# Patient Record
Sex: FEMALE | Race: WHITE | Employment: OTHER | URBAN - METROPOLITAN AREA
[De-identification: names, ages, dates, MRNs, and addresses within clinical notes are randomized per-mention and may not be internally consistent; named-entity substitution may affect disease eponyms.]

---

## 2017-02-17 ENCOUNTER — OFFICE VISIT (OUTPATIENT)
Dept: CARDIOLOGY CLINIC | Age: 82
End: 2017-02-17

## 2017-02-17 ENCOUNTER — CLINICAL SUPPORT (OUTPATIENT)
Dept: CARDIOLOGY CLINIC | Age: 82
End: 2017-02-17

## 2017-02-17 VITALS
DIASTOLIC BLOOD PRESSURE: 70 MMHG | WEIGHT: 208 LBS | BODY MASS INDEX: 33.43 KG/M2 | HEIGHT: 66 IN | HEART RATE: 99 BPM | SYSTOLIC BLOOD PRESSURE: 130 MMHG | OXYGEN SATURATION: 98 % | RESPIRATION RATE: 16 BRPM

## 2017-02-17 DIAGNOSIS — R00.0 TACHYCARDIA: Primary | ICD-10-CM

## 2017-02-17 DIAGNOSIS — I51.7 LAE (LEFT ATRIAL ENLARGEMENT): ICD-10-CM

## 2017-02-17 DIAGNOSIS — R06.02 SOB (SHORTNESS OF BREATH): ICD-10-CM

## 2017-02-17 DIAGNOSIS — I49.3 PVC (PREMATURE VENTRICULAR CONTRACTION): ICD-10-CM

## 2017-02-17 DIAGNOSIS — G47.33 OSA (OBSTRUCTIVE SLEEP APNEA): ICD-10-CM

## 2017-02-17 DIAGNOSIS — I10 ESSENTIAL HYPERTENSION: ICD-10-CM

## 2017-02-17 DIAGNOSIS — I35.0 NONRHEUMATIC AORTIC VALVE STENOSIS: Primary | ICD-10-CM

## 2017-02-17 DIAGNOSIS — I35.0 NONRHEUMATIC AORTIC VALVE STENOSIS: ICD-10-CM

## 2017-02-17 DIAGNOSIS — I65.29 STENOSIS OF CAROTID ARTERY, UNSPECIFIED LATERALITY: Primary | ICD-10-CM

## 2017-02-17 DIAGNOSIS — I77.3 FIBROMUSCULAR DYSPLASIA (HCC): ICD-10-CM

## 2017-02-17 NOTE — PROCEDURES
[facility]  *** FINAL REPORT ***    Name: Sukhdeep Toro  MRN: [id number]       Outpatient  : 1930  HIS Order #: [id number]  [id number] [address] Visit #: [id number]  Date: 2017    TYPE OF TEST: Cerebrovascular Duplex    REASON FOR TEST  Known carotid stenosis    Right Carotid:-             Proximal               Mid                 Distal  cm/s  Systolic  Diastolic  Systolic  Diastolic  Systolic  Diastolic  CCA:     51.3      13.0                            56.0      12.0  Bulb:  ECA:     80.0      11.0  ICA:     34.0      12.0       45.0      15.0       73.0      15.0  ICA/CCA:  0.6       1.0    ICA Stenosis: Unknown    Right Vertebral:-  Finding: Antegrade  Sys:       40.0  Janie:       12.0    Right Subclavian:    Left Carotid:-            Proximal                Mid                 Distal  cm/s  Systolic  Diastolic  Systolic  Diastolic  Systolic  Diastolic  CCA:     78.5      14.0                            51.0      12.0  Bulb:  ECA:     62.0      10.0  ICA:     35.0      12.0       33.0      12.0       63.0      19.0  ICA/CCA:  0.7       1.0    ICA Stenosis: Unknown    Left Vertebral:-  Finding: Antegrade  Sys:       24.0  Janie:        8.0    Left Subclavian:    INTERPRETATION/FINDINGS  PROCEDURE:  Evaluation of the extracranial cerebrovascular arteries  with ultrasound (B-mode imaging, pulsed Doppler, color Doppler). Includes the common carotid, internal carotid, external carotid, and  vertebral arteries. FINDINGS:  Right:  Minimal heterogeneous plaque is present at the origins of the  internal and external carotid arteries. There are no significant  color flow filling defects and peak velocities are normal.  Left:  Minimal heterogeneous plaque is visualized at the origins of  the internal and external carotid arteries. There are no significant  filling defects noted.   Peak velocities are normal.    IMPRESSION: Findings are consistent with 1-49% stenosis of the right  and left internal carotid arteries. Disease severity is at the low  end of range. Vertebral arteries are patent with antegrade flow. In comparison with a previous study dated 10/15/2012, the presence of  plaque at the right carotid bifurcation is newly noted. ADDITIONAL COMMENTS    I have personally reviewed the data relevant to the interpretation of  this  study. TECHNOLOGIST: Margarita Galindo RVT, RDMS, RDCS  Signed: 02/17/2017 01:55 PM    PHYSICIAN: Timothy Lagos.  Romelia Mcrae MD  Signed: 02/20/2017 03:29 PM

## 2017-02-17 NOTE — MR AVS SNAPSHOT
Visit Information Date & Time Provider Department Dept. Phone Encounter #  
 2/17/2017  9:40 AM Marylen Irving, MD [facility] Natalie Krause [phone] [id number] Your Appointments 3/23/2017 12:00 PM  
ROUTINE CARE with Ruth Nicole MD  
[facility]) Appt Note: 6 month f/u  
 [place] [address] [facility] [address]  
[address]  
  
    
 3/24/2017  9:00 AM  
Any with Yamilka Garrett MD  
[address] [place] [address] ([facility] Appt Note: 3 month cpap folllow up; -  
 [address] [facility] [address] [facility] [address]  
  
   
 [address] [facility] [address] [address] 8/15/2017 11:00 AM  
ECHO CARDIOGRAMS 2D with ECHO, [facility] ([facility]IR[facility] SCHEDULING) Appt Note: echo and 6 mfu per nazario [address] [facility] [address]  
[address] [phone]  
  
    
 8/15/2017 11:40 AM  
ESTABLISHED PATIENT with Marylen Irving, MD  
[facility] ([facility]) Appt Note: echo and 6 mfu per nazario [address] [place] [address]  
[address] [address] Upcoming Health Maintenance Date Due OSTEOPOROSIS SCREENING (DEXA) 7/3/1995 MEDICARE YEARLY EXAM 7/3/1995 DTaP/Tdap/Td series (1 - Tdap) 9/21/2011 HEMOGLOBIN A1C Q6M 3/17/2015 MICROALBUMIN Q1 3/24/2015 LIPID PANEL Q1 3/24/2015 EYE EXAM RETINAL OR DILATED Q1 2/17/2016 GLAUCOMA SCREENING Q2Y 3/1/2016 FOOT EXAM Q1 4/27/2016 INFLUENZA AGE 9 TO ADULT 8/1/2016 Allergies as of 2/17/2017  Review Complete On: 2/17/2017 By: Adriel Amaya RN Severity Noted Reaction Type Reactions Bees [Hymenoptera Allergenic Extract]  07/23/2012   Systemic Anaphylaxis Betadine [Povidone-iodine]  08/17/2012    Other (comments) Abdominal swelling, one incident in 1977. Has had since without a reaction. Darvon [Propoxyphene]  02/01/2011   Systemic Nausea and Vomiting Griseofulvin  02/01/2011    Nausea and Vomiting Lipitor [Atorvastatin]  06/13/2011   Systemic Myalgia Current Immunizations  Reviewed on 4/27/2015 Name Date Influenza Vaccine 8/28/2013 Pneumococcal Conjugate (PCV-13) 4/27/2015 Pneumococcal Vaccine (Unspecified Type) 10/1/2006 TD Vaccine 9/20/2011  6:32 PM  
 Zoster Vaccine, Live 6/30/2004 Not reviewed this visit You Were Diagnosed With   
  
 Codes Comments Tachycardia    -  Primary ICD-10-CM: R00.0 ICD-9-CM: 785.0 Essential hypertension     ICD-10-CM: I10 
ICD-9-CM: 401.9 PVC (premature ventricular contraction)     ICD-10-CM: I49.3 ICD-9-CM: 427.69 Nonrheumatic aortic valve stenosis     ICD-10-CM: I35.0 ICD-9-CM: 424.1   
 CINDA (obstructive sleep apnea)     ICD-10-CM: G47.33 
ICD-9-CM: 327.23 Fibromuscular dysplasia (HCC)     ICD-10-CM: I77.3 ICD-9-CM: 884. 8 Vitals BP Pulse Resp Height(growth percentile) Weight(growth percentile) SpO2  
 130/70 (BP 1 Location: Left arm, BP Patient Position: Sitting) 99 16 5' 6\" (1.676 m) 208 lb (94.3 kg) 98% BMI OB Status Smoking Status 33.57 kg/m2 Postmenopausal Former Smoker Vitals History BMI and BSA Data Body Mass Index Body Surface Area  
 33.57 kg/m 2 2.1 m 2 Preferred Pharmacy Pharmacy Name Phone [id number] [phone] Your Updated Medication List  
  
   
This list is accurate as of: 2/17/17 11:02 AM.  Always use your most recent med list.  
  
  
  
  
 aspirin 81 mg tablet Take 162 mg by mouth daily. 2 tabs prior to niacin  
  
 azelastine 137 mcg (0.1 %) nasal spray Commonly known as:  ASTELIN  
2 Sprays by Both Nostrils route two (2) times a day. Use in each nostril as directed COD LIVER OIL PO Take 1,000 mg by mouth daily. dilTIAZem  mg ER capsule Commonly known as:  CARDIZEM CD Take 1 Cap by mouth daily. ezetimibe 10 mg tablet Commonly known as:  Michalene Gills Take 1 Tab by mouth daily. furosemide 40 mg tablet Commonly known as:  LASIX Take 1 Tab by mouth daily. glimepiride 2 mg tablet Commonly known as:  AMARYL Take 1 Tab by mouth daily. glucose blood VI test strips strip Commonly known as:  ACCU-CHEK ELBERT PLUS TEST STRP Check bs daily. Dx 250.00  
  
 ketoconazole 2 % shampoo Commonly known as:  NIZORAL Apply to scalp and shampoo and massage. Rinse out after 5-10 minutes. losartan 100 mg tablet Commonly known as:  COZAAR Take 1 Tab by mouth daily. metFORMIN 1,000 mg tablet Commonly known as:  GLUCOPHAGE Take 1 Tab by mouth two (2) times a day. niacin 1,000 mg Tber Take 2,000 Units by mouth nightly. potassium chloride SA 10 mEq capsule Commonly known as:  Rufus  Take 1 Cap by mouth two (2) times a day. rosuvastatin 5 mg tablet Commonly known as:  CRESTOR Take 1 Tab by mouth daily. TYLENOL EXTRA STRENGTH 500 mg tablet Generic drug:  acetaminophen Take  by mouth every six (6) hours as needed for Pain. VITAMIN B-12 1,000 mcg tablet Generic drug:  cyanocobalamin Take 1,500 mcg by mouth daily. VITAMIN D3 1,000 unit tablet Generic drug:  cholecalciferol Take 2,000 Units by mouth daily. We Performed the Following DUPLEX CAROTID BILATERAL [id number] CPT(R)] To-Do List   
 02/17/2017 ECHO:  2D ECHO COMPLETE ADULT (TTE) W OR WO CONTR Introducing [facility]!    
 [facility] introduces [employer]! patient portal. Now you can access parts of your medical record, email your doctor's office, and request medication refills online. 1. In your internet browser, go to [email or web address] 2. Click on the First Time User? Click Here link in the Sign In box. You will see the New Member Sign Up page. 3. Enter your [employer] Access Code exactly as it appears below. You will not need to use this code after youve completed the sign-up process. If you do not sign up before the expiration date, you must request a new code. · [employer] Access Code: [email or web address] Expires: 3/19/2017  2:19 PM 
 
4. Enter the last four digits of your Social Security Number (xxxx) and Date of Birth (mm/dd/yyyy) as indicated and click Submit. You will be taken to the next sign-up page. 5. Create a [employer] ID. This will be your [employer] login ID and cannot be changed, so think of one that is secure and easy to remember. 6. Create a [employer] password. You can change your password at any time. 7. Enter your Password Reset Question and Answer. This can be used at a later time if you forget your password. 8. Enter your e-mail address. You will receive e-mail notification when new information is available in [address] 9. Click Sign Up. You can now view and download portions of your medical record. 10. Click the Download Summary menu link to download a portable copy of your medical information. If you have questions, please visit the Frequently Asked Questions section of the [employer] website. Remember, [employer] is NOT to be used for urgent needs. For medical emergencies, dial 911. Now available from your iPhone and Android! Please provide this summary of care documentation to your next provider. Your primary care clinician is listed as KEERTHI JENSEN. If you have any questions after today's visit, please call [phone].
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
## 2017-02-24 ENCOUNTER — TELEPHONE (OUTPATIENT)
Dept: CARDIOLOGY CLINIC | Age: 82
End: 2017-02-24

## 2017-02-24 NOTE — TELEPHONE ENCOUNTER
The patient requested a call back on [phone]. She stated that she's been awaiting the nurses call back regarding her Vascular. Thanks!

## 2017-02-24 NOTE — TELEPHONE ENCOUNTER
LVM for patient to return call at earliest convenience. Carotid: 2/17, IMPRESSION: Findings are consistent with 1-49% stenosis of the right  and left internal carotid arteries. Disease severity is at the low  end of range. Vertebral arteries are patent with antegrade flow.     In comparison with a previous study dated 10/15/2012, the presence of  plaque at the right carotid bifurcation is newly noted. Echo: 2/17, Discussed at office visit: EF 60%, LVH, mod AS    Above Carotid results discussed with patient. No further questions.    2 pt identifiers used

## 2017-03-03 ENCOUNTER — DOCUMENTATION ONLY (OUTPATIENT)
Dept: SLEEP MEDICINE | Age: 82
End: 2017-03-03

## 2017-03-23 ENCOUNTER — OFFICE VISIT (OUTPATIENT)
Dept: INTERNAL MEDICINE CLINIC | Age: 82
End: 2017-03-23

## 2017-03-23 VITALS
WEIGHT: 201.6 LBS | OXYGEN SATURATION: 96 % | SYSTOLIC BLOOD PRESSURE: 126 MMHG | HEART RATE: 94 BPM | DIASTOLIC BLOOD PRESSURE: 75 MMHG | RESPIRATION RATE: 17 BRPM | TEMPERATURE: 98 F | BODY MASS INDEX: 32.4 KG/M2 | HEIGHT: 66 IN

## 2017-03-23 DIAGNOSIS — Z13.39 SCREENING FOR ALCOHOLISM: ICD-10-CM

## 2017-03-23 DIAGNOSIS — Z78.9 ADVANCE DIRECTIVE ON FILE: ICD-10-CM

## 2017-03-23 DIAGNOSIS — Z00.00 ROUTINE GENERAL MEDICAL EXAMINATION AT A HEALTH CARE FACILITY: ICD-10-CM

## 2017-03-23 DIAGNOSIS — E11.9 CONTROLLED TYPE 2 DIABETES MELLITUS WITHOUT COMPLICATION, WITHOUT LONG-TERM CURRENT USE OF INSULIN (HCC): Primary | ICD-10-CM

## 2017-03-23 DIAGNOSIS — I35.0 NONRHEUMATIC AORTIC VALVE STENOSIS: ICD-10-CM

## 2017-03-23 DIAGNOSIS — J44.9 CHRONIC OBSTRUCTIVE PULMONARY DISEASE, UNSPECIFIED COPD TYPE (HCC): ICD-10-CM

## 2017-03-23 DIAGNOSIS — Z78.0 POSTMENOPAUSE: ICD-10-CM

## 2017-03-23 DIAGNOSIS — I10 ESSENTIAL HYPERTENSION: ICD-10-CM

## 2017-03-23 RX ORDER — MOMETASONE FUROATE 1 MG/G
CREAM TOPICAL DAILY
Qty: 15 G | Refills: 0 | Status: SHIPPED | OUTPATIENT
Start: 2017-03-23 | End: 2017-09-19 | Stop reason: ALTCHOICE

## 2017-03-23 NOTE — PROGRESS NOTES
HISTORY OF PRESENT ILLNESS  Milka Rodriguez is a 80 y.o. female. HPI  On ketogenic diet - 60% fat, 30% protein and 10% carbs with no sugars - for 6 weeks. Has lost 12 lbs and feels better. Legs are not swelling. BS have been good. Skin and nails are splitting on fingers. Blisters on fingers - on thumbs inially, also had been on palm and index fingers. Itches and scratches in sleep. Right foot with onychomycosis. Does not want a pill. Has been using VICKS. Diabetes - continues to see Dr. Evelin Rodriguez. A1C was 6.1. Last eye exam was 1/2017. Neuropathy is stable. Intermittent. Can somedays walk without cane. Stretching for back helps. Rarely has bad day and needs to use her walker. UTD with Dr. Abdalla Betters for AS and bradycardia. Recent ECHO was unchanged. SOB is stable. Worsening fecal incontinence in the last several years. Slightly improved after Ketogenic diet. Current Outpatient Prescriptions:     ketoconazole (NIZORAL) 2 % shampoo, Apply to scalp and shampoo and massage. Rinse out after 5-10 minutes. , Disp: 120 mL, Rfl: 4    COD LIVER OIL PO, Take 1,000 mg by mouth daily. , Disp: , Rfl:     acetaminophen (TYLENOL EXTRA STRENGTH) 500 mg tablet, Take  by mouth every six (6) hours as needed for Pain., Disp: , Rfl:     potassium chloride SA (MICRO-K) 10 mEq capsule, Take 1 Cap by mouth two (2) times a day., Disp: 180 Cap, Rfl: 3    ezetimibe (ZETIA) 10 mg tablet, Take 1 Tab by mouth daily. , Disp: 90 Tab, Rfl: 3    glimepiride (AMARYL) 2 mg tablet, Take 1 Tab by mouth daily. , Disp: 90 Tab, Rfl: 3    rosuvastatin (CRESTOR) 5 mg tablet, Take 1 Tab by mouth daily. , Disp: 90 Tab, Rfl: 3    metFORMIN (GLUCOPHAGE) 1,000 mg tablet, Take 1 Tab by mouth two (2) times a day., Disp: 180 Tab, Rfl: 3    losartan (COZAAR) 100 mg tablet, Take 1 Tab by mouth daily. , Disp: 90 Tab, Rfl: 3    diltiazem CD (CARDIZEM CD) 180 mg ER capsule, Take 1 Cap by mouth daily. , Disp: 90 Cap, Rfl: 3   furosemide (LASIX) 40 mg tablet, Take 1 Tab by mouth daily. , Disp: 90 Tab, Rfl: 3    azelastine (ASTELIN) 137 mcg (0.1 %) nasal spray, 2 Sprays by Both Nostrils route two (2) times a day. Use in each nostril as directed (Patient taking differently: 2 Sprays by Both Nostrils route as needed. Use in each nostril as directed), Disp: 3 Bottle, Rfl: 3    niacin 1,000 mg TbER, Take 2,000 Units by mouth nightly., Disp: 180 Tab, Rfl: 3    glucose blood VI test strips (ACCU-CHEK ELBERT PLUS) strip, Check bs daily. Dx 250.00, Disp: 1 Package, Rfl: 11    cyanocobalamin (VITAMIN B-12) 1,000 mcg tablet, Take 1,500 mcg by mouth daily. , Disp: , Rfl:     cholecalciferol, vitamin d3, (VITAMIN D) 1,000 unit tablet, Take 2,000 Units by mouth daily. , Disp: , Rfl:     aspirin 81 mg tablet, Take 162 mg by mouth daily. 2 tabs prior to niacin, Disp: , Rfl:     Visit Vitals    /75 (BP 1 Location: Left arm, BP Patient Position: Sitting)    Pulse 94    Temp 98 °F (36.7 °C) (Oral)    Resp 17    Ht 5' 6\" (1.676 m)    Wt 201 lb 9.6 oz (91.4 kg)    SpO2 96%    BMI 32.54 kg/m2     ROS  See above  Physical Exam  NECK: supple, no lad, no bruit, no tm  LUNGS: cta bilat  CV rrr, no g/r, 3/6 fernando  ABD: soft, nt, nd, nabs  EXT: no c/c/e  Feet: 2+ pt and dp pulses. Cool to touch but good cap refill. Decreased sensation to light touch and filament testing to mid shin bilat. No lesions, calluses or ulcerations. 1st, 3rd and 5th toenails on right foot thickened and yellowed consistent with onychomycosis. Hands - mild erythema lateral aspect bilat index fingers with evidence of excoriations. ASSESSMENT and PLAN  DM - well controlled, f/u Dr. Jane Zapata. Foot exam completed today  AS - controlled, valve is stable per recent ECHO. continu f/u with Dr. Khushboo Vela.    htn - controlled, cont same  COPD - controlled, cont same  Fecal incontinence - better with recent dietary changes. Discussed PT if worsens again.     Onychomycosis - VICKS to toenails overnight x 9 months. Eczema hands - mometasone cream  Orders Placed This Encounter    DEXA BONE DENSITY STUDY AXIAL    mometasone (ELOCON) 0.1 % topical cream     Follow-up Disposition:  Return in about 6 months (around 9/23/2017). This is an Initial Medicare Annual Wellness Exam (AWV) (Performed 12 months after IPPE or effective date of Medicare Part B enrollment, Once in a lifetime)    I have reviewed the patient's medical history in detail and updated the computerized patient record.      History     Past Medical History:   Diagnosis Date    Aortic stenosis     CAD (coronary artery disease)     pt states she does not have this    Chronic pain     back - spinal stenosis    COPD     Diabetes ([address]     Diabetic neuropathy ([address]     Diverticulosis of colon     GERD (gastroesophageal reflux disease)     Hiatal Hernia - pt states she does not have this    Hypercholesterolemia     CINDA (obstructive sleep apnea) 08-    AHI: 18.6 per hour    Other ill-defined conditions(799.89)     heart murmur    Other ill-defined conditions(799.89)     colon infection    Peripheral neuropathy ([address]     Renal artery stenosis ([address]     Right    Unspecified adverse effect of anesthesia     delayed awakening - \"does not need as much\"      Past Surgical History:   Procedure Laterality Date    ABDOMEN SURGERY PROC UNLISTED  1977    R Renal Artery Stenosis repair    ABDOMEN SURGERY [address] or 1983    [employer] Knee    BREAST SURGERY PROCEDURE UNLISTED  1968    Left - benign cyst    ENDOSCOPY, COLON, DIAGNOSTIC  1999    hx of polys in the past    HX GYN  1966    D&C    HX HEENT      Bilat Cataracts    HX HEENT      R Retinal Hem    HX HEENT      Eyelid Cysts x 4    HX HEENT      cyst removed above eyebrow    HX HEENT      oral surgeries - gum surgery/floating root removed x 2    HX ORTHOPAEDIC  11/06    L1-S1 2007    HX ORTHOPAEDIC  1985    L 4th Trigger finger    HX OTHER SURGICAL      renal artery bypass    HX OTHER SURGICAL      tonsillectomy  adenoidectomy    HX OTHER SURGICAL      retinal hemorrhage    HX OTHER SURGICAL      cataract bilateral    HX OTHER SURGICAL      back surgery    HX TONSILLECTOMY      T & A     Current Outpatient Prescriptions   Medication Sig Dispense Refill    ketoconazole (NIZORAL) 2 % shampoo Apply to scalp and shampoo and massage. Rinse out after 5-10 minutes. 120 mL 4    COD LIVER OIL PO Take 1,000 mg by mouth daily.  acetaminophen (TYLENOL EXTRA STRENGTH) 500 mg tablet Take  by mouth every six (6) hours as needed for Pain.  potassium chloride SA (MICRO-K) 10 mEq capsule Take 1 Cap by mouth two (2) times a day. 180 Cap 3    ezetimibe (ZETIA) 10 mg tablet Take 1 Tab by mouth daily. 90 Tab 3    glimepiride (AMARYL) 2 mg tablet Take 1 Tab by mouth daily. 90 Tab 3    rosuvastatin (CRESTOR) 5 mg tablet Take 1 Tab by mouth daily. 90 Tab 3    metFORMIN (GLUCOPHAGE) 1,000 mg tablet Take 1 Tab by mouth two (2) times a day. 180 Tab 3    losartan (COZAAR) 100 mg tablet Take 1 Tab by mouth daily. 90 Tab 3    diltiazem CD (CARDIZEM CD) 180 mg ER capsule Take 1 Cap by mouth daily. 90 Cap 3    furosemide (LASIX) 40 mg tablet Take 1 Tab by mouth daily. 90 Tab 3    azelastine (ASTELIN) 137 mcg (0.1 %) nasal spray 2 Sprays by Both Nostrils route two (2) times a day. Use in each nostril as directed (Patient taking differently: 2 Sprays by Both Nostrils route as needed. Use in each nostril as directed) 3 Bottle 3    niacin 1,000 mg TbER Take 2,000 Units by mouth nightly. 180 Tab 3    glucose blood VI test strips (ACCU-CHEK ELBERT PLUS) strip Check bs daily. Dx 250.00 1 Package 11    cyanocobalamin (VITAMIN B-12) 1,000 mcg tablet Take 1,500 mcg by mouth daily.       cholecalciferol, vitamin d3, (VITAMIN D) 1,000 unit tablet Take 2,000 Units by mouth daily.  aspirin 81 mg tablet Take 162 mg by mouth daily. 2 tabs prior to niacin       Allergies   Allergen Reactions    Bees [Hymenoptera Allergenic Extract] Anaphylaxis    Betadine [Povidone-Iodine] Other (comments)     Abdominal swelling, one incident in 1977. Has had since without a reaction.  Darvon [Propoxyphene] Nausea and Vomiting    Griseofulvin Nausea and Vomiting    Lipitor [Atorvastatin] Myalgia     Family History   Problem Relation Age of Onset   [facility] Stroke Mother     Cancer Mother      ovarian    Heart Attack Mother     Cancer Father      lung/throat    Alcohol abuse Father     Stroke Maternal Grandfather      Social History   Substance Use Topics    Smoking status: Former Smoker     Packs/day: 2.00     Years: 13.00     Types: Cigarettes     Quit date: 6/6/1977    Smokeless tobacco: Never Used      Comment: former cigarette smoker    Alcohol use 0.0 - 0.6 oz/week     0 - 1 Standard drinks or equivalent per week      Comment: 1 drink every several months     Patient Active Problem List   Diagnosis Code    Tachycardia R00.0    Hypertension I10    PVC (premature ventricular contraction) I49.3    Aortic stenosis I35.0    Edema R60.9    Foreign body in pharynx T17.208A    Type II or unspecified type diabetes mellitus without mention of complication, uncontrolled E11.65    Other and unspecified hyperlipidemia E78.5    CINDA (obstructive sleep apnea) G47.33    Other dyspnea and respiratory abnormality R06.09, R09.89    Mitral stenosis I05.0    COPD (chronic obstructive pulmonary disease) ([facility]) J44.9    Fibromuscular dysplasia ([facility]) I77.3         Depression Risk Factor Screening:     PHQ 2 / 9, over the last two weeks 4/24/2014   Little interest or pleasure in doing things Not at all   Feeling down, depressed or hopeless Not at all   Total Score PHQ 2 0     Alcohol Risk Factor Screening:    On any occasion during the past 3 months, have you had more than 3 drinks containing alcohol? No    Do you average more than 7 drinks per week? No    Functional Ability and Level of Safety:     Hearing Loss   normal-to-mild    Activities of Daily Living   Self-care. Requires assistance with: no ADLs    Fall Risk     Fall Risk Assessment, last 12 mths 4/27/2015   Able to walk? Yes   Fall in past 12 months? No     Abuse Screen   Patient is not abused    Review of Systems   Pertinent items are noted in HPI. Physical Examination     No exam data present    Evaluation of Cognitive Function:  Mood/affect:  happy  Appearance: age appropriate  Family member/caregiver input: none    See above    Patient Care Team:  Karyn Navarrete MD as PCP - General (Internal Medicine)  Gregorio Cummins MD (Endocrinology)  Ramone Laguerre MD as Physician (Ophthalmology)  Thomas Silva MD as Physician (Dermatology)  Emanuel Oh MD as Physician (Dermatology)  Rosie Iqbal MD as Physician (Cardiology)  Sadaf Dee MD (Cardiology)  Robinson Pappas MD as Physician (Sleep Medicine)    Advice/Referrals/Counseling   Education and counseling provided:  advanced care plan on file      Assessment/Plan    HM -   Declines TDAP  DXA ordered  Will obtain lab results from Dr. Marilyn Peck and eye exam notes from Dr. Valente Garcia This Encounter    DEXA BONE DENSITY STUDY AXIAL    mometasone (ELOCON) 0.1 % topical cream     Follow-up Disposition:  Return in about 6 months (around 9/23/2017). Braulio Houston

## 2017-03-23 NOTE — PATIENT INSTRUCTIONS
Medicare Wellness Visit, Female    The best way to live healthy is to have a healthy lifestyle by eating a well-balanced diet, exercising regularly, limiting alcohol and stopping smoking. Regular physical exams and screening tests are another way to keep healthy. Preventive exams provided by your health care provider can find health problems before they become diseases or illnesses. Preventive services including immunizations, screening tests, monitoring and exams can help you take care of your own health. All people over age 72 should have a pneumovax  and and a prevnar shot to prevent pneumonia. These are once in a lifetime unless you and your provider decide differently. All people over 65 should have a yearly flu shot and a tetanus vaccine every 10 years. A bone mass density to screen for osteoporosis or thinning of the bones should be done every 2 years after 65. Screening for diabetes mellitus with a blood sugar test should be done every year. Glaucoma is a disease of the eye due to increased ocular pressure that can lead to blindness and it should be done every year by an eye professional.    Cardiovascular screening tests that check for elevated lipids (fatty part of blood) which can lead to heart disease and strokes should be done every 5 years. Colorectal screening that evaluates for blood or polyps in your colon should be done yearly as a stool test or every five years as a flexible sigmoidoscope or every 10 years as a colonoscopy up to age 76. Breast cancer screening with a mammogram is recommended biennially  for women age 54-69. Screening for cervical cancer with a pap smear and pelvic exam is recommended for women after age 72 years every 2 years up to age 79 or when the provider and patient decide to stop. If there is a history of cervical abnormalities or other increased risk for cancer then the test is recommended yearly.     Hepatitis C screening is also recommended for anyone born between [birth year] through [address]. A shingles vaccine is also recommended once in a lifetime after age 61. Your Medicare Wellness Exam is recommended annually. Here is a list of your current Health Maintenance items with a due date:  Health Maintenance Due   Topic Date Due    Bone Density Screening  Ordered today    Annual Well Visit  03/23/2018    DTaP/Tdap/Td  (1 - Tdap) Declined    Hemoglobin A1C    Up to date through Dr. Jerome Canavan    Albumin Urine Test  Up to date through Dr. Dawood Hollis Cholesterol Test   Up to date through Dr. Shepard Boxer to date    Glaucoma Screening   Up to date    Diabetic Foot Care  Completed today     Layo Martin nightly to affected toenails for 9 months.

## 2017-03-23 NOTE — MR AVS SNAPSHOT
Visit Information Date & Time Provider Department Dept. Phone Encounter #  
 3/23/2017 12:00 PM Dana Armstrong MD [facility] [phone] [id number] Follow-up Instructions Return in about 6 months (around 9/23/2017). Your Appointments 3/24/2017  9:00 AM  
Any with Dana Silva MD  
[address] [place] ([facility]) Appt Note: 3 month cpap folllow up; -  
 [address] [facility] [address]  
  
   
 [address] [address] 8/15/2017 11:00 AM  
ECHO CARDIOGRAMS 2D with ECHO, [facility] ([facility] SCHEDULING) Appt Note: echo and 6 mfu per [address]  [address] [facility] [address]  
[address] [address]  
  
    
 8/15/2017 11:40 AM  
ESTABLISHED PATIENT with David Brennan MD  
[facility] ([facility]) Appt Note: echo and 6 mfu per nazario [address] [address]  
[address] [address]  
  
    
 9/25/2017 11:20 AM  
ROUTINE CARE with Dana Armstrong MD  
[facility]) Appt Note: 6 month follow up visit [facility] [address] [facility] [address]  
[facility] [address] [facility] [address] [facility] [address] Upcoming Health Maintenance Date Due OSTEOPOROSIS SCREENING (DEXA) 7/3/1995 MEDICARE YEARLY EXAM 7/3/1995 DTaP/Tdap/Td series (1 - Tdap) 9/21/2011 HEMOGLOBIN A1C Q6M 3/17/2015 MICROALBUMIN Q1 3/24/2015 LIPID PANEL Q1 3/24/2015 EYE EXAM RETINAL OR DILATED Q1 2/17/2016 GLAUCOMA SCREENING Q2Y 3/1/2016 FOOT EXAM Q1 4/27/2016 Allergies as of 3/23/2017  Review Complete On: 3/23/2017 By: Dana Armstrong MD  
  
 Severity Noted Reaction Type Reactions Bees [Hymenoptera Allergenic Extract]  07/23/2012   Systemic Anaphylaxis Betadine [Povidone-iodine]  08/17/2012    Other (comments) Abdominal swelling, one incident in 1977. Has had since without a reaction. Darvon [Propoxyphene]  02/01/2011   Systemic Nausea and Vomiting Griseofulvin  02/01/2011    Nausea and Vomiting Lipitor [Atorvastatin]  06/13/2011   Systemic Myalgia Current Immunizations  Reviewed on 4/27/2015 Name Date Influenza Vaccine 8/28/2013 Pneumococcal Conjugate (PCV-13) 4/27/2015 Pneumococcal Vaccine (Unspecified Type) 10/1/2006 TD Vaccine 9/20/2011  6:32 PM  
 Zoster Vaccine, Live 6/30/2004 Not reviewed this visit You Were Diagnosed With   
  
 Codes Comments Postmenopause    -  Primary ICD-10-CM: Z78.0 ICD-9-CM: V49.81 Routine general medical examination at a health care facility     ICD-10-CM: Z00.00 ICD-9-CM: V70.0 Screening for alcoholism     ICD-10-CM: Z13.89 ICD-9-CM: V79.1 Controlled type 2 diabetes mellitus without complication, without long-term current use of insulin ([facility].)     ICD-10-CM: E11.9 ICD-9-CM: 250.00 Vitals BP Pulse Temp Resp Height(growth percentile) Weight(growth percentile) 126/75 (BP 1 Location: Left arm, BP Patient Position: Sitting) 94 98 °F (36.7 °C) (Oral) 17 5' 6\" (1.676 m) 201 lb 9.6 oz (91.4 kg) SpO2 BMI OB Status Smoking Status 96% 32.54 kg/m2 Postmenopausal Former Smoker BMI and BSA Data Body Mass Index Body Surface Area 32.54 kg/m 2 2.06 m 2 Preferred Pharmacy Pharmacy Name Phone [id number] 60 [phone] Your Updated Medication List  
  
   
This list is accurate as of: 3/23/17 12:47 PM.  Always use your most recent med list.  
  
  
  
  
 aspirin 81 mg tablet Take 162 mg by mouth daily. 2 tabs prior to niacin azelastine 137 mcg (0.1 %) nasal spray Commonly known as:  ASTELIN  
2 Sprays by Both Nostrils route two (2) times a day. Use in each nostril as directed COD LIVER OIL PO Take 1,000 mg by mouth daily. dilTIAZem  mg ER capsule Commonly known as:  CARDIZEM CD Take 1 Cap by mouth daily. ezetimibe 10 mg tablet Commonly known as:  Teresa Ahumada Take 1 Tab by mouth daily. furosemide 40 mg tablet Commonly known as:  LASIX Take 1 Tab by mouth daily. glimepiride 2 mg tablet Commonly known as:  AMARYL Take 1 Tab by mouth daily. glucose blood VI test strips strip Commonly known as:  ACCU-CHEK ELBERT PLUS TEST STRP Check bs daily. Dx 250.00  
  
 ketoconazole 2 % shampoo Commonly known as:  NIZORAL Apply to scalp and shampoo and massage. Rinse out after 5-10 minutes. losartan 100 mg tablet Commonly known as:  COZAAR Take 1 Tab by mouth daily. metFORMIN 1,000 mg tablet Commonly known as:  GLUCOPHAGE Take 1 Tab by mouth two (2) times a day. mometasone 0.1 % topical cream  
Commonly known as:  Nikki Bill Apply  to affected area daily. niacin 1,000 mg Tber Take 2,000 Units by mouth nightly. potassium chloride SA 10 mEq capsule Commonly known as:  Pankaj Irish Take 1 Cap by mouth two (2) times a day. rosuvastatin 5 mg tablet Commonly known as:  CRESTOR Take 1 Tab by mouth daily. TYLENOL EXTRA STRENGTH 500 mg tablet Generic drug:  acetaminophen Take  by mouth every six (6) hours as needed for Pain. VITAMIN B-12 1,000 mcg tablet Generic drug:  cyanocobalamin Take 1,500 mcg by mouth daily. VITAMIN D3 1,000 unit tablet Generic drug:  cholecalciferol Take 2,000 Units by mouth daily. Prescriptions Sent to Pharmacy Refills  
 mometasone (ELOCON) 0.1 % topical cream 0 Sig: Apply  to affected area daily.   
 Class: Normal  
 Pharmacy: [id number] [place], [address] #: [phone] Route: Topical  
  
We Performed the Following  DIABETES FOOT EXAM [7 Custom] Follow-up Instructions Return in about 6 months (around 9/23/2017). To-Do List   
 03/30/2017 Imaging:  DEXA BONE DENSITY STUDY AXIAL Patient Instructions Medicare Wellness Visit, Female The best way to live healthy is to have a healthy lifestyle by eating a well-balanced diet, exercising regularly, limiting alcohol and stopping smoking. Regular physical exams and screening tests are another way to keep healthy. Preventive exams provided by your health care provider can find health problems before they become diseases or illnesses. Preventive services including immunizations, screening tests, monitoring and exams can help you take care of your own health. All people over age 72 should have a pneumovax  and and a prevnar shot to prevent pneumonia. These are once in a lifetime unless you and your provider decide differently. All people over 65 should have a yearly flu shot and a tetanus vaccine every 10 years. A bone mass density to screen for osteoporosis or thinning of the bones should be done every 2 years after 65. Screening for diabetes mellitus with a blood sugar test should be done every year. Glaucoma is a disease of the eye due to increased ocular pressure that can lead to blindness and it should be done every year by an eye professional. 
 
Cardiovascular screening tests that check for elevated lipids (fatty part of blood) which can lead to heart disease and strokes should be done every 5 years. Colorectal screening that evaluates for blood or polyps in your colon should be done yearly as a stool test or every five years as a flexible sigmoidoscope or every 10 years as a colonoscopy up to age 76.  
 
Breast cancer screening with a mammogram is recommended biennially  for women age 54-69. Screening for cervical cancer with a pap smear and pelvic exam is recommended for women after age 72 years every 2 years up to age 79 or when the provider and patient decide to stop. If there is a history of cervical abnormalities or other increased risk for cancer then the test is recommended yearly. Hepatitis C screening is also recommended for anyone born between 80 through [address]. A shingles vaccine is also recommended once in a lifetime after age 61. Your Medicare Wellness Exam is recommended annually. Here is a list of your current Health Maintenance items with a due date: 
Health Maintenance Due Topic Date Due  Bone Density Screening  Ordered today Denisha Jade Annual Well Visit  03/23/2018  DTaP/Tdap/Td  (1 - Tdap) Declined  Hemoglobin A1C    Up to date through Dr. Aria Rose  Albumin Urine Test  Up to date through Dr. Aria Rose  Cholesterol Test   Up to date through Dr. Aria Rsoe  Eye Exam  Up to date  Glaucoma Screening   Up to date  Diabetic Foot Care  Completed today Clifton Valero nightly to affected toenails for 9 months. Introducing [facility] [place] [facility]! Gustavo Ortega introduces Touch[employer] patient portal. Now you can access parts of your medical record, email your doctor's office, and request medication refills online. 1. In your internet browser, go to [email or web address] 2. Click on the First Time User? Click Here link in the Sign In box. You will see the New Member Sign Up page. 3. Enter your Touch[employer] Access Code exactly as it appears below. You will not need to use this code after youve completed the sign-up process. If you do not sign up before the expiration date, you must request a new code. · Touch[employer] Access Code: [email or web address] Expires: 6/21/2017 12:47 PM 
 
4. Enter the last four digits of your Social Security Number (xxxx) and Date of Birth (mm/dd/yyyy) as indicated and click Submit.  You will be taken to the next sign-up page. 5. Create a [employer] ID. This will be your [employer] login ID and cannot be changed, so think of one that is secure and easy to remember. 6. Create a [employer] password. You can change your password at any time. 7. Enter your Password Reset Question and Answer. This can be used at a later time if you forget your password. 8. Enter your e-mail address. You will receive e-mail notification when new information is available in [address] 9. Click Sign Up. You can now view and download portions of your medical record. 10. Click the Download Summary menu link to download a portable copy of your medical information. If you have questions, please visit the Frequently Asked Questions section of the [employer] website. Remember, [employer] is NOT to be used for urgent needs. For medical emergencies, dial 911. Now available from your iPhone and Android! Please provide this summary of care documentation to your next provider. Your primary care clinician is listed as KEERTHI JENSEN. If you have any questions after today's visit, please call [phone].

## 2017-03-24 ENCOUNTER — OFFICE VISIT (OUTPATIENT)
Dept: SLEEP MEDICINE | Age: 82
End: 2017-03-24

## 2017-03-24 VITALS
HEIGHT: 66 IN | HEART RATE: 102 BPM | OXYGEN SATURATION: 97 % | WEIGHT: 204 LBS | BODY MASS INDEX: 32.78 KG/M2 | SYSTOLIC BLOOD PRESSURE: 132 MMHG | DIASTOLIC BLOOD PRESSURE: 75 MMHG

## 2017-03-24 DIAGNOSIS — G47.33 OSA (OBSTRUCTIVE SLEEP APNEA): Primary | ICD-10-CM

## 2017-03-24 DIAGNOSIS — J44.9 CHRONIC OBSTRUCTIVE PULMONARY DISEASE, UNSPECIFIED COPD TYPE (HCC): ICD-10-CM

## 2017-03-24 NOTE — Clinical Note
* Device pressure change to Auto-Bi - Level  EPAP 6 cmH2O and PS 6 for AHI 14.0.  * PAP card download in 4 weeks.

## 2017-03-24 NOTE — PROGRESS NOTES
[address] [facility]. [place], [address]  Tel.  [phone]  Fax. 100 [facility] 60  [place], [address] [facility]  Tel.  [phone]  Fax. [phone] [address] [facility] [address]   Tel.  [phone]  Fax. [phone]     Ivett Barreto is a 80 y.o. female seen for a positive airway pressure follow-up. She reports no problems using the device. She is 100% compliant over the past 30 days. The following problems are identified:    Drowsiness no Problems exhaling no   Snoring no Forget to put on no   Mask Comfortable yes Can't fall asleep no   Dry Mouth no Mask falls off no   Air Leaking no Frequent awakenings no         She admits that her sleep has improved. Allergies   Allergen Reactions    Bees [Hymenoptera Allergenic Extract] Anaphylaxis    Betadine [Povidone-Iodine] Other (comments)     Abdominal swelling, one incident in 1977. Has had since without a reaction.  Darvon [Propoxyphene] Nausea and Vomiting    Griseofulvin Nausea and Vomiting    Lipitor [Atorvastatin] Myalgia       She has a current medication list which includes the following prescription(s): mometasone, ketoconazole, cod liver oil, acetaminophen, potassium chloride sa, ezetimibe, glimepiride, rosuvastatin, metformin, losartan, diltiazem cd, furosemide, azelastine, niacin, glucose blood vi test strips, cyanocobalamin, cholecalciferol, and aspirin. .      She  has a past medical history of Aortic stenosis; CAD (coronary artery disease); Chronic pain; COPD; Diabetes ([address] Diabetic neuropathy ([address] Diverticulosis of colon; GERD (gastroesophageal reflux disease); Hypercholesterolemia; CINDA (obstructive sleep apnea) (08-); Other ill-defined conditions(799.89); Other ill-defined conditions(799.89); Peripheral neuropathy ([address] Renal artery stenosis ([address] and Unspecified adverse effect of anesthesia.     [place] Sleepiness Score: 6   and Modified F.O.S.Q. Score Total / 2: 17.5 which reflect improved sleep quality over therapy time. O>    Visit Vitals    /75    Pulse (!) 102    Ht 5' 6\" (1.676 m)    Wt 204 lb (92.5 kg)    SpO2 97%    BMI 32.93 kg/m2         General:   Not in acute distress   Eyes:  Anicteric sclerae, no obvious strabismus   Nose:  No obvious nasal septum deviation    Oropharynx:   Class 4 oropharyngeal outlet, thick tongue base, uvula not seen due to low-lying soft palate, narrow tonsilo-pharyngeal pilars   Tonsils:   tonsils are not visualized due to low-lying soft palate   Neck:   midline trachea   Chest/Lungs:  Equal lung expansion, clear on auscultation    CVS:  Normal rate, regular rhythm; no JVD   Skin:  Warm to touch; no obvious rashes   Neuro:  No focal deficits ; no obvious tremor    Psych:  Normal affect,  normal countenance;           A>    ICD-10-CM ICD-9-CM    1. CINDA (obstructive sleep apnea) G47.33 327.23    2. Chronic obstructive pulmonary disease, unspecified COPD type (HCC) J44.9 496      AHI = 18.6. On Bi - Level : 12/06 cmH2O. Compliant:      yes    Therapeutic Response:  Positive    P>    * Device pressure change to Auto-Bi - Level  EPAP 6 cmH2O and PS 6 for AHI 14.0.    * PAP card download in 4 weeks. * We have recommended a dedicated weight loss through appropriate diet and an exercise regiment as significant weight reduction has been shown to reduce severity of obstructive sleep apnea. * Follow-up Disposition:  Return in about 6 months (around 9/24/2017), or if symptoms worsen or fail to improve. * She was asked to contact our office for any problems regarding PAP therapy. * Counseling was provided regarding the importance of regular PAP use and on proper sleep hygiene and safe driving. * Re-enforced proper and regular cleaning for the device. Thank you for allowing us to participate in your patient's medical care.       Aarti Houston MD, FAASM  Diplomate American Board of Sleep Medicine  Diplomate in Sleep Medicine - ABP  Electronically signed.

## 2017-03-24 NOTE — PATIENT INSTRUCTIONS
[address] [facility] [place]. [place], [address]  Tel.  [phone]  Fax. 100 [facility] 60  [address] [facility], [address]  Tel.  [phone]  Fax. [phone] [address]  Tel.  [phone]  Fax. [phone]     Learning About CPAP for Sleep Apnea  What is CPAP? CPAP is a small machine that you use at home every night while you sleep. It increases air pressure in your throat to keep your airway open. When you have sleep apnea, this can help you sleep better so you feel much better. CPAP stands for \"continuous positive airway pressure. \"  The CPAP machine will have one of the following:  · A mask that covers your nose and mouth  · Prongs that fit into your nose  · A mask that covers your nose only, the most common type. This type is called NCPAP. The N stands for \"nasal.\"  Why is it done? CPAP is usually the best treatment for obstructive sleep apnea. It is the first treatment choice and the most widely used. Your doctor may suggest CPAP if you have:  · Moderate to severe sleep apnea. · Sleep apnea and coronary artery disease (CAD) or heart failure. How does it help? · CPAP can help you have more normal sleep, so you feel less sleepy and more alert during the daytime. · CPAP may help keep heart failure or other heart problems from getting worse. · NCPAP may help lower your blood pressure. · If you use CPAP, your bed partner may also sleep better because you are not snoring or restless. What are the side effects? Some people who use CPAP have:  · A dry or stuffy nose and a sore throat. · Irritated skin on the face. · Sore eyes. · Bloating. If you have any of these problems, work with your doctor to fix them. Here are some things you can try:  · Be sure the mask or nasal prongs fit well. · See if your doctor can adjust the pressure of your CPAP. · If your nose is dry, try a humidifier.   · If your nose is runny or stuffy, try decongestant medicine or a steroid nasal spray. If these things do not help, you might try a different type of machine. Some machines have air pressure that adjusts on its own. Others have air pressures that are different when you breathe in than when you breathe out. This may reduce discomfort caused by too much pressure in your nose. Where can you learn more? Go to CAD Crowd.be  Enter Jannette Baltazar in the search box to learn more about \"Learning About CPAP for Sleep Apnea. \"   © 9823-4217 Healthwise, [place]. Care instructions adapted under license by J [employer] Don (which disclaims liability or warranty for this information). This care instruction is for use with your licensed healthcare professional. If you have questions about a medical condition or this instruction, always ask your healthcare professional. [address] any warranty or liability for your use of this information. Content Version: 4.2.75745; Last Revised: January 11, 2010  PROPER SLEEP HYGIENE    What to avoid  · Do not have drinks with caffeine, such as coffee or black tea, for 8 hours before bed. · Do not smoke or use other types of tobacco near bedtime. Nicotine is a stimulant and can keep you awake. · Avoid drinking alcohol late in the evening, because it can cause you to wake in the middle of the night. · Do not eat a big meal close to bedtime. If you are hungry, eat a light snack. · Do not drink a lot of water close to bedtime, because the need to urinate may wake you up during the night. · Do not read or watch TV in bed. Use the bed only for sleeping and sexual activity. What to try  · Go to bed at the same time every night, and wake up at the same time every morning. Do not take naps during the day. · Keep your bedroom quiet, dark, and cool. · Get regular exercise, but not within 3 to 4 hours of your bedtime. .  · Sleep on a comfortable pillow and mattress.   · If watching the clock makes you anxious, turn it facing away from you so you cannot see the time. · If you worry when you lie down, start a worry book. Well before bedtime, write down your worries, and then set the book and your concerns aside. · Try meditation or other relaxation techniques before you go to bed. · If you cannot fall asleep, get up and go to another room until you feel sleepy. Do something relaxing. Repeat your bedtime routine before you go to bed again. · Make your house quiet and calm about an hour before bedtime. Turn down the lights, turn off the TV, log off the computer, and turn down the volume on music. This can help you relax after a busy day. Drowsy Driving: The [employer] cites drowsiness as a causing factor in more than 072,913 police reported crashes annually, resulting in 76,000 injuries and 1,500 deaths. Other surveys suggest 55% of people polled have driven while drowsy in the past year, 23% had fallen asleep but not crashed, 3% crashed, and 2% had and accident due to drowsy driving. Who is at risk? Young Drivers: One study of drowsy driving accidents states that 55% of the drivers were under 25 years. Of those, 75% were male. Shift Workers and Travelers: People who work overnight or travel across time zones frequently are at higher risk of experiencing Circadian Rhythm Disorders. They are trying to work and function when their body is programed to sleep. Sleep Deprived: Lack of sleep has a serious impact on your ability to pay attention or focus on a task. Consistently getting less than the average of 8 hours your body needs creates partial or cumulative sleep deprivation. Untreated Sleep Disorders: Sleep Apnea, Narcolepsy, R.L.S., and other sleep disorders (untreated) prevent a person from getting enough restful sleep. This leads to excessive daytime sleepiness and increases the risk for drowsy driving accidents by up to 7 times.   Medications / Alcohol: Even over the counter medications can cause drowsiness. Medications that impair a drivers attention should have a warning label. Alcohol naturally makes you sleepy and on its own can cause accidents. Combined with excessive drowsiness its effects are amplified. Signs of Drowsy Driving:   * You don't remember driving the last few miles   * You may drift out of your jeny   * You are unable to focus and your thoughts wander   * You may yawn more often than normal   * You have difficulty keeping your eyes open / nodding off   * Missing traffic signs, speeding, or tailgating  Prevention-   Good sleep hygiene, lifestyle and behavioral choices have the most impact on drowsy driving. There is no substitute for sleep and the average person requires 8 hours nightly. If you find yourself driving drowsy, stop and sleep. Consider the sleep hygiene tips provided during your visit as well. Medication Refill Policy: Refills for all medications require 1 week advance notice. Please have your pharmacy fax a refill request. We are unable to fax, or call in \"controled substance\" medications and you will need to pick these prescriptions up from our office. [employer] Activation    Thank you for requesting access to [employer]. Please follow the instructions below to securely access and download your online medical record. [employer] allows you to send messages to your doctor, view your test results, renew your prescriptions, schedule appointments, and more. How Do I Sign Up? 1. In your internet browser, go to [email or web address]. 2. Click on the First Time User? Click Here link in the Sign In box. You will see the New Member Sign Up page. 3. Enter your [employer] Access Code exactly as it appears below. You will not need to use this code after youve completed the sign-up process. If you do not sign up before the expiration date, you must request a new code.     [employer] Access Code: [email or web address]  Expires: 2017 12:47 PM (This is the date your [employer] access code will )    4. Enter the last four digits of your Social Security Number (xxxx) and Date of Birth (mm/dd/yyyy) as indicated and click Submit. You will be taken to the next sign-up page. 5. Create a [employer] ID. This will be your [employer] login ID and cannot be changed, so think of one that is secure and easy to remember. 6. Create a [employer] password. You can change your password at any time. 7. Enter your Password Reset Question and Answer. This can be used at a later time if you forget your password. 8. Enter your e-mail address. You will receive e-mail notification when new information is available in [address] 9. Click Sign Up. You can now view and download portions of your medical record. 10. Click the Download Summary menu link to download a portable copy of your medical information. Additional Information    If you have questions, please call [phone]. Remember, [employer] is NOT to be used for urgent needs. For medical emergencies, dial 911.

## 2017-04-07 ENCOUNTER — HOSPITAL ENCOUNTER (OUTPATIENT)
Dept: MAMMOGRAPHY | Age: 82
Discharge: HOME | End: 2017-04-07
Attending: INTERNAL MEDICINE
Payer: MEDICARE

## 2017-04-07 DIAGNOSIS — Z78.0 POSTMENOPAUSE: ICD-10-CM

## 2017-04-07 PROCEDURE — 77080 DXA BONE DENSITY AXIAL: CPT

## 2017-06-28 DIAGNOSIS — R00.0 TACHYCARDIA: ICD-10-CM

## 2017-06-28 DIAGNOSIS — I49.3 PVC (PREMATURE VENTRICULAR CONTRACTION): ICD-10-CM

## 2017-06-28 RX ORDER — DILTIAZEM HYDROCHLORIDE 180 MG/1
180 CAPSULE, COATED, EXTENDED RELEASE ORAL DAILY
Qty: 90 CAP | Refills: 3 | Status: SHIPPED | OUTPATIENT
Start: 2017-06-28 | End: 2018-04-15

## 2017-06-28 RX ORDER — METFORMIN HYDROCHLORIDE 1000 MG/1
1000 TABLET ORAL 2 TIMES DAILY
Qty: 180 TAB | Refills: 3 | Status: SHIPPED | OUTPATIENT
Start: 2017-06-28 | End: 2018-05-18 | Stop reason: SDUPTHER

## 2017-06-28 RX ORDER — EZETIMIBE 10 MG/1
10 TABLET ORAL DAILY
Qty: 90 TAB | Refills: 3 | Status: SHIPPED | OUTPATIENT
Start: 2017-06-28 | End: 2018-06-08 | Stop reason: SDUPTHER

## 2017-06-28 RX ORDER — FUROSEMIDE 40 MG/1
40 TABLET ORAL DAILY
Qty: 90 TAB | Refills: 3 | Status: SHIPPED | OUTPATIENT
Start: 2017-06-28 | End: 2017-08-15 | Stop reason: SDUPTHER

## 2017-06-28 RX ORDER — POTASSIUM CHLORIDE 750 MG/1
10 CAPSULE, EXTENDED RELEASE ORAL 2 TIMES DAILY
Qty: 180 CAP | Refills: 3 | Status: SHIPPED | OUTPATIENT
Start: 2017-06-28 | End: 2018-05-18 | Stop reason: SDUPTHER

## 2017-06-28 RX ORDER — ROSUVASTATIN CALCIUM 5 MG/1
5 TABLET, COATED ORAL DAILY
Qty: 90 TAB | Refills: 3 | Status: SHIPPED | OUTPATIENT
Start: 2017-06-28 | End: 2018-06-08 | Stop reason: SDUPTHER

## 2017-06-28 RX ORDER — GLIMEPIRIDE 2 MG/1
2 TABLET ORAL DAILY
Qty: 90 TAB | Refills: 3 | Status: SHIPPED | OUTPATIENT
Start: 2017-06-28 | End: 2018-01-04 | Stop reason: ALTCHOICE

## 2017-06-28 NOTE — TELEPHONE ENCOUNTER
Last seen 03/23/2017    Requested Prescriptions     Pending Prescriptions Disp Refills    glimepiride (AMARYL) 2 mg tablet 90 Tab 3     Sig: Take 1 Tab by mouth daily.  metFORMIN (GLUCOPHAGE) 1,000 mg tablet 180 Tab 3     Sig: Take 1 Tab by mouth two (2) times a day.  dilTIAZem CD (CARDIZEM CD) 180 mg ER capsule 90 Cap 3     Sig: Take 1 Cap by mouth daily.  furosemide (LASIX) 40 mg tablet 90 Tab 3     Sig: Take 1 Tab by mouth daily.  ezetimibe (ZETIA) 10 mg tablet 90 Tab 3     Sig: Take 1 Tab by mouth daily.  potassium chloride SA (MICRO-K) 10 mEq capsule 180 Cap 3     Sig: Take 1 Cap by mouth two (2) times a day.  rosuvastatin (CRESTOR) 5 mg tablet 90 Tab 3     Sig: Take 1 Tab by mouth daily.

## 2017-08-15 ENCOUNTER — CLINICAL SUPPORT (OUTPATIENT)
Dept: CARDIOLOGY CLINIC | Age: 82
End: 2017-08-15

## 2017-08-15 ENCOUNTER — OFFICE VISIT (OUTPATIENT)
Dept: CARDIOLOGY CLINIC | Age: 82
End: 2017-08-15

## 2017-08-15 VITALS
HEART RATE: 88 BPM | HEIGHT: 66 IN | WEIGHT: 205 LBS | DIASTOLIC BLOOD PRESSURE: 70 MMHG | RESPIRATION RATE: 16 BRPM | SYSTOLIC BLOOD PRESSURE: 120 MMHG | BODY MASS INDEX: 32.95 KG/M2

## 2017-08-15 DIAGNOSIS — R00.0 TACHYCARDIA: ICD-10-CM

## 2017-08-15 DIAGNOSIS — I35.0 NONRHEUMATIC AORTIC VALVE STENOSIS: Primary | ICD-10-CM

## 2017-08-15 DIAGNOSIS — J44.9 CHRONIC OBSTRUCTIVE PULMONARY DISEASE, UNSPECIFIED COPD TYPE (HCC): ICD-10-CM

## 2017-08-15 DIAGNOSIS — I35.0 NONRHEUMATIC AORTIC VALVE STENOSIS: ICD-10-CM

## 2017-08-15 DIAGNOSIS — G47.33 OSA (OBSTRUCTIVE SLEEP APNEA): ICD-10-CM

## 2017-08-15 DIAGNOSIS — I05.0 MITRAL VALVE STENOSIS, UNSPECIFIED ETIOLOGY: ICD-10-CM

## 2017-08-15 DIAGNOSIS — I49.3 PVC (PREMATURE VENTRICULAR CONTRACTION): ICD-10-CM

## 2017-08-15 DIAGNOSIS — E78.5 OTHER AND UNSPECIFIED HYPERLIPIDEMIA: ICD-10-CM

## 2017-08-15 DIAGNOSIS — I10 ESSENTIAL HYPERTENSION: ICD-10-CM

## 2017-08-15 DIAGNOSIS — E11.9 CONTROLLED TYPE 2 DIABETES MELLITUS WITHOUT COMPLICATION, WITHOUT LONG-TERM CURRENT USE OF INSULIN (HCC): ICD-10-CM

## 2017-08-15 DIAGNOSIS — I77.3 FIBROMUSCULAR DYSPLASIA (HCC): ICD-10-CM

## 2017-08-15 DIAGNOSIS — R06.09 OTHER DYSPNEA AND RESPIRATORY ABNORMALITY: ICD-10-CM

## 2017-08-15 DIAGNOSIS — R09.89 OTHER DYSPNEA AND RESPIRATORY ABNORMALITY: ICD-10-CM

## 2017-08-15 RX ORDER — FUROSEMIDE 40 MG/1
40 TABLET ORAL DAILY
Qty: 180 TAB | Refills: 3 | Status: SHIPPED | OUTPATIENT
Start: 2017-08-15 | End: 2018-04-10
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
## 2017-08-15 NOTE — MR AVS SNAPSHOT
Visit Information Date & Time Provider Department Dept. Phone Encounter #  
 8/15/2017 11:40 AM Shahnaz Arguelles MD [facility] Jc Barrier [phone] [id number] Your Appointments 9/25/2017 11:20 AM  
ROUTINE CARE with Garfield White MD  
[facility]) Appt Note: 6 month follow up visit [place] [address] [facility] [address]  
[address]  
  
    
 9/26/2017  9:00 AM  
Any with Stormy Henning MD  
[id number] [place] [address] ([facility]) Appt Note: 6 month cpap follow up [address] [facility] [address] [id number] Upcoming Health Maintenance Date Due DTaP/Tdap/Td series (1 - Tdap) 9/21/2011 HEMOGLOBIN A1C Q6M 3/17/2015 MICROALBUMIN Q1 3/24/2015 LIPID PANEL Q1 3/24/2015 INFLUENZA AGE 9 TO ADULT 8/1/2017 EYE EXAM RETINAL OR DILATED Q1 1/3/2018 FOOT EXAM Q1 3/23/2018 MEDICARE YEARLY EXAM 3/24/2018 GLAUCOMA SCREENING Q2Y 1/3/2019 Allergies as of 8/15/2017  Review Complete On: 8/15/2017 By: Maye Temple Severity Noted Reaction Type Reactions Bees [Hymenoptera Allergenic Extract]  07/23/2012   Systemic Anaphylaxis Betadine [Povidone-iodine]  08/17/2012    Other (comments) Abdominal swelling, one incident in 1977. Has had since without a reaction. Darvon [Propoxyphene]  02/01/2011   Systemic Nausea and Vomiting Griseofulvin  02/01/2011    Nausea and Vomiting Lipitor [Atorvastatin]  06/13/2011   Systemic Myalgia Current Immunizations  Reviewed on 4/27/2015 Name Date Influenza Vaccine 8/28/2013 Pneumococcal Conjugate (PCV-13) 4/27/2015 TD Vaccine 9/20/2011  6:32 PM  
 ZZZ-RETIRED (DO NOT USE) Pneumococcal Vaccine (Unspecified Type) 10/1/2006 Zoster Vaccine, Live 6/30/2004 Not reviewed this visit Vitals BP Pulse Resp Height(growth percentile) Weight(growth percentile) BMI  
 120/70 (BP 1 Location: Left arm, BP Patient Position: Sitting) 88 16 5' 6\" (1.676 m) 205 lb (93 kg) 33.09 kg/m2 OB Status Smoking Status Postmenopausal Former Smoker Vitals History BMI and BSA Data Body Mass Index Body Surface Area 33.09 kg/m 2 2.08 m 2 Preferred Pharmacy Pharmacy Name Phone 305 [facility], [address] [place] [address] [address] Your Updated Medication List  
  
   
This list is accurate as of: 8/15/17 12:22 PM.  Always use your most recent med list.  
  
  
  
  
 aspirin 81 mg tablet Take 162 mg by mouth daily. 2 tabs prior to niacin  
  
 azelastine 137 mcg (0.1 %) nasal spray Commonly known as:  ASTELIN  
2 Sprays by Both Nostrils route two (2) times a day. Use in each nostril as directed COD LIVER OIL PO Take 1,000 mg by mouth daily. dilTIAZem  mg ER capsule Commonly known as:  CARDIZEM CD Take 1 Cap by mouth daily. ezetimibe 10 mg tablet Commonly known as:  Kita Starr Take 1 Tab by mouth daily. furosemide 40 mg tablet Commonly known as:  LASIX Take 1 Tab by mouth daily. Take 40/80mg on alternating days. glimepiride 2 mg tablet Commonly known as:  AMARYL Take 1 Tab by mouth daily. glucose blood VI test strips strip Commonly known as:  ACCU-CHEK ELBERT PLUS TEST STRP Check bs daily. Dx 250.00  
  
 ketoconazole 2 % shampoo Commonly known as:  NIZORAL Apply to scalp and shampoo and massage. Rinse out after 5-10 minutes. losartan 100 mg tablet Commonly known as:  COZAAR Take 1 Tab by mouth daily. metFORMIN 1,000 mg tablet Commonly known as:  GLUCOPHAGE Take 1 Tab by mouth two (2) times a day.   
  
 mometasone 0.1 % topical cream  
Commonly known as:  Trish Fiddler  
 Apply  to affected area daily. niacin 1,000 mg Tber Take 2,000 Units by mouth nightly. potassium chloride SA 10 mEq capsule Commonly known as:  [place] Take 1 Cap by mouth two (2) times a day. rosuvastatin 5 mg tablet Commonly known as:  CRESTOR Take 1 Tab by mouth daily. TYLENOL EXTRA STRENGTH 500 mg tablet Generic drug:  acetaminophen Take  by mouth every six (6) hours as needed for Pain. VITAMIN B-12 1,000 mcg tablet Generic drug:  cyanocobalamin Take 1,500 mcg by mouth daily. VITAMIN D3 1,000 unit tablet Generic drug:  cholecalciferol Take 2,000 Units by mouth daily. Prescriptions Sent to Pharmacy Refills  
 furosemide (LASIX) 40 mg tablet 3 Sig: Take 1 Tab by mouth daily. Take 40/80mg on alternating days. Class: Normal  
 Pharmacy: [facility] Ph #: [phone] Route: Oral  
  
Introducing [facility]! [place] Amin introduces [employer] patient portal. Now you can access parts of your medical record, email your doctor's office, and request medication refills online. 1. In your internet browser, go to [email or web address] [email or web address] plchart 2. Click on the First Time User? Click Here link in the Sign In box. You will see the New Member Sign Up page. 3. Enter your [employer] Access Code exactly as it appears below. You will not need to use this code after youve completed the sign-up process. If you do not sign up before the expiration date, you must request a new code. · [employer] Access Code: [email or web address] Expires: 11/13/2017  7:52 AM 
 
4. Enter the last four digits of your Social Security Number (xxxx) and Date of Birth (mm/dd/yyyy) as indicated and click Submit. You will be taken to the next sign-up page. 5. Create a Fig[employer] ID. This will be your [employer] login ID and cannot be changed, so think of one that is secure and easy to remember. 6. Create a Pal[employer] password. You can change your password at any time. 7. Enter your Password Reset Question and Answer. This can be used at a later time if you forget your password. 8. Enter your e-mail address. You will receive e-mail notification when new information is available in [address] 9. Click Sign Up. You can now view and download portions of your medical record. 10. Click the Download Summary menu link to download a portable copy of your medical information. If you have questions, please visit the Frequently Asked Questions section of the [employer] website. Remember, [employer] is NOT to be used for urgent needs. For medical emergencies, dial 911. Now available from your iPhone and Android! Please provide this summary of care documentation to your next provider. Your primary care clinician is listed as KEERTHI JENSEN. If you have any questions after today's visit, please call [phone].

## 2017-09-19 ENCOUNTER — TELEPHONE (OUTPATIENT)
Dept: INTERNAL MEDICINE CLINIC | Age: 82
End: 2017-09-19

## 2017-09-19 ENCOUNTER — HOSPITAL ENCOUNTER (OUTPATIENT)
Dept: GENERAL RADIOLOGY | Age: 82
Discharge: HOME | End: 2017-09-19
Payer: MEDICARE

## 2017-09-19 ENCOUNTER — OFFICE VISIT (OUTPATIENT)
Dept: INTERNAL MEDICINE CLINIC | Age: 82
End: 2017-09-19

## 2017-09-19 VITALS
HEART RATE: 86 BPM | RESPIRATION RATE: 16 BRPM | WEIGHT: 207.8 LBS | BODY MASS INDEX: 33.4 KG/M2 | HEIGHT: 66 IN | DIASTOLIC BLOOD PRESSURE: 72 MMHG | OXYGEN SATURATION: 98 % | TEMPERATURE: 98.2 F | SYSTOLIC BLOOD PRESSURE: 105 MMHG

## 2017-09-19 DIAGNOSIS — R05.9 COUGH: ICD-10-CM

## 2017-09-19 DIAGNOSIS — J20.9 ACUTE BRONCHITIS, UNSPECIFIED ORGANISM: ICD-10-CM

## 2017-09-19 DIAGNOSIS — R06.2 WHEEZING: ICD-10-CM

## 2017-09-19 DIAGNOSIS — J20.9 ACUTE BRONCHITIS, UNSPECIFIED ORGANISM: Primary | ICD-10-CM

## 2017-09-19 PROCEDURE — 71020 XR CHEST PA LAT: CPT

## 2017-09-19 RX ORDER — AZITHROMYCIN 250 MG/1
TABLET, FILM COATED ORAL
Qty: 6 TAB | Refills: 0 | Status: SHIPPED | OUTPATIENT
Start: 2017-09-19 | End: 2017-09-24

## 2017-09-19 RX ORDER — ALBUTEROL SULFATE 90 UG/1
2 AEROSOL, METERED RESPIRATORY (INHALATION)
Qty: 1 INHALER | Refills: 0 | Status: SHIPPED | OUTPATIENT
Start: 2017-09-19 | End: 2018-01-04

## 2017-09-19 NOTE — PROGRESS NOTES
Fer Cook is a 80 y.o. female who presents today with cough. patient states she has had cough since last Friday. She says she has pneumonia before and this feels very similar to how she felt when she had pneumonia. She is not actively coughing up mucus, instead she is swallowing it but says it is slimy.        Chief Complaint   Patient presents with    Cough     since last friday

## 2017-09-19 NOTE — PROGRESS NOTES
Subjective: Carter Raygoza is a 80 y.o. female who complains of congestion, sneezing, nasal blockage, post nasal drip, cough described as productive of clear sputum, bilateral ear pain, suspected fevers but not measured at home and clear nasal discharge for 5 days, gradually worsening since that time though cough is better today. + sob and wheezing over baseline  She denies a history of vomiting. BS have continued to be controlled running . Evaluation to date: none. Treatment to date: none. Patient does not smoke cigarettes. Relevant PMH: aortic and mitral valve d/o    Current Outpatient Prescriptions   Medication Sig Dispense Refill    PSYLLIUM SEED, WITH DEXTROSE, (FIBER PO) Take  by mouth. 6 tabs daily      furosemide (LASIX) 40 mg tablet Take 1 Tab by mouth daily. Take 40/80mg on alternating days. (Patient taking differently: Take 40 mg by mouth daily. 60 mg daily (1.5)) 180 Tab 3    glimepiride (AMARYL) 2 mg tablet Take 1 Tab by mouth daily. 90 Tab 3    metFORMIN (GLUCOPHAGE) 1,000 mg tablet Take 1 Tab by mouth two (2) times a day. 180 Tab 3    dilTIAZem CD (CARDIZEM CD) 180 mg ER capsule Take 1 Cap by mouth daily. 90 Cap 3    ezetimibe (ZETIA) 10 mg tablet Take 1 Tab by mouth daily. 90 Tab 3    potassium chloride SA (MICRO-K) 10 mEq capsule Take 1 Cap by mouth two (2) times a day. 180 Cap 3    rosuvastatin (CRESTOR) 5 mg tablet Take 1 Tab by mouth daily. 90 Tab 3    COD LIVER OIL PO Take 1,000 mg by mouth daily.  acetaminophen (TYLENOL EXTRA STRENGTH) 500 mg tablet Take  by mouth every six (6) hours as needed for Pain.  losartan (COZAAR) 100 mg tablet Take 1 Tab by mouth daily. 90 Tab 3    azelastine (ASTELIN) 137 mcg (0.1 %) nasal spray 2 Sprays by Both Nostrils route two (2) times a day. Use in each nostril as directed (Patient taking differently: 2 Sprays by Both Nostrils route as needed.  Use in each nostril as directed) 3 Bottle 3    niacin 1,000 mg TbER Take 2,000 Units by mouth nightly. 180 Tab 3    glucose blood VI test strips (ACCU-CHEK ELBERT PLUS) strip Check bs daily. Dx 250.00 1 Package 11    cyanocobalamin (VITAMIN B-12) 1,000 mcg tablet Take 1,500 mcg by mouth daily.  cholecalciferol, vitamin d3, (VITAMIN D) 1,000 unit tablet Take 2,000 Units by mouth daily.  aspirin 81 mg tablet Take 162 mg by mouth daily. 2 tabs prior to niacin          Review of Systems  Pertinent items are noted in HPI. Objective:     Visit Vitals    /72 (BP 1 Location: Left arm, BP Patient Position: Sitting)    Pulse 86    Temp 98.2 °F (36.8 °C) (Oral)    Resp 16    Ht 5' 6\" (1.676 m)    Wt 207 lb 12.8 oz (94.3 kg)    SpO2 98%    BMI 33.54 kg/m2     General:  alert, cooperative, no distress   Eyes: conjunctivae/corneas clear. Ears: bilat full TM without erythema, ext canals wnl   Sinuses: Normal paranasal sinuses without tenderness   Mouth:  abnormal findings: post nasal drainage   Neck: supple, symmetrical, trachea midline and no adenopathy. Lungs: wheezes throughout end expiratory   Nares   Edematous with clear discharge. Assessment/Plan:   Bronchitis vs pneumonia  Suggested symptomatic OTC remedies. Antibiotics per orders. RTC prn.   mucinex 1200mg bid  cxr  Albuterol hfa prn  Orders Placed This Encounter    XR CHEST PA LAT    PSYLLIUM SEED, WITH DEXTROSE, (FIBER PO)    azithromycin (ZITHROMAX) 250 mg tablet    albuterol (PROVENTIL HFA, VENTOLIN HFA, PROAIR HFA) 90 mcg/actuation inhaler     Follow-up Disposition:  Return if symptoms worsen or fail to improve. Annamary Ripa

## 2017-09-20 ENCOUNTER — TELEPHONE (OUTPATIENT)
Dept: CARDIOLOGY CLINIC | Age: 82
End: 2017-09-20

## 2017-09-20 NOTE — TELEPHONE ENCOUNTER
CXR ok        Called patient, verified identity. Informed patient of the above results per [facility]. Patient also requested for me to change her upcoming appointment with Dr. Ally Savage to 11/17 at 9am so that her son could attend the appointment with her.

## 2017-09-26 ENCOUNTER — OFFICE VISIT (OUTPATIENT)
Dept: SLEEP MEDICINE | Age: 82
End: 2017-09-26

## 2017-09-26 VITALS
HEART RATE: 90 BPM | HEIGHT: 66 IN | WEIGHT: 208 LBS | OXYGEN SATURATION: 93 % | DIASTOLIC BLOOD PRESSURE: 73 MMHG | SYSTOLIC BLOOD PRESSURE: 113 MMHG | BODY MASS INDEX: 33.43 KG/M2

## 2017-09-26 DIAGNOSIS — I10 ESSENTIAL HYPERTENSION: ICD-10-CM

## 2017-09-26 DIAGNOSIS — G47.33 OSA (OBSTRUCTIVE SLEEP APNEA): Primary | ICD-10-CM

## 2017-09-26 DIAGNOSIS — J44.9 CHRONIC OBSTRUCTIVE PULMONARY DISEASE, UNSPECIFIED COPD TYPE (HCC): ICD-10-CM

## 2017-09-26 NOTE — PROGRESS NOTES
[address] [facility]., [place]. [place], [address]  Tel.  [phone]  Fax. 100 [facility] 60  [place], [address] [facility]  Tel.  [phone]  Fax. [phone] [address]   Tel.  [phone]  Fax. [phone]     Meena Almanza is a 80 y.o. female seen for a positive airway pressure follow-up. She reports no problems using the device. She is 100% compliant over the past 30 days. The following problems are identified:    Drowsiness no Problems exhaling no   Snoring no Forget to put on no   Mask Comfortable yes Can't fall asleep no   Dry Mouth no Mask falls off no   Air Leaking no Frequent awakenings no         She admits that her sleep has improved. Allergies   Allergen Reactions    Bees [Hymenoptera Allergenic Extract] Anaphylaxis    Betadine [Povidone-Iodine] Other (comments)     Abdominal swelling, one incident in 1977. Has had since without a reaction.  Darvon [Propoxyphene] Nausea and Vomiting    Griseofulvin Nausea and Vomiting    Lipitor [Atorvastatin] Myalgia       She has a current medication list which includes the following prescription(s): psyllium seed (with dextrose), albuterol, furosemide, glimepiride, metformin, diltiazem cd, ezetimibe, potassium chloride sa, rosuvastatin, cod liver oil, acetaminophen, losartan, azelastine, niacin, glucose blood vi test strips, cyanocobalamin, cholecalciferol, and aspirin. .      She  has a past medical history of Aortic stenosis; CAD (coronary artery disease); Chronic pain; COPD; Diabetes ([address] Diabetic neuropathy ([address] Diverticulosis of colon; GERD (gastroesophageal reflux disease); Hypercholesterolemia; CINDA (obstructive sleep apnea) (08-); Other ill-defined conditions; Other ill-defined conditions; Peripheral neuropathy ([address] Renal artery stenosis ([address] and Unspecified adverse effect of anesthesia.     Kiron Sleepiness Score: 12   and     which reflect improved sleep quality over therapy time. O>    Visit Vitals    /73    Pulse 90    Ht 5' 6\" (1.676 m)    Wt 208 lb (94.3 kg)    SpO2 93%    BMI 33.57 kg/m2         General:   Not in acute distress   Eyes:  Anicteric sclerae, no obvious strabismus   Nose:  No obvious nasal septum deviation    Oropharynx:   Class 4 oropharyngeal outlet, thick tongue base, uvula not seen due to low-lying soft palate, narrow tonsilo-pharyngeal pilars   Tonsils:   tonsils are not visualized due to low-lying soft palate   Neck:   midline trachea   Chest/Lungs:  Equal lung expansion, clear on auscultation    CVS:  Normal rate, regular rhythm; no JVD   Skin:  Warm to touch; no obvious rashes   Neuro:  No focal deficits ; no obvious tremor    Psych:  Normal affect,  normal countenance;           A>    ICD-10-CM ICD-9-CM    1. CINDA (obstructive sleep apnea) G47.33 327.23    2. Chronic obstructive pulmonary disease, unspecified COPD type ([facility] J44.9 496    3. Essential hypertension I10 401.9      AHI = 18.6. On Bi - Level : 12/06 cmH2O. Compliant:      yes    Therapeutic Response:  Positive    P>    * Device pressure change to Bi - Level  14/07 cmH2O.    * PAP card download in 4 weeks. * We have recommended a dedicated weight loss through appropriate diet and an exercise regiment as significant weight reduction has been shown to reduce severity of obstructive sleep apnea. * Follow-up Disposition:  Return in about 6 months (around 3/26/2018), or if symptoms worsen or fail to improve. * She was asked to contact our office for any problems regarding PAP therapy. * Counseling was provided regarding the importance of regular PAP use and on proper sleep hygiene and safe driving. * Re-enforced proper and regular cleaning for the device. Thank you for allowing us to participate in your patient's medical care. Shanel Patiño MD, FAASM  Electronically signed.  09/26/17

## 2017-09-26 NOTE — PATIENT INSTRUCTIONS
217 [facility] [place]. [place], [address]  Tel.  [phone]  Fax. 100 [facility] 60  [place], [address] [facility]  Tel.  [phone]  Fax. [phone] [address]  Tel.  [phone]  Fax. [phone]     Learning About CPAP for Sleep Apnea  What is CPAP? CPAP is a small machine that you use at home every night while you sleep. It increases air pressure in your throat to keep your airway open. When you have sleep apnea, this can help you sleep better so you feel much better. CPAP stands for \"continuous positive airway pressure. \"  The CPAP machine will have one of the following:  · A mask that covers your nose and mouth  · Prongs that fit into your nose  · A mask that covers your nose only, the most common type. This type is called NCPAP. The N stands for \"nasal.\"  Why is it done? CPAP is usually the best treatment for obstructive sleep apnea. It is the first treatment choice and the most widely used. Your doctor may suggest CPAP if you have:  · Moderate to severe sleep apnea. · Sleep apnea and coronary artery disease (CAD) or heart failure. How does it help? · CPAP can help you have more normal sleep, so you feel less sleepy and more alert during the daytime. · CPAP may help keep heart failure or other heart problems from getting worse. · NCPAP may help lower your blood pressure. · If you use CPAP, your bed partner may also sleep better because you are not snoring or restless. What are the side effects? Some people who use CPAP have:  · A dry or stuffy nose and a sore throat. · Irritated skin on the face. · Sore eyes. · Bloating. If you have any of these problems, work with your doctor to fix them. Here are some things you can try:  · Be sure the mask or nasal prongs fit well. · See if your doctor can adjust the pressure of your CPAP. · If your nose is dry, try a humidifier.   · If your nose is runny or stuffy, try decongestant medicine or a steroid nasal spray. If these things do not help, you might try a different type of machine. Some machines have air pressure that adjusts on its own. Others have air pressures that are different when you breathe in than when you breathe out. This may reduce discomfort caused by too much pressure in your nose. Where can you learn more? Go to [employer].be  Enter Ascade in the search box to learn more about \"Learning About CPAP for Sleep Apnea. \"   © 1367-9258 Healthwise, [employer]. Care instructions adapted under license by Shanell Shirley (which disclaims liability or warranty for this information). This care instruction is for use with your licensed healthcare professional. If you have questions about a medical condition or this instruction, always ask your healthcare professional. [address] any warranty or liability for your use of this information. Content Version: 7.3.49169; Last Revised: January 11, 2010  PROPER SLEEP HYGIENE    What to avoid  · Do not have drinks with caffeine, such as coffee or black tea, for 8 hours before bed. · Do not smoke or use other types of tobacco near bedtime. Nicotine is a stimulant and can keep you awake. · Avoid drinking alcohol late in the evening, because it can cause you to wake in the middle of the night. · Do not eat a big meal close to bedtime. If you are hungry, eat a light snack. · Do not drink a lot of water close to bedtime, because the need to urinate may wake you up during the night. · Do not read or watch TV in bed. Use the bed only for sleeping and sexual activity. What to try  · Go to bed at the same time every night, and wake up at the same time every morning. Do not take naps during the day. · Keep your bedroom quiet, dark, and cool. · Get regular exercise, but not within 3 to 4 hours of your bedtime. .  · Sleep on a comfortable pillow and mattress.   · If watching the clock makes you anxious, turn it facing away from you so you cannot see the time. · If you worry when you lie down, start a worry book. Well before bedtime, write down your worries, and then set the book and your concerns aside. · Try meditation or other relaxation techniques before you go to bed. · If you cannot fall asleep, get up and go to another room until you feel sleepy. Do something relaxing. Repeat your bedtime routine before you go to bed again. · Make your house quiet and calm about an hour before bedtime. Turn down the lights, turn off the TV, log off the computer, and turn down the volume on music. This can help you relax after a busy day. Drowsy Driving: The [employer] cites drowsiness as a causing factor in more than 351,296 police reported crashes annually, resulting in 76,000 injuries and 1,500 deaths. Other surveys suggest 55% of people polled have driven while drowsy in the past year, 23% had fallen asleep but not crashed, 3% crashed, and 2% had and accident due to drowsy driving. Who is at risk? Young Drivers: One study of drowsy driving accidents states that 55% of the drivers were under 25 years. Of those, 75% were male. Shift Workers and Travelers: People who work overnight or travel across time zones frequently are at higher risk of experiencing Circadian Rhythm Disorders. They are trying to work and function when their body is programed to sleep. Sleep Deprived: Lack of sleep has a serious impact on your ability to pay attention or focus on a task. Consistently getting less than the average of 8 hours your body needs creates partial or cumulative sleep deprivation. Untreated Sleep Disorders: Sleep Apnea, Narcolepsy, R.L.S., and other sleep disorders (untreated) prevent a person from getting enough restful sleep. This leads to excessive daytime sleepiness and increases the risk for drowsy driving accidents by up to 7 times.   Medications / Alcohol: Even over the counter medications can cause drowsiness. Medications that impair a drivers attention should have a warning label. Alcohol naturally makes you sleepy and on its own can cause accidents. Combined with excessive drowsiness its effects are amplified. Signs of Drowsy Driving:   * You don't remember driving the last few miles   * You may drift out of your jeny   * You are unable to focus and your thoughts wander   * You may yawn more often than normal   * You have difficulty keeping your eyes open / nodding off   * Missing traffic signs, speeding, or tailgating  Prevention-   Good sleep hygiene, lifestyle and behavioral choices have the most impact on drowsy driving. There is no substitute for sleep and the average person requires 8 hours nightly. If you find yourself driving drowsy, stop and sleep. Consider the sleep hygiene tips provided during your visit as well. Medication Refill Policy: Refills for all medications require 1 week advance notice. Please have your pharmacy fax a refill request. We are unable to fax, or call in \"controled substance\" medications and you will need to pick these prescriptions up from our office. [employer] Activation    Thank you for requesting access to [employer]. Please follow the instructions below to securely access and download your online medical record. [employer] allows you to send messages to your doctor, view your test results, renew your prescriptions, schedule appointments, and more. How Do I Sign Up? 1. In your internet browser, go to [email or web address]. 2. Click on the First Time User? Click Here link in the Sign In box. You will see the New Member Sign Up page. 3. Enter your [employer] Access Code exactly as it appears below. You will not need to use this code after youve completed the sign-up process. If you do not sign up before the expiration date, you must request a new code.     [employer] Access Code: [email or web address]  Expires: 2017  7:52 AM (This is the date your Prognomix access code will )    4. Enter the last four digits of your Social Security Number (xxxx) and Date of Birth (mm/dd/yyyy) as indicated and click Submit. You will be taken to the next sign-up page. 5. Create a Prognomix ID. This will be your Pro[employer]omix login ID and cannot be changed, so think of one that is secure and easy to remember. 6. Create a Prognomix password. You can change your password at any time. 7. Enter your Password Reset Question and Answer. This can be used at a later time if you forget your password. 8. Enter your e-mail address. You will receive e-mail notification when new information is available in [address] 9. Click Sign Up. You can now view and download portions of your medical record. 10. Click the Download Summary menu link to download a portable copy of your medical information. Additional Information    If you have questions, please call [phone]. Remember, Pro[employer] is NOT to be used for urgent needs. For medical emergencies, dial 911.

## 2017-11-17 ENCOUNTER — OFFICE VISIT (OUTPATIENT)
Dept: CARDIOLOGY CLINIC | Age: 82
End: 2017-11-17

## 2017-11-17 ENCOUNTER — HOSPITAL ENCOUNTER (OUTPATIENT)
Dept: LAB | Age: 82
Discharge: HOME | End: 2017-11-17
Payer: MEDICARE

## 2017-11-17 VITALS
WEIGHT: 207.2 LBS | HEIGHT: 66 IN | SYSTOLIC BLOOD PRESSURE: 130 MMHG | HEART RATE: 68 BPM | DIASTOLIC BLOOD PRESSURE: 80 MMHG | RESPIRATION RATE: 16 BRPM | BODY MASS INDEX: 33.3 KG/M2

## 2017-11-17 DIAGNOSIS — R06.00 DYSPNEA AND RESPIRATORY ABNORMALITIES: ICD-10-CM

## 2017-11-17 DIAGNOSIS — R06.89 DYSPNEA AND RESPIRATORY ABNORMALITIES: ICD-10-CM

## 2017-11-17 DIAGNOSIS — I05.0 MITRAL VALVE STENOSIS, UNSPECIFIED ETIOLOGY: ICD-10-CM

## 2017-11-17 DIAGNOSIS — R07.89 CHEST TIGHTNESS: ICD-10-CM

## 2017-11-17 DIAGNOSIS — I35.0 NONRHEUMATIC AORTIC VALVE STENOSIS: Primary | ICD-10-CM

## 2017-11-17 DIAGNOSIS — I10 ESSENTIAL HYPERTENSION: ICD-10-CM

## 2017-11-17 DIAGNOSIS — R42 DIZZINESS: ICD-10-CM

## 2017-11-17 DIAGNOSIS — E11.9 CONTROLLED TYPE 2 DIABETES MELLITUS WITHOUT COMPLICATION, WITHOUT LONG-TERM CURRENT USE OF INSULIN (HCC): ICD-10-CM

## 2017-11-17 DIAGNOSIS — I77.3 FIBROMUSCULAR DYSPLASIA (HCC): ICD-10-CM

## 2017-11-17 DIAGNOSIS — J44.9 CHRONIC OBSTRUCTIVE PULMONARY DISEASE, UNSPECIFIED COPD TYPE (HCC): ICD-10-CM

## 2017-11-17 DIAGNOSIS — I49.3 PVC (PREMATURE VENTRICULAR CONTRACTION): ICD-10-CM

## 2017-11-17 DIAGNOSIS — G47.33 OSA (OBSTRUCTIVE SLEEP APNEA): ICD-10-CM

## 2017-11-17 PROCEDURE — 36415 COLL VENOUS BLD VENIPUNCTURE: CPT

## 2017-11-17 PROCEDURE — 85027 COMPLETE CBC AUTOMATED: CPT

## 2017-11-17 PROCEDURE — 80053 COMPREHEN METABOLIC PANEL: CPT

## 2017-11-17 NOTE — MR AVS SNAPSHOT
Visit Information Date & Time Provider Department Dept. Phone Encounter #  
 11/17/2017  9:00 AM Warren Back MD [facility] Jasmine Ott [phone] [id number] Your Appointments 3/22/2018 10:00 AM  
ROUTINE CARE with Roland Freire MD  
Formerly [facility] [address]) Appt Note: 6 month follow up visit; [place] F/U  
 [place] [address]  
[phone]  
  
   
 [address] [facility] [address]  
  
    
 3/27/2018  9:40 AM  
Any with Marian Claros MD  
[address] [facility] [[email or web address]] ([address]) Appt Note: 6 month F/U, modem pt  
 [address] [facility] [address] [facility] [address]  
  
   
 [address] [id number] [facility] [address] Upcoming Health Maintenance Date Due DTaP/Tdap/Td series (1 - Tdap) 9/21/2011 HEMOGLOBIN A1C Q6M 3/17/2015 MICROALBUMIN Q1 3/24/2015 LIPID PANEL Q1 3/24/2015 Influenza Age 5 to Adult 8/1/2017 EYE EXAM RETINAL OR DILATED Q1 1/3/2018 FOOT EXAM Q1 3/23/2018 MEDICARE YEARLY EXAM 3/24/2018 GLAUCOMA SCREENING Q2Y 1/3/2019 Allergies as of 11/17/2017  Review Complete On: 11/17/2017 By: Arcenio Berger Severity Noted Reaction Type Reactions Bees [Hymenoptera Allergenic Extract]  07/23/2012   Systemic Anaphylaxis Betadine [Povidone-iodine]  08/17/2012    Other (comments) Abdominal swelling, one incident in 1977. Has had since without a reaction. Darvon [Propoxyphene]  02/01/2011   Systemic Nausea and Vomiting Griseofulvin  02/01/2011    Nausea and Vomiting Lipitor [Atorvastatin]  06/13/2011   Systemic Myalgia Current Immunizations  Reviewed on 4/27/2015 Name Date Influenza Vaccine 8/28/2013 Pneumococcal Conjugate (PCV-13) 4/27/2015 TD Vaccine 9/20/2011  6:32 PM  
 ZZZ-RETIRED (DO NOT USE) Pneumococcal Vaccine (Unspecified Type) 10/1/2006 Zoster Vaccine, Live 6/30/2004 Not reviewed this visit You Were Diagnosed With   
  
 Codes Comments Mitral valve stenosis, unspecified etiology    -  Primary ICD-10-CM: I05.0 ICD-9-CM: 394.0 Nonrheumatic aortic valve stenosis     ICD-10-CM: I35.0 ICD-9-CM: 424.1 Vitals BP Pulse Resp Height(growth percentile) Weight(growth percentile) BMI  
 130/80 (BP 1 Location: Right arm, BP Patient Position: Sitting) 68 16 5' 6\" (1.676 m) 207 lb 3.2 oz (94 kg) 33.44 kg/m2 OB Status Smoking Status Postmenopausal Former Smoker Vitals History BMI and BSA Data Body Mass Index Body Surface Area  
 33.44 kg/m 2 2.09 m 2 Preferred Pharmacy Pharmacy Name Phone [id number], [address] [phone] Your Updated Medication List  
  
   
This list is accurate as of: 11/17/17  9:36 AM.  Always use your most recent med list.  
  
  
  
  
 albuterol 90 mcg/actuation inhaler Commonly known as:  PROVENTIL HFA, VENTOLIN HFA, PROAIR HFA Take 2 Puffs by inhalation every four (4) hours as needed for Wheezing. aspirin 81 mg tablet Take  by mouth. 2 tabs prior to niacin  
  
 azelastine 137 mcg (0.1 %) nasal spray Commonly known as:  ASTELIN  
2 Sprays by Both Nostrils route two (2) times a day. Use in each nostril as directed COD LIVER OIL PO Take 1,000 mg by mouth daily. dilTIAZem  mg ER capsule Commonly known as:  CARDIZEM CD Take 1 Cap by mouth daily. ezetimibe 10 mg tablet Commonly known as:  Jarome Dys Take 1 Tab by mouth daily. FIBER PO Take  by mouth. 6 tabs daily  
  
 furosemide 40 mg tablet Commonly known as:  LASIX Take 1 Tab by mouth daily. Take 40/80mg on alternating days. glimepiride 2 mg tablet Commonly known as:  AMARYL Take 1 Tab by mouth daily. glucose blood VI test strips strip Commonly known as:  ACCU-CHEK ELBERT PLUS TEST STRP  
 Check bs daily. Dx 250.00  
  
 losartan 100 mg tablet Commonly known as:  COZAAR Take 1 Tab by mouth daily. metFORMIN 1,000 mg tablet Commonly known as:  GLUCOPHAGE Take 1 Tab by mouth two (2) times a day. niacin 1,000 mg Tber Take 2,000 Units by mouth nightly. potassium chloride SA 10 mEq capsule Commonly known as:  Georganna Males Take 1 Cap by mouth two (2) times a day. rosuvastatin 5 mg tablet Commonly known as:  CRESTOR Take 1 Tab by mouth daily. TYLENOL EXTRA STRENGTH 500 mg tablet Generic drug:  acetaminophen Take  by mouth every six (6) hours as needed for Pain. VITAMIN B-12 1,000 mcg tablet Generic drug:  cyanocobalamin Take 1,500 mcg by mouth daily. VITAMIN D3 1,000 unit tablet Generic drug:  cholecalciferol Take 2,000 Units by mouth daily. We Performed the Following CBC W/O DIFF [75025 CPT(R)] METABOLIC PANEL, COMPREHENSIVE [29418 CPT(R)] Introducing [facility]! [facility] introduces [employer] patient portal. Now you can access parts of your medical record, email your doctor's office, and request medication refills online. 1. In your internet browser, go to [email or web address] 2. Click on the First Time User? Click Here link in the Sign In box. You will see the New Member Sign Up page. 3. Enter your [employer] Access Code exactly as it appears below. You will not need to use this code after youve completed the sign-up process. If you do not sign up before the expiration date, you must request a new code. · [employer] Access Code: [email or web address] Expires: 2/15/2018  8:47 AM 
 
4. Enter the last four digits of your Social Security Number (xxxx) and Date of Birth (mm/dd/yyyy) as indicated and click Submit. You will be taken to the next sign-up page. 5. Create a [employer] ID. This will be your [employer] login ID and cannot be changed, so think of one that is secure and easy to remember. 6. Create a BrainRush password. You can change your password at any time. 7. Enter your Password Reset Question and Answer. This can be used at a later time if you forget your password. 8. Enter your e-mail address. You will receive e-mail notification when new information is available in [address] 9. Click Sign Up. You can now view and download portions of your medical record. 10. Click the Download Summary menu link to download a portable copy of your medical information. If you have questions, please visit the Frequently Asked Questions section of the BrainRush website. Remember, BrainRush is NOT to be used for urgent needs. For medical emergencies, dial 911. Now available from your iPhone and Android! Please provide this summary of care documentation to your next provider. Your primary care clinician is listed as KEERTHI JENSEN. If you have any questions after today's visit, please call [phone].

## 2017-11-17 NOTE — LETTER
11/27/2017 9:54 AM 
 
Ms. Greg Shirley [address] [id number] Dear Greg Shirley: 
 
Please find your most recent results below. Resulted Orders CBC W/O DIFF Result Value Ref Range WBC 8.7 3.4 - 10.8 x10E3/uL  
 RBC 4.73 3.77 - 5.28 x10E6/uL HGB 12.8 11.1 - 15.9 g/dL HCT 40.2 34.0 - 46.6 % MCV 85 79 - 97 fL  
 MCH 27.1 26.6 - 33.0 pg  
 MCHC 31.8 31.5 - 35.7 g/dL  
 RDW 15.8 (H) 12.3 - 15.4 % PLATELET 724 838 - 600 x10E3/uL Narrative Performed at:  [address]  [id number] : Pricila Richardson MD, Phone:  [phone] METABOLIC PANEL, COMPREHENSIVE Result Value Ref Range Glucose 147 (H) 65 - 99 mg/dL BUN 16 8 - 27 mg/dL Creatinine 0.75 0.57 - 1.00 mg/dL GFR est non-AA 72 >59 mL/min/1.73 GFR est AA 83 >59 mL/min/1.73  
 BUN/Creatinine ratio 21 12 - 28 Sodium 143 134 - 144 mmol/L Potassium 4.1 3.5 - 5.2 mmol/L Chloride 100 96 - 106 mmol/L  
 CO2 24 18 - 29 mmol/L Calcium 9.1 8.7 - 10.3 mg/dL Protein, total 6.7 6.0 - 8.5 g/dL Albumin 4.1 3.5 - 4.7 g/dL GLOBULIN, TOTAL 2.6 1.5 - 4.5 g/dL A-G Ratio 1.6 1.2 - 2.2 Bilirubin, total 1.0 0.0 - 1.2 mg/dL Alk. phosphatase 86 39 - 117 IU/L  
 AST (SGOT) 20 0 - 40 IU/L  
 ALT (SGPT) 13 0 - 32 IU/L Narrative Performed at:  [address]  [id number] : Pricila Richardson MD, Phone:  [phone] RECOMMENDATIONS: 
Labs look good! Please call me if you have any questions: [phone] Sincerely, Lalit Jean MD
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
## 2017-11-18 LAB
ALBUMIN SERPL-MCNC: 4.1 G/DL (ref 3.5–4.7)
ALBUMIN/GLOB SERPL: 1.6 {RATIO} (ref 1.2–2.2)
ALP SERPL-CCNC: 86 IU/L (ref 39–117)
ALT SERPL-CCNC: 13 IU/L (ref 0–32)
AST SERPL-CCNC: 20 IU/L (ref 0–40)
BILIRUB SERPL-MCNC: 1 MG/DL (ref 0–1.2)
BUN SERPL-MCNC: 16 MG/DL (ref 8–27)
BUN/CREAT SERPL: 21 (ref 12–28)
CALCIUM SERPL-MCNC: 9.1 MG/DL (ref 8.7–10.3)
CHLORIDE SERPL-SCNC: 100 MMOL/L (ref 96–106)
CO2 SERPL-SCNC: 24 MMOL/L (ref 18–29)
CREAT SERPL-MCNC: 0.75 MG/DL (ref 0.57–1)
ERYTHROCYTE [DISTWIDTH] IN BLOOD BY AUTOMATED COUNT: 15.8 % (ref 12.3–15.4)
GFR SERPLBLD CREATININE-BSD FMLA CKD-EPI: 72 ML/MIN/1.73
GFR SERPLBLD CREATININE-BSD FMLA CKD-EPI: 83 ML/MIN/1.73
GLOBULIN SER CALC-MCNC: 2.6 G/DL (ref 1.5–4.5)
GLUCOSE SERPL-MCNC: 147 MG/DL (ref 65–99)
HCT VFR BLD AUTO: 40.2 % (ref 34–46.6)
HGB BLD-MCNC: 12.8 G/DL (ref 11.1–15.9)
MCH RBC QN AUTO: 27.1 PG (ref 26.6–33)
MCHC RBC AUTO-ENTMCNC: 31.8 G/DL (ref 31.5–35.7)
MCV RBC AUTO: 85 FL (ref 79–97)
PLATELET # BLD AUTO: 238 X10E3/UL (ref 150–379)
POTASSIUM SERPL-SCNC: 4.1 MMOL/L (ref 3.5–5.2)
PROT SERPL-MCNC: 6.7 G/DL (ref 6–8.5)
RBC # BLD AUTO: 4.73 X10E6/UL (ref 3.77–5.28)
SODIUM SERPL-SCNC: 143 MMOL/L (ref 134–144)
WBC # BLD AUTO: 8.7 X10E3/UL (ref 3.4–10.8)

## 2017-11-30 ENCOUNTER — HOSPITAL ENCOUNTER (OUTPATIENT)
Dept: CARDIAC CATH/INVASIVE PROCEDURES | Age: 82
Discharge: HOME | End: 2017-11-30
Attending: SPECIALIST | Admitting: SPECIALIST
Payer: MEDICARE

## 2017-11-30 VITALS
SYSTOLIC BLOOD PRESSURE: 134 MMHG | HEART RATE: 82 BPM | WEIGHT: 198 LBS | OXYGEN SATURATION: 92 % | BODY MASS INDEX: 31.82 KG/M2 | HEIGHT: 66 IN | DIASTOLIC BLOOD PRESSURE: 96 MMHG | TEMPERATURE: 99.3 F | RESPIRATION RATE: 18 BRPM

## 2017-11-30 LAB
GLUCOSE BLD STRIP.AUTO-MCNC: 141 MG/DL (ref 65–100)
SERVICE CMNT-IMP: ABNORMAL

## 2017-11-30 PROCEDURE — C1769 GUIDE WIRE: HCPCS

## 2017-11-30 PROCEDURE — C1751 CATH, INF, PER/CENT/MIDLINE: HCPCS

## 2017-11-30 PROCEDURE — 74011250636 HC RX REV CODE- 250/636: Performed by: SPECIALIST

## 2017-11-30 PROCEDURE — 93460 R&L HRT ART/VENTRICLE ANGIO: CPT

## 2017-11-30 PROCEDURE — 77030004532 HC CATH ANGI DX IMP BSC -A

## 2017-11-30 PROCEDURE — 93005 ELECTROCARDIOGRAM TRACING: CPT

## 2017-11-30 PROCEDURE — C1887 CATHETER, GUIDING: HCPCS

## 2017-11-30 PROCEDURE — 77030013406 HC CATH CTRL EDWD -B

## 2017-11-30 PROCEDURE — 74011636320 HC RX REV CODE- 636/320: Performed by: SPECIALIST

## 2017-11-30 PROCEDURE — 77030004533 HC CATH ANGI DX IMP BSC -B

## 2017-11-30 PROCEDURE — 82962 GLUCOSE BLOOD TEST: CPT

## 2017-11-30 PROCEDURE — 77030013744

## 2017-11-30 PROCEDURE — 74011000250 HC RX REV CODE- 250: Performed by: SPECIALIST

## 2017-11-30 PROCEDURE — C1894 INTRO/SHEATH, NON-LASER: HCPCS

## 2017-11-30 PROCEDURE — 74011250637 HC RX REV CODE- 250/637: Performed by: SPECIALIST

## 2017-11-30 RX ORDER — ATROPINE SULFATE 0.1 MG/ML
1 INJECTION INTRAVENOUS AS NEEDED
Status: DISCONTINUED | OUTPATIENT
Start: 2017-11-30 | End: 2017-11-30 | Stop reason: HOSPADM

## 2017-11-30 RX ORDER — HEPARIN SODIUM 200 [USP'U]/100ML
1000 INJECTION, SOLUTION INTRAVENOUS
Status: DISCONTINUED | OUTPATIENT
Start: 2017-11-30 | End: 2017-11-30 | Stop reason: HOSPADM

## 2017-11-30 RX ORDER — ACETAMINOPHEN 325 MG/1
650 TABLET ORAL
Status: DISCONTINUED | OUTPATIENT
Start: 2017-11-30 | End: 2017-11-30 | Stop reason: HOSPADM

## 2017-11-30 RX ORDER — SODIUM CHLORIDE 9 MG/ML
75 INJECTION, SOLUTION INTRAVENOUS CONTINUOUS
Status: DISCONTINUED | OUTPATIENT
Start: 2017-11-30 | End: 2017-11-30 | Stop reason: HOSPADM

## 2017-11-30 RX ORDER — CLOPIDOGREL 300 MG/1
600 TABLET, FILM COATED ORAL
Status: DISCONTINUED | OUTPATIENT
Start: 2017-11-30 | End: 2017-11-30 | Stop reason: HOSPADM

## 2017-11-30 RX ORDER — LIDOCAINE HYDROCHLORIDE 10 MG/ML
4-30 INJECTION INFILTRATION; PERINEURAL
Status: DISCONTINUED | OUTPATIENT
Start: 2017-11-30 | End: 2017-11-30 | Stop reason: HOSPADM

## 2017-11-30 RX ORDER — FENTANYL CITRATE 50 UG/ML
25-200 INJECTION, SOLUTION INTRAMUSCULAR; INTRAVENOUS
Status: DISCONTINUED | OUTPATIENT
Start: 2017-11-30 | End: 2017-11-30 | Stop reason: HOSPADM

## 2017-11-30 RX ORDER — SODIUM CHLORIDE 0.9 % (FLUSH) 0.9 %
5-10 SYRINGE (ML) INJECTION EVERY 8 HOURS
Status: DISCONTINUED | OUTPATIENT
Start: 2017-11-30 | End: 2017-11-30 | Stop reason: HOSPADM

## 2017-11-30 RX ORDER — MIDAZOLAM HYDROCHLORIDE 1 MG/ML
1-10 INJECTION, SOLUTION INTRAMUSCULAR; INTRAVENOUS
Status: DISCONTINUED | OUTPATIENT
Start: 2017-11-30 | End: 2017-11-30 | Stop reason: HOSPADM

## 2017-11-30 RX ORDER — SODIUM CHLORIDE 0.9 % (FLUSH) 0.9 %
10 SYRINGE (ML) INJECTION AS NEEDED
Status: DISCONTINUED | OUTPATIENT
Start: 2017-11-30 | End: 2017-11-30 | Stop reason: HOSPADM

## 2017-11-30 RX ORDER — HEPARIN SODIUM 1000 [USP'U]/ML
5000 INJECTION, SOLUTION INTRAVENOUS; SUBCUTANEOUS
Status: DISCONTINUED | OUTPATIENT
Start: 2017-11-30 | End: 2017-11-30 | Stop reason: HOSPADM

## 2017-11-30 RX ORDER — SODIUM CHLORIDE 0.9 % (FLUSH) 0.9 %
5-10 SYRINGE (ML) INJECTION AS NEEDED
Status: DISCONTINUED | OUTPATIENT
Start: 2017-11-30 | End: 2017-11-30 | Stop reason: HOSPADM

## 2017-11-30 RX ADMIN — SODIUM CHLORIDE 75 ML/HR: 900 INJECTION, SOLUTION INTRAVENOUS at 10:18

## 2017-11-30 RX ADMIN — IOPAMIDOL 52 ML: 755 INJECTION, SOLUTION INTRAVENOUS at 12:34

## 2017-11-30 RX ADMIN — FENTANYL CITRATE 25 MCG: 50 INJECTION, SOLUTION INTRAMUSCULAR; INTRAVENOUS at 12:29

## 2017-11-30 RX ADMIN — FENTANYL CITRATE 50 MCG: 50 INJECTION, SOLUTION INTRAMUSCULAR; INTRAVENOUS at 11:55

## 2017-11-30 RX ADMIN — LIDOCAINE HYDROCHLORIDE 10 ML: 10 INJECTION, SOLUTION INFILTRATION; PERINEURAL at 11:57

## 2017-11-30 RX ADMIN — ACETAMINOPHEN 650 MG: 325 TABLET, FILM COATED ORAL at 13:46

## 2017-11-30 RX ADMIN — MIDAZOLAM HYDROCHLORIDE 2 MG: 1 INJECTION, SOLUTION INTRAMUSCULAR; INTRAVENOUS at 11:55

## 2017-11-30 RX ADMIN — FENTANYL CITRATE 25 MCG: 50 INJECTION, SOLUTION INTRAMUSCULAR; INTRAVENOUS at 11:35

## 2017-11-30 RX ADMIN — MIDAZOLAM HYDROCHLORIDE 1 MG: 1 INJECTION, SOLUTION INTRAMUSCULAR; INTRAVENOUS at 12:29

## 2017-11-30 RX ADMIN — HEPARIN SODIUM 2000 UNITS: 200 INJECTION, SOLUTION INTRAVENOUS at 11:34

## 2017-11-30 RX ADMIN — MIDAZOLAM HYDROCHLORIDE 2 MG: 1 INJECTION, SOLUTION INTRAMUSCULAR; INTRAVENOUS at 11:35

## 2017-11-30 NOTE — ROUTINE PROCESS
1245:  TRANSFER - IN REPORT:    Verbal report received from [facility] on [address] , from the Cardiac Cath lab, for routine progression of care. Report consisted of patients Situation, Background, Assessment and Recommendations(SBAR). Information from the following report(s) Procedure Summary, Intake/Output, MAR and Recent Results was reviewed with the receiving clinician. Opportunity for questions and clarification was provided. Assessment completed upon patients arrival to [address] [place] [address] and care assumed. Cardiac Cath Lab Recovery Arrival Note:     [address] arrived to [facility] recovery area. Patient procedure= C. Patient on cardiac monitor, non-invasive blood pressure, Patient status doing well without problems. Patient is A&Ox 4. Patient reports no complaints. Procedure site without any bleeding and no hematoma.

## 2017-11-30 NOTE — PROGRESS NOTES
1306:  SHEATH PULL NOTE:    Patient informed of procedure with questions answered with review. Sheath site prepped with Chloraprep swab. 6 fr sheath in right femoral artery and vein pulled by KWAME Souza. Hand hold and clotting pad, with manual compression to site. No bleeding, no hematoma, no pain at site. Hemostasis obtained with hand hold/manual compression at site. Patient tolerated well. No change in status. Handhold for 15 minutes. No change at site. Sterile gauze and transparent dressing applied to site. No bleeding, no hematoma, no pain/discomfort at site. Groin instructions provided with review. Continue to monitor procedure site and patient status. *Advised patient to keep head flat and extremity flat to decrease risk of bleeding. *Recommended that patient not drink for ONE HOUR post sheath pull completion. *Recommended that patient not eat for TWO HOURS post sheath pull completion. *Instructed patient on rationale for delay of PO products to decrease risk for aspiration and if additional treatment to procedure site is required. Patient verbalized understanding of instructions with review.

## 2017-11-30 NOTE — DISCHARGE INSTRUCTIONS
Patient Discharge Instructions    Alejandra Petersen / [id number] : 7/3/1930    Admitted 2017 Discharged: 2017         CARDIAC CATHETERIZATION/ CATH STENT PLACEMENT   DISCHARGE INSTRUCTIONS    If possible, have someone stay with you for the first night. It is normal to feel tired for the first couple of days. Take it easy and follow your physicians instructions on activity. CHECK THE CATHETER INSERTION SITE DAILY:    If bleeding at the cath site occurs, take a clean washcloth and apply direct pressure just above the puncture site for at least 15 minutes. Call 911 immediately if the bleeding is not controlled. Continue to apply direct pressure until an ambulance gets to your location. Do not try to drive yourself or have someone else drive you to the hospital.  Have the ambulance bring you to the emergency room. You may shower 24 hours after your procedure. Gently remove the bandage during showering. Wash with soap and water and pat dry. To prevent infection, keep the groin area/insertion site clean and dry. Do not apply powders, creams, lotions, or ointments to the site for 5 days. You may cover the site with a fresh Band-Aid each day until well healed. You may notice a small lump at the site. This is normal and may last up to 6 weeks. CALL THE PHYSICIAN:  ? If the site becomes red, swollen, or feels warm to the touch, or is healing poorly    ? If you note any large/extending bruise, fever >101.0 or chills  ? If your extremity has numbness, tingling, discoloration, abnormal swelling, tightness or pain   ? If you have difficulty with urination or develop nausea, vomiting, or severe abdominal pain    ACTIVITY for the first 24-48 hours, or as instructed by your physician:  ? No lifting, pushing or pulling over 10 pounds and no straining the insertion site. Do not lift grocery bags or the garbage can; do not run the vacuum  or  for 7 days.   ? You may start walking short distances the day of your procedure. Gradually increase as tolerated each day. Current activity recommendations are 30 minutes of exercise at least 5 days a week. Work up to this as you recover. ? Avoid walking outside in extremes of heat or cold. Walk inside (at home, at the mall, or at a large store) when it is cold and windy or hot and humid. THINGS TO KEEP IN MIND:   ? Do not drive, operate any machinery, or sign any legal documents for 24 hours after your procedure, or as directed by your physician. You must have someone drive you home after your procedure. ? Drink plenty of fluids for 24-48 hours after your procedure to flush the contrast dye from your kidneys. ? Limit the number of times you go up and down the stairs  ? Take rests and pace yourself with activity  ? Be careful and do not strain with bowel movements    MEDICATIONS:  ? Take all medications as prescribed  ? Call your physician if you have any questions  ? Keep an updated list of your medications with you at all times and give a list to your primary physician and pharmacist  ? You may use Tylenol 325mg 1-2 tablets every 6 hours as needed for pain or discomfort, unless otherwise instructed. If you have significant discomfort more than 48 hours after your procedure, please call your physicians office. SIGNS AND SYMPTOMS:  ? Notify your physician for new or recurrent symptoms of chest discomfort, unusual shortness of breath or fatigue. These could be signs of a problem and should be discussed with your physician. ? For significant chest pain or symptoms of angina not relieved with rest:  if you have been prescribed Nitroglycerin, take as directed (taken under the tongue, one at a time 5 minutes apart for a total of 3 doses, sitting down). If the discomfort is not relieved after the 3rd Nitroglycerin, call 911. If you have not been prescribed Nitroglycerin and your chest discomfort is significant, call 911.  Take the ambulance, do not try to drive yourself or have someone else drive you to the hospital.     AFTER CARE:  ? Follow up with your physician as instructed  ? Follow a heart healthy diet with proper portion control, daily stress management, daily exercise, blood pressure and cholesterol control, and smoking cessation. The success of your stent, if you had one placed, and the prevention of future catheterizations heavily depends on your lifestyle changes you make now! ? You may start walking short distances the day of your procedure. Gradually increase as tolerated each day. Current activity recommendations are 30 minutes of exercise at least 5 days a week. Work up to this as you recover. Walk, ride a bike, or choose any other activity you enjoy to reach this activity goal.   ? Avoid walking outside in extremes of heat or cold. Walk inside (at home, at the mall, or at a large store) when it is cold and windy or hot and humid. ? If you had a stent placed, consider [facility] as a resource to help you make the needed lifestyle changes to live a heart healthy lifestyle. Discuss your candidacy with your physician. If you have questions, call your physicians office or the Cardiac Cath Lab at [phone]. The Cath Lab is operational from 6:30 a.m. to 5:00 p.m., Monday through Friday. After hours, notify your physician. [address] [place] [address] can be reached at [phone]. [facility] is operational Monday-Thursday 8:30 a.m. to 5:00 p.m. and Friday 8:30 a.m. to 12:00 p.m.       Remember:  IN CASE OF BLEEDING: KEEP FIRM PRESSURE ON THE PROCEDURE SITE AND CALL 911 IF NOT CONTROLLED

## 2017-11-30 NOTE — PROCEDURES
Cardiac Catheterization Procedure Note   Patient: Santo Habermann  MRN: [id number]  SSN: [id number]   YOB: 1930 Age: 80 y.o. Sex: female    Date of Procedure: 11/30/2017   Pre-procedure Diagnosis: Aortic Stenosis  Post-procedure Diagnosis: Aortic Stenosis  Procedure: Left and Right Heart Cath  :  Dr. Chloe Pedroza MD    Assistant(s):  None  Anesthesia: Moderate Sedation   Estimated Blood Loss: Less than 10 mL   Specimens Removed: None  Findings: RACHEL 0.84 patient appeared to be in AF during case, but most likely was frequent atrial ectopy. No mitral stenosis. LV gram not done. Normal coronaries. Thermo output was 6.9, assumed Emilie was 4.65; the 6.9 was used by computer in RACHEL calculation, so RACHEL may be overestimated. LV known to be normal by echo. Peak to peak gradient 63.   Complications: None   Implants:  None  Signed by:  Chloe Pedroza MD  11/30/2017  12:44 PM

## 2017-11-30 NOTE — IP AVS SNAPSHOT
[address] [facility] 
[phone] Patient: Costa Leal MRN: [id number] SJT:[birth year] My Medications TAKE these medications as instructed Instructions Each Dose to Equal  
 Morning Noon Evening Bedtime  
 albuterol 90 mcg/actuation inhaler Commonly known as:  PROVENTIL HFA, VENTOLIN HFA, PROAIR HFA Your last dose was: Your next dose is: Take 2 Puffs by inhalation every four (4) hours as needed for Wheezing. 2 Puff  
    
   
   
   
  
 aspirin 81 mg tablet Your last dose was: Your next dose is: Take  by mouth. 2 tabs prior to niacin  
     
   
   
   
  
 azelastine 137 mcg (0.1 %) nasal spray Commonly known as:  ASTELIN Your last dose was: Your next dose is: 2 Sprays by Both Nostrils route two (2) times a day. Use in each nostril as directed 2 [place] COD LIVER OIL PO Your last dose was: Your next dose is: Take 1,000 mg by mouth daily. 1000 mg  
    
   
   
   
  
 dilTIAZem  mg ER capsule Commonly known as:  CARDIZEM CD Your last dose was: Your next dose is: Take 1 Cap by mouth daily. 180 mg  
    
   
   
   
  
 ezetimibe 10 mg tablet Commonly known as:  Ric Ngo Your last dose was: Your next dose is: Take 1 Tab by mouth daily. 10 mg  
    
   
   
   
  
 FIBER PO Your last dose was: Your next dose is: Take  by mouth. 6 tabs daily  
     
   
   
   
  
 furosemide 40 mg tablet Commonly known as:  LASIX Your last dose was: Your next dose is: Take 1 Tab by mouth daily. Take 40/80mg on alternating days. 40 mg  
    
   
   
   
  
 glimepiride 2 mg tablet Commonly known as:  AMARYL Your last dose was: Your next dose is: Take 1 Tab by mouth daily. 2 mg  
    
   
   
   
  
 glucose blood VI test strips strip Commonly known as:  ACCU-CHEK ELBERT PLUS TEST STRP Your last dose was: Your next dose is:    
   
   
 Check bs daily. Dx 250.00  
     
   
   
   
  
 losartan 100 mg tablet Commonly known as:  COZAAR Your last dose was: Your next dose is: Take 1 Tab by mouth daily. 100 mg  
    
   
   
   
  
 metFORMIN 1,000 mg tablet Commonly known as:  GLUCOPHAGE Your last dose was: Your next dose is: Take 1 Tab by mouth two (2) times a day. 1000 mg  
    
   
   
   
  
 niacin 1,000 mg Tber Your last dose was: Your next dose is: Take 2,000 Units by mouth nightly. 2000 Units  
    
   
   
   
  
 potassium chloride SA 10 mEq capsule Commonly known as:  Dewayne Givens Your last dose was: Your next dose is: Take 1 Cap by mouth two (2) times a day. 10 mEq  
    
   
   
   
  
 rosuvastatin 5 mg tablet Commonly known as:  CRESTOR Your last dose was: Your next dose is: Take 1 Tab by mouth daily. 5 mg TYLENOL EXTRA STRENGTH 500 mg tablet Generic drug:  acetaminophen Your last dose was: Your next dose is: Take 1,000 mg by mouth every six (6) hours as needed for Pain. 1000 mg  
    
   
   
   
  
 VITAMIN B-12 1,000 mcg tablet Generic drug:  cyanocobalamin Your last dose was: Your next dose is: Take 1,500 mcg by mouth daily. 1500 mcg VITAMIN D3 1,000 unit tablet Generic drug:  cholecalciferol Your last dose was: Your next dose is: Take 2,000 Units by mouth daily. 2000 Units

## 2017-11-30 NOTE — IP AVS SNAPSHOT
[address] [facility] Colin Owens 13 
[phone] Patient: Chely Devi MRN: [id number] [facility]:[birth year] About your hospitalization You were admitted on:  November 30, 2017 You last received care in the:  [address], [facility]/s Dr JUAN LUIS BUSBY You were discharged on:  November 30, 2017 Why you were hospitalized Your primary diagnosis was:  Not on File Discharge Orders None A check latanya indicates which time of day the medication should be taken. My Medications TAKE these medications as instructed Instructions Each Dose to Equal  
 Morning Noon Evening Bedtime  
 albuterol 90 mcg/actuation inhaler Commonly known as:  PROVENTIL HFA, VENTOLIN HFA, PROAIR HFA Your last dose was: Your next dose is: Take 2 Puffs by inhalation every four (4) hours as needed for Wheezing. 2 Puff  
    
   
   
   
  
 aspirin 81 mg tablet Your last dose was: Your next dose is: Take  by mouth. 2 tabs prior to niacin  
     
   
   
   
  
 azelastine 137 mcg (0.1 %) nasal spray Commonly known as:  ASTELIN Your last dose was: Your next dose is: 2 Sprays by Both Nostrils route two (2) times a day. Use in each nostril as directed 2 [place] COD LIVER OIL PO Your last dose was: Your next dose is: Take 1,000 mg by mouth daily. 1000 mg  
    
   
   
   
  
 dilTIAZem  mg ER capsule Commonly known as:  CARDIZEM CD Your last dose was: Your next dose is: Take 1 Cap by mouth daily. 180 mg  
    
   
   
   
  
 ezetimibe 10 mg tablet Commonly known as:  Josephine Paget Your last dose was: Your next dose is: Take 1 Tab by mouth daily. 10 mg  
    
   
   
   
  
 FIBER PO Your last dose was: Your next dose is: Take  by mouth. 6 tabs daily furosemide 40 mg tablet Commonly known as:  LASIX Your last dose was: Your next dose is: Take 1 Tab by mouth daily. Take 40/80mg on alternating days. 40 mg  
    
   
   
   
  
 glimepiride 2 mg tablet Commonly known as:  AMARYL Your last dose was: Your next dose is: Take 1 Tab by mouth daily. 2 mg  
    
   
   
   
  
 glucose blood VI test strips strip Commonly known as:  ACCU-CHEK ELBERT PLUS TEST STRP Your last dose was: Your next dose is:    
   
   
 Check bs daily. Dx 250.00  
     
   
   
   
  
 losartan 100 mg tablet Commonly known as:  COZAAR Your last dose was: Your next dose is: Take 1 Tab by mouth daily. 100 mg  
    
   
   
   
  
 metFORMIN 1,000 mg tablet Commonly known as:  GLUCOPHAGE Your last dose was: Your next dose is: Take 1 Tab by mouth two (2) times a day. 1000 mg  
    
   
   
   
  
 niacin 1,000 mg Tber Your last dose was: Your next dose is: Take 2,000 Units by mouth nightly. 2000 Units  
    
   
   
   
  
 potassium chloride SA 10 mEq capsule Commonly known as:  Oc Waltersers Your last dose was: Your next dose is: Take 1 Cap by mouth two (2) times a day. 10 mEq  
    
   
   
   
  
 rosuvastatin 5 mg tablet Commonly known as:  CRESTOR Your last dose was: Your next dose is: Take 1 Tab by mouth daily. 5 mg TYLENOL EXTRA STRENGTH 500 mg tablet Generic drug:  acetaminophen Your last dose was: Your next dose is: Take 1,000 mg by mouth every six (6) hours as needed for Pain. 1000 mg  
    
   
   
   
  
 VITAMIN B-12 1,000 mcg tablet Generic drug:  cyanocobalamin Your last dose was: Your next dose is: Take 1,500 mcg by mouth daily. 1500 mcg VITAMIN D3 1,000 unit tablet Generic drug:  cholecalciferol Your last dose was: Your next dose is: Take 2,000 Units by mouth daily. 2000 Units Discharge Instructions Patient Discharge Instructions Maximus Balderrama / [id number] : 7/3/1930 Admitted 2017 Discharged: 2017 CARDIAC CATHETERIZATION/ CATH STENT PLACEMENT  
DISCHARGE INSTRUCTIONS If possible, have someone stay with you for the first night. It is normal to feel tired for the first couple of days. Take it easy and follow your physicians instructions on activity. CHECK THE CATHETER INSERTION SITE DAILY: If bleeding at the cath site occurs, take a clean washcloth and apply direct pressure just above the puncture site for at least 15 minutes. Call 911 immediately if the bleeding is not controlled. Continue to apply direct pressure until an ambulance gets to your location. Do not try to drive yourself or have someone else drive you to the hospital.  Have the ambulance bring you to the emergency room. You may shower 24 hours after your procedure. Gently remove the bandage during showering. Wash with soap and water and pat dry. To prevent infection, keep the groin area/insertion site clean and dry. Do not apply powders, creams, lotions, or ointments to the site for 5 days. You may cover the site with a fresh Band-Aid each day until well healed. You may notice a small lump at the site. This is normal and may last up to 6 weeks. CALL THE PHYSICIAN: 
? If the site becomes red, swollen, or feels warm to the touch, or is healing poorly ? If you note any large/extending bruise, fever >101.0 or chills ? If your extremity has numbness, tingling, discoloration, abnormal swelling, tightness or pain ? If you have difficulty with urination or develop nausea, vomiting, or severe abdominal pain ACTIVITY for the first 24-48 hours, or as instructed by your physician: 
? No lifting, pushing or pulling over 10 pounds and no straining the insertion site. Do not lift grocery bags or the garbage can; do not run the vacuum  or  for 7 days. ? You may start walking short distances the day of your procedure. Gradually increase as tolerated each day. Current activity recommendations are 30 minutes of exercise at least 5 days a week. Work up to this as you recover. ? Avoid walking outside in extremes of heat or cold. Walk inside (at home, at the mall, or at a large store) when it is cold and windy or hot and humid. THINGS TO KEEP IN MIND:  
? Do not drive, operate any machinery, or sign any legal documents for 24 hours after your procedure, or as directed by your physician. You must have someone drive you home after your procedure. ? Drink plenty of fluids for 24-48 hours after your procedure to flush the contrast dye from your kidneys. ? Limit the number of times you go up and down the stairs ? Take rests and pace yourself with activity ? Be careful and do not strain with bowel movements MEDICATIONS: 
? Take all medications as prescribed ? Call your physician if you have any questions ? Keep an updated list of your medications with you at all times and give a list to your primary physician and pharmacist 
? You may use Tylenol 325mg 1-2 tablets every 6 hours as needed for pain or discomfort, unless otherwise instructed. If you have significant discomfort more than 48 hours after your procedure, please call your physicians office. SIGNS AND SYMPTOMS: 
? Notify your physician for new or recurrent symptoms of chest discomfort, unusual shortness of breath or fatigue. These could be signs of a problem and should be discussed with your physician. ?  For significant chest pain or symptoms of angina not relieved with rest: if you have been prescribed Nitroglycerin, take as directed (taken under the tongue, one at a time 5 minutes apart for a total of 3 doses, sitting down). If the discomfort is not relieved after the 3rd Nitroglycerin, call 911. If you have not been prescribed Nitroglycerin and your chest discomfort is significant, call 911. Take the ambulance, do not try to drive yourself or have someone else drive you to the hospital.  
 
AFTER CARE: 
? Follow up with your physician as instructed ? Follow a heart healthy diet with proper portion control, daily stress management, daily exercise, blood pressure and cholesterol control, and smoking cessation. The success of your stent, if you had one placed, and the prevention of future catheterizations heavily depends on your lifestyle changes you make now! ? You may start walking short distances the day of your procedure. Gradually increase as tolerated each day. Current activity recommendations are 30 minutes of exercise at least 5 days a week. Work up to this as you recover. Walk, ride a bike, or choose any other activity you enjoy to reach this activity goal.  
? Avoid walking outside in extremes of heat or cold. Walk inside (at home, at the mall, or at a large store) when it is cold and windy or hot and humid. ? If you had a stent placed, consider Cardiac Wellness as a resource to help you make the needed lifestyle changes to live a heart healthy lifestyle. Discuss your candidacy with your physician. If you have questions, call your physicians office or the Cardiac Cath Lab at [phone]. The Cath Lab is operational from 6:30 a.m. to 5:00 p.m., Monday through Friday. After hours, notify your physician. [address] [place] [address] can be reached at [phone]. [facility]ness is operational Monday-Thursday 8:30 a.m. to 5:00 p.m. and Friday 8:30 a.m. to 12:00 p.m.  
 
 
Remember:  IN CASE OF BLEEDING: KEEP FIRM PRESSURE ON THE PROCEDURE SITE AND CALL 911 IF NOT CONTROLLED Introducing [facility]! [facility] introduces [employer] patient portal. Now you can access parts of your medical record, email your doctor's office, and request medication refills online. 1. In your internet browser, go to [email or web address] 2. Click on the First Time User? Click Here link in the Sign In box. You will see the New Member Sign Up page. 3. Enter your Solavei Access Code exactly as it appears below. You will not need to use this code after youve completed the sign-up process. If you do not sign up before the expiration date, you must request a new code. · [employer] Access Code: [email or web address] Expires: 2/15/2018  8:47 AM 
 
4. Enter the last four digits of your Social Security Number (xxxx) and Date of Birth (mm/dd/yyyy) as indicated and click Submit. You will be taken to the next sign-up page. 5. Create a Solavei ID. This will be your [employer]avei login ID and cannot be changed, so think of one that is secure and easy to remember. 6. Create a [employer]ave[employer] password. You can change your password at any time. 7. Enter your Password Reset Question and Answer. This can be used at a later time if you forget your password. 8. Enter your e-mail address. You will receive e-mail notification when new information is available in [address] 9. Click Sign Up. You can now view and download portions of your medical record. 10. Click the Download Summary menu link to download a portable copy of your medical information. If you have questions, please visit the Frequently Asked Questions section of the [employer] website. Remember, [employer]avei is NOT to be used for urgent needs. For medical emergencies, dial 911. Now available from your iPhone and Android! Providers Seen During Your Hospitalization Provider Specialty Primary office phone Caleb Oconnor MD Cardiology [phone] Your Primary Care Physician (PCP) Primary Care Physician Office Phone Office Fax [address] [facility] [facility], [place] [address] [phone] You are allergic to the following Allergen Reactions Bees (Hymenoptera Allergenic Extract) Anaphylaxis Betadine (Povidone-Iodine) Other (comments) Abdominal swelling, one incident in 1977. Has had since without a reaction. Darvon (Propoxyphene) Nausea and Vomiting Griseofulvin Nausea and Vomiting Lipitor (Atorvastatin) Myalgia Recent Documentation Height Weight Breastfeeding? BMI OB Status Smoking Status 1.676 m 89.8 kg No 31.96 kg/m2 Postmenopausal Former Smoker Emergency Contacts Name Discharge Info Relation Home Work Mobile Regina Mason DECLINED CAREGIVER [4] Son [22] [phone] Patient Belongings The following personal items are in your possession at time of discharge: 
     Visual Aid: Glasses Please provide this summary of care documentation to your next provider. Signatures-by signing, you are acknowledging that this After Visit Summary has been reviewed with you and you have received a copy. Patient Signature:  ____________________________________________________________ Date:  ____________________________________________________________  
  
[facility] Provider Signature:  ____________________________________________________________ Date:  ____________________________________________________________

## 2017-11-30 NOTE — PROGRESS NOTES
Cardiac Cath Lab Procedure Area Arrival Note:    Roosevelt Simmons arrived to Cardiac Cath Lab, Procedure Area. Patient identifiers verified with NAME and DATE OF BIRTH. Procedure verified with patient. Consent forms verified. Allergies verified. Patient informed of procedure and plan of care. Questions answered with review. Patient voiced understanding of procedure and plan of care. Patient on cardiac monitor, non-invasive blood pressure, SPO2 monitor. On   or O2 @ 2 lpm via NC.  IV of NS on pump at 75 ml/hr. Patient status doing well without problems. Patient is A&Ox 4. Patient reports no pain. Patient medicated during procedure with orders obtained and verified by Dr. Bryant Ferguson. Refer to patients Cardiac Cath Lab PROCEDURE REPORT for vital signs, assessment, status, and response during procedure, printed at end of case. Printed report on chart or scanned into chart.

## 2017-11-30 NOTE — PROGRESS NOTES
1505:  Head of bed elevated 30degrees right groin site without bleeding and no hematoma. 32 61 16: Jose J Golden ambulated @ 32 61 16 (time) approximately 100 feet. Patient tolerated ambulation without adverse advents. right leg (right/left, groin/arm)  without bleeding or hematoma noted. 1605: I have reviewed discharge instructions with the patient and patient's son Reji Gee. The patient and patient's son Reji Gee verbalized understanding.

## 2017-12-01 ENCOUNTER — TELEPHONE (OUTPATIENT)
Dept: CARDIAC CATH/INVASIVE PROCEDURES | Age: 82
End: 2017-12-01

## 2017-12-01 LAB
ATRIAL RATE: 80 BPM
CALCULATED P AXIS, ECG09: 93 DEGREES
CALCULATED R AXIS, ECG10: 25 DEGREES
CALCULATED T AXIS, ECG11: 63 DEGREES
DIAGNOSIS, 93000: NORMAL
P-R INTERVAL, ECG05: 196 MS
Q-T INTERVAL, ECG07: 404 MS
QRS DURATION, ECG06: 88 MS
QTC CALCULATION (BEZET), ECG08: 465 MS
VENTRICULAR RATE, ECG03: 80 BPM

## 2017-12-05 ENCOUNTER — TELEPHONE (OUTPATIENT)
Dept: CARDIOLOGY CLINIC | Age: 82
End: 2017-12-05

## 2017-12-05 NOTE — TELEPHONE ENCOUNTER
Returned patient's call, 2 pt identifiers used  Advised patient the valve clinic will be calling her to schedule an appointment with her. Patient verbalized understanding.

## 2017-12-05 NOTE — TELEPHONE ENCOUNTER
Patient states that she recently had a cath and would like to discuss what the next step is. Requests a call back at [phone]. Thanks!

## 2018-01-04 ENCOUNTER — OFFICE VISIT (OUTPATIENT)
Dept: CARDIOTHORACIC SURGERY | Age: 83
End: 2018-01-04

## 2018-01-04 VITALS
OXYGEN SATURATION: 96 % | HEIGHT: 66 IN | WEIGHT: 200 LBS | RESPIRATION RATE: 16 BRPM | SYSTOLIC BLOOD PRESSURE: 102 MMHG | HEART RATE: 93 BPM | DIASTOLIC BLOOD PRESSURE: 58 MMHG | TEMPERATURE: 98.7 F | BODY MASS INDEX: 32.14 KG/M2

## 2018-01-04 DIAGNOSIS — I10 ESSENTIAL HYPERTENSION: ICD-10-CM

## 2018-01-04 DIAGNOSIS — I05.0 MITRAL VALVE STENOSIS, UNSPECIFIED ETIOLOGY: ICD-10-CM

## 2018-01-04 DIAGNOSIS — J44.9 CHRONIC OBSTRUCTIVE PULMONARY DISEASE, UNSPECIFIED COPD TYPE (HCC): ICD-10-CM

## 2018-01-04 DIAGNOSIS — I77.3 FIBROMUSCULAR DYSPLASIA (HCC): ICD-10-CM

## 2018-01-04 DIAGNOSIS — E11.9 CONTROLLED TYPE 2 DIABETES MELLITUS WITHOUT COMPLICATION, WITHOUT LONG-TERM CURRENT USE OF INSULIN (HCC): ICD-10-CM

## 2018-01-04 DIAGNOSIS — G47.33 OSA (OBSTRUCTIVE SLEEP APNEA): ICD-10-CM

## 2018-01-04 DIAGNOSIS — I35.0 NONRHEUMATIC AORTIC VALVE STENOSIS: Primary | ICD-10-CM

## 2018-01-04 NOTE — PROGRESS NOTES
I had the pleasure of seeing Ms. Jesus French in the valve clinic earlier today with Ms. Maren Camarena NP - please see her note for details. She has severe AS and mild symptoms probably limited more by her back pain, but wants to proceed with TAVR on her aortic valve. She is intermediate risk at least due to age and mobility issues. Will get a CT scan and then discuss options.

## 2018-01-04 NOTE — PROGRESS NOTES
Patient: Alise Oh   Age: 80 y.o. Patient Care Team:  Dianna Espinosa MD as PCP - General (Internal Medicine)  Ayo Merchant MD (Endocrinology)  Cyrus Acosta MD as Physician (Ophthalmology)  Tomy Higgins MD as Physician (Dermatology)  Brenden Amos MD as Physician (Dermatology)  Moses Saravia MD as Physician (Cardiology)  Maldonado Campos MD as Physician (Sleep Medicine)  Meenu Núñez MD (Cardiothoracic Surgery)    PCP: Dianna Espinosa MD    Cardiologist: Stewart Atwood    Diagnosis/Reason for Consultation: The primary encounter diagnosis was Nonrheumatic aortic valve stenosis. Diagnoses of Mitral valve stenosis, unspecified etiology, Essential hypertension, Controlled type 2 diabetes mellitus without complication, without long-term current use of insulin ([address] Chronic obstructive pulmonary disease, unspecified COPD type ([address] CINDA (obstructive sleep apnea), and Fibromuscular dysplasia ([address] were also pertinent to this visit. Problem List:   Patient Active Problem List   Diagnosis Code    Tachycardia R00.0    Hypertension I10    PVC (premature ventricular contraction) I49.3    Aortic stenosis I35.0    Edema R60.9    Foreign body in pharynx T17.208A    Type II or unspecified type diabetes mellitus without mention of complication, uncontrolled E11.65    Other and unspecified hyperlipidemia E78.5    CINDA (obstructive sleep apnea) G47.33    Other dyspnea and respiratory abnormality R06.09, R09.89    Mitral stenosis I05.0    COPD (chronic obstructive pulmonary disease) ([facility]) J44.9    Fibromuscular dysplasia ([facility]) I77.3    Controlled type 2 diabetes mellitus without complication, without long-term current use of insulin ([facility]) E11.9    Advance directive on file Z78.9    Chest tightness R07.89    Dyspnea and respiratory abnormalities R06.00, R06.89         HPI: 80 y.o.   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 Romina Mccray for interventional evaluation of AS.     Ms. Lee Engel has been followed by Dr. Romina Mccray for several years and they have been serially tracking her AS by echo and by symptoms. It was suggested she come discuss intervention in the past but has developed convincing sx's and has agreed to come at this time. Today, Ms. Lee Engel admits to some progressive fatigue and mild dizziness if she moves too quickly. She also states she has some mild chest tightness associated with ROBB that is more common in the recent months that can occur even when walking on a flat surface. She has chronic back pain with peripheral neuropathy and feels that is a large contributor to her energy level which 'has good days and bad days.'  She denies any palpitations, orthopnea, PND, syncope or falls. She does have some long standing LE edema and has recently increased her furosemide for management. Ms. Lee Engel is deathly afraid of a debilitating stroke and has reluctantly come with one of her sons today. She is desperate to avoid general anesthesia. She denies any cardiac hospitalizations in the past 12 months. Ms. Lee Engel is  and lives alone. She has a son and daughter in law that live nearby. She has a life alert necklace on. She has a chair lift in her home to help her with stairs. She is independent in her ADLs, medical decision making and medication management. She drives and is an avid bridge player and participates in tournaments weekly. NYHA Classification: II   Class II (Mild): Slight limitation of physical activity. Comfortable at rest, but ordinary physical activity results in fatigue, palpitation, or dyspnea.      Angina Classification: 0   Class 0: No symptoms    Past Medical History:   Diagnosis Date    Aortic stenosis  CAD (coronary artery disease)     pt states she does not have this    Chronic pain     back - spinal stenosis    COPD     Diabetes ([address]     Diabetic neuropathy ([address]     Diverticulosis of colon     GERD (gastroesophageal reflux disease)     Hiatal Hernia - pt states she does not have this    Hypercholesterolemia     CINDA (obstructive sleep apnea) 08-    AHI: 18.6 per hour    Other ill-defined conditions(799.89)     heart murmur    Other ill-defined conditions(799.89)     colon infection    Peripheral neuropathy     Renal artery stenosis (HCC)     Right    Unspecified adverse effect of anesthesia     delayed awakening - \"does not need as much\"     Endocarditis: no  Pulmonary HTN: no Greater than 2/3 systemic: N/A  Immunocompromised/Steroids: no  Heart Failure Admission w/in past year:  no   Heart Failure Admission w/in past 2 weeks: no  Liver Dz/Cirrhosis: no  If yes, MELD score:  N/A    Past Surgical History:   Procedure Laterality Date    ABDOMEN SURGERY PROC UNLISTED  1977    R Renal Artery Stenosis repair    ABDOMEN SURGERY [address] or 1983    Herniorrhaphy   [address] [facility] [facility] [address]   [address] [place] [address]    Left - benign cyst    ENDOSCOPY, COLON, DIAGNOSTIC  1999    hx of polys in the past    HX GYN  1966    D&C    HX HEENT      Bilat Cataracts    HX HEENT      R Retinal Hem    HX HEENT      Eyelid Cysts x 4    HX HEENT      cyst removed above eyebrow    HX HEENT      oral surgeries - gum surgery/floating root  removed x 2    HX ORTHOPAEDIC  11/06    L1-S1 2007    HX ORTHOPAEDIC  1985    L 4th Trigger finger    HX OTHER SURGICAL      renal artery bypass    HX OTHER SURGICAL      tonsillectomy  adenoidectomy    HX OTHER SURGICAL      retinal hemorrhage    HX OTHER SURGICAL      cataract bilateral    HX OTHER SURGICAL      back surgery    HX TONSILLECTOMY      T & A      Social History   Substance Use Topics    Smoking status: Former Smoker     Packs/day: 2.00     Years: 13.00     Types: Cigarettes     Quit date: 6/6/1977    Smokeless tobacco: Never Used      Comment: former cigarette smoker    Alcohol use No      Family History   Problem Relation Age of Onset    Stroke Mother     Cancer Mother      ovarian    Heart Attack Mother     Cancer Father      lung/throat    Alcohol abuse Father     Stroke Maternal Grandfather      Prior to Admission medications    Medication Sig Start Date End Date Taking? Authorizing Provider   PSYLLIUM SEED, WITH DEXTROSE, (FIBER PO) Take  by mouth. 6 tabs daily   Yes Historical Provider   furosemide (LASIX) 40 mg tablet Take 1 Tab by mouth daily. Take 40/80mg on alternating days. Patient taking differently: Take 40 mg by mouth daily. 60 mg daily (1.5) 8/15/17  Yes Galdino Rios MD   metFORMIN (GLUCOPHAGE) 1,000 mg tablet Take 1 Tab by mouth two (2) times a day. 6/28/17  Yes Sally Cyr MD   dilTIAZem CD (CARDIZEM CD) 180 mg ER capsule Take 1 Cap by mouth daily. 6/28/17  Yes Rebeka Tsang MD   ezetimibe (ZETIA) 10 mg tablet Take 1 Tab by mouth daily. 6/28/17  Yes Sally Cyr MD   potassium chloride SA (MICRO-K) 10 mEq capsule Take 1 Cap by mouth two (2) times a day. 6/28/17  Yes Rebeka Tsang MD   rosuvastatin (CRESTOR) 5 mg tablet Take 1 Tab by mouth daily. 6/28/17  Yes Rebeka Tsang MD   COD LIVER OIL PO Take 1,000 mg by mouth daily. Yes Historical Provider   acetaminophen (TYLENOL EXTRA STRENGTH) 500 mg tablet Take 1,000 mg by mouth every six (6) hours as needed for Pain. Yes Historical Provider   losartan (COZAAR) 100 mg tablet Take 1 Tab by mouth daily. 6/23/16  Yes Rebeka Tsang MD   azelastine (ASTELIN) 137 mcg (0.1 %) nasal spray 2 Sprays by Both Nostrils route two (2) times a day. Use in each nostril as directed  Patient taking differently: 2 Sprays by Both Nostrils route as needed.  Use in each nostril as directed 6/23/16  Yes Rebeka Balbuena MD   niacin 1,000 mg TbER Take 2,000 Units by mouth nightly. 12/7/15  Yes Naomie Rodriguez MD   glucose blood VI test strips (ACCU-CHEK ELBERT PLUS) strip Check bs daily. Dx 250.00 8/1/13  Yes Rebeka Balbuena MD   cyanocobalamin (VITAMIN B-12) 1,000 mcg tablet Take 1,500 mcg by mouth daily. Yes Historical Provider   cholecalciferol, vitamin d3, (VITAMIN D) 1,000 unit tablet Take 2,000 Units by mouth daily. Yes Historical Provider   aspirin 81 mg tablet Take  by mouth. 2 tabs prior to niacin 2/1/11  Yes Historical Provider       Allergies   Allergen Reactions    Bees [Hymenoptera Allergenic Extract] Anaphylaxis    Betadine [Povidone-Iodine] Other (comments)     Abdominal swelling, one incident in 1977. Has had since without a reaction.  Darvon [Propoxyphene] Nausea and Vomiting    Griseofulvin Nausea and Vomiting    Lipitor [Atorvastatin] Myalgia       Current Medications:   Current Outpatient Prescriptions   Medication Sig Dispense Refill    PSYLLIUM SEED, WITH DEXTROSE, (FIBER PO) Take  by mouth. 6 tabs daily      furosemide (LASIX) 40 mg tablet Take 1 Tab by mouth daily. Take 40/80mg on alternating days. (Patient taking differently: Take 40 mg by mouth daily. 60 mg daily (1.5)) 180 Tab 3    metFORMIN (GLUCOPHAGE) 1,000 mg tablet Take 1 Tab by mouth two (2) times a day. 180 Tab 3    dilTIAZem CD (CARDIZEM CD) 180 mg ER capsule Take 1 Cap by mouth daily. 90 Cap 3    ezetimibe (ZETIA) 10 mg tablet Take 1 Tab by mouth daily. 90 Tab 3    potassium chloride SA (MICRO-K) 10 mEq capsule Take 1 Cap by mouth two (2) times a day. 180 Cap 3    rosuvastatin (CRESTOR) 5 mg tablet Take 1 Tab by mouth daily. 90 Tab 3    COD LIVER OIL PO Take 1,000 mg by mouth daily.  acetaminophen (TYLENOL EXTRA STRENGTH) 500 mg tablet Take 1,000 mg by mouth every six (6) hours as needed for Pain.  losartan (COZAAR) 100 mg tablet Take 1 Tab by mouth daily.  90 Tab 3    azelastine (ASTELIN) 137 mcg (0.1 %) nasal spray 2 Sprays by Both Nostrils route two (2) times a day. Use in each nostril as directed (Patient taking differently: 2 Sprays by Both Nostrils route as needed. Use in each nostril as directed) 3 Bottle 3    niacin 1,000 mg TbER Take 2,000 Units by mouth nightly. 180 Tab 3    glucose blood VI test strips (ACCU-CHEK ELBERT PLUS) strip Check bs daily. Dx 250.00 1 Package 11    cyanocobalamin (VITAMIN B-12) 1,000 mcg tablet Take 1,500 mcg by mouth daily.  cholecalciferol, vitamin d3, (VITAMIN D) 1,000 unit tablet Take 2,000 Units by mouth daily.  aspirin 81 mg tablet Take  by mouth. 2 tabs prior to niacin         Vitals: Blood pressure 102/58, pulse 93, temperature 98.7 °F (37.1 °C), temperature source Oral, resp. rate 16, height 5' 6\" (1.676 m), weight 200 lb (90.7 kg), SpO2 96 %. Allergies: is allergic to bees [hymenoptera allergenic extract]; betadine [povidone-iodine]; darvon [propoxyphene]; griseofulvin; and lipitor [atorvastatin].     Review of Systems: Pertinent Positives per HPI   [x]Total of 13 systems reviewed as follows:  Constitutional: Negative fever, negative chills  Eyes:   Negative for amauroses fugax  ENT:   Negative sore throat,oral absecess  Endocrine Negative for thyroid replacement Rx; goiter  Respiratory:  Negative chronic cough,sputum production  Cards:   Negative for palpitations, varicosities, claudication  GI:   Negative for dysphagia, bleeding, nausea, vomiting, diarrhea, and abdominal pain  Genitourinary: Negative for frequency, dysuria  Integument:  Negative for rash and pruritus  Hematologic:  Negative for easy bruising; bleeding dyscarsia  Musculoskel: Negative for muscle weakness inhibiting ambulation  Neurological:  Negative for stroke, TIA, syncope  Behavl/Psych: Negative for feelings of anxiety, depression     Cardiovascular Testing:   EKG 11/30/2017: SR 80 bpm. No axis deviations/blocks    TTE 8/15/2017:  LEFT VENTRICLE: Size was normal. Systolic function was normal. Ejection fraction was estimated in the range of 70 % to 75 %. There were no regional wall motion abnormalities. Wall thickness was moderately to markedly increased. RIGHT VENTRICLE: The size was normal. Systolic function was normal. Wall thickness was normal.   LEFT ATRIUM: The atrium was moderately dilated. RIGHT ATRIUM: Size was normal. MITRAL VALVE: There was moderate annular calcification. There was mild thickening. DOPPLER: There was no regurgitation. AORTIC VALVE: DOPPLER: There was severe aortic stenosis. Mean gradient 48mmHg There was no regurgitation. TRICUSPID VALVE: Normal valve structure. There was normal leaflet separation. DOPPLER: The transtricuspid velocity was within the normal range. There was no evidence for tricuspid stenosis. There was mild to moderate regurgitation. Pulmonary artery systolic pressure was within the normal range. Right atrial pressure estimate: 3 mmHg. Pulmonary artery systolic pressure: 32 mmHg. PULMONIC VALVE: Leaflets exhibited normal thickness, no calcification, and normal cuspal separation. DOPPLER: The transpulmonic velocity was within the normal range. There was no significant regurgitation. AORTA: The root exhibited normal size. PERICARDIUM: There was no pericardial effusion. The pericardium was normal in appearance. AV MaxP mmHg  AV Vmax: 4.4 m/s  AV meanP.4 mmHg  RAP: 3 mmHg  RACHEL (VTI): 0.7 cm2  RVSP: 32 mmHg    R/L Cardiac catheterization 2017: CORONARY CIRCULATION: The coronary circulation is right dominant. Left main: Normal. LAD: Normal. Circumflex: Angiography showed minor  Luminal irregularities. Ramus intermedius: Normal. RCA: Angiography showed minor luminal irregularities.   Aortic Pressure (S/D/M): 110/59/83  Aortic Valve Splice - AO: 720/76/--  Aortic Valve Splice - LV: 776/-2/--  Left Ventricle (s/edp): 166/13/--  Mitral Valve Splice - LV-PCW - LV: 281/1/--  Mitral Valve Splice - LV-PCW - PCW: 161/-1/--  Pulmonary Artery (S/D/M): 30/10/19  Pulmonary Capillary Wedge: 13/17/13    Carotid Dopplers 2/17/2017: Findings are consistent with 1-49% stenosis of the right and left internal carotid arteries. Disease severity is at the low end of range. Vertebral arteries are patent with antegrade flow    Physical Exam:  General: Well nourished well groomed woman appearing younger than stated age accompanied by a son  Neuro: A&OX3. ARORA. PERRL. Steady assisted gait with cane  Head:Normocephalic. Atraumatic. Symmetrical  Neck: Trachea Midline  Resp: CTA B. No Adv BS/cough/sputum/tachypnea with seated conversation  CV: S1S2 RRR. KATHERINE IVVI. No JVD/carotid bruits. Pink/warm/dry extremities. Trace LE peripheral edema  GI:Benign ab. Soft. NT/ND. Active BS  : Voids  Integ: No obvious s/s of infection or breakdown  Musculo/Skeletal: FROM in all major joints. Good muscle tone    Clinic Evaluation:   KCCQ-12: scanned into EMR    5 meter gait: 5.38 seconds with cane    Lovetta Brim Survey:  Doc [place] Index ADL - 5/6  scanned into EMR     STS 2.81 Risk Score / Predicted 30 day mortality: - calculations scanned into EMR - PFT data unknown   Procedure: AV Replacement    Risk of Mortality: 4.784%    Morbidity or Mortality: 18.992%    Long Length of Stay: 10.009%    Short Length of Stay: 19.556%    Permanent Stroke: 2.049%    Prolonged Ventilation: 12.925%    DSW Infection: 0.314%    Renal Failure: 6.721%    Reoperation: 6.599%    Assessment/Plan:   1. AS- severe and symptomatic - At least intermediate risk d/t age and rehab/mobility issues. Interested in pursuing TAVR evaluation to include CTA and PFTs. 2. HTN- CCB, CDZ per cards  3. TRENTON-  bypassed in 1977 - will eval vasculature on CTA  4. DM- recent amaryl discontinued by endocrinologist. To continue metformin per endo. Most recent within past 90 days HgbA1C 5.7% per pt  5. HLD- well controlled on current statin dose per cards  6.  Vit D deficiency- at goal on supplement per cards  7. CINDA- CPAP for past 6+ yrs. Pt unaware of setting   8. Hx vocal cord injury w/ intubation in 1985 - chronic horse voice. No swallowing issues. 9. Advanced directives- Has adv directives written down. Does NOT want to live on life support or with reduced quality of life for any length of time. \"If I have a stroke during the TAVR can you just go ahead and kill me?\"   10. Further plan/care by Rola Garcia [id number]

## 2018-01-04 NOTE — PROGRESS NOTES
Saw patient. History and films reviewed. Risks and benefits explained. Agrees with TAVR work-up. Patient seen by NP Ms Toi Hall and agree.    Findings/Conditions: Severe AS  Plan of Care: TAVR work-up    Risk of morbidity and mortality - intermediate risk   Medical decision making - high complexity

## 2018-01-09 ENCOUNTER — APPOINTMENT (OUTPATIENT)
Dept: CT IMAGING | Age: 83
End: 2018-01-09
Attending: NURSE PRACTITIONER
Payer: MEDICARE

## 2018-01-09 ENCOUNTER — APPOINTMENT (OUTPATIENT)
Dept: PULMONOLOGY | Age: 83
End: 2018-01-09
Attending: NURSE PRACTITIONER
Payer: MEDICARE

## 2018-01-09 ENCOUNTER — HOSPITAL ENCOUNTER (OUTPATIENT)
Age: 83
Discharge: HOME | End: 2018-01-09
Attending: THORACIC SURGERY (CARDIOTHORACIC VASCULAR SURGERY) | Admitting: THORACIC SURGERY (CARDIOTHORACIC VASCULAR SURGERY)
Payer: MEDICARE

## 2018-01-09 VITALS
RESPIRATION RATE: 16 BRPM | DIASTOLIC BLOOD PRESSURE: 79 MMHG | SYSTOLIC BLOOD PRESSURE: 146 MMHG | TEMPERATURE: 98.3 F | HEART RATE: 93 BPM | OXYGEN SATURATION: 92 %

## 2018-01-09 DIAGNOSIS — I35.0 NONRHEUMATIC AORTIC VALVE STENOSIS: ICD-10-CM

## 2018-01-09 DIAGNOSIS — J44.9 CHRONIC OBSTRUCTIVE PULMONARY DISEASE, UNSPECIFIED COPD TYPE (HCC): ICD-10-CM

## 2018-01-09 DIAGNOSIS — I77.3 FIBROMUSCULAR DYSPLASIA (HCC): ICD-10-CM

## 2018-01-09 DIAGNOSIS — I10 ESSENTIAL HYPERTENSION: ICD-10-CM

## 2018-01-09 DIAGNOSIS — E11.9 CONTROLLED TYPE 2 DIABETES MELLITUS WITHOUT COMPLICATION, WITHOUT LONG-TERM CURRENT USE OF INSULIN (HCC): ICD-10-CM

## 2018-01-09 DIAGNOSIS — I05.0 MITRAL VALVE STENOSIS, UNSPECIFIED ETIOLOGY: ICD-10-CM

## 2018-01-09 DIAGNOSIS — G47.33 OSA (OBSTRUCTIVE SLEEP APNEA): ICD-10-CM

## 2018-01-09 LAB
ALBUMIN SERPL-MCNC: 3.2 G/DL (ref 3.5–5)
ALBUMIN/GLOB SERPL: 0.9 {RATIO} (ref 1.1–2.2)
ALP SERPL-CCNC: 81 U/L (ref 45–117)
ALT SERPL-CCNC: 20 U/L (ref 12–78)
ANION GAP SERPL CALC-SCNC: 5 MMOL/L (ref 5–15)
AST SERPL-CCNC: 13 U/L (ref 15–37)
BILIRUB SERPL-MCNC: 0.8 MG/DL (ref 0.2–1)
BUN SERPL-MCNC: 15 MG/DL (ref 6–20)
BUN/CREAT SERPL: 27 (ref 12–20)
CALCIUM SERPL-MCNC: 9 MG/DL (ref 8.5–10.1)
CHLORIDE SERPL-SCNC: 107 MMOL/L (ref 97–108)
CO2 SERPL-SCNC: 30 MMOL/L (ref 21–32)
CREAT SERPL-MCNC: 0.55 MG/DL (ref 0.55–1.02)
ERYTHROCYTE [DISTWIDTH] IN BLOOD BY AUTOMATED COUNT: 15.3 % (ref 11.5–14.5)
GLOBULIN SER CALC-MCNC: 3.4 G/DL (ref 2–4)
GLUCOSE SERPL-MCNC: 136 MG/DL (ref 65–100)
HCT VFR BLD AUTO: 38.2 % (ref 35–47)
HGB BLD-MCNC: 12.2 G/DL (ref 11.5–16)
MCH RBC QN AUTO: 27.5 PG (ref 26–34)
MCHC RBC AUTO-ENTMCNC: 31.9 G/DL (ref 30–36.5)
MCV RBC AUTO: 86 FL (ref 80–99)
PLATELET # BLD AUTO: 219 K/UL (ref 150–400)
POTASSIUM SERPL-SCNC: 3.4 MMOL/L (ref 3.5–5.1)
PROT SERPL-MCNC: 6.6 G/DL (ref 6.4–8.2)
RBC # BLD AUTO: 4.44 M/UL (ref 3.8–5.2)
SODIUM SERPL-SCNC: 142 MMOL/L (ref 136–145)
WBC # BLD AUTO: 6.5 K/UL (ref 3.6–11)

## 2018-01-09 PROCEDURE — 99218 HC RM OBSERVATION: CPT

## 2018-01-09 PROCEDURE — 74174 CTA ABD&PLVS W/CONTRAST: CPT

## 2018-01-09 PROCEDURE — 71275 CT ANGIOGRAPHY CHEST: CPT

## 2018-01-09 PROCEDURE — 36415 COLL VENOUS BLD VENIPUNCTURE: CPT | Performed by: THORACIC SURGERY (CARDIOTHORACIC VASCULAR SURGERY)

## 2018-01-09 PROCEDURE — 80053 COMPREHEN METABOLIC PANEL: CPT | Performed by: THORACIC SURGERY (CARDIOTHORACIC VASCULAR SURGERY)

## 2018-01-09 PROCEDURE — 85027 COMPLETE CBC AUTOMATED: CPT | Performed by: THORACIC SURGERY (CARDIOTHORACIC VASCULAR SURGERY)

## 2018-01-09 PROCEDURE — 74011636320 HC RX REV CODE- 636/320: Performed by: THORACIC SURGERY (CARDIOTHORACIC VASCULAR SURGERY)

## 2018-01-09 PROCEDURE — 74011000258 HC RX REV CODE- 258: Performed by: THORACIC SURGERY (CARDIOTHORACIC VASCULAR SURGERY)

## 2018-01-09 PROCEDURE — 94010 BREATHING CAPACITY TEST: CPT

## 2018-01-09 RX ORDER — SODIUM CHLORIDE 0.9 % (FLUSH) 0.9 %
10 SYRINGE (ML) INJECTION
Status: COMPLETED | OUTPATIENT
Start: 2018-01-09 | End: 2018-01-09

## 2018-01-09 RX ADMIN — Medication 10 ML: at 11:13

## 2018-01-09 RX ADMIN — SODIUM CHLORIDE 100 ML: 900 INJECTION, SOLUTION INTRAVENOUS at 11:13

## 2018-01-09 RX ADMIN — IOPAMIDOL 78 ML: 755 INJECTION, SOLUTION INTRAVENOUS at 11:14

## 2018-01-09 RX ADMIN — IOPAMIDOL 100 ML: 755 INJECTION, SOLUTION INTRAVENOUS at 11:13

## 2018-01-09 NOTE — PROGRESS NOTES
Patient discharged to home following TAVR testing completion of CT and PFT. Labs sent per YVETTE Ornelas. All PIVs removed prior to D/C. Patient taken by volunteer wheelchair to lobby.

## 2018-01-10 ENCOUNTER — DOCUMENTATION ONLY (OUTPATIENT)
Dept: CARDIOLOGY | Age: 83
End: 2018-01-10

## 2018-01-10 NOTE — PROGRESS NOTES
CT scan reviewed. While significant artifact, the diastolic thin cut images show aortic annulus of 542mm2 area and perimeter of 85mm. TF access probably better left side with MLD 6.5mm should be ok for 26mm S3 valve (with balloon angiogram with 25mm balloon first to confirm sizing).

## 2018-01-10 NOTE — PROCEDURES
[address]  PULMONARY FUNCTION    Eric Carrillo  MR#: [id number]  : 1930  ACCOUNT #: [de-identified]   DATE OF SERVICE: 2018    CLINICAL INDICATION:  Aortic stenosis. Spirometry is performed. Spirometry is within normal limits without airflow obstruction.   Expiratory limb of the flow volume loop appears grossly normal.      MD FILIBERTO Oneal / Lizette.Elsy  D: 01/10/2018 12:51     T: 01/10/2018 13:43  JOB #: [id number]

## 2018-01-25 ENCOUNTER — OFFICE VISIT (OUTPATIENT)
Dept: CARDIOTHORACIC SURGERY | Age: 83
End: 2018-01-25

## 2018-01-25 VITALS
SYSTOLIC BLOOD PRESSURE: 104 MMHG | RESPIRATION RATE: 20 BRPM | HEART RATE: 98 BPM | HEIGHT: 66 IN | OXYGEN SATURATION: 96 % | BODY MASS INDEX: 32.71 KG/M2 | TEMPERATURE: 98.6 F | WEIGHT: 203.5 LBS | DIASTOLIC BLOOD PRESSURE: 58 MMHG

## 2018-01-25 DIAGNOSIS — J44.9 CHRONIC OBSTRUCTIVE PULMONARY DISEASE, UNSPECIFIED COPD TYPE (HCC): ICD-10-CM

## 2018-01-25 DIAGNOSIS — I05.0 MITRAL VALVE STENOSIS, UNSPECIFIED ETIOLOGY: ICD-10-CM

## 2018-01-25 DIAGNOSIS — G47.33 OSA (OBSTRUCTIVE SLEEP APNEA): ICD-10-CM

## 2018-01-25 DIAGNOSIS — E11.9 CONTROLLED TYPE 2 DIABETES MELLITUS WITHOUT COMPLICATION, WITHOUT LONG-TERM CURRENT USE OF INSULIN (HCC): ICD-10-CM

## 2018-01-25 DIAGNOSIS — I77.3 FIBROMUSCULAR DYSPLASIA (HCC): ICD-10-CM

## 2018-01-25 DIAGNOSIS — I35.0 NONRHEUMATIC AORTIC VALVE STENOSIS: Primary | ICD-10-CM

## 2018-01-25 DIAGNOSIS — I10 ESSENTIAL HYPERTENSION: ICD-10-CM

## 2018-01-25 NOTE — PROGRESS NOTES
Saw patient. History and films reviewed. Risks and benefits explained. Agrees with TAVR. Patient seen by NP Ms Lanae Zamudio and agree.    Findings/Conditions: severe AS  Plan of Care: TAVR       Risk of morbidity and mortality - intermediate   Medical decision making - high complexity

## 2018-01-25 NOTE — PROGRESS NOTES
Patient: Harry Salinas   Age: 80 y.o. Patient Care Team:  Panfilo Garza MD as PCP - General (Internal Medicine)  Ian Agee MD (Endocrinology)  Orquidea Leonard MD as Physician (Ophthalmology)  Fma Parish MD as Physician (Dermatology)  Damaris Burrell MD as Physician (Dermatology)  Juan Francisco Winchester MD as Physician (Cardiology)  Cesar Malik MD as Physician (Sleep Medicine)  Fernando Morrison MD (Cardiothoracic Surgery)    PCP: Panfilo Garza MD    Cardiologist: Sydnie Schmitz    Diagnosis/Reason for Consultation: The primary encounter diagnosis was Nonrheumatic aortic valve stenosis. Diagnoses of Mitral valve stenosis, unspecified etiology, Essential hypertension, Controlled type 2 diabetes mellitus without complication, without long-term current use of insulin ([address] Chronic obstructive pulmonary disease, unspecified COPD type ([address] CINDA (obstructive sleep apnea), and Fibromuscular dysplasia ([address] were also pertinent to this visit. Problem List:   Patient Active Problem List   Diagnosis Code    Tachycardia R00.0    Hypertension I10    PVC (premature ventricular contraction) I49.3    Aortic stenosis I35.0    Edema R60.9    Foreign body in pharynx T17.208A    Type II or unspecified type diabetes mellitus without mention of complication, uncontrolled E11.65    Other and unspecified hyperlipidemia E78.5    CINDA (obstructive sleep apnea) G47.33    Other dyspnea and respiratory abnormality R06.09, R09.89    Mitral stenosis I05.0    COPD (chronic obstructive pulmonary disease) (HCC) J44.9    Fibromuscular dysplasia ([facility]) I77.3    Controlled type 2 diabetes mellitus without complication, without long-term current use of insulin (HCC) E11.9    Advance directive on file Z78.9    Chest tightness R07.89    Dyspnea and respiratory abnormalities R06.00, R06.89      HPI: 80 y.o.   female with 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 harvest (1977), spinal stenosis w/ peripheral neuropathy, CINDA (CPAP), Vit D deficiency, vocal cords injury with intubation in 1985 that was referred to the [address] [place] [address] by Dr. Didier Charles for interventional evaluation of AS. She is back today to review dx testing results and to discuss her interventional options.      Ms. Emilia Horowitz has been followed by Dr. Didier Charles for several years and they have been serially tracking her AS by echo and by symptoms. She has avoided addressing her AS for many yrs but now symptomatically is forced to address it.     Ms. Emilia Horowitz admits to some progressive fatigue and mild dizziness if she moves too quickly. She also states she has some mild chest tightness associated with ROBB that is more common in the recent months that can occur even when walking on a flat surface. She has chronic back pain with peripheral neuropathy and feels that is a large contributor to her energy level which 'has good days and bad days.'  She denies any palpitations, orthopnea, PND, syncope or falls. She does have some long standing LE edema and has recently increased her furosemide for management.      Ms. Emilia Horowitz is deathly afraid of a debilitating stroke and continues to be a bit reluctant. She is desperate to avoid general anesthesia. She denies any cardiac hospitalizations in the past 12 months.      Ms. Emilia Horowitz is  and lives alone. She has a son and daughter in law that live nearby. She has a life alert necklace on. She has a chair lift in her home to help her with stairs d/t debilitating back pain. She is independent in her ADLs, medical decision making and medication management. She drives and is an avid bridge player and participates in tournaments weekly. NYHA Classification: II   Class II (Mild): Slight limitation of physical activity. Comfortable at rest, but ordinary physical activity results in fatigue, palpitation, or dyspnea.     Angina Classification: 0   Class 0: No symptoms Past Medical History:   Diagnosis Date    Aortic stenosis     CAD (coronary artery disease)     pt states she does not have this    Chronic pain     back - spinal stenosis    COPD     Diabetes ([address]     Diabetic neuropathy ([address]     Diverticulosis of colon     GERD (gastroesophageal reflux disease)     Hiatal Hernia - pt states she does not have this    Hypercholesterolemia     CINDA (obstructive sleep apnea) 08-    AHI: 18.6 per hour    Other ill-defined conditions(799.89)     heart murmur    Other ill-defined conditions(799.89)     colon infection    Peripheral neuropathy     Renal artery stenosis (HCC)     Right    Unspecified adverse effect of anesthesia     delayed awakening - \"does not need as much\"     Endocarditis: no  Pulmonary HTN:  No  Greater than 2/3 systemic: N/A  Immunocompromised/Steroids: no  Heart Failure Admission w/in past year: no  Heart Failure Admission w/in past 2 weeks: no  Liver Dz/Cirrhosis: no If yes, MELD score: N/A    Past Surgical History:   Procedure Laterality Date    ABDOMEN SURGERY PROC UNLISTED  1977    R Renal Artery Stenosis repair    ABDOMEN SURGERY [address] or 1983    Herniorrhaphy    ABDOMEN SURGERY [address]   [address] [place] [address]    Left - benign cyst    ENDOSCOPY, COLON, DIAGNOSTIC  1999    hx of polys in the past    HX GYN  1966    D&C    HX HEENT      Bilat Cataracts    HX HEENT      R Retinal Hem    HX HEENT      Eyelid Cysts x 4    HX HEENT      cyst removed above eyebrow    HX HEENT      oral surgeries - gum surgery/floating root  removed x 2    HX ORTHOPAEDIC  11/06    L1-S1 2007    HX ORTHOPAEDIC  1985    L 4th Trigger finger    HX OTHER SURGICAL      renal artery bypass    HX OTHER SURGICAL      tonsillectomy  adenoidectomy    HX OTHER SURGICAL      retinal hemorrhage    HX OTHER SURGICAL      cataract bilateral    HX OTHER SURGICAL      back surgery    HX TONSILLECTOMY      T & A      Social History   Substance Use Topics    Smoking status: Former Smoker     Packs/day: 2.00     Years: 13.00     Types: Cigarettes     Quit date: 6/6/1977    Smokeless tobacco: Never Used      Comment: former cigarette smoker    Alcohol use No      Family History   Problem Relation Age of Onset    Stroke Mother     Cancer Mother      ovarian    Heart Attack Mother     Cancer Father      lung/throat    Alcohol abuse Father     Stroke Maternal Grandfather      Prior to Admission medications    Medication Sig Start Date End Date Taking? Authorizing Provider   PSYLLIUM SEED, WITH DEXTROSE, (FIBER PO) Take  by mouth. 6 tabs daily   Yes Historical Provider   furosemide (LASIX) 40 mg tablet Take 1 Tab by mouth daily. Take 40/80mg on alternating days. Patient taking differently: Take 40 mg by mouth daily. 60 mg daily (1.5) 8/15/17  Yes Raúl Acuña MD   metFORMIN (GLUCOPHAGE) 1,000 mg tablet Take 1 Tab by mouth two (2) times a day. 6/28/17  Yes Radha Loera MD   dilTIAZem CD (CARDIZEM CD) 180 mg ER capsule Take 1 Cap by mouth daily. 6/28/17  Yes Rebeka Syed MD   ezetimibe (ZETIA) 10 mg tablet Take 1 Tab by mouth daily. 6/28/17  Yes Radha Loera MD   potassium chloride SA (MICRO-K) 10 mEq capsule Take 1 Cap by mouth two (2) times a day. 6/28/17  Yes Rebeka Syed MD   rosuvastatin (CRESTOR) 5 mg tablet Take 1 Tab by mouth daily. 6/28/17  Yes Rebeka Syed MD   COD LIVER OIL PO Take 1,000 mg by mouth daily. Yes Historical Provider   acetaminophen (TYLENOL EXTRA STRENGTH) 500 mg tablet Take 1,000 mg by mouth every six (6) hours as needed for Pain. Yes Historical Provider   losartan (COZAAR) 100 mg tablet Take 1 Tab by mouth daily. 6/23/16  Yes Rebeka Syed MD   azelastine (ASTELIN) 137 mcg (0.1 %) nasal spray 2 Sprays by Both Nostrils route two (2) times a day.  Use in each nostril as directed  Patient taking differently: 2 Sprays by Both Nostrils route as needed. Use in each nostril as directed 6/23/16  Yes Rebeka Greenwood MD   niacin 1,000 mg TbER Take 2,000 Units by mouth nightly. 12/7/15  Yes Live Kilgore MD   glucose blood VI test strips (ACCU-CHEK ELBERT PLUS) strip Check bs daily. Dx 250.00 8/1/13  Yes Rebeka Greenwood MD   cyanocobalamin (VITAMIN B-12) 1,000 mcg tablet Take 1,500 mcg by mouth daily. Yes Historical Provider   cholecalciferol, vitamin d3, (VITAMIN D) 1,000 unit tablet Take 2,000 Units by mouth daily. Yes Historical Provider   aspirin 81 mg tablet Take  by mouth. 2 tabs prior to niacin 2/1/11  Yes Historical Provider       Allergies   Allergen Reactions    Bees [Hymenoptera Allergenic Extract] Anaphylaxis    Betadine [Povidone-Iodine] Other (comments)     Abdominal swelling, one incident in 1977. Has had since without a reaction.  Darvon [Propoxyphene] Nausea and Vomiting    Griseofulvin Nausea and Vomiting    Lipitor [Atorvastatin] Myalgia       Current Medications:   Current Outpatient Prescriptions   Medication Sig Dispense Refill    PSYLLIUM SEED, WITH DEXTROSE, (FIBER PO) Take  by mouth. 6 tabs daily      furosemide (LASIX) 40 mg tablet Take 1 Tab by mouth daily. Take 40/80mg on alternating days. (Patient taking differently: Take 40 mg by mouth daily. 60 mg daily (1.5)) 180 Tab 3    metFORMIN (GLUCOPHAGE) 1,000 mg tablet Take 1 Tab by mouth two (2) times a day. 180 Tab 3    dilTIAZem CD (CARDIZEM CD) 180 mg ER capsule Take 1 Cap by mouth daily. 90 Cap 3    ezetimibe (ZETIA) 10 mg tablet Take 1 Tab by mouth daily. 90 Tab 3    potassium chloride SA (MICRO-K) 10 mEq capsule Take 1 Cap by mouth two (2) times a day. 180 Cap 3    rosuvastatin (CRESTOR) 5 mg tablet Take 1 Tab by mouth daily. 90 Tab 3    COD LIVER OIL PO Take 1,000 mg by mouth daily.  acetaminophen (TYLENOL EXTRA STRENGTH) 500 mg tablet Take 1,000 mg by mouth every six (6) hours as needed for Pain.       losartan (COZAAR) 100 mg tablet Take 1 Tab by mouth daily. 90 Tab 3    azelastine (ASTELIN) 137 mcg (0.1 %) nasal spray 2 Sprays by Both Nostrils route two (2) times a day. Use in each nostril as directed (Patient taking differently: 2 Sprays by Both Nostrils route as needed. Use in each nostril as directed) 3 Bottle 3    niacin 1,000 mg TbER Take 2,000 Units by mouth nightly. 180 Tab 3    glucose blood VI test strips (ACCU-CHEK ELBERT PLUS) strip Check bs daily. Dx 250.00 1 Package 11    cyanocobalamin (VITAMIN B-12) 1,000 mcg tablet Take 1,500 mcg by mouth daily.  cholecalciferol, vitamin d3, (VITAMIN D) 1,000 unit tablet Take 2,000 Units by mouth daily.  aspirin 81 mg tablet Take  by mouth. 2 tabs prior to niacin         Vitals: Blood pressure 104/58, pulse 98, temperature 98.6 °F (37 °C), temperature source Oral, resp. rate 20, height 5' 6\" (1.676 m), weight 203 lb 8 oz (92.3 kg), SpO2 96 %. Allergies: is allergic to bees [hymenoptera allergenic extract]; betadine [povidone-iodine]; darvon [propoxyphene]; griseofulvin; and lipitor [atorvastatin].     Review of Systems: Pertinent Positives per HPI   [x]Total of 13 systems reviewed as follows:  Constitutional: Negative fever, negative chills  Eyes:               Negative for amauroses fugax  ENT:                Negative sore throat,oral absecess  Endocrine       Negative for thyroid replacement Rx; goiter  Respiratory:    Negative chronic cough,sputum production  Cards:             Negative for palpitations, varicosities, claudication  GI:                    Negative for dysphagia, bleeding, nausea, vomiting, diarrhea, and abdominal pain  Genitourinary:  Negative for frequency, dysuria  Integument:      Negative for rash and pruritus  Hematologic:    Negative for easy bruising; bleeding dyscarsia  Musculoskel:     Negative for muscle weakness inhibiting ambulation  Neurological:    Negative for stroke, TIA, syncope, dizziness  Behavl/Psych: Negative for feelings of anxiety, depression      Cardiovascular Testing:   EKG 2017: SR 80 bpm. No axis deviations/blocks     TTE 8/15/2017:  LEFT VENTRICLE: Size was normal. Systolic function was normal. Ejection fraction was estimated in the range of 70 % to 75 %. There were no regional wall motion abnormalities. Wall thickness was moderately to markedly increased. RIGHT VENTRICLE: The size was normal. Systolic function was normal. Wall thickness was normal.   LEFT ATRIUM: The atrium was moderately dilated. RIGHT ATRIUM: Size was normal. MITRAL VALVE: There was moderate annular calcification. There was mild thickening. DOPPLER: There was no regurgitation. AORTIC VALVE: DOPPLER: There was severe aortic stenosis. Mean gradient 48mmHg There was no regurgitation. TRICUSPID VALVE: Normal valve structure. There was normal leaflet separation. DOPPLER: The transtricuspid velocity was within the normal range. There was no evidence for tricuspid stenosis. There was mild to moderate regurgitation. Pulmonary artery systolic pressure was within the normal range. Right atrial pressure estimate: 3 mmHg. Pulmonary artery systolic pressure: 32 mmHg. PULMONIC VALVE: Leaflets exhibited normal thickness, no calcification, and normal cuspal separation. DOPPLER: The transpulmonic velocity was within the normal range. There was no significant regurgitation. AORTA: The root exhibited normal size. PERICARDIUM: There was no pericardial effusion. The pericardium was normal in appearance. AV MaxP mmHg  AV Vmax: 4.4 m/s  AV meanP.4 mmHg  RAP: 3 mmHg  RACHEL (VTI): 0.7 cm2  RVSP: 32 mmHg    R/L Cardiac catheterization 2017: CORONARY CIRCULATION: The coronary circulation is right dominant. Left main: Normal. LAD: Normal. Circumflex: Angiography showed minor  Luminal irregularities. Ramus intermedius: Normal. RCA: Angiography showed minor luminal irregularities.   Aortic Pressure (S/D/M): 110/59/83  Aortic Valve Splice - AO: 105/54/--  Aortic Valve Splice - LV: 355/-6/--  Left Ventricle (s/edp): 166/13/--  Mitral Valve Splice - LV-PCW - LV: 545/5/--  Mitral Valve Splice - LV-PCW - PCW: 161/-1/--  Pulmonary Artery (S/D/M): 30/10/19  Pulmonary Capillary Wedge: 13/17/13     Carotid Dopplers 2/17/2017: Findings are consistent with 1-49% stenosis of the right and left internal carotid arteries.  Disease severity is at the low end of range.  Vertebral arteries are patent with antegrade flow    PFTs: FEV1/Predicted 1/9/2018:  1.91L /108%    Normal FEV1 > 1 Liter, Predicted = 100%, DLCO > 80%      Gated C/A/P CTA 1/9/2018: Images through the lower thorax reveal patency of the thoracic aorta and major branch vessels of the aortic arch. The ascending aorta measures 3.3 x 3.6 cm in anterior-posterior and transverse dimensions respectively. The corresponding descending thoracic aorta measures 2.8 x 2.8 cm in anterior-posterior and transverse dimensions respectively. Coronary artery calcifications are evident.  Images through the lungs reveal no infiltrate or pleural effusion or overt cardiac decompensation. Images through the liver reveal no gross pace-occupying lesion. The pancreas and spleen and kidneys appear to be grossly within normal limits. Multiple bilateral probable renal cysts are present. Left adrenal enlargement  redemonstrated measuring 1.8 x 2.8 cm, previously 1.9 x 2.7 cm.  Images of the abdominal aorta reveal moderate to severe calcified atherosclerotic vascular disease with some diminution in caliber of the distal abdominal aorta. The celiac and superior mesenteric and renal arteries are patent. On the right, there appears to be a possible aortic-renal bypass by passing a segment of severe fibromuscular dysplasia. Images through the pelvis reveal patency of the common iliac, external iliac, and common femoral arteries bilaterally. The proximal profunda femoris and   superficial femoral arteries are patent.  Incidental note of moderate sigmoid colonic diverticulosis. Images through the pelvis reveal incidental calcified fibroid of the uterus. Bone windows demonstrate multilevel lumbar instrumented fusion. IMPRESSION:  Patency of the thoracic and abdominal aorta and pelvic vasculature for catheter procedure. Status post right renal artery bypass. Stable appearing left adrenal lesion. Moderate sigmoid colonic diverticulosis. Status post multilevel lumbar laminectomy and instrumented fusion. Physical Exam:  General: Well nourished well groomed woman appearing younger than stated age accompanied by a son  Neuro: A&OX3. ARORA. PERRL. Steady assisted gait with walker  Head:Normocephalic. Atraumatic. Symmetrical  Neck: Trachea Midline  Resp: CTA B. No Adv BS/cough/sputum/tachypnea with seated conversation  CV: S1S2 RRR. KATHERINE IVVI. No JVD/carotid bruits. Pink/warm/dry extremities. Trace LE peripheral edema  GI:Benign ab. Soft. NT/ND. Active BS  : Voids  Integ: No obvious s/s of infection or breakdown  Musculo/Skeletal: FROM in all major joints.  Good muscle tone    Clinic Evaluation:   KCCQ-12: scanned into EMR     5 meter gait: 5.38 seconds with cane at previous visit     Frality Survey:  Marene Jointer Index ADL - 5/6  scanned into EMR at previous visit     STS 2.81 Risk Score / Predicted 30 day mortality: - calculations scanned into EMR - PFT data unknown                        Procedure: AV Replacement                                              Risk of Mortality: 4.784%                                              Morbidity or Mortality: 18.992%                                              Long Length of Stay: 10.009%                                              Short Length of Stay: 19.556%                                              Permanent Stroke: 2.049%                                              Prolonged Ventilation: 12.925%                                              DSW Infection: 0.314% Renal Failure: 6.721%                                              Reoperation: 6.599%  Assessment/Plan:   1. AS- severe and symptomatic - At least intermediate risk d/t age and rehab/mobility issues. Risks/benefits of TAVR reviewed w/ pt and son and they wish to think about it and call us back. L TF 26 mm S3.   2. HTN- CCB, CDZ per cards  3. TRENTON-  bypassed in 1977   4. DM- recent amaryl discontinued by endocrinologist. To continue metformin per endo. Most recent within past 90 days HgbA1C 5.7% per pt  5. HLD- well controlled on current statin dose per cards  6. Vit D deficiency- at goal on supplement per cards  7. CINDA- CPAP for past 6+ yrs. Pt unaware of setting   8. Hx vocal cord injury w/ intubation in 1985 - chronic horse voice. No swallowing issues. 9. Advanced directives- Has adv directives written down. Does NOT want to live on life support or with reduced quality of life for any length of time. \"If I have a stroke during the TAVR can you just go ahead and kill me?\"   10. Further plan/care by Rola Garcia [id number]

## 2018-01-25 NOTE — PROGRESS NOTES
I had the pleasure of seeing Ms. Leonardo Miranda again in the valve clinic earlier today with Ms. Albert Torres NP - please see her note for details. She has severe symptomatic AS and is intermediate surgical risk. I had a long discussion with her and her son, and she wants to head home and think about it for a few days before letting us know if she wants to proceed or not. A 26mm S3 from left TF would be the procedural plan.

## 2018-01-26 ENCOUNTER — OFFICE VISIT (OUTPATIENT)
Dept: INTERNAL MEDICINE CLINIC | Age: 83
End: 2018-01-26

## 2018-01-26 VITALS
HEART RATE: 98 BPM | DIASTOLIC BLOOD PRESSURE: 66 MMHG | RESPIRATION RATE: 17 BRPM | BODY MASS INDEX: 32.14 KG/M2 | TEMPERATURE: 98 F | WEIGHT: 200 LBS | OXYGEN SATURATION: 95 % | HEIGHT: 66 IN | SYSTOLIC BLOOD PRESSURE: 150 MMHG

## 2018-01-26 DIAGNOSIS — R10.31 RIGHT LOWER QUADRANT ABDOMINAL PAIN: Primary | ICD-10-CM

## 2018-01-26 NOTE — PROGRESS NOTES
HISTORY OF PRESENT ILLNESS  Toby Joel is a 80 y.o. female. HPI  8-10 days ago started with pain in right lower abdomen/groin in the area of the catheterization. Worse when stands and started moving around initially but now more constant throughout the day. Had a couple of days of pain radiatin to LLQ \"worm like\". Today awoke a little queesy. No nausea, vomiting, fevers or chills. Pain comes in waves. Stools are thinner - taking 6 fiber pills day for fecal incontinence. Increased gas. Had 1 episode of fecal incontinence this weekend - occurs infrequently. Last year was with a strict low carb diet but afyter holidays has started having a bagel for breakfast.    Undergoing workup for possible transcatheter AV replacement. CT scan on 1/9 showed:   Patency of the thoracic and abdominal aorta and pelvic vasculature for catheterprocedure.   Status post right renal artery bypass.   Stable appearing left adrenal lesion.   Moderate sigmoid colonic diverticulosis.   Status post multilevel lumbar laminectomy and instrumented fusion      Current Outpatient Prescriptions:     PSYLLIUM SEED, WITH DEXTROSE, (FIBER PO), Take  by mouth. 6 tabs daily, Disp: , Rfl:     furosemide (LASIX) 40 mg tablet, Take 1 Tab by mouth daily. Take 40/80mg on alternating days. (Patient taking differently: Take 40 mg by mouth daily. 60 mg daily (1.5)), Disp: 180 Tab, Rfl: 3    metFORMIN (GLUCOPHAGE) 1,000 mg tablet, Take 1 Tab by mouth two (2) times a day., Disp: 180 Tab, Rfl: 3    dilTIAZem CD (CARDIZEM CD) 180 mg ER capsule, Take 1 Cap by mouth daily. , Disp: 90 Cap, Rfl: 3    ezetimibe (ZETIA) 10 mg tablet, Take 1 Tab by mouth daily. , Disp: 90 Tab, Rfl: 3    potassium chloride SA (MICRO-K) 10 mEq capsule, Take 1 Cap by mouth two (2) times a day., Disp: 180 Cap, Rfl: 3    rosuvastatin (CRESTOR) 5 mg tablet, Take 1 Tab by mouth daily. , Disp: 90 Tab, Rfl: 3    COD LIVER OIL PO, Take 1,000 mg by mouth daily. , Disp: , Rfl:    acetaminophen (TYLENOL EXTRA STRENGTH) 500 mg tablet, Take 1,000 mg by mouth every six (6) hours as needed for Pain., Disp: , Rfl:     losartan (COZAAR) 100 mg tablet, Take 1 Tab by mouth daily. , Disp: 90 Tab, Rfl: 3    azelastine (ASTELIN) 137 mcg (0.1 %) nasal spray, 2 Sprays by Both Nostrils route two (2) times a day. Use in each nostril as directed (Patient taking differently: 2 Sprays by Both Nostrils route as needed. Use in each nostril as directed), Disp: 3 Bottle, Rfl: 3    niacin 1,000 mg TbER, Take 2,000 Units by mouth nightly., Disp: 180 Tab, Rfl: 3    glucose blood VI test strips (ACCU-CHEK ELBERT PLUS) strip, Check bs daily. Dx 250.00, Disp: 1 Package, Rfl: 11    cyanocobalamin (VITAMIN B-12) 1,000 mcg tablet, Take 1,500 mcg by mouth daily. , Disp: , Rfl:     cholecalciferol, vitamin d3, (VITAMIN D) 1,000 unit tablet, Take 2,000 Units by mouth daily. , Disp: , Rfl:     aspirin 81 mg tablet, Take  by mouth. 2 tabs prior to niacin, Disp: , Rfl:     Visit Vitals    /66 (BP 1 Location: Left arm, BP Patient Position: Sitting)    Pulse 98    Temp 98 °F (36.7 °C) (Oral)    Resp 17    Ht 5' 6\" (1.676 m)    Wt 200 lb (90.7 kg)    SpO2 95%    BMI 32.28 kg/m2       ROS  See above  Physical Exam   Constitutional: She appears well-developed and well-nourished. Neck: Neck supple. No thyromegaly present. Cardiovascular: Normal rate and regular rhythm. Exam reveals no gallop and no friction rub. Murmur (3/6 holosystolic murmur radiationg to caroltids) heard. Pulmonary/Chest: Effort normal and breath sounds normal.   Abdominal: Soft. Bowel sounds are normal. She exhibits no distension and no mass. There is no tenderness. Lymphadenopathy:     She has no cervical adenopathy. Vitals reviewed. ASSESSMENT and PLAN  RLQ abdominal pain - CT scan without cause. nontender on exam.  ? Bowel gas. Discussed symptoms of diverticulitis to watch for. Decrease fiber to 5 tabs daily.   Increase water intake. Call if pain worsens  No orders of the defined types were placed in this encounter.     Follow-up Disposition: Not on File

## 2018-02-01 ENCOUNTER — DOCUMENTATION ONLY (OUTPATIENT)
Dept: CARDIOLOGY | Age: 83
End: 2018-02-01

## 2018-03-22 ENCOUNTER — OFFICE VISIT (OUTPATIENT)
Dept: INTERNAL MEDICINE CLINIC | Age: 83
End: 2018-03-22

## 2018-03-22 VITALS
HEART RATE: 91 BPM | BODY MASS INDEX: 32.05 KG/M2 | WEIGHT: 199.4 LBS | DIASTOLIC BLOOD PRESSURE: 61 MMHG | RESPIRATION RATE: 17 BRPM | SYSTOLIC BLOOD PRESSURE: 137 MMHG | OXYGEN SATURATION: 97 % | HEIGHT: 66 IN | TEMPERATURE: 97.6 F

## 2018-03-22 DIAGNOSIS — R22.1 MASS OF LEFT SIDE OF NECK: ICD-10-CM

## 2018-03-22 DIAGNOSIS — E11.9 CONTROLLED TYPE 2 DIABETES MELLITUS WITHOUT COMPLICATION, WITHOUT LONG-TERM CURRENT USE OF INSULIN (HCC): Primary | ICD-10-CM

## 2018-03-22 DIAGNOSIS — I35.0 NONRHEUMATIC AORTIC VALVE STENOSIS: ICD-10-CM

## 2018-03-22 DIAGNOSIS — I10 ESSENTIAL HYPERTENSION: ICD-10-CM

## 2018-03-22 DIAGNOSIS — E78.00 PURE HYPERCHOLESTEROLEMIA: ICD-10-CM

## 2018-03-22 DIAGNOSIS — Z00.00 MEDICARE ANNUAL WELLNESS VISIT, SUBSEQUENT: ICD-10-CM

## 2018-03-22 DIAGNOSIS — Z78.9 ADVANCE DIRECTIVE ON FILE: ICD-10-CM

## 2018-03-22 NOTE — MR AVS SNAPSHOT
[address] [place] [address] Drive [address] 350 [facility] 
[phone] Patient: Luna Merrill MRN: [id number] MBD:[id number]/[id number] Visit Information Date & Time Provider Department Dept. Phone Encounter #  
 3/22/2018 10:00 AM Santa Ware MD [facility] [phone] [id number] Follow-up Instructions Return in about 6 months (around 9/22/2018) for diabetes, hyperlipidemia, htn. Your Appointments 3/27/2018  9:40 AM  
Any with Nic Whiteside MD  
[address] [facility] ([address] [place] [address]) Appt Note: 6 month F/U, modem pt  
 [address] [facility]\[address]  
[phone]  
  
   
 [address] [facility] [phone]  
  
    
 4/3/2018  2:15 PM  
HISTORY AND PHYSICAL with Kelly Izquierdo MD  
[facility] - [address] [place] [address] ([address] [place] [address]) Appt Note: [facility] H&P PAT for surgery 4/12/18 [address] [place] [address] [address] [place] [phone] 4/12/2018  7:30 AM  
SURGERY with Kelly Izquierdo MD  
[facility] - [address] [place] [address] ([address] [place] [address]) Appt Note: Surgery [address] [place] [address] TAVR; -  
 [address] [place] [address] [address] [phone] Upcoming Health Maintenance Date Due DTaP/Tdap/Td series (1 - Tdap) 9/21/2011 HEMOGLOBIN A1C Q6M 3/17/2015 MICROALBUMIN Q1 3/24/2015 LIPID PANEL Q1 3/24/2015 EYE EXAM RETINAL OR DILATED Q1 1/3/2018 FOOT EXAM Q1 3/23/2018 MEDICARE YEARLY EXAM 3/24/2018 GLAUCOMA SCREENING Q2Y 1/3/2019 Allergies as of 3/22/2018  Review Complete On: 3/22/2018 By: Santa Ware MD  
  
 Severity Noted Reaction Type Reactions Bees [Hymenoptera Allergenic Extract]  07/23/2012   Systemic Anaphylaxis Betadine [Povidone-iodine]  08/17/2012    Other (comments) Abdominal swelling, one incident in 1977. Has had since without a reaction. Darvon [Propoxyphene]  02/01/2011   Systemic Nausea and Vomiting Griseofulvin  02/01/2011    Nausea and Vomiting Lipitor [Atorvastatin]  06/13/2011   Systemic Myalgia Current Immunizations  Reviewed on 4/27/2015 Name Date Influenza High Dose Vaccine PF 8/27/2017 Influenza Vaccine 8/28/2013 Pneumococcal Conjugate (PCV-13) 4/27/2015 TD Vaccine 9/20/2011  6:32 PM  
 ZZZ-RETIRED (DO NOT USE) Pneumococcal Vaccine (Unspecified Type) 10/1/2006 Zoster Vaccine, Live 6/30/2004 Not reviewed this visit You Were Diagnosed With   
  
 Codes Comments Controlled type 2 diabetes mellitus without complication, without long-term current use of insulin ([facility]    -  Primary ICD-10-CM: E11.9 ICD-9-CM: 250.00 Advance directive on file     ICD-10-CM: Z78.9 ICD-9-CM: V49.89 Nonrheumatic aortic valve stenosis     ICD-10-CM: I35.0 ICD-9-CM: 424.1 Essential hypertension     ICD-10-CM: I10 
ICD-9-CM: 401.9 Pure hypercholesterolemia     ICD-10-CM: E78.00 ICD-9-CM: 272.0 Mass of left side of neck     ICD-10-CM: R22.1 ICD-9-CM: 784.2 Medicare annual wellness visit, subsequent     ICD-10-CM: Z00.00 ICD-9-CM: V70.0 Vitals BP Pulse Temp Resp Height(growth percentile) Weight(growth percentile)  
 137/61 (BP 1 Location: Left arm, BP Patient Position: Sitting) 91 97.6 °F (36.4 °C) (Oral) 17 5' 6\" (1.676 m) 199 lb 6.4 oz (90.4 kg) SpO2 BMI OB Status Smoking Status 97% 32.18 kg/m2 Postmenopausal Former Smoker Vitals History BMI and BSA Data Body Mass Index Body Surface Area  
 32.18 kg/m 2 2.05 m 2 Preferred Pharmacy Pharmacy Name Phone [id number] [address] [facility] [phone] Your Updated Medication List  
  
   
This list is accurate as of 3/22/18 10:42 AM.  Always use your most recent med list.  
  
  
  
  
 aspirin 81 mg tablet Take  by mouth. 2 tabs prior to niacin  
  
 azelastine 137 mcg (0.1 %) nasal spray Commonly known as:  ASTELIN  
2 Sprays by Both Nostrils route two (2) times a day. Use in each nostril as directed COD LIVER OIL PO Take 1,000 mg by mouth daily. dilTIAZem  mg ER capsule Commonly known as:  CARDIZEM CD Take 1 Cap by mouth daily. diph,pertuss(acel),tetanus vac(PF) 2 Lf-(2.5-5-3-5 mcg)-5Lf/0.5 mL Syrg vaccine Commonly known as:  ADACEL  
0.5 mL by IntraMUSCular route once for 1 dose. ezetimibe 10 mg tablet Commonly known as:  Larwence Loida Take 1 Tab by mouth daily. FIBER PO Take  by mouth. 6 tabs daily  
  
 furosemide 40 mg tablet Commonly known as:  LASIX Take 1 Tab by mouth daily. Take 40/80mg on alternating days. glucose blood VI test strips strip Commonly known as:  ACCU-CHEK ELBERT PLUS TEST STRP Check bs daily. Dx 250.00  
  
 losartan 100 mg tablet Commonly known as:  COZAAR Take 1 Tab by mouth daily. metFORMIN 1,000 mg tablet Commonly known as:  GLUCOPHAGE Take 1 Tab by mouth two (2) times a day. niacin 1,000 mg Tber Take 2,000 Units by mouth nightly. potassium chloride SA 10 mEq capsule Commonly known as:  April Rachele Take 1 Cap by mouth two (2) times a day. rosuvastatin 5 mg tablet Commonly known as:  CRESTOR Take 1 Tab by mouth daily. TYLENOL EXTRA STRENGTH 500 mg tablet Generic drug:  acetaminophen Take 1,000 mg by mouth every six (6) hours as needed for Pain. VITAMIN B-12 1,000 mcg tablet Generic drug:  cyanocobalamin Take 1,500 mcg by mouth daily. VITAMIN D3 1,000 unit tablet Generic drug:  cholecalciferol Take 2,000 Units by mouth daily. Prescriptions Printed Refills diph,pertuss,acel,,tetanus vac,PF, (ADACEL) 2 Lf-(2.5-5-3-5 mcg)-5Lf/0.5 mL syrg vaccine 0 Si.5 mL by IntraMUSCular route once for 1 dose. Class: Print Route: IntraMUSCular We Performed the Following  DIABETES FOOT EXAM [7 Custom] Follow-up Instructions Return in about 6 months (around 9/22/2018) for diabetes, hyperlipidemia, htn. To-Do List   
 04/03/2018 1:00 PM  
  Appointment with [facility] at [address] [facility] ([phone]) Your visit has a scheduled time; however, the Cardiac surgery office will inform  when to report to the [facility] [address]. 04/30/2018 Imaging:  US THYROID/PARATHYROID/SOFT TISS Patient Instructions Medicare Wellness Visit, Female The best way to live healthy is to have a healthy lifestyle by eating a well-balanced diet, exercising regularly, limiting alcohol and stopping smoking. Regular physical exams and screening tests are another way to keep healthy. Preventive exams provided by your health care provider can find health problems before they become diseases or illnesses. Preventive services including immunizations, screening tests, monitoring and exams can help you take care of your own health. All people over age 72 should have a pneumovax  and and a prevnar shot to prevent pneumonia. These are once in a lifetime unless you and your provider decide differently. All people over 65 should have a yearly flu shot and a tetanus vaccine every 10 years. A bone mass density to screen for osteoporosis or thinning of the bones should be done every 2 years after 65. Screening for diabetes mellitus with a blood sugar test should be done every year. Glaucoma is a disease of the eye due to increased ocular pressure that can lead to blindness and it should be done every year by an eye professional. 
 
Cardiovascular screening tests that check for elevated lipids (fatty part of blood) which can lead to heart disease and strokes should be done every 5 years.  
 
Colorectal screening that evaluates for blood or polyps in your colon should be done yearly as a stool test or every five years as a flexible sigmoidoscope or every 10 years as a colonoscopy up to age 76. Breast cancer screening with a mammogram is recommended biennially  for women age 54-69. Screening for cervical cancer with a pap smear and pelvic exam is recommended for women after age 72 years every 2 years up to age 79 or when the provider and patient decide to stop. If there is a history of cervical abnormalities or other increased risk for cancer then the test is recommended yearly. Hepatitis C screening is also recommended for anyone born between [birth year] through [address]. A shingles vaccine is also recommended once in a lifetime after age 61. Your Medicare Wellness Exam is recommended annually. Here is a list of your current Health Maintenance items with a due date: 
 
Health Maintenance Due Topic Date Due  
 DTaP/Tdap/Td series (1 - Tdap) Prescription given  HEMOGLOBIN A1C Q6M  Through Dr. Bella King  MICROALBUMIN Q1  Through Dr. Bella King  LIPID PANEL Q1  Through Dr. Bella King  EYE EXAM RETINAL OR DILATED Q1  Up to date  FOOT EXAM Q1  03/23/2019 Introducing \[facility] [place] [facility]\"" & HEALTH SERVICES! Avelino Osuna introduces TAPP patient portal. Now you can access parts of your medical record, email your doctor's office, and request medication refills online. 1. In your internet browser, go to [email or web address] 2. Click on the First Time User? Click Here link in the Sign In box. You will see the New Member Sign Up page. 3. Enter your TAPP Access Code exactly as it appears below. You will not need to use this code after youve completed the sign-up process. If you do not sign up before the expiration date, you must request a new code. · TAPP Access Code: [email or web address] Expires: 5/21/2018  5:12 PM 
 
4.  Enter the last four digits of your Social Security Number (xxxx) and Date of Birth (mm/dd/yyyy) as indicated and click Submit. You will be taken to the next sign-up page. 5. Create a Tailwind ID. This will be your Tailwind login ID and cannot be changed, so think of one that is secure and easy to remember. 6. Create a Tailwind password. You can change your password at any time. 7. Enter your Password Reset Question and Answer. This can be used at a later time if you forget your password. 8. Enter your e-mail address. You will receive e-mail notification when new information is available in [address] 9. Click Sign Up. You can now view and download portions of your medical record. 10. Click the Download Summary menu link to download a portable copy of your medical information. If you have questions, please visit the Frequently Asked Questions section of the Tailwind website. Remember, Tailwind is NOT to be used for urgent needs. For medical emergencies, dial 911. Now available from your iPhone and Android! Please provide this summary of care documentation to your next provider. Your primary care clinician is listed as KEERTHI JENSEN. If you have any questions after today's visit, please call [phone].

## 2018-03-22 NOTE — PATIENT INSTRUCTIONS

## 2018-03-22 NOTE — PROGRESS NOTES
Chief Complaint   Patient presents with    Follow Up Chronic Condition     1. Have you been to the ER, urgent care clinic since your last visit? Hospitalized since your last visit? No    2. Have you seen or consulted any other health care providers outside of the [address] [place] [address] since your last visit? Include any pap smears or colon screening.  No

## 2018-03-22 NOTE — PROGRESS NOTES
Subjective: Mery Nova is a 80 y.o. female seen for follow up of diabetes. She also has hypertension and hyperlipidemia. Diabetic Review of Systems - medication compliance: compliant all of the time, diabetic diet compliance: compliant most of the time, home glucose monitoring: is performed sporadically, fasting values range 120-140s, further diabetic ROS: no polyuria or polydipsia, no unusual visual symptoms, no hypoglycemia, has dysesthesias in the feet, last eye exam approximately 2 months ago. Has follow up with Dr. Erin Rodriguez next week. Had A1C last visit of 5.5 - stopped one of DM meds. Other symptoms and concerns:   continued right lower abdominal pain. Decreased fiber. Has lost approx 10 lbs in the last month. Stopped her high fat, low carb diet. Cut back a little bit on her fiber. Tends to have regular BM but has some incontinence of more liquid stool. Saw GI, but no suggestions made. Scheduled for TAVR on 4/12. Increasing sob. Right nostril bleeding all the time. Occ vaseline. New brown spots in the last 6 months. Has an appointment with Dr. Wen Radford later this spring (post TAVR). Current Outpatient Prescriptions   Medication Sig Dispense Refill    PSYLLIUM SEED, WITH DEXTROSE, (FIBER PO) Take  by mouth. 6 tabs daily      furosemide (LASIX) 40 mg tablet Take 1 Tab by mouth daily. Take 40/80mg on alternating days. (Patient taking differently: Take 40 mg by mouth daily. 60 mg daily (1.5)) 180 Tab 3    metFORMIN (GLUCOPHAGE) 1,000 mg tablet Take 1 Tab by mouth two (2) times a day. 180 Tab 3    dilTIAZem CD (CARDIZEM CD) 180 mg ER capsule Take 1 Cap by mouth daily. 90 Cap 3    ezetimibe (ZETIA) 10 mg tablet Take 1 Tab by mouth daily. 90 Tab 3    potassium chloride SA (MICRO-K) 10 mEq capsule Take 1 Cap by mouth two (2) times a day. 180 Cap 3    rosuvastatin (CRESTOR) 5 mg tablet Take 1 Tab by mouth daily. 90 Tab 3    COD LIVER OIL PO Take 1,000 mg by mouth daily.       acetaminophen (TYLENOL EXTRA STRENGTH) 500 mg tablet Take 1,000 mg by mouth every six (6) hours as needed for Pain.  losartan (COZAAR) 100 mg tablet Take 1 Tab by mouth daily. 90 Tab 3    azelastine (ASTELIN) 137 mcg (0.1 %) nasal spray 2 Sprays by Both Nostrils route two (2) times a day. Use in each nostril as directed (Patient taking differently: 2 Sprays by Both Nostrils route as needed. Use in each nostril as directed) 3 Bottle 3    niacin 1,000 mg TbER Take 2,000 Units by mouth nightly. 180 Tab 3    glucose blood VI test strips (ACCU-CHEK ELBERT PLUS) strip Check bs daily. Dx 250.00 1 Package 11    cyanocobalamin (VITAMIN B-12) 1,000 mcg tablet Take 1,500 mcg by mouth daily.  cholecalciferol, vitamin d3, (VITAMIN D) 1,000 unit tablet Take 2,000 Units by mouth daily.  aspirin 81 mg tablet Take  by mouth. 2 tabs prior to niacin        Review of Systems  Pertinent items are noted in HPI. Objective:     Visit Vitals    /61 (BP 1 Location: Left arm, BP Patient Position: Sitting)    Pulse 91    Temp 97.6 °F (36.4 °C) (Oral)    Resp 17    Ht 5' 6\" (1.676 m)    Wt 199 lb 6.4 oz (90.4 kg)    SpO2 97%    BMI 32.18 kg/m2     Appearance: alert, well appearing, and in no distress and oriented to person, place, and time. Exam:  NECK: supple, no lad, no bruit, no tm. Soft mass adjacent to left thyroid  LUNGS: cta bilat  CV rrr, no g/r, blowing holosystolic murmur radiating to bilat carotids  ABD: soft, nt, nd, nabs  EXT: no c/c/e   feet: warm, good capillary refill, no trophic changes or ulcerative lesions and normal DP and PT pulses, nml sensation to light touch but decreased filament testing bilat feet/toes. Assessment/Plan:     diabetes well controlled. Diabetic issues reviewed with her: continue lower carb diet (but not 0 carbs).   Continue metformin  Labs and f/u with Dr. Cyn Rascon exam completed today - consistent with ongoing neuropathy    htn - controlled, cont same    Hyperlipidemia - controlled  Labs through Dr. Angelica Bauer same meds    AS - upcoming TAVR 4/12    ? Left thyroid vs neck mass - check US  . Orders Placed This Encounter    US THYROID/PARATHYROID/SOFT TISS    diph,pertuss,acel,,tetanus vac,PF, (ADACEL) 2 Lf-(2.5-5-3-5 mcg)-5Lf/0.5 mL syrg vaccine     Follow-up Disposition:  Return in about 6 months (around 9/22/2018) for diabetes, hyperlipidemia, htn. This is the Subsequent Medicare Annual Wellness Exam, performed 12 months or more after the Initial AWV or the last Subsequent AWV    I have reviewed the patient's medical history in detail and updated the computerized patient record.      History     Past Medical History:   Diagnosis Date    Aortic stenosis     CAD (coronary artery disease)     pt states she does not have this    Chronic pain     back - spinal stenosis    COPD     Diabetes ([address]     Diabetic neuropathy ([address]     Diverticulosis of colon     GERD (gastroesophageal reflux disease)     Hiatal Hernia - pt states she does not have this    Hypercholesterolemia     CINDA (obstructive sleep apnea) 08-    AHI: 18.6 per hour    Other ill-defined conditions(799.89)     heart murmur    Other ill-defined conditions(799.89)     colon infection    Peripheral neuropathy     Renal artery stenosis (HCC)     Right    Unspecified adverse effect of anesthesia     delayed awakening - \"does not need as much\"      Past Surgical History:   Procedure Laterality Date    ABDOMEN SURGERY PROC UNLISTED  1977    R Renal Artery Stenosis repair    ABDOMEN SURGERY [address] or 1983    [facility]   [address]    Left - benign cyst    ENDOSCOPY, COLON, DIAGNOSTIC  1999    hx of polys in the past    HX GYN  1966    D&C    HX HEENT      Bilat Cataracts    HX HEENT      R Retinal Hem    HX HEENT Eyelid Cysts x 4    HX HEENT      cyst removed above eyebrow    HX HEENT      oral surgeries - gum surgery/floating root  removed x 2    HX ORTHOPAEDIC  11/06    L1-S1 2007    HX ORTHOPAEDIC  1985    L 4th Trigger finger    HX OTHER SURGICAL      renal artery bypass    HX OTHER SURGICAL      tonsillectomy  adenoidectomy    HX OTHER SURGICAL      retinal hemorrhage    HX OTHER SURGICAL      cataract bilateral    HX OTHER SURGICAL      back surgery    HX TONSILLECTOMY      T & A     Current Outpatient Prescriptions   Medication Sig Dispense Refill    diph,pertuss,acel,,tetanus vac,PF, (ADACEL) 2 Lf-(2.5-5-3-5 mcg)-5Lf/0.5 mL syrg vaccine 0.5 mL by IntraMUSCular route once for 1 dose. 0.5 mL 0    PSYLLIUM SEED, WITH DEXTROSE, (FIBER PO) Take  by mouth. 6 tabs daily      furosemide (LASIX) 40 mg tablet Take 1 Tab by mouth daily. Take 40/80mg on alternating days. (Patient taking differently: Take 40 mg by mouth daily. 60 mg daily (1.5)) 180 Tab 3    metFORMIN (GLUCOPHAGE) 1,000 mg tablet Take 1 Tab by mouth two (2) times a day. 180 Tab 3    dilTIAZem CD (CARDIZEM CD) 180 mg ER capsule Take 1 Cap by mouth daily. 90 Cap 3    ezetimibe (ZETIA) 10 mg tablet Take 1 Tab by mouth daily. 90 Tab 3    potassium chloride SA (MICRO-K) 10 mEq capsule Take 1 Cap by mouth two (2) times a day. 180 Cap 3    rosuvastatin (CRESTOR) 5 mg tablet Take 1 Tab by mouth daily. 90 Tab 3    COD LIVER OIL PO Take 1,000 mg by mouth daily.  acetaminophen (TYLENOL EXTRA STRENGTH) 500 mg tablet Take 1,000 mg by mouth every six (6) hours as needed for Pain.  losartan (COZAAR) 100 mg tablet Take 1 Tab by mouth daily. 90 Tab 3    azelastine (ASTELIN) 137 mcg (0.1 %) nasal spray 2 Sprays by Both Nostrils route two (2) times a day. Use in each nostril as directed (Patient taking differently: 2 Sprays by Both Nostrils route as needed.  Use in each nostril as directed) 3 Bottle 3    niacin 1,000 mg TbER Take 2,000 Units by mouth nightly. 180 Tab 3    glucose blood VI test strips (ACCU-CHEK ELBERT PLUS) strip Check bs daily. Dx 250.00 1 Package 11    cyanocobalamin (VITAMIN B-12) 1,000 mcg tablet Take 1,500 mcg by mouth daily.  cholecalciferol, vitamin d3, (VITAMIN D) 1,000 unit tablet Take 2,000 Units by mouth daily.  aspirin 81 mg tablet Take  by mouth. 2 tabs prior to niacin       Allergies   Allergen Reactions    Bees [Hymenoptera Allergenic Extract] Anaphylaxis    Betadine [Povidone-Iodine] Other (comments)     Abdominal swelling, one incident in 1977. Has had since without a reaction.     Darvon [Propoxyphene] Nausea and Vomiting    Griseofulvin Nausea and Vomiting    Lipitor [Atorvastatin] Myalgia     Family History   Problem Relation Age of Onset   Maurisio Nova Stroke Mother     Cancer Mother      ovarian    Heart Attack Mother     Cancer Father      lung/throat    Alcohol abuse Father     Stroke Maternal Grandfather      Social History   Substance Use Topics    Smoking status: Former Smoker     Packs/day: 2.00     Years: 13.00     Types: Cigarettes     Quit date: 6/6/1977    Smokeless tobacco: Never Used      Comment: former cigarette smoker    Alcohol use No     Patient Active Problem List   Diagnosis Code    Tachycardia R00.0    Hypertension I10    PVC (premature ventricular contraction) I49.3    Aortic stenosis I35.0    Edema R60.9    Foreign body in pharynx T17.208A    Type II or unspecified type diabetes mellitus without mention of complication, uncontrolled E11.65    Pure hypercholesterolemia E78.00    CINDA (obstructive sleep apnea) G47.33    Other dyspnea and respiratory abnormality R06.09, R09.89    Mitral stenosis I05.0    COPD (chronic obstructive pulmonary disease) ([facility]) J44.9    Fibromuscular dysplasia ([facility]) I77.3    Controlled type 2 diabetes mellitus without complication, without long-term current use of insulin ([facility]) E11.9    Advance directive on file Z78.9    Chest tightness R07.89    Dyspnea and respiratory abnormalities R06.00, R06.89       Depression Risk Factor Screening:     PHQ over the last two weeks 9/19/2017   Little interest or pleasure in doing things Not at all   Feeling down, depressed or hopeless Not at all   Total Score PHQ 2 0     Alcohol Risk Factor Screening: You do not drink alcohol or very rarely. Functional Ability and Level of Safety:   Hearing Loss  Hearing is good. Activities of Daily Living  The home contains: handrails and grab bars. Uses walker or cane. Patient does total self care    Fall Risk  Fall Risk Assessment, last 12 mths 3/22/2018   Able to walk? Yes   Fall in past 12 months? No       Abuse Screen  Patient is not abused    Cognitive Screening   Evaluation of Cognitive Function:  Has your family/caregiver stated any concerns about your memory: no  Normal    Patient Care Team   Patient Care Team:  Jensen Rivera MD as PCP - General (Internal Medicine)  Koko Hair MD (Endocrinology)  Quynh Perez MD as Physician (Ophthalmology)  Isaac Seaman MD as Physician (Dermatology)  David Paul MD as Physician (Dermatology)  Reese Sheffield MD as Physician (Cardiology)  Kelly Dobbins MD as Physician (Sleep Medicine)  Alida Beal MD (Cardiothoracic Surgery)    Assessment/Plan   Education and counseling provided:  Are appropriate based on today's review and evaluation   Advanced directive on file    Diagnoses and all orders for this visit:    1. Controlled type 2 diabetes mellitus without complication, without long-term current use of insulin (HCC)  -      DIABETES FOOT EXAM    2. Advance directive on file    3. Nonrheumatic aortic valve stenosis    4. Essential hypertension    5. Pure hypercholesterolemia    6. Mass of left side of neck  -     US THYROID/PARATHYROID/SOFT TISS; Future    7.  Medicare annual wellness visit, subsequent  -     diph,pertuss,acel,,tetanus vac,PF, (ADACEL) 2 Lf-(2.5-5-3-5 mcg)-5Lf/0.5 mL syrg vaccine; 0.5 mL by IntraMUSCular route once for 1 dose.         Health Maintenance Due   Topic Date Due    DTaP/Tdap/Td series (1 - Tdap) Prescription given    HEMOGLOBIN A1C Q6M  Through Dr. García Arriaza MICROALBUMIN Q1  Through Dr. Ethridge Sicard  Through Dr. Greta Mares  Up to date    FOOT EXAM Q1  03/23/2019

## 2018-03-27 ENCOUNTER — OFFICE VISIT (OUTPATIENT)
Dept: SLEEP MEDICINE | Age: 83
End: 2018-03-27

## 2018-03-27 VITALS
WEIGHT: 203 LBS | DIASTOLIC BLOOD PRESSURE: 65 MMHG | HEIGHT: 66 IN | SYSTOLIC BLOOD PRESSURE: 134 MMHG | HEART RATE: 87 BPM | BODY MASS INDEX: 32.62 KG/M2 | OXYGEN SATURATION: 97 %

## 2018-03-27 DIAGNOSIS — J44.9 CHRONIC OBSTRUCTIVE PULMONARY DISEASE, UNSPECIFIED COPD TYPE (HCC): ICD-10-CM

## 2018-03-27 DIAGNOSIS — I10 ESSENTIAL HYPERTENSION: ICD-10-CM

## 2018-03-27 DIAGNOSIS — G47.33 OSA (OBSTRUCTIVE SLEEP APNEA): Primary | ICD-10-CM

## 2018-03-27 RX ORDER — NIACIN 1000 MG/1
2000 TABLET, EXTENDED RELEASE ORAL
COMMUNITY
Start: 2018-02-01 | End: 2018-06-15

## 2018-03-27 NOTE — PROGRESS NOTES
[address] [place]. [place], [address]  Tel.  [phone]  Fax. [address] [facility] [address]  [place], [address] [facility]  Tel.  [phone]  Fax. [phone] [address] [facility] [place]   Tel.  [phone]  Fax. [phone]     Cindy Vazquez is a 80 y.o. female seen for a positive airway pressure follow-up. She reports no problems using the device. She is 100% compliant over the past 90 days. The following problems are identified:    Drowsiness no Problems exhaling no   Snoring no Forget to put on no   Mask Comfortable yes Can't fall asleep no   Dry Mouth no Mask falls off no   Air Leaking no Frequent awakenings no         She admits that her sleep has improved. Allergies   Allergen Reactions    Bees [Hymenoptera Allergenic Extract] Anaphylaxis    Betadine [Povidone-Iodine] Other (comments)     Abdominal swelling, one incident in 1977. Has had since without a reaction.  Darvon [Propoxyphene] Nausea and Vomiting    Griseofulvin Nausea and Vomiting    Lipitor [Atorvastatin] Myalgia       She has a current medication list which includes the following prescription(s): niacin, psyllium seed (with dextrose), furosemide, metformin, diltiazem cd, ezetimibe, potassium chloride sa, rosuvastatin, cod liver oil, acetaminophen, losartan, azelastine, niacin, glucose blood vi test strips, cyanocobalamin, cholecalciferol, and aspirin. .      She  has a past medical history of Aortic stenosis; CAD (coronary artery disease); Chronic pain; COPD; Diabetes ([address] Diabetic neuropathy ([address] Diverticulosis of colon; GERD (gastroesophageal reflux disease); Hypercholesterolemia; CINDA (obstructive sleep apnea) (08-); Other ill-defined conditions(799.89); Other ill-defined conditions(799.89); Peripheral neuropathy; Renal artery stenosis ([address] and Unspecified adverse effect of anesthesia.     [place] Sleepiness Score: 8   and Modified F.O.S.Q. Score Total / 2: 18   which reflect improved sleep quality over therapy time. O>    Visit Vitals    /65    Pulse 87    Ht 5' 6\" (1.676 m)    Wt 203 lb (92.1 kg)    SpO2 97%    BMI 32.77 kg/m2         General:   Not in acute distress   Eyes:  Anicteric sclerae, no obvious strabismus   Nose:  No obvious nasal septum deviation    Oropharynx:   Class 4 oropharyngeal outlet, thick tongue base, uvula not seen due to low-lying soft palate, narrow tonsilo-pharyngeal pilars   Tonsils:   tonsils are not visualized due to low-lying soft palate   Neck:   midline trachea   Chest/Lungs:  Equal lung expansion, clear on auscultation    CVS:  Normal rate, regular rhythm; no JVD   Skin:  Warm to touch; no obvious rashes   Neuro:  No focal deficits ; no obvious tremor    Psych:  Normal affect,  normal countenance;           A>    ICD-10-CM ICD-9-CM    1. CINDA (obstructive sleep apnea) G47.33 327.23    2. Chronic obstructive pulmonary disease, unspecified COPD type ([facility] J44.9 496    3. Essential hypertension I10 401.9    4. BMI 32.0-32.9,adult Z68.32 V85.32      AHI = 18.6. On Bi - Level : 14/07 cmH2O. Compliant:      yes    Therapeutic Response:  Positive    P>    * We have recommended a dedicated weight loss through appropriate diet and an exercise regiment as significant weight reduction has been shown to reduce severity of obstructive sleep apnea. * Follow-up Disposition:  Return in about 6 months (around 9/27/2018), or if symptoms worsen or fail to improve. * She was asked to contact our office for any problems regarding PAP therapy. * Counseling was provided regarding the importance of regular PAP use and on proper sleep hygiene and safe driving. * Re-enforced proper and regular cleaning for the device. Thank you for allowing us to participate in your patient's medical care. Jing Peterson MD, FAASM  Electronically signed.  03/27/18

## 2018-03-27 NOTE — PATIENT INSTRUCTIONS
[address] [facility] [place]. [place], [address]  Tel.  [phone]  Fax. 100 [facility] 60  [place], [address] [facility]  Tel.  [phone]  Fax. [phone] [address] [place]  Tel.  [phone]  Fax. [phone]     Learning About CPAP for Sleep Apnea  What is CPAP? CPAP is a small machine that you use at home every night while you sleep. It increases air pressure in your throat to keep your airway open. When you have sleep apnea, this can help you sleep better so you feel much better. CPAP stands for \"continuous positive airway pressure. \"  The CPAP machine will have one of the following:  · A mask that covers your nose and mouth  · Prongs that fit into your nose  · A mask that covers your nose only, the most common type. This type is called NCPAP. The N stands for \"nasal.\"  Why is it done? CPAP is usually the best treatment for obstructive sleep apnea. It is the first treatment choice and the most widely used. Your doctor may suggest CPAP if you have:  · Moderate to severe sleep apnea. · Sleep apnea and coronary artery disease (CAD) or heart failure. How does it help? · CPAP can help you have more normal sleep, so you feel less sleepy and more alert during the daytime. · CPAP may help keep heart failure or other heart problems from getting worse. · NCPAP may help lower your blood pressure. · If you use CPAP, your bed partner may also sleep better because you are not snoring or restless. What are the side effects? Some people who use CPAP have:  · A dry or stuffy nose and a sore throat. · Irritated skin on the face. · Sore eyes. · Bloating. If you have any of these problems, work with your doctor to fix them. Here are some things you can try:  · Be sure the mask or nasal prongs fit well. · See if your doctor can adjust the pressure of your CPAP. · If your nose is dry, try a humidifier.   · If your nose is runny or stuffy, try decongestant medicine or a steroid nasal spray. If these things do not help, you might try a different type of machine. Some machines have air pressure that adjusts on its own. Others have air pressures that are different when you breathe in than when you breathe out. This may reduce discomfort caused by too much pressure in your nose. Where can you learn more? Go to sim4tec.be  Enter Taryn Gilmore in the search box to learn more about \"Learning About CPAP for Sleep Apnea. \"   © 5056-0721 Healthwise, [employer]. Care instructions adapted under license by Concha Martinez (which disclaims liability or warranty for this information). This care instruction is for use with your licensed healthcare professional. If you have questions about a medical condition or this instruction, always ask your healthcare professional. [address] any warranty or liability for your use of this information. Content Version: 7.1.85231; Last Revised: January 11, 2010  PROPER SLEEP HYGIENE    What to avoid  · Do not have drinks with caffeine, such as coffee or black tea, for 8 hours before bed. · Do not smoke or use other types of tobacco near bedtime. Nicotine is a stimulant and can keep you awake. · Avoid drinking alcohol late in the evening, because it can cause you to wake in the middle of the night. · Do not eat a big meal close to bedtime. If you are hungry, eat a light snack. · Do not drink a lot of water close to bedtime, because the need to urinate may wake you up during the night. · Do not read or watch TV in bed. Use the bed only for sleeping and sexual activity. What to try  · Go to bed at the same time every night, and wake up at the same time every morning. Do not take naps during the day. · Keep your bedroom quiet, dark, and cool. · Get regular exercise, but not within 3 to 4 hours of your bedtime. .  · Sleep on a comfortable pillow and mattress.   · If watching the clock makes you anxious, turn it facing away from you so you cannot see the time. · If you worry when you lie down, start a worry book. Well before bedtime, write down your worries, and then set the book and your concerns aside. · Try meditation or other relaxation techniques before you go to bed. · If you cannot fall asleep, get up and go to another room until you feel sleepy. Do something relaxing. Repeat your bedtime routine before you go to bed again. · Make your house quiet and calm about an hour before bedtime. Turn down the lights, turn off the TV, log off the computer, and turn down the volume on music. This can help you relax after a busy day. Drowsy Driving: The [facility] cites drowsiness as a causing factor in more than 682,179 police reported crashes annually, resulting in 76,000 injuries and 1,500 deaths. Other surveys suggest 55% of people polled have driven while drowsy in the past year, 23% had fallen asleep but not crashed, 3% crashed, and 2% had and accident due to drowsy driving. Who is at risk? Young Drivers: One study of drowsy driving accidents states that 55% of the drivers were under 25 years. Of those, 75% were male. Shift Workers and Travelers: People who work overnight or travel across time zones frequently are at higher risk of experiencing Circadian Rhythm Disorders. They are trying to work and function when their body is programed to sleep. Sleep Deprived: Lack of sleep has a serious impact on your ability to pay attention or focus on a task. Consistently getting less than the average of 8 hours your body needs creates partial or cumulative sleep deprivation. Untreated Sleep Disorders: Sleep Apnea, Narcolepsy, R.L.S., and other sleep disorders (untreated) prevent a person from getting enough restful sleep. This leads to excessive daytime sleepiness and increases the risk for drowsy driving accidents by up to 7 times.   Medications / Alcohol: Even over the counter medications can cause drowsiness. Medications that impair a drivers attention should have a warning label. Alcohol naturally makes you sleepy and on its own can cause accidents. Combined with excessive drowsiness its effects are amplified. Signs of Drowsy Driving:   * You don't remember driving the last few miles   * You may drift out of your jeny   * You are unable to focus and your thoughts wander   * You may yawn more often than normal   * You have difficulty keeping your eyes open / nodding off   * Missing traffic signs, speeding, or tailgating  Prevention-   Good sleep hygiene, lifestyle and behavioral choices have the most impact on drowsy driving. There is no substitute for sleep and the average person requires 8 hours nightly. If you find yourself driving drowsy, stop and sleep. Consider the sleep hygiene tips provided during your visit as well. Medication Refill Policy: Refills for all medications require 1 week advance notice. Please have your pharmacy fax a refill request. We are unable to fax, or call in \"controled substance\" medications and you will need to pick these prescriptions up from our office. e-contratos Activation    Thank you for requesting access to e-contratos. Please follow the instructions below to securely access and download your online medical record. e-contratos allows you to send messages to your doctor, view your test results, renew your prescriptions, schedule appointments, and more. How Do I Sign Up? 1. In your internet browser, go to [email or web address]. 2. Click on the First Time User? Click Here link in the Sign In box. You will see the New Member Sign Up page. 3. Enter your e-contratos Access Code exactly as it appears below. You will not need to use this code after youve completed the sign-up process. If you do not sign up before the expiration date, you must request a new code. e-contratos Access Code:  Activation code not generated  Current AddThis Status: Active (This is the date your AddThis access code will )    4. Enter the last four digits of your Social Security Number (xxxx) and Date of Birth (mm/dd/yyyy) as indicated and click Submit. You will be taken to the next sign-up page. 5. Create a cacaoTVt ID. This will be your cacaoTVt login ID and cannot be changed, so think of one that is secure and easy to remember. 6. Create a AddThis password. You can change your password at any time. 7. Enter your Password Reset Question and Answer. This can be used at a later time if you forget your password. 8. Enter your e-mail address. You will receive e-mail notification when new information is available in [address] 9. Click Sign Up. You can now view and download portions of your medical record. 10. Click the Download Summary menu link to download a portable copy of your medical information. Additional Information    If you have questions, please call [phone]. Remember, AddThis is NOT to be used for urgent needs. For medical emergencies, dial 911.

## 2018-03-29 LAB
CREATININE, EXTERNAL: 0.7
HBA1C MFR BLD HPLC: 7.3 %
LDL-C, EXTERNAL: 36
MICROALBUMIN UR TEST STR-MCNC: 13 MG/DL

## 2018-04-02 ENCOUNTER — HOSPITAL ENCOUNTER (EMERGENCY)
Age: 83
Discharge: HOME | End: 2018-04-02
Attending: EMERGENCY MEDICINE | Admitting: EMERGENCY MEDICINE
Payer: MEDICARE

## 2018-04-02 ENCOUNTER — TELEPHONE (OUTPATIENT)
Dept: SLEEP MEDICINE | Age: 83
End: 2018-04-02

## 2018-04-02 ENCOUNTER — APPOINTMENT (OUTPATIENT)
Dept: GENERAL RADIOLOGY | Age: 83
End: 2018-04-02
Attending: EMERGENCY MEDICINE
Payer: MEDICARE

## 2018-04-02 VITALS
DIASTOLIC BLOOD PRESSURE: 98 MMHG | WEIGHT: 200 LBS | OXYGEN SATURATION: 96 % | SYSTOLIC BLOOD PRESSURE: 148 MMHG | HEIGHT: 66 IN | TEMPERATURE: 98.5 F | BODY MASS INDEX: 32.14 KG/M2 | HEART RATE: 91 BPM | RESPIRATION RATE: 21 BRPM

## 2018-04-02 DIAGNOSIS — R06.00 DYSPNEA, UNSPECIFIED TYPE: ICD-10-CM

## 2018-04-02 DIAGNOSIS — R07.9 CHEST PAIN, UNSPECIFIED TYPE: Primary | ICD-10-CM

## 2018-04-02 LAB
ALBUMIN SERPL-MCNC: 3.2 G/DL (ref 3.5–5)
ALBUMIN/GLOB SERPL: 0.9 {RATIO} (ref 1.1–2.2)
ALP SERPL-CCNC: 89 U/L (ref 45–117)
ALT SERPL-CCNC: 20 U/L (ref 12–78)
ANION GAP SERPL CALC-SCNC: 11 MMOL/L (ref 5–15)
AST SERPL-CCNC: 22 U/L (ref 15–37)
ATRIAL RATE: 100 BPM
ATRIAL RATE: 91 BPM
BASOPHILS # BLD: 0 K/UL (ref 0–0.1)
BASOPHILS NFR BLD: 0 % (ref 0–1)
BILIRUB SERPL-MCNC: 0.9 MG/DL (ref 0.2–1)
BNP SERPL-MCNC: 205 PG/ML (ref 0–450)
BUN SERPL-MCNC: 23 MG/DL (ref 6–20)
BUN/CREAT SERPL: 27 (ref 12–20)
CALCIUM SERPL-MCNC: 9.1 MG/DL (ref 8.5–10.1)
CALCULATED P AXIS, ECG09: 66 DEGREES
CALCULATED P AXIS, ECG09: 83 DEGREES
CALCULATED R AXIS, ECG10: -7 DEGREES
CALCULATED R AXIS, ECG10: 34 DEGREES
CALCULATED T AXIS, ECG11: 13 DEGREES
CALCULATED T AXIS, ECG11: 52 DEGREES
CHLORIDE SERPL-SCNC: 108 MMOL/L (ref 97–108)
CK MB CFR SERPL CALC: 2.6 % (ref 0–2.5)
CK MB SERPL-MCNC: 2 NG/ML (ref 5–25)
CK SERPL-CCNC: 78 U/L (ref 26–192)
CO2 SERPL-SCNC: 22 MMOL/L (ref 21–32)
CREAT SERPL-MCNC: 0.85 MG/DL (ref 0.55–1.02)
DIAGNOSIS, 93000: NORMAL
DIAGNOSIS, 93000: NORMAL
DIFFERENTIAL METHOD BLD: ABNORMAL
EOSINOPHIL # BLD: 0.2 K/UL (ref 0–0.4)
EOSINOPHIL NFR BLD: 2 % (ref 0–7)
ERYTHROCYTE [DISTWIDTH] IN BLOOD BY AUTOMATED COUNT: 15.2 % (ref 11.5–14.5)
GLOBULIN SER CALC-MCNC: 3.6 G/DL (ref 2–4)
GLUCOSE SERPL-MCNC: 168 MG/DL (ref 65–100)
HCT VFR BLD AUTO: 39.8 % (ref 35–47)
HGB BLD-MCNC: 12.6 G/DL (ref 11.5–16)
IMM GRANULOCYTES # BLD: 0 K/UL (ref 0–0.04)
IMM GRANULOCYTES NFR BLD AUTO: 0 % (ref 0–0.5)
INR PPP: 1 (ref 0.9–1.1)
LYMPHOCYTES # BLD: 1.5 K/UL (ref 0.8–3.5)
LYMPHOCYTES NFR BLD: 14 % (ref 12–49)
MCH RBC QN AUTO: 28.3 PG (ref 26–34)
MCHC RBC AUTO-ENTMCNC: 31.7 G/DL (ref 30–36.5)
MCV RBC AUTO: 89.2 FL (ref 80–99)
MONOCYTES # BLD: 0.8 K/UL (ref 0–1)
MONOCYTES NFR BLD: 7 % (ref 5–13)
NEUTS SEG # BLD: 8 K/UL (ref 1.8–8)
NEUTS SEG NFR BLD: 77 % (ref 32–75)
NRBC # BLD: 0 K/UL (ref 0–0.01)
NRBC BLD-RTO: 0 PER 100 WBC
P-R INTERVAL, ECG05: 190 MS
P-R INTERVAL, ECG05: 192 MS
PLATELET # BLD AUTO: 214 K/UL (ref 150–400)
PMV BLD AUTO: 10.4 FL (ref 8.9–12.9)
POTASSIUM SERPL-SCNC: 3.8 MMOL/L (ref 3.5–5.1)
PROT SERPL-MCNC: 6.8 G/DL (ref 6.4–8.2)
PROTHROMBIN TIME: 10.3 SEC (ref 9–11.1)
Q-T INTERVAL, ECG07: 368 MS
Q-T INTERVAL, ECG07: 384 MS
QRS DURATION, ECG06: 84 MS
QRS DURATION, ECG06: 88 MS
QTC CALCULATION (BEZET), ECG08: 472 MS
QTC CALCULATION (BEZET), ECG08: 474 MS
RBC # BLD AUTO: 4.46 M/UL (ref 3.8–5.2)
SODIUM SERPL-SCNC: 141 MMOL/L (ref 136–145)
TROPONIN I SERPL-MCNC: <0.04 NG/ML
TROPONIN I SERPL-MCNC: <0.04 NG/ML
VENTRICULAR RATE, ECG03: 100 BPM
VENTRICULAR RATE, ECG03: 91 BPM
WBC # BLD AUTO: 10.5 K/UL (ref 3.6–11)

## 2018-04-02 PROCEDURE — 93005 ELECTROCARDIOGRAM TRACING: CPT

## 2018-04-02 PROCEDURE — 84484 ASSAY OF TROPONIN QUANT: CPT | Performed by: EMERGENCY MEDICINE

## 2018-04-02 PROCEDURE — 36415 COLL VENOUS BLD VENIPUNCTURE: CPT | Performed by: EMERGENCY MEDICINE

## 2018-04-02 PROCEDURE — 82550 ASSAY OF CK (CPK): CPT | Performed by: EMERGENCY MEDICINE

## 2018-04-02 PROCEDURE — 99285 EMERGENCY DEPT VISIT HI MDM: CPT

## 2018-04-02 PROCEDURE — 85610 PROTHROMBIN TIME: CPT | Performed by: EMERGENCY MEDICINE

## 2018-04-02 PROCEDURE — 80053 COMPREHEN METABOLIC PANEL: CPT | Performed by: EMERGENCY MEDICINE

## 2018-04-02 PROCEDURE — 71045 X-RAY EXAM CHEST 1 VIEW: CPT

## 2018-04-02 PROCEDURE — 94762 N-INVAS EAR/PLS OXIMTRY CONT: CPT

## 2018-04-02 PROCEDURE — 83880 ASSAY OF NATRIURETIC PEPTIDE: CPT | Performed by: EMERGENCY MEDICINE

## 2018-04-02 PROCEDURE — 85025 COMPLETE CBC W/AUTO DIFF WBC: CPT | Performed by: EMERGENCY MEDICINE

## 2018-04-02 NOTE — TELEPHONE ENCOUNTER
Patient called advised just got out of ER, Patient went due to rapid heart rate, would like to speak to you about her cpap and settings best number to contact is [phone]

## 2018-04-02 NOTE — ED TRIAGE NOTES
PT presents via EMS complaining of palpitations, lightheaded, and SOB that started about 5 am this morning.   PT reports that Paplatations stopped around the time EMS arrived

## 2018-04-02 NOTE — Clinical Note
Thank you for allowing us to provide you with medical care today. We realize that you have many choices for your emergency care needs. We thank you for choosing [facility].  Please choose us in the future for any continued health care need s. We hope we addressed all of your medical concerns. We strive to provide excellent quality care in the Emergency Department. Anything less than excellent does not meet our expectations. The exam and treatment you received in the Emergency Depa rtment were for an emergent problem and are not intended as complete care. It is important that you follow up with a doctor, nurse practitioner, or physician's assistant for ongoing care. If your symptoms worsen or you do not improve as expected and you  are unable to reach your usual health care provider, you should return to the Emergency Department. We are available 24 hours a day. Take this sheet with you when you go to your follow-up visit. If you have any problem arranging the follow-up vis it, contact the Emergency Department immediately. Make an appointment your family doctor for follow up of this visit. Return to the ER if you are unable to be seen in a timely manner.

## 2018-04-02 NOTE — ED PROVIDER NOTES
HPI Comments: 80 y.o. female with past medical history significant for DM, hypercholesterolemia, COPD, peripheral neuropathy, diverticulitis, chronic pain, GERD and rental artery stenosis who presents via EMS from home accompanied by her friend, Dr. Holly Acosta, with chief complaint of palpitations. Patient states she went to bed in her usual state of health last night and awakened at 0445 this morning feeling \"dizzy\" and \"shakey\". Patient ate a bagel with peanut butter with no change in symptoms, so she went back to bed. Patient states she then noticed rapid pulse and inability to \"take a deep breath\". Patient called her neighbor, a physician, who recommended she come to the ED for evaluation. Patient states she was able to take a deep breath in the ambulance en route. Patient denies chest pain, but endorses some chest \"discomfort\" during episode. Patient notes she is scheduled for an aortic valve replacement on 4/12/18. Patient's last bowel movement was normal this morning. Patient specifically denies nausea, vomiting, visual changes, numbness, tingling, abdominal pain, and diarrhea. There are no other acute medical concerns at this time. Social hx: former tobacco smoker; denies EtOH use; denies illicit drug use  PCP: Ermelinda Mccray MD  Cardiology: Adilene Fisher MD  Cardiothoracic Surgery: Jodee Kitchen MD; Pinky Mae MD    Note written by Lila Abad, as dictated by Jose Mendoza MD 8:26 AM        The history is provided by the patient. No  was used.         Past Medical History:   Diagnosis Date    Aortic stenosis     CAD (coronary artery disease)     pt states she does not have this    Chronic pain     back - spinal stenosis    COPD     [facility]     Diabetic neuropathy ([facility]     Diverticulosis of colon     GERD (gastroesophageal reflux disease)     Hiatal Hernia - pt states she does not have this    Hypercholesterolemia     CINDA (obstructive sleep apnea) 08-    AHI: 18.6 per hour    Other ill-defined conditions(799.89)     heart murmur    Other ill-defined conditions(799.89)     colon infection    Peripheral neuropathy     Renal artery stenosis (HCC)     Right    Unspecified adverse effect of anesthesia     delayed awakening - \"does not need as much\"       Past Surgical History:   Procedure Laterality Date    ABDOMEN SURGERY PROC UNLISTED  1977    R Renal Artery Stenosis repair    ABDOMEN SURGERY [address] or [address] [place] [address] L Knee   [address] [facility]    Left - benign cyst    ENDOSCOPY, COLON, DIAGNOSTIC  1999    hx of polys in the past    HX GYN  1966    D&C    HX HEENT      Bilat Cataracts    HX HEENT      R Retinal Hem    HX HEENT      Eyelid Cysts x 4    HX HEENT      cyst removed above eyebrow    HX HEENT      oral surgeries - gum surgery/floating root  removed x 2    HX ORTHOPAEDIC  11/06    L1-S1 2007    HX ORTHOPAEDIC  1985    L 4th Trigger finger    HX OTHER SURGICAL      renal artery bypass    HX OTHER SURGICAL      tonsillectomy  adenoidectomy    HX OTHER SURGICAL      retinal hemorrhage    HX OTHER SURGICAL      cataract bilateral    HX OTHER SURGICAL      back surgery    HX TONSILLECTOMY      T & A         Family History:   Problem Relation Age of Onset    Stroke Mother     Cancer Mother      ovarian    Heart Attack Mother     Cancer Father      lung/throat    Alcohol abuse Father     Stroke Maternal Grandfather        Social History     Social History    Marital status:      Spouse name: N/A    Number of children: N/A    Years of education: N/A     Occupational History    Not on file.      Social History Main Topics    Smoking status: Former Smoker     Packs/day: 2.00     Years: 13.00     Types: Cigarettes     Quit date: 6/6/1977    Smokeless tobacco: Never Used Comment: former cigarette smoker    Alcohol use No    Drug use: No    Sexual activity: No     Other Topics Concern    Not on file     Social History Narrative         ALLERGIES: Bees [hymenoptera allergenic extract]; Betadine [povidone-iodine]; Darvon [propoxyphene]; Griseofulvin; and Lipitor [atorvastatin]    Review of Systems   Respiratory: Positive for chest tightness and shortness of breath. Gastrointestinal: Negative for abdominal pain, diarrhea, nausea and vomiting. Neurological: Positive for dizziness. Negative for numbness. All other systems reviewed and are negative. Vitals:    04/02/18 0817   BP: 158/69   Pulse: 100   Resp: 20   Temp: 98.5 °F (36.9 °C)   SpO2: 94%   Weight: 90.7 kg (200 lb)   Height: 5' 6\" (1.676 m)            Physical Exam   Constitutional: She is oriented to person, place, and time. She appears well-developed and well-nourished. NAD, AxOx4, speaking in complete sentences, GCS = 15     HENT:   Head: Normocephalic and atraumatic. Nose: Nose normal.   Mouth/Throat: Oropharynx is clear and moist.   Cn intact       Eyes: Conjunctivae and EOM are normal. Pupils are equal, round, and reactive to light. Right eye exhibits no discharge. Left eye exhibits no discharge. No scleral icterus. Neck: Normal range of motion. Neck supple. No JVD present. No tracheal deviation present. Cardiovascular: Normal rate, regular rhythm and intact distal pulses. Exam reveals no gallop and no friction rub. Murmur heard. Pulmonary/Chest: Effort normal and breath sounds normal. No respiratory distress. She has no wheezes. She has no rales. She exhibits no tenderness. Abdominal: Soft. Bowel sounds are normal. There is no tenderness. There is no rebound and no guarding. Genitourinary: No vaginal discharge found. Genitourinary Comments: Pt denies urinary/ vaginal complaints   Musculoskeletal: Normal range of motion. She exhibits no edema or tenderness.    Neurological: She is alert and oriented to person, place, and time. She has normal reflexes. No cranial nerve deficit. She exhibits normal muscle tone. Coordination normal.   pt has motor/ CV/ Sensation grossly intact to all extremities, R = L in strength;   Skin: Skin is warm and dry. No rash noted. No erythema. No pallor. Psychiatric: She has a normal mood and affect. Her behavior is normal. Thought content normal.   Nursing note and vitals reviewed. [facility]      ED Course       Procedures     Chief Complaint   Patient presents with    Chest Pain       11:36 AM  The patients presenting problems have been discussed, and they are in agreement with the care plan formulated and outlined with them. I have encouraged them to ask questions as they arise throughout their visit. MEDICATIONS GIVEN:  Medications - No data to display    LABS REVIEWED:  Labs Reviewed   CBC WITH AUTOMATED DIFF - Abnormal; Notable for the following:        Result Value    RDW 15.2 (*)     NEUTROPHILS 77 (*)     All other components within normal limits   METABOLIC PANEL, COMPREHENSIVE - Abnormal; Notable for the following:     Glucose 168 (*)     BUN 23 (*)     BUN/Creatinine ratio 27 (*)     Albumin 3.2 (*)     A-G Ratio 0.9 (*)     All other components within normal limits   CK W/ CKMB & INDEX - Abnormal; Notable for the following:     CK-MB Index 2.6 (*)     All other components within normal limits   TROPONIN I   NT-PRO BNP   PROTHROMBIN TIME + INR   SAMPLES BEING HELD   TROPONIN I       RADIOLOGY RESULTS:  The following have been ordered and reviewed:  _____________________________________________________________________  _____________________________________________________________________    EKG interpretation:   Rhythm: normal sinus rhythm; and ir-regular due to a PVC. Rate (approx.): 100;  Axis: normal; P wave: normal; QRS interval: normal ; ST/T wave: normal; Negative acute significant segmental elevations/ unchanged compared to study dated 11/30/2017    PROCEDURES:        CONSULTATIONS:       PROGRESS NOTES:      DIAGNOSIS:    1. Chest pain, unspecified type    2. Dyspnea, unspecified type        PLAN:  1-Chest Pain dyspnea / neg ed evaluation/ ? anxiety - will have pt follow-up with Cardiology;       ED COURSE: The patients hospital course has been uncomplicated. 9:48 AM  Patient reassessed. She is resting comfortably in stretcher with family at bedside and no new complaints. Patient and family were updated on available lab/imaging results. They verbalize understanding and agree with plan to obtain repeat enzymes and reassess. ED Subsquent EKG interpretation:  Rhythm: normal sinus rhythm; and regular . Rate (approx.): 90; Axis: normal; P wave: normal; QRS interval: normal ; ST/T wave: non-specific changes; in  Lead: ; Other findings: unchanged from previous ekg. This EKG was interpreted by Adilene Harrison MD,ED Provider. 12:20 PM  Repeat troponin negative. Discussed available lab and imaging results with patient and family. Both verbalize understanding and agree with care plan. Will discharge patient home with specific return precautions; f/u with cardiology, cardiothoracic surgery, PCP. Patient's results have been reviewed with them. Patient and/or family have verbally conveyed their understanding and agreement of the patient's signs, symptoms, diagnosis, treatment and prognosis and additionally agree to follow up as recommended or return to the Emergency Room should their condition change prior to follow-up. Discharge instructions have also been provided to the patient with some educational information regarding their diagnosis as well a list of reasons why they would want to return to the ER prior to their follow-up appointment should their condition change.

## 2018-04-02 NOTE — DISCHARGE INSTRUCTIONS
Chest Pain: Care Instructions  Your Care Instructions    There are many things that can cause chest pain. Some are not serious and will get better on their own in a few days. But some kinds of chest pain need more testing and treatment. Your doctor may have recommended a follow-up visit in the next 8 to 12 hours. If you are not getting better, you may need more tests or treatment. Even though your doctor has released you, you still need to watch for any problems. The doctor carefully checked you, but sometimes problems can develop later. If you have new symptoms or if your symptoms do not get better, get medical care right away. If you have worse or different chest pain or pressure that lasts more than 5 minutes or you passed out (lost consciousness), call 911 or seek other emergency help right away. A medical visit is only one step in your treatment. Even if you feel better, you still need to do what your doctor recommends, such as going to all suggested follow-up appointments and taking medicines exactly as directed. This will help you recover and help prevent future problems. How can you care for yourself at home? · Rest until you feel better. · Take your medicine exactly as prescribed. Call your doctor if you think you are having a problem with your medicine. · Do not drive after taking a prescription pain medicine. When should you call for help? Call 911 if:  ? · You passed out (lost consciousness). ? · You have severe difficulty breathing. ? · You have symptoms of a heart attack. These may include:  ¨ Chest pain or pressure, or a strange feeling in your chest.  ¨ Sweating. ¨ Shortness of breath. ¨ Nausea or vomiting. ¨ Pain, pressure, or a strange feeling in your back, neck, jaw, or upper belly or in one or both shoulders or arms. ¨ Lightheadedness or sudden weakness. ¨ A fast or irregular heartbeat.   After you call 911, the  may tell you to chew 1 adult-strength or 2 to 4 low-dose aspirin. Wait for an ambulance. Do not try to drive yourself. ?Call your doctor today if:  ? · You have any trouble breathing. ? · Your chest pain gets worse. ? · You are dizzy or lightheaded, or you feel like you may faint. ? · You are not getting better as expected. ? · You are having new or different chest pain. Where can you learn more? Go to http://jose raul-bryan.info/. Enter A120 in the search box to learn more about \"Chest Pain: Care Instructions. \"  Current as of: March 20, 2017  Content Version: 11.4  © 8038-0300 [employer]. Care instructions adapted under license by [employer] (which disclaims liability or warranty for this information). If you have questions about a medical condition or this instruction, always ask your healthcare professional. [place] [address] any warranty or liability for your use of this information. Shortness of Breath: Care Instructions  Your Care Instructions  Shortness of breath has many causes. Sometimes conditions such as anxiety can lead to shortness of breath. Some people get mild shortness of breath when they exercise. Trouble breathing also can be a symptom of a serious problem, such as asthma, lung disease, emphysema, heart problems, and pneumonia. If your shortness of breath continues, you may need tests and treatment. Watch for any changes in your breathing and other symptoms. Follow-up care is a key part of your treatment and safety. Be sure to make and go to all appointments, and call your doctor if you are having problems. It's also a good idea to know your test results and keep a list of the medicines you take. How can you care for yourself at home? · Do not smoke or allow others to smoke around you. If you need help quitting, talk to your doctor about stop-smoking programs and medicines. These can increase your chances of quitting for good.   · Get plenty of rest and sleep.  · Take your medicines exactly as prescribed. Call your doctor if you think you are having a problem with your medicine. · Find healthy ways to deal with stress. ¨ Exercise daily. ¨ Get plenty of sleep. ¨ Eat regularly and well. When should you call for help? Call 911 anytime you think you may need emergency care. For example, call if:  ? · You have severe shortness of breath. ? · You have symptoms of a heart attack. These may include:  ¨ Chest pain or pressure, or a strange feeling in the chest.  ¨ Sweating. ¨ Shortness of breath. ¨ Nausea or vomiting. ¨ Pain, pressure, or a strange feeling in the back, neck, jaw, or upper belly or in one or both shoulders or arms. ¨ Lightheadedness or sudden weakness. ¨ A fast or irregular heartbeat. After you call 911, the  may tell you to chew 1 adult-strength or 2 to 4 low-dose aspirin. Wait for an ambulance. Do not try to drive yourself. ?Call your doctor now or seek immediate medical care if:  ? · Your shortness of breath gets worse or you start to wheeze. Wheezing is a high-pitched sound when you breathe. ? · You wake up at night out of breath or have to prop your head up on several pillows to breathe. ? · You are short of breath after only light activity or while at rest.   ? Watch closely for changes in your health, and be sure to contact your doctor if:  ? · You do not get better over the next 1 to 2 days. Where can you learn more? Go to http://jose raul-bryan.info/. Enter S780 in the search box to learn more about \"Shortness of Breath: Care Instructions. \"  Current as of: May 12, 2017  Content Version: 11.4  © 6220-2084 [employer]. Care instructions adapted under license by [employer] (which disclaims liability or warranty for this information).  If you have questions about a medical condition or this instruction, always ask your healthcare professional. Nisha Tucker any warranty or liability for your use of this information. We hope that we have addressed all of your medical concerns. The examination and treatment you received in the Emergency Department were for an emergent problem and were not intended as complete care. It is important that you follow up with your healthcare provider(s) for ongoing care. If your symptoms worsen or do not improve as expected, and you are unable to reach your usual health care provider(s), you should return to the Emergency Department. Today's healthcare is undergoing tremendous change, and patient satisfaction surveys are one of the many tools to assess the quality of medical care. You may receive a survey from the [employer] organization regarding your experience in the Emergency Department. I hope that your experience has been completely positive, particularly the medical care that I provided. As such, please participate in the survey; anything less than excellent does not meet my expectations or intentions. [facility] and [address] [place] [address] participate in nationally recognized quality of care measures. If your blood pressure is greater than 120/80, as reported below, we urge that you seek medical care to address the potential of high blood pressure, commonly known as hypertension. Hypertension can be hereditary or can be caused by certain medical conditions, pain, stress, or \"white coat syndrome. \"       Please make an appointment with your health care provider(s) for follow up of your Emergency Department visit.        VITALS:   Patient Vitals for the past 8 hrs:   Temp Pulse Resp BP SpO2   04/02/18 1130 - 93 21 138/82 94 %   04/02/18 1030 - 93 23 125/73 95 %   04/02/18 1000 - 90 12 139/77 93 %   04/02/18 0900 - 98 17 139/56 97 %   04/02/18 0830 - (!) 102 17 161/66 94 %   04/02/18 0817 98.5 °F (36.9 °C) 100 20 158/69 94 %          Thank you for allowing us to provide you with medical care today. We realize that you have many choices for your emergency care needs. Please choose us in the future for any continued health care needs. [facility] [address] [facility].   Office: [phone]            Recent Results (from the past 24 hour(s))   EKG, 12 LEAD, INITIAL    Collection Time: 04/02/18  8:17 AM   Result Value Ref Range    Ventricular Rate 100 BPM    Atrial Rate 100 BPM    P-R Interval 192 ms    QRS Duration 84 ms    Q-T Interval 368 ms    QTC Calculation (Bezet) 474 ms    Calculated P Axis 66 degrees    Calculated R Axis 34 degrees    Calculated T Axis 52 degrees    Diagnosis       Normal sinus rhythm  When compared with ECG of 30-NOV-2017 15:34,  premature atrial complexes are no longer present     CBC WITH AUTOMATED DIFF    Collection Time: 04/02/18  8:21 AM   Result Value Ref Range    WBC 10.5 3.6 - 11.0 K/uL    RBC 4.46 3.80 - 5.20 M/uL    HGB 12.6 11.5 - 16.0 g/dL    HCT 39.8 35.0 - 47.0 %    MCV 89.2 80.0 - 99.0 FL    MCH 28.3 26.0 - 34.0 PG    MCHC 31.7 30.0 - 36.5 g/dL    RDW 15.2 (H) 11.5 - 14.5 %    PLATELET 840 031 - 430 K/uL    MPV 10.4 8.9 - 12.9 FL    NRBC 0.0 0  WBC    ABSOLUTE NRBC 0.00 0.00 - 0.01 K/uL    NEUTROPHILS 77 (H) 32 - 75 %    LYMPHOCYTES 14 12 - 49 %    MONOCYTES 7 5 - 13 %    EOSINOPHILS 2 0 - 7 %    BASOPHILS 0 0 - 1 %    IMMATURE GRANULOCYTES 0 0.0 - 0.5 %    ABS. NEUTROPHILS 8.0 1.8 - 8.0 K/UL    ABS. LYMPHOCYTES 1.5 0.8 - 3.5 K/UL    ABS. MONOCYTES 0.8 0.0 - 1.0 K/UL    ABS. EOSINOPHILS 0.2 0.0 - 0.4 K/UL    ABS. BASOPHILS 0.0 0.0 - 0.1 K/UL    ABS. IMM.  GRANS. 0.0 0.00 - 0.04 K/UL    DF AUTOMATED     TROPONIN I    Collection Time: 04/02/18  8:21 AM   Result Value Ref Range    Troponin-I, Qt. <0.04 <6.90 ng/mL   METABOLIC PANEL, COMPREHENSIVE    Collection Time: 04/02/18  8:21 AM   Result Value Ref Range    Sodium 141 136 - 145 mmol/L    Potassium 3.8 3.5 - 5.1 mmol/L    Chloride 108 97 - 108 mmol/L CO2 22 21 - 32 mmol/L    Anion gap 11 5 - 15 mmol/L    Glucose 168 (H) 65 - 100 mg/dL    BUN 23 (H) 6 - 20 MG/DL    Creatinine 0.85 0.55 - 1.02 MG/DL    BUN/Creatinine ratio 27 (H) 12 - 20      GFR est AA >60 >60 ml/min/1.73m2    GFR est non-AA >60 >60 ml/min/1.73m2    Calcium 9.1 8.5 - 10.1 MG/DL    Bilirubin, total 0.9 0.2 - 1.0 MG/DL    ALT (SGPT) 20 12 - 78 U/L    AST (SGOT) 22 15 - 37 U/L    Alk. phosphatase 89 45 - 117 U/L    Protein, total 6.8 6.4 - 8.2 g/dL    Albumin 3.2 (L) 3.5 - 5.0 g/dL    Globulin 3.6 2.0 - 4.0 g/dL    A-G Ratio 0.9 (L) 1.1 - 2.2     CK W/ CKMB & INDEX    Collection Time: 04/02/18  8:21 AM   Result Value Ref Range    CK 78 26 - 192 U/L    CK - MB 2.0 <3.6 NG/ML    CK-MB Index 2.6 (H) 0 - 2.5     NT-PRO BNP    Collection Time: 04/02/18  8:21 AM   Result Value Ref Range    NT pro- 0 - 450 PG/ML   PROTHROMBIN TIME + INR    Collection Time: 04/02/18  8:21 AM   Result Value Ref Range    INR 1.0 0.9 - 1.1      Prothrombin time 10.3 9.0 - 11.1 sec   EKG, 12 LEAD, SUBSEQUENT    Collection Time: 04/02/18 11:40 AM   Result Value Ref Range    Ventricular Rate 91 BPM    Atrial Rate 91 BPM    P-R Interval 190 ms    QRS Duration 88 ms    Q-T Interval 384 ms    QTC Calculation (Bezet) 472 ms    Calculated P Axis 83 degrees    Calculated R Axis -7 degrees    Calculated T Axis 13 degrees    Diagnosis       Sinus rhythm with sinus arrhythmia with occasional premature ventricular   complexes  Inferior infarct , age undetermined  When compared with ECG of 02-APR-2018 08:17,  MANUAL COMPARISON REQUIRED, DATA IS UNCONFIRMED         Xr Chest Port    Result Date: 4/2/2018  EXAM:  XR CHEST PORT INDICATION:  Chest pain and shortness of breath since this morning. COMPARISON:  9/19/2017. FINDINGS:  An AP portable view was obtained of the chest.  The heart is minimally enlarged. No consolidation or pulmonary edema is evident. Degenerative change of the spine is noted.         IMPRESSION: No pneumonia or CHF.

## 2018-04-02 NOTE — TELEPHONE ENCOUNTER
Patient woke up at about 5:00 am after sleeping 7 hours. She felt weak, dizzy and lightheaded, had a rapid pulse that she was unable to count and was unable to take a deep breath. She called 911 and was evaluated at the [facility] [facility] ER and just returned home. She expressed an interest in having us look at a download from last night.

## 2018-04-02 NOTE — TELEPHONE ENCOUNTER
Reviewed AHI on download which was indicative of AHI 2.1, (7:00 pm to 4:00 am) unlikely that symptoms were from CINDA. Patient agreed to call for recurrence in symptoms and after surgery.

## 2018-04-03 ENCOUNTER — HOSPITAL ENCOUNTER (OUTPATIENT)
Dept: PREADMISSION TESTING | Age: 83
Discharge: HOME | End: 2018-04-03
Payer: MEDICARE

## 2018-04-03 VITALS
HEART RATE: 76 BPM | RESPIRATION RATE: 22 BRPM | SYSTOLIC BLOOD PRESSURE: 114 MMHG | DIASTOLIC BLOOD PRESSURE: 63 MMHG | BODY MASS INDEX: 32.17 KG/M2 | WEIGHT: 200.19 LBS | HEIGHT: 66 IN | TEMPERATURE: 98.5 F | OXYGEN SATURATION: 96 %

## 2018-04-03 LAB
ALBUMIN SERPL-MCNC: 3.2 G/DL (ref 3.5–5)
ALBUMIN/GLOB SERPL: 0.9 {RATIO} (ref 1.1–2.2)
ALP SERPL-CCNC: 80 U/L (ref 45–117)
ALT SERPL-CCNC: 19 U/L (ref 12–78)
ANION GAP SERPL CALC-SCNC: 8 MMOL/L (ref 5–15)
APPEARANCE UR: CLEAR
APTT PPP: 25.6 SEC (ref 22.1–32)
ARTERIAL PATENCY WRIST A: YES
AST SERPL-CCNC: 15 U/L (ref 15–37)
ATRIAL RATE: 81 BPM
BACTERIA URNS QL MICRO: ABNORMAL /HPF
BASE EXCESS BLD CALC-SCNC: 3 MMOL/L
BASOPHILS # BLD: 0 K/UL (ref 0–0.1)
BASOPHILS NFR BLD: 0 % (ref 0–1)
BDY SITE: ABNORMAL
BILIRUB SERPL-MCNC: 0.7 MG/DL (ref 0.2–1)
BILIRUB UR QL: NEGATIVE
BNP SERPL-MCNC: 228 PG/ML (ref 0–450)
BUN SERPL-MCNC: 19 MG/DL (ref 6–20)
BUN/CREAT SERPL: 22 (ref 12–20)
CALCIUM SERPL-MCNC: 9 MG/DL (ref 8.5–10.1)
CALCULATED P AXIS, ECG09: 78 DEGREES
CALCULATED R AXIS, ECG10: 45 DEGREES
CALCULATED T AXIS, ECG11: 67 DEGREES
CHLORIDE SERPL-SCNC: 106 MMOL/L (ref 97–108)
CO2 SERPL-SCNC: 27 MMOL/L (ref 21–32)
COLOR UR: ABNORMAL
CREAT SERPL-MCNC: 0.86 MG/DL (ref 0.55–1.02)
DIAGNOSIS, 93000: NORMAL
DIFFERENTIAL METHOD BLD: ABNORMAL
EOSINOPHIL # BLD: 0.3 K/UL (ref 0–0.4)
EOSINOPHIL NFR BLD: 3 % (ref 0–7)
EPITH CASTS URNS QL MICRO: ABNORMAL /LPF
ERYTHROCYTE [DISTWIDTH] IN BLOOD BY AUTOMATED COUNT: 15.3 % (ref 11.5–14.5)
EST. AVERAGE GLUCOSE BLD GHB EST-MCNC: 171 MG/DL
GAS FLOW.O2 O2 DELIVERY SYS: ABNORMAL L/MIN
GLOBULIN SER CALC-MCNC: 3.6 G/DL (ref 2–4)
GLUCOSE SERPL-MCNC: 112 MG/DL (ref 65–100)
GLUCOSE UR STRIP.AUTO-MCNC: NEGATIVE MG/DL
HBA1C MFR BLD: 7.6 % (ref 4.2–6.3)
HCO3 BLD-SCNC: 26.4 MMOL/L (ref 22–26)
HCT VFR BLD AUTO: 37.4 % (ref 35–47)
HGB BLD-MCNC: 11.9 G/DL (ref 11.5–16)
HGB UR QL STRIP: NEGATIVE
HYALINE CASTS URNS QL MICRO: ABNORMAL /LPF (ref 0–5)
IMM GRANULOCYTES # BLD: 0 K/UL (ref 0–0.04)
IMM GRANULOCYTES NFR BLD AUTO: 0 % (ref 0–0.5)
INR PPP: 1 (ref 0.9–1.1)
KETONES UR QL STRIP.AUTO: NEGATIVE MG/DL
LEUKOCYTE ESTERASE UR QL STRIP.AUTO: ABNORMAL
LYMPHOCYTES # BLD: 1.8 K/UL (ref 0.8–3.5)
LYMPHOCYTES NFR BLD: 23 % (ref 12–49)
MAGNESIUM SERPL-MCNC: 1.7 MG/DL (ref 1.6–2.4)
MCH RBC QN AUTO: 27.6 PG (ref 26–34)
MCHC RBC AUTO-ENTMCNC: 31.8 G/DL (ref 30–36.5)
MCV RBC AUTO: 86.8 FL (ref 80–99)
MONOCYTES # BLD: 0.9 K/UL (ref 0–1)
MONOCYTES NFR BLD: 11 % (ref 5–13)
NEUTS SEG # BLD: 4.7 K/UL (ref 1.8–8)
NEUTS SEG NFR BLD: 62 % (ref 32–75)
NITRITE UR QL STRIP.AUTO: NEGATIVE
NRBC # BLD: 0 K/UL (ref 0–0.01)
NRBC BLD-RTO: 0 PER 100 WBC
O2/TOTAL GAS SETTING VFR VENT: 21 %
P-R INTERVAL, ECG05: 188 MS
PCO2 BLD: 35.3 MMHG (ref 35–45)
PH BLD: 7.48 [PH] (ref 7.35–7.45)
PH UR STRIP: 5.5 [PH] (ref 5–8)
PLATELET # BLD AUTO: 236 K/UL (ref 150–400)
PMV BLD AUTO: 10.2 FL (ref 8.9–12.9)
PO2 BLD: 75 MMHG (ref 80–100)
POTASSIUM SERPL-SCNC: 3.5 MMOL/L (ref 3.5–5.1)
PROT SERPL-MCNC: 6.8 G/DL (ref 6.4–8.2)
PROT UR STRIP-MCNC: NEGATIVE MG/DL
PROTHROMBIN TIME: 10.4 SEC (ref 9–11.1)
Q-T INTERVAL, ECG07: 392 MS
QRS DURATION, ECG06: 90 MS
QTC CALCULATION (BEZET), ECG08: 455 MS
RBC # BLD AUTO: 4.31 M/UL (ref 3.8–5.2)
RBC #/AREA URNS HPF: ABNORMAL /HPF (ref 0–5)
SAO2 % BLD: 96 % (ref 92–97)
SODIUM SERPL-SCNC: 141 MMOL/L (ref 136–145)
SP GR UR REFRACTOMETRY: 1.01 (ref 1–1.03)
SPECIMEN TYPE: ABNORMAL
THERAPEUTIC RANGE,PTTT: NORMAL SECS (ref 58–77)
TSH SERPL DL<=0.05 MIU/L-ACNC: 0.96 UIU/ML (ref 0.36–3.74)
UA: UC IF INDICATED,UAUC: ABNORMAL
UROBILINOGEN UR QL STRIP.AUTO: 0.2 EU/DL (ref 0.2–1)
VENTRICULAR RATE, ECG03: 81 BPM
WBC # BLD AUTO: 7.6 K/UL (ref 3.6–11)
WBC URNS QL MICRO: ABNORMAL /HPF (ref 0–4)

## 2018-04-03 PROCEDURE — 80053 COMPREHEN METABOLIC PANEL: CPT | Performed by: NURSE PRACTITIONER

## 2018-04-03 PROCEDURE — 87077 CULTURE AEROBIC IDENTIFY: CPT | Performed by: NURSE PRACTITIONER

## 2018-04-03 PROCEDURE — 85730 THROMBOPLASTIN TIME PARTIAL: CPT | Performed by: NURSE PRACTITIONER

## 2018-04-03 PROCEDURE — 82803 BLOOD GASES ANY COMBINATION: CPT

## 2018-04-03 PROCEDURE — 83036 HEMOGLOBIN GLYCOSYLATED A1C: CPT | Performed by: NURSE PRACTITIONER

## 2018-04-03 PROCEDURE — 86923 COMPATIBILITY TEST ELECTRIC: CPT | Performed by: NURSE PRACTITIONER

## 2018-04-03 PROCEDURE — 87086 URINE CULTURE/COLONY COUNT: CPT | Performed by: NURSE PRACTITIONER

## 2018-04-03 PROCEDURE — 87186 SC STD MICRODIL/AGAR DIL: CPT | Performed by: NURSE PRACTITIONER

## 2018-04-03 PROCEDURE — 85025 COMPLETE CBC W/AUTO DIFF WBC: CPT | Performed by: NURSE PRACTITIONER

## 2018-04-03 PROCEDURE — 36600 WITHDRAWAL OF ARTERIAL BLOOD: CPT

## 2018-04-03 PROCEDURE — 83880 ASSAY OF NATRIURETIC PEPTIDE: CPT | Performed by: NURSE PRACTITIONER

## 2018-04-03 PROCEDURE — 85610 PROTHROMBIN TIME: CPT | Performed by: NURSE PRACTITIONER

## 2018-04-03 PROCEDURE — 93005 ELECTROCARDIOGRAM TRACING: CPT

## 2018-04-03 PROCEDURE — 81001 URINALYSIS AUTO W/SCOPE: CPT | Performed by: NURSE PRACTITIONER

## 2018-04-03 PROCEDURE — 83735 ASSAY OF MAGNESIUM: CPT | Performed by: NURSE PRACTITIONER

## 2018-04-03 PROCEDURE — 86900 BLOOD TYPING SEROLOGIC ABO: CPT | Performed by: NURSE PRACTITIONER

## 2018-04-03 PROCEDURE — 84443 ASSAY THYROID STIM HORMONE: CPT | Performed by: NURSE PRACTITIONER

## 2018-04-03 PROCEDURE — 36415 COLL VENOUS BLD VENIPUNCTURE: CPT | Performed by: NURSE PRACTITIONER

## 2018-04-03 NOTE — H&P
CSS   History and Physical    Subjective: Nurys Chávez is a 80 y.o. female  with 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 [id number] ([id number]), spinal stenosis w/ peripheral neuropathy, CINDA (CPAP), Vit D deficiency, vocal cords injury with intubation in 1985 that was referred to the [address] [place] [address] by Dr. Blunt Branch interventional evaluation of AS. She is back today to review dx testing results and to discuss her interventional options.      Ms. Juancarlos Miramontes has been followed by Dr. Caryle Chol for several years and they have been serially tracking her AS by echo and by symptoms. She has avoided addressing her AS for many yrs but now symptomatically is forced to address it.     Ms. Juancarlos Miramontes admits to some progressive fatigue and mild dizziness if she moves too quickly. Paige Brennan also states she has some mild chest tightness associated with ROBB that is more common in the recent months that can occur even when walking on a flat surface.  She has chronic back pain with peripheral neuropathy and feels that is a large contributor to her energy level which 'has good days and bad days. '  She denies any palpitations, orthopnea, PND, syncope or falls. Paige Brennan does have some long standing LE edema and has recently increased her furosemide for management.       Ms. Juancarlos Miramontes is deathly afraid of a debilitating stroke and continues to be a bit reluctant. She is desperate to avoid general anesthesia. She denies any cardiac hospitalizations in the past 12 months. Pt went to [facility] [place] [facility] on 4/2 for palpitations/\"heart racing\"/dizzy. Discharged same day. Concerned about CPAP settings. Called pulmonary office.       Ms. Juancarlos Miramontes is  and lives alone. She has a son and daughter in law that live nearby. She has a life alert necklace on.  She has a chair lift in her home to help her with stairs d/t debilitating back pain.  She is independent in her ADLs, medical decision making and medication management. She drives and is an avid bridge player and participates in tournaments weekly.       NYHA Classification: II                        Class II (Mild): Slight limitation of physical activity. Comfortable at rest, but ordinary physical activity results in fatigue, palpitation, or dyspnea.     Angina Classification: 0                        Class 0: No symptoms        Cardiac Testing    EKG 2017: SR 80 bpm. No axis deviations/blocks      TTE 8/15/2017:  LEFT VENTRICLE: Size was normal. Systolic function was normal. Ejection fraction was estimated in the range of 70 % to 75 %. There were no regional wall motion abnormalities. Wall thickness was moderately to markedly increased. RIGHT VENTRICLE: The size was normal. Systolic function was normal. Wall thickness was normal.   LEFT ATRIUM: The atrium was moderately dilated. RIGHT ATRIUM: Size was normal. MITRAL VALVE: There was moderate annular calcification. There was mild thickening. DOPPLER: There was no regurgitation.  AORTIC VALVE: DOPPLER: There was severe aortic stenosis. Mean gradient 48mmHg There was no regurgitation.  TRICUSPID VALVE: Normal valve structure. There was normal leaflet separation. DOPPLER: The transtricuspid velocity was within the normal range. There was no evidence for tricuspid stenosis. There was mild to moderate regurgitation. Pulmonary artery systolic pressure was within the normal range. Right atrial pressure estimate: 3 mmHg. Pulmonary artery systolic pressure: 32 mmHg. PULMONIC VALVE: Leaflets exhibited normal thickness, no calcification, and normal cuspal separation. DOPPLER: The transpulmonic velocity was within the normal range. There was no significant regurgitation. AORTA: The root exhibited normal size. PERICARDIUM: There was no pericardial effusion. The pericardium was normal in appearance.    AV MaxP mmHg  AV Vmax: 4.4 m/s  AV meanP.4 mmHg  RAP: 3 mmHg  RACHEL (VTI): 0.7 cm2  RVSP: 32 mmHg     R/L Cardiac catheterization 11/30/2017: CORONARY CIRCULATION: The coronary circulation is right dominant. Left main: Normal. LAD: Normal. Circumflex: Angiography showed minor  Luminal irregularities. Ramus intermedius: Normal. RCA: Angiography showed minor luminal irregularities. Aortic Pressure (S/D/M): 110/59/83  Aortic Valve Splice - AO: 909/10/--  Aortic Valve Splice - LV: 188/-6/--  Left Ventricle (s/edp): 166/13/--  Mitral Valve Splice - LV-PCW - LV: 818/3/--  Mitral Valve Splice - LV-PCW - PCW: 161/-1/--  Pulmonary Artery (S/D/M): 30/10/19  Pulmonary Capillary Wedge: 13/17/13      Carotid Dopplers 2/17/2017: Findings are consistent with 1-49% stenosis of the right and left internal carotid arteries.  Disease severity is at the low end of range.  Vertebral arteries are patent with antegrade flow     PFTs: FEV1/Predicted 1/9/2018:  1.91L /108%                         Normal FEV1 > 1 Liter, Predicted = 100%, DLCO > 80%      Gated C/A/P CTA 1/9/2018: Images through the lower thorax reveal patency of the thoracic aorta and major branch vessels of the aortic arch. The ascending aorta measures 3.3 x 3.6 cm in anterior-posterior and transverse dimensions respectively. The corresponding descending thoracic aorta measures 2.8 x 2.8 cm in anterior-posterior and transverse dimensions respectively. Coronary artery calcifications are evident.  Images through the lungs reveal no infiltrate or pleural effusion or overt cardiac decompensation. Images through the liver reveal no gross pace-occupying lesion. The pancreas and spleen and kidneys appear to be grossly within normal limits. Multiple bilateral probable renal cysts are present. Left adrenal enlargement  redemonstrated measuring 1.8 x 2.8 cm, previously 1.9 x 2.7 cm.  Images of the abdominal aorta reveal moderate to severe calcified atherosclerotic vascular disease with some diminution in caliber of the distal abdominal aorta.  The celiac and superior mesenteric and renal arteries are patent. On the right, there appears to be a possible aortic-renal bypass by passing a segment of severe fibromuscular dysplasia. Images through the pelvis reveal patency of the common iliac, external iliac, and common femoral arteries bilaterally. The proximal profunda femoris and   superficial femoral arteries are patent. Incidental note of moderate sigmoid colonic diverticulosis. Images through the pelvis reveal incidental calcified fibroid of the uterus. Bone windows demonstrate multilevel lumbar instrumented fusion. IMPRESSION:  Patency of the thoracic and abdominal aorta and pelvic vasculature for catheter procedure. Status post right renal artery bypass. Stable appearing left adrenal lesion. Moderate sigmoid colonic diverticulosis. Status post multilevel lumbar laminectomy and instrumented fusion.     Past Medical History:   Diagnosis Date    Aortic stenosis     CAD (coronary artery disease)     pt states she does not have this    Chronic pain     back - spinal stenosis    COPD     Diabetes ([address]     Diabetic neuropathy ([address]     Diverticulosis of colon     GERD (gastroesophageal reflux disease)     Hiatal Hernia - pt states she does not have this    Hypercholesterolemia     CINDA (obstructive sleep apnea) 08-    AHI: 18.6 per hour    Other ill-defined conditions(799.89)     heart murmur    Other ill-defined conditions(799.89)     colon infection    Peripheral neuropathy     Renal artery stenosis (HCC)     Right    Unspecified adverse effect of anesthesia     delayed awakening - \"does not need as much\"     Past Surgical History:   Procedure Laterality Date    ABDOMEN SURGERY PROC UNLISTED  1977    R Renal Artery Stenosis repair    ABDOMEN SURGERY [address] or 1983    Osman REES Knee    BREAST SURGERY PROCEDURE UNLISTED  1968    Left - benign cyst    ENDOSCOPY, COLON, DIAGNOSTIC  1999    hx of polys in the past    HX GYN  1966    D&C    HX HEENT      Bilat Cataracts    HX HEENT      R Retinal Hem    HX HEENT      Eyelid Cysts x 4    HX HEENT      cyst removed above eyebrow    HX HEENT      oral surgeries - gum surgery/floating root  removed x 2    HX ORTHOPAEDIC  11/06    L1-S1 2007    HX ORTHOPAEDIC  1985    L 4th Trigger finger    HX OTHER SURGICAL      renal artery bypass    HX OTHER SURGICAL      tonsillectomy  adenoidectomy    HX OTHER SURGICAL      retinal hemorrhage    HX OTHER SURGICAL      cataract bilateral    HX OTHER SURGICAL      back surgery    HX TONSILLECTOMY      T & A      Social History   Substance Use Topics    Smoking status: Former Smoker     Packs/day: 2.00     Years: 13.00     Types: Cigarettes     Quit date: 6/6/1977    Smokeless tobacco: Never Used      Comment: former cigarette smoker    Alcohol use No      Family History   Problem Relation Age of Onset    Stroke Mother     Cancer Mother      ovarian    Heart Attack Mother     Cancer Father      lung/throat    Alcohol abuse Father     Stroke Maternal Grandfather      Prior to Admission medications    Medication Sig Start Date End Date Taking? Authorizing Provider   niacin (NIASPAN) 1,000 mg Tb24 tab  2/1/18   Historical Provider   PSYLLIUM SEED, WITH DEXTROSE, (FIBER PO) Take  by mouth. 6 tabs daily    Historical Provider   furosemide (LASIX) 40 mg tablet Take 1 Tab by mouth daily. Take 40/80mg on alternating days. Patient taking differently: Take 40 mg by mouth daily. 60 mg daily (1.5) 8/15/17   Cesar Giron MD   metFORMIN (GLUCOPHAGE) 1,000 mg tablet Take 1 Tab by mouth two (2) times a day. 6/28/17   Ermelinda Mccray MD   dilTIAZem CD (CARDIZEM CD) 180 mg ER capsule Take 1 Cap by mouth daily. 6/28/17   Ermelinda Mccray MD   ezetimibe (ZETIA) 10 mg tablet Take 1 Tab by mouth daily.  6/28/17   Ermelinda Mccray MD   potassium chloride SA (MICRO-K) 10 mEq capsule Take 1 Cap by mouth two (2) times a day. 6/28/17   Kristal Becerril MD   rosuvastatin (CRESTOR) 5 mg tablet Take 1 Tab by mouth daily. 6/28/17   Kristal Becerril MD   COD LIVER OIL PO Take 1,000 mg by mouth daily. Historical Provider   acetaminophen (TYLENOL EXTRA STRENGTH) 500 mg tablet Take 1,000 mg by mouth every six (6) hours as needed for Pain. Historical Provider   losartan (COZAAR) 100 mg tablet Take 1 Tab by mouth daily. 6/23/16   Kristal Becerril MD   azelastine (ASTELIN) 137 mcg (0.1 %) nasal spray 2 Sprays by Both Nostrils route two (2) times a day. Use in each nostril as directed  Patient taking differently: 2 Sprays by Both Nostrils route as needed. Use in each nostril as directed 6/23/16   Kristal Becerril MD   niacin 1,000 mg TbER Take 2,000 Units by mouth nightly. 12/7/15   Rebeka Salter MD   glucose blood VI test strips (ACCU-CHEK ELBERT PLUS) strip Check bs daily. Dx 250.00 8/1/13   Kristal Becerril MD   cyanocobalamin (VITAMIN B-12) 1,000 mcg tablet Take 1,500 mcg by mouth daily. Historical Provider   cholecalciferol, vitamin d3, (VITAMIN D) 1,000 unit tablet Take 2,000 Units by mouth daily. Historical Provider   aspirin 81 mg tablet Take  by mouth. 2 tabs prior to niacin 2/1/11   Historical Provider       Allergies   Allergen Reactions    Bees [Hymenoptera Allergenic Extract] Anaphylaxis    Betadine [Povidone-Iodine] Other (comments)     Abdominal swelling, one incident in 1977. Has had since without a reaction.  Darvon [Propoxyphene] Nausea and Vomiting    Griseofulvin Nausea and Vomiting    Lipitor [Atorvastatin] Myalgia       Review of Systems: Pertinent positives in HPI   Consititutional: Denies fever or chills. Eyes:  Denies use of glasses or vision problems(cataracts). ENT:  Denies hearing or swallowing difficulty. CV: Denies CP, claudication, HTN. Resp: Denies dyspnea, productive cough. : Denies dialysis or kidney problems.   GI: Denies ulcers, esophageal strictures, liver problems. M/S: Denies joint or bone problems, or implanted artificial hardware. Skin: Denies varicose veins, edema. Neuro: Denies strokes, or TIAs. Psych: Denies anxiety or depression. Endocrine: Denies thyroid problems or diabetes. Heme/Lymphatic: Denies easy bruising or lymphedema. Objective:     VS:  HR: 76  Resp:  22  02 sat: 96%   BP: 158/68    Physical Exam:    General appearance: alert, cooperative, no distress  Head: normocephalic, without obvious abnormality; atraumatic  Eyes: conjunctivae/corneas clear; EOM's intact. Nose: nares normal; no drainage. Neck: no carotid bruit and no JVD  Lungs: clear to auscultation bilaterally  Heart: regular rate and rhythm; grade 4/6 systolic murmur  Abdomen: soft, non-tender; bowel sounds normal  Extremities: moves all extremities; no weakness. Skin: Skin color normal; No varicose veins. Trace LE edema.   Neurologic: Grossly normal      Labs:   Recent Labs      04/03/18   1308   WBC  7.6   HGB  11.9   HCT  37.4   PLT  236   NA  141   K  3.5   BUN  19   CREA  0.86   GLU  112*   INR  1.0       Diagnostics:   PA and lateral: No acute process     EKG: Sinus rhythm with occasional premature ventricular complexes and premature   atrial complexes     Clinic Evaluation:   KCCQ-12: scanned into EMR      5 meter gait: 5.38 seconds with cane at previous visit      Dorthey Fraction Survey: Delene  Index ADL - 5/6  scanned into EMR at previous visit      STS 2.81 Risk Score / Predicted 30 day mortality: - calculations scanned into EMR - PFT data unknown                        Procedure: AV Replacement                                              Risk of Mortality: 4.784%                                              Morbidity or Mortality: 18.992%                                              Long Length of Stay: 10.009%                                              Short Length of Stay: 19.556%                                              Permanent Stroke: 2.049%                                              Prolonged Ventilation: 12.925%                                              DSW Infection: 0.314%                                              Renal Failure: 6.721%                                              Reoperation: 6.599%  Assessment:     Active Problems: Aortic stenosis (12/1/2011)        Plan:   The risk and benefit of surgery were reviewed with patient and family and all questions answered and the patient wishes to proceed. Risk include infection, bleeding, stroke, heart attack, irregular heart rhythm, kidney failure and death. The patient was given instructions. The patient was instructed to take last dose of metformin 4/10/18. Surgery is scheduled for 4/12/18. Treatment Plan:    1. AS- severe and symptomatic -  L TF 26 mm S3.   2. HTN- CCB, CDZ per cards  3. TRENTON-  bypassed in 1977   4. DM- metformin & amaryl per endo. 5. HLD-  statin dose per cards  6. Vit D deficiency- at goal on supplement per cards  7. CINDA- CPAP for past 6+ yrs. Pt unaware of setting   8. Hx vocal cord injury w/ intubation in 1985 - chronic horse voice. No swallowing issues. 9.            Signed By: Colonel Low NP     April 3, 2018      Saw patient, agree with above  Risk of morbidity and mortality - high  Medical decision making - high complexity    Medical decision making     1. AS- plan for TAVR  2.  HTN- CCB  3. TRENTON-  bypassed   4. DM- metformin & amaryl   5. HLD-  statin   6. Vit D deficiency- monitor   7. CINDA- CPAP   8. Hx vocal cord injury w/ intubation in 1985 -  No swallowing issues.

## 2018-04-05 DIAGNOSIS — N30.00 ACUTE CYSTITIS WITHOUT HEMATURIA: Primary | ICD-10-CM

## 2018-04-05 LAB
BACTERIA SPEC CULT: ABNORMAL
BACTERIA SPEC CULT: ABNORMAL
CC UR VC: ABNORMAL
SERVICE CMNT-IMP: ABNORMAL

## 2018-04-05 RX ORDER — CEPHALEXIN 250 MG/1
500 CAPSULE ORAL 2 TIMES DAILY
Qty: 28 CAP | Refills: 0 | Status: SHIPPED | OUTPATIENT
Start: 2018-04-05 | End: 2018-04-15

## 2018-04-05 NOTE — PROGRESS NOTES
Called pt, will start keflex x 7 days tonight/Fri am.  Will go to [facility] for FU UA/cx on Mon 4/9. Pt denies any symptoms of UTI or fever.

## 2018-04-10 DIAGNOSIS — N30.00 ACUTE CYSTITIS WITHOUT HEMATURIA: Primary | ICD-10-CM

## 2018-04-10 LAB
APPEARANCE UR: CLEAR
BACTERIA #/AREA URNS HPF: ABNORMAL /[HPF]
BACTERIA UR CULT: NO GROWTH
BILIRUB UR QL STRIP: NEGATIVE
COLOR UR: YELLOW
EPI CELLS #/AREA URNS HPF: ABNORMAL /HPF
GLUCOSE UR QL: NEGATIVE
HGB UR QL STRIP: NEGATIVE
KETONES UR QL STRIP: ABNORMAL
LEUKOCYTE ESTERASE UR QL STRIP: ABNORMAL
MICRO URNS: ABNORMAL
MUCOUS THREADS URNS QL MICRO: PRESENT
NITRITE UR QL STRIP: NEGATIVE
PH UR STRIP: 6 [PH] (ref 5–7.5)
PROT UR QL STRIP: NEGATIVE
RBC #/AREA URNS HPF: ABNORMAL /HPF
RENAL EPI CELLS #/AREA URNS HPF: ABNORMAL /HPF
SP GR UR: 1.02 (ref 1–1.03)
UROBILINOGEN UR STRIP-MCNC: 0.2 MG/DL (ref 0.2–1)
WBC #/AREA URNS HPF: ABNORMAL /HPF

## 2018-04-10 RX ORDER — FUROSEMIDE 40 MG/1
40 TABLET ORAL DAILY
COMMUNITY
End: 2018-05-22

## 2018-04-10 RX ORDER — GLIMEPIRIDE 2 MG/1
1 TABLET ORAL AS NEEDED
COMMUNITY
End: 2018-05-22

## 2018-04-10 RX ORDER — CHOLECALCIFEROL (VITAMIN D3) 125 MCG
1 CAPSULE ORAL
COMMUNITY
End: 2020-07-23

## 2018-04-11 ENCOUNTER — TELEPHONE (OUTPATIENT)
Dept: CASE MANAGEMENT | Age: 83
End: 2018-04-11

## 2018-04-11 ENCOUNTER — ANESTHESIA EVENT (OUTPATIENT)
Dept: CARDIOTHORACIC SURGERY | Age: 83
DRG: 267 | End: 2018-04-11
Payer: MEDICARE

## 2018-04-11 RX ORDER — FENTANYL CITRATE 50 UG/ML
25 INJECTION, SOLUTION INTRAMUSCULAR; INTRAVENOUS
Status: CANCELLED | OUTPATIENT
Start: 2018-04-11

## 2018-04-11 RX ORDER — MORPHINE SULFATE 10 MG/ML
2 INJECTION, SOLUTION INTRAMUSCULAR; INTRAVENOUS
Status: CANCELLED | OUTPATIENT
Start: 2018-04-11

## 2018-04-11 RX ORDER — SODIUM CHLORIDE 9 MG/ML
1.5-3 INJECTION, SOLUTION INTRAVENOUS
Status: DISCONTINUED | OUTPATIENT
Start: 2018-04-12 | End: 2018-04-12

## 2018-04-11 RX ORDER — DIPHENHYDRAMINE HYDROCHLORIDE 50 MG/ML
12.5 INJECTION, SOLUTION INTRAMUSCULAR; INTRAVENOUS AS NEEDED
Status: CANCELLED | OUTPATIENT
Start: 2018-04-11 | End: 2018-04-11

## 2018-04-11 RX ORDER — SODIUM CHLORIDE, SODIUM LACTATE, POTASSIUM CHLORIDE, CALCIUM CHLORIDE 600; 310; 30; 20 MG/100ML; MG/100ML; MG/100ML; MG/100ML
100 INJECTION, SOLUTION INTRAVENOUS CONTINUOUS
Status: CANCELLED | OUTPATIENT
Start: 2018-04-11

## 2018-04-11 RX ORDER — SODIUM CHLORIDE 0.9 % (FLUSH) 0.9 %
5-10 SYRINGE (ML) INJECTION AS NEEDED
Status: CANCELLED | OUTPATIENT
Start: 2018-04-11

## 2018-04-11 RX ORDER — ONDANSETRON 2 MG/ML
4 INJECTION INTRAMUSCULAR; INTRAVENOUS AS NEEDED
Status: CANCELLED | OUTPATIENT
Start: 2018-04-11

## 2018-04-11 RX ORDER — MIDAZOLAM HYDROCHLORIDE 1 MG/ML
0.5 INJECTION, SOLUTION INTRAMUSCULAR; INTRAVENOUS
Status: CANCELLED | OUTPATIENT
Start: 2018-04-11

## 2018-04-11 NOTE — TELEPHONE ENCOUNTER
Cardiac Surgery Care Coordinator-  Called Jose Marvin to review plan of care and day of surgery expectations. No answer, left voicemail message and contact information.   Pablito Benavides RN

## 2018-04-12 ENCOUNTER — HOSPITAL ENCOUNTER (INPATIENT)
Age: 83
LOS: 3 days | Discharge: HOME HEALTH | DRG: 267 | End: 2018-04-15
Attending: THORACIC SURGERY (CARDIOTHORACIC VASCULAR SURGERY) | Admitting: THORACIC SURGERY (CARDIOTHORACIC VASCULAR SURGERY)
Payer: MEDICARE

## 2018-04-12 ENCOUNTER — APPOINTMENT (OUTPATIENT)
Dept: GENERAL RADIOLOGY | Age: 83
DRG: 267 | End: 2018-04-12
Attending: THORACIC SURGERY (CARDIOTHORACIC VASCULAR SURGERY)
Payer: MEDICARE

## 2018-04-12 ENCOUNTER — APPOINTMENT (OUTPATIENT)
Dept: GENERAL RADIOLOGY | Age: 83
DRG: 267 | End: 2018-04-12
Attending: NURSE PRACTITIONER
Payer: MEDICARE

## 2018-04-12 ENCOUNTER — ANESTHESIA (OUTPATIENT)
Dept: CARDIOTHORACIC SURGERY | Age: 83
DRG: 267 | End: 2018-04-12
Payer: MEDICARE

## 2018-04-12 LAB
ALBUMIN SERPL-MCNC: 3 G/DL (ref 3.5–5)
ALBUMIN SERPL-MCNC: 3 G/DL (ref 3.5–5)
ALBUMIN/GLOB SERPL: 0.9 {RATIO} (ref 1.1–2.2)
ALBUMIN/GLOB SERPL: 1 {RATIO} (ref 1.1–2.2)
ALP SERPL-CCNC: 68 U/L (ref 45–117)
ALP SERPL-CCNC: 68 U/L (ref 45–117)
ALT SERPL-CCNC: 16 U/L (ref 12–78)
ALT SERPL-CCNC: 17 U/L (ref 12–78)
ANION GAP SERPL CALC-SCNC: 10 MMOL/L (ref 5–15)
ANION GAP SERPL CALC-SCNC: 8 MMOL/L (ref 5–15)
APTT PPP: 26 SEC (ref 22.1–32)
ARTERIAL PATENCY WRIST A: ABNORMAL
AST SERPL-CCNC: 19 U/L (ref 15–37)
AST SERPL-CCNC: 27 U/L (ref 15–37)
ATRIAL RATE: 61 BPM
BASE EXCESS BLDV CALC-SCNC: 3 MMOL/L
BASOPHILS # BLD: 0 K/UL (ref 0–0.1)
BASOPHILS NFR BLD: 0 % (ref 0–1)
BDY SITE: ABNORMAL
BILIRUB SERPL-MCNC: 0.6 MG/DL (ref 0.2–1)
BILIRUB SERPL-MCNC: 0.7 MG/DL (ref 0.2–1)
BUN SERPL-MCNC: 18 MG/DL (ref 6–20)
BUN SERPL-MCNC: 19 MG/DL (ref 6–20)
BUN/CREAT SERPL: 25 (ref 12–20)
BUN/CREAT SERPL: 26 (ref 12–20)
CALCIUM SERPL-MCNC: 8.1 MG/DL (ref 8.5–10.1)
CALCIUM SERPL-MCNC: 8.2 MG/DL (ref 8.5–10.1)
CALCULATED R AXIS, ECG10: 117 DEGREES
CALCULATED T AXIS, ECG11: -15 DEGREES
CHLORIDE SERPL-SCNC: 107 MMOL/L (ref 97–108)
CHLORIDE SERPL-SCNC: 110 MMOL/L (ref 97–108)
CO2 SERPL-SCNC: 24 MMOL/L (ref 21–32)
CO2 SERPL-SCNC: 25 MMOL/L (ref 21–32)
CREAT SERPL-MCNC: 0.7 MG/DL (ref 0.55–1.02)
CREAT SERPL-MCNC: 0.77 MG/DL (ref 0.55–1.02)
DIAGNOSIS, 93000: NORMAL
DIFFERENTIAL METHOD BLD: ABNORMAL
EOSINOPHIL # BLD: 0.2 K/UL (ref 0–0.4)
EOSINOPHIL NFR BLD: 2 % (ref 0–7)
ERYTHROCYTE [DISTWIDTH] IN BLOOD BY AUTOMATED COUNT: 14.9 % (ref 11.5–14.5)
GAS FLOW.O2 O2 DELIVERY SYS: ABNORMAL L/MIN
GAS FLOW.O2 SETTING OXYMISER: 2 L/M
GLOBULIN SER CALC-MCNC: 3.1 G/DL (ref 2–4)
GLOBULIN SER CALC-MCNC: 3.2 G/DL (ref 2–4)
GLUCOSE BLD STRIP.AUTO-MCNC: 126 MG/DL (ref 65–100)
GLUCOSE BLD STRIP.AUTO-MCNC: 153 MG/DL (ref 65–100)
GLUCOSE BLD STRIP.AUTO-MCNC: 157 MG/DL (ref 65–100)
GLUCOSE SERPL-MCNC: 121 MG/DL (ref 65–100)
GLUCOSE SERPL-MCNC: 180 MG/DL (ref 65–100)
HCO3 BLDV-SCNC: 28.2 MMOL/L (ref 23–28)
HCT VFR BLD AUTO: 36.5 % (ref 35–47)
HGB BLD-MCNC: 11.6 G/DL (ref 11.5–16)
IMM GRANULOCYTES # BLD: 0.1 K/UL (ref 0–0.04)
IMM GRANULOCYTES NFR BLD AUTO: 1 % (ref 0–0.5)
INR PPP: 1.1 (ref 0.9–1.1)
LYMPHOCYTES # BLD: 2.4 K/UL (ref 0.8–3.5)
LYMPHOCYTES NFR BLD: 23 % (ref 12–49)
MAGNESIUM SERPL-MCNC: 1.4 MG/DL (ref 1.6–2.4)
MAGNESIUM SERPL-MCNC: 2.2 MG/DL (ref 1.6–2.4)
MCH RBC QN AUTO: 28.2 PG (ref 26–34)
MCHC RBC AUTO-ENTMCNC: 31.8 G/DL (ref 30–36.5)
MCV RBC AUTO: 88.8 FL (ref 80–99)
MONOCYTES # BLD: 0.7 K/UL (ref 0–1)
MONOCYTES NFR BLD: 7 % (ref 5–13)
NEUTS SEG # BLD: 7 K/UL (ref 1.8–8)
NEUTS SEG NFR BLD: 67 % (ref 32–75)
NRBC # BLD: 0 K/UL (ref 0–0.01)
NRBC BLD-RTO: 0 PER 100 WBC
O2/TOTAL GAS SETTING VFR VENT: 0.24 %
PCO2 BLDV: 48.9 MMHG (ref 41–51)
PH BLDV: 7.37 [PH] (ref 7.32–7.42)
PLATELET # BLD AUTO: 212 K/UL (ref 150–400)
PMV BLD AUTO: 9.9 FL (ref 8.9–12.9)
PO2 BLDV: 33 MMHG (ref 25–40)
POTASSIUM SERPL-SCNC: 3.2 MMOL/L (ref 3.5–5.1)
POTASSIUM SERPL-SCNC: 5.1 MMOL/L (ref 3.5–5.1)
PROT SERPL-MCNC: 6.1 G/DL (ref 6.4–8.2)
PROT SERPL-MCNC: 6.2 G/DL (ref 6.4–8.2)
PROTHROMBIN TIME: 11.4 SEC (ref 9–11.1)
Q-T INTERVAL, ECG07: 536 MS
QRS DURATION, ECG06: 128 MS
QTC CALCULATION (BEZET), ECG08: 521 MS
RBC # BLD AUTO: 4.11 M/UL (ref 3.8–5.2)
SAO2 % BLDV: 61 % (ref 65–88)
SERVICE CMNT-IMP: ABNORMAL
SODIUM SERPL-SCNC: 142 MMOL/L (ref 136–145)
SODIUM SERPL-SCNC: 142 MMOL/L (ref 136–145)
SPECIMEN TYPE: ABNORMAL
THERAPEUTIC RANGE,PTTT: NORMAL SECS (ref 58–77)
TOTAL RESP. RATE, ITRR: 21
VENTRICULAR RATE, ECG03: 57 BPM
WBC # BLD AUTO: 10.4 K/UL (ref 3.6–11)

## 2018-04-12 PROCEDURE — 85730 THROMBOPLASTIN TIME PARTIAL: CPT | Performed by: THORACIC SURGERY (CARDIOTHORACIC VASCULAR SURGERY)

## 2018-04-12 PROCEDURE — C1769 GUIDE WIRE: HCPCS | Performed by: THORACIC SURGERY (CARDIOTHORACIC VASCULAR SURGERY)

## 2018-04-12 PROCEDURE — 82803 BLOOD GASES ANY COMBINATION: CPT

## 2018-04-12 PROCEDURE — C1892 INTRO/SHEATH,FIXED,PEEL-AWAY: HCPCS | Performed by: THORACIC SURGERY (CARDIOTHORACIC VASCULAR SURGERY)

## 2018-04-12 PROCEDURE — 74011250636 HC RX REV CODE- 250/636: Performed by: THORACIC SURGERY (CARDIOTHORACIC VASCULAR SURGERY)

## 2018-04-12 PROCEDURE — 65620000000 HC RM CCU GENERAL

## 2018-04-12 PROCEDURE — C1751 CATH, INF, PER/CENT/MIDLINE: HCPCS

## 2018-04-12 PROCEDURE — 77030035444 HC LD BPLR TRANSVEN TEMP MEDT -D: Performed by: THORACIC SURGERY (CARDIOTHORACIC VASCULAR SURGERY)

## 2018-04-12 PROCEDURE — B246ZZ4 ULTRASONOGRAPHY OF RIGHT AND LEFT HEART, TRANSESOPHAGEAL: ICD-10-PCS | Performed by: THORACIC SURGERY (CARDIOTHORACIC VASCULAR SURGERY)

## 2018-04-12 PROCEDURE — 77030013744

## 2018-04-12 PROCEDURE — 77030004533 HC CATH ANGI DX IMP BSC -B: Performed by: THORACIC SURGERY (CARDIOTHORACIC VASCULAR SURGERY)

## 2018-04-12 PROCEDURE — 93005 ELECTROCARDIOGRAM TRACING: CPT

## 2018-04-12 PROCEDURE — 77030013797 HC KT TRNSDUC PRSSR EDWD -A: Performed by: THORACIC SURGERY (CARDIOTHORACIC VASCULAR SURGERY)

## 2018-04-12 PROCEDURE — 36415 COLL VENOUS BLD VENIPUNCTURE: CPT | Performed by: NURSE PRACTITIONER

## 2018-04-12 PROCEDURE — 74011250637 HC RX REV CODE- 250/637: Performed by: NURSE PRACTITIONER

## 2018-04-12 PROCEDURE — 77030005320 HC CATH PACE TEMP STJU -B

## 2018-04-12 PROCEDURE — 77030013797 HC KT TRNSDUC PRSSR EDWD -A

## 2018-04-12 PROCEDURE — C1760 CLOSURE DEV, VASC: HCPCS | Performed by: THORACIC SURGERY (CARDIOTHORACIC VASCULAR SURGERY)

## 2018-04-12 PROCEDURE — 77030013798 HC KT TRNSDUC PRSSR EDWD -B: Performed by: THORACIC SURGERY (CARDIOTHORACIC VASCULAR SURGERY)

## 2018-04-12 PROCEDURE — 77030018729 HC ELECTRD DEFIB PAD CARD -B

## 2018-04-12 PROCEDURE — 74011000250 HC RX REV CODE- 250

## 2018-04-12 PROCEDURE — 80053 COMPREHEN METABOLIC PANEL: CPT | Performed by: NURSE PRACTITIONER

## 2018-04-12 PROCEDURE — 74011250636 HC RX REV CODE- 250/636: Performed by: NURSE PRACTITIONER

## 2018-04-12 PROCEDURE — 82962 GLUCOSE BLOOD TEST: CPT

## 2018-04-12 PROCEDURE — 77030037468 HC VLV AORT EDWRD -L: Performed by: THORACIC SURGERY (CARDIOTHORACIC VASCULAR SURGERY)

## 2018-04-12 PROCEDURE — 76001 XR FLUOROSCOPY OVER 60 MINUTES: CPT

## 2018-04-12 PROCEDURE — 77030019702 HC WRP THER MENM -C: Performed by: THORACIC SURGERY (CARDIOTHORACIC VASCULAR SURGERY)

## 2018-04-12 PROCEDURE — 77030008771 HC TU NG SALEM SUMP -A: Performed by: ANESTHESIOLOGY

## 2018-04-12 PROCEDURE — 77030018729 HC ELECTRD DEFIB PAD CARD -B: Performed by: THORACIC SURGERY (CARDIOTHORACIC VASCULAR SURGERY)

## 2018-04-12 PROCEDURE — 77030008684 HC TU ET CUF COVD -B: Performed by: ANESTHESIOLOGY

## 2018-04-12 PROCEDURE — 76010000109 HC CV SURG 2.5 TO 3 HR: Performed by: THORACIC SURGERY (CARDIOTHORACIC VASCULAR SURGERY)

## 2018-04-12 PROCEDURE — 77030020177 HC ELECTRD DEFIB PD PHIL -B

## 2018-04-12 PROCEDURE — 76060000036 HC ANESTHESIA 2.5 TO 3 HR: Performed by: THORACIC SURGERY (CARDIOTHORACIC VASCULAR SURGERY)

## 2018-04-12 PROCEDURE — 74011250636 HC RX REV CODE- 250/636

## 2018-04-12 PROCEDURE — 74011636320 HC RX REV CODE- 636/320: Performed by: THORACIC SURGERY (CARDIOTHORACIC VASCULAR SURGERY)

## 2018-04-12 PROCEDURE — 85025 COMPLETE CBC W/AUTO DIFF WBC: CPT | Performed by: NURSE PRACTITIONER

## 2018-04-12 PROCEDURE — 71045 X-RAY EXAM CHEST 1 VIEW: CPT

## 2018-04-12 PROCEDURE — 74011000250 HC RX REV CODE- 250: Performed by: NURSE PRACTITIONER

## 2018-04-12 PROCEDURE — 77030011640 HC PAD GRND REM COVD -A: Performed by: THORACIC SURGERY (CARDIOTHORACIC VASCULAR SURGERY)

## 2018-04-12 PROCEDURE — C1894 INTRO/SHEATH, NON-LASER: HCPCS | Performed by: THORACIC SURGERY (CARDIOTHORACIC VASCULAR SURGERY)

## 2018-04-12 PROCEDURE — 77030013798 HC KT TRNSDUC PRSSR EDWD -B: Performed by: ANESTHESIOLOGY

## 2018-04-12 PROCEDURE — 77030005402 HC CATH RAD ART LN KT TELE -B

## 2018-04-12 PROCEDURE — 83735 ASSAY OF MAGNESIUM: CPT | Performed by: NURSE PRACTITIONER

## 2018-04-12 PROCEDURE — 85610 PROTHROMBIN TIME: CPT | Performed by: THORACIC SURGERY (CARDIOTHORACIC VASCULAR SURGERY)

## 2018-04-12 PROCEDURE — 77030034850: Performed by: THORACIC SURGERY (CARDIOTHORACIC VASCULAR SURGERY)

## 2018-04-12 PROCEDURE — 02RF3JZ REPLACEMENT OF AORTIC VALVE WITH SYNTHETIC SUBSTITUTE, PERCUTANEOUS APPROACH: ICD-10-PCS | Performed by: THORACIC SURGERY (CARDIOTHORACIC VASCULAR SURGERY)

## 2018-04-12 PROCEDURE — 77030026438 HC STYL ET INTUB CARD -A: Performed by: ANESTHESIOLOGY

## 2018-04-12 DEVICE — VALVE AORTIC SAPEIN 3 26MM -- COMMANDER SYS 9600TFX: Type: IMPLANTABLE DEVICE | Site: AORTIC VALVE | Status: FUNCTIONAL

## 2018-04-12 RX ORDER — SODIUM CHLORIDE 0.9 % (FLUSH) 0.9 %
5-10 SYRINGE (ML) INJECTION AS NEEDED
Status: DISCONTINUED | OUTPATIENT
Start: 2018-04-12 | End: 2018-04-14

## 2018-04-12 RX ORDER — SODIUM CHLORIDE 0.9 % (FLUSH) 0.9 %
10 SYRINGE (ML) INJECTION EVERY 24 HOURS
Status: DISCONTINUED | OUTPATIENT
Start: 2018-04-12 | End: 2018-04-14

## 2018-04-12 RX ORDER — SODIUM CHLORIDE 0.9 % (FLUSH) 0.9 %
5-10 SYRINGE (ML) INJECTION AS NEEDED
Status: DISCONTINUED | OUTPATIENT
Start: 2018-04-12 | End: 2018-04-12 | Stop reason: HOSPADM

## 2018-04-12 RX ORDER — ESMOLOL HYDROCHLORIDE 10 MG/ML
INJECTION INTRAVENOUS AS NEEDED
Status: DISCONTINUED | OUTPATIENT
Start: 2018-04-12 | End: 2018-04-12 | Stop reason: HOSPADM

## 2018-04-12 RX ORDER — HEPARIN SODIUM 1000 [USP'U]/ML
INJECTION, SOLUTION INTRAVENOUS; SUBCUTANEOUS AS NEEDED
Status: DISCONTINUED | OUTPATIENT
Start: 2018-04-12 | End: 2018-04-12 | Stop reason: HOSPADM

## 2018-04-12 RX ORDER — SODIUM CHLORIDE, SODIUM LACTATE, POTASSIUM CHLORIDE, CALCIUM CHLORIDE 600; 310; 30; 20 MG/100ML; MG/100ML; MG/100ML; MG/100ML
INJECTION, SOLUTION INTRAVENOUS
Status: DISCONTINUED | OUTPATIENT
Start: 2018-04-12 | End: 2018-04-12 | Stop reason: HOSPADM

## 2018-04-12 RX ORDER — PROTAMINE SULFATE 10 MG/ML
INJECTION, SOLUTION INTRAVENOUS AS NEEDED
Status: DISCONTINUED | OUTPATIENT
Start: 2018-04-12 | End: 2018-04-12 | Stop reason: HOSPADM

## 2018-04-12 RX ORDER — SODIUM CHLORIDE 0.9 % (FLUSH) 0.9 %
10 SYRINGE (ML) INJECTION AS NEEDED
Status: DISCONTINUED | OUTPATIENT
Start: 2018-04-12 | End: 2018-04-14

## 2018-04-12 RX ORDER — SUCCINYLCHOLINE CHLORIDE 20 MG/ML
INJECTION INTRAMUSCULAR; INTRAVENOUS AS NEEDED
Status: DISCONTINUED | OUTPATIENT
Start: 2018-04-12 | End: 2018-04-12 | Stop reason: HOSPADM

## 2018-04-12 RX ORDER — MIDAZOLAM HYDROCHLORIDE 1 MG/ML
1 INJECTION, SOLUTION INTRAMUSCULAR; INTRAVENOUS AS NEEDED
Status: DISCONTINUED | OUTPATIENT
Start: 2018-04-12 | End: 2018-04-12 | Stop reason: HOSPADM

## 2018-04-12 RX ORDER — EPHEDRINE SULFATE 50 MG/ML
INJECTION, SOLUTION INTRAVENOUS AS NEEDED
Status: DISCONTINUED | OUTPATIENT
Start: 2018-04-12 | End: 2018-04-12 | Stop reason: HOSPADM

## 2018-04-12 RX ORDER — MAGNESIUM SULFATE 100 %
4 CRYSTALS MISCELLANEOUS AS NEEDED
Status: DISCONTINUED | OUTPATIENT
Start: 2018-04-12 | End: 2018-04-15 | Stop reason: HOSPADM

## 2018-04-12 RX ORDER — ACETAMINOPHEN 325 MG/1
650 TABLET ORAL
Status: DISCONTINUED | OUTPATIENT
Start: 2018-04-12 | End: 2018-04-15 | Stop reason: HOSPADM

## 2018-04-12 RX ORDER — NALOXONE HYDROCHLORIDE 0.4 MG/ML
0.4 INJECTION, SOLUTION INTRAMUSCULAR; INTRAVENOUS; SUBCUTANEOUS AS NEEDED
Status: DISCONTINUED | OUTPATIENT
Start: 2018-04-12 | End: 2018-04-14

## 2018-04-12 RX ORDER — LIDOCAINE HYDROCHLORIDE 10 MG/ML
0.1 INJECTION, SOLUTION EPIDURAL; INFILTRATION; INTRACAUDAL; PERINEURAL AS NEEDED
Status: DISCONTINUED | OUTPATIENT
Start: 2018-04-12 | End: 2018-04-12 | Stop reason: HOSPADM

## 2018-04-12 RX ORDER — FACIAL-BODY WIPES
10 EACH TOPICAL DAILY PRN
Status: DISCONTINUED | OUTPATIENT
Start: 2018-04-12 | End: 2018-04-15 | Stop reason: HOSPADM

## 2018-04-12 RX ORDER — FENTANYL CITRATE 50 UG/ML
INJECTION, SOLUTION INTRAMUSCULAR; INTRAVENOUS AS NEEDED
Status: DISCONTINUED | OUTPATIENT
Start: 2018-04-12 | End: 2018-04-12 | Stop reason: HOSPADM

## 2018-04-12 RX ORDER — BACITRACIN 500 UNIT/G
1 PACKET (EA) TOPICAL AS NEEDED
Status: DISCONTINUED | OUTPATIENT
Start: 2018-04-12 | End: 2018-04-14

## 2018-04-12 RX ORDER — MAGNESIUM SULFATE HEPTAHYDRATE 40 MG/ML
2 INJECTION, SOLUTION INTRAVENOUS ONCE
Status: COMPLETED | OUTPATIENT
Start: 2018-04-12 | End: 2018-04-13

## 2018-04-12 RX ORDER — SODIUM CHLORIDE 0.9 % (FLUSH) 0.9 %
5-10 SYRINGE (ML) INJECTION EVERY 8 HOURS
Status: DISCONTINUED | OUTPATIENT
Start: 2018-04-12 | End: 2018-04-12 | Stop reason: HOSPADM

## 2018-04-12 RX ORDER — GUAIFENESIN 100 MG/5ML
81 LIQUID (ML) ORAL DAILY
Status: DISCONTINUED | OUTPATIENT
Start: 2018-04-13 | End: 2018-04-15 | Stop reason: HOSPADM

## 2018-04-12 RX ORDER — ROPIVACAINE HYDROCHLORIDE 5 MG/ML
30 INJECTION, SOLUTION EPIDURAL; INFILTRATION; PERINEURAL AS NEEDED
Status: DISCONTINUED | OUTPATIENT
Start: 2018-04-12 | End: 2018-04-12 | Stop reason: HOSPADM

## 2018-04-12 RX ORDER — FAMOTIDINE 20 MG/1
20 TABLET, FILM COATED ORAL EVERY 12 HOURS
Status: DISCONTINUED | OUTPATIENT
Start: 2018-04-13 | End: 2018-04-15 | Stop reason: HOSPADM

## 2018-04-12 RX ORDER — SODIUM CHLORIDE 0.9 % (FLUSH) 0.9 %
5-10 SYRINGE (ML) INJECTION EVERY 8 HOURS
Status: DISCONTINUED | OUTPATIENT
Start: 2018-04-12 | End: 2018-04-14

## 2018-04-12 RX ORDER — SODIUM CHLORIDE 9 MG/ML
25 INJECTION, SOLUTION INTRAVENOUS CONTINUOUS
Status: DISCONTINUED | OUTPATIENT
Start: 2018-04-12 | End: 2018-04-12 | Stop reason: HOSPADM

## 2018-04-12 RX ORDER — ALBUTEROL SULFATE 0.83 MG/ML
2.5 SOLUTION RESPIRATORY (INHALATION)
Status: DISCONTINUED | OUTPATIENT
Start: 2018-04-12 | End: 2018-04-14

## 2018-04-12 RX ORDER — ONDANSETRON 2 MG/ML
4 INJECTION INTRAMUSCULAR; INTRAVENOUS
Status: DISCONTINUED | OUTPATIENT
Start: 2018-04-12 | End: 2018-04-15 | Stop reason: HOSPADM

## 2018-04-12 RX ORDER — CEFAZOLIN SODIUM/WATER 2 G/20 ML
2 SYRINGE (ML) INTRAVENOUS ONCE
Status: COMPLETED | OUTPATIENT
Start: 2018-04-12 | End: 2018-04-12

## 2018-04-12 RX ORDER — SODIUM CHLORIDE 9 MG/ML
INJECTION, SOLUTION INTRAVENOUS
Status: DISCONTINUED | OUTPATIENT
Start: 2018-04-12 | End: 2018-04-12 | Stop reason: HOSPADM

## 2018-04-12 RX ORDER — PROPOFOL 10 MG/ML
INJECTION, EMULSION INTRAVENOUS AS NEEDED
Status: DISCONTINUED | OUTPATIENT
Start: 2018-04-12 | End: 2018-04-12 | Stop reason: HOSPADM

## 2018-04-12 RX ORDER — DEXTROSE 50 % IN WATER (D50W) INTRAVENOUS SYRINGE
12.5-25 AS NEEDED
Status: DISCONTINUED | OUTPATIENT
Start: 2018-04-12 | End: 2018-04-15 | Stop reason: HOSPADM

## 2018-04-12 RX ORDER — SODIUM CHLORIDE 9 MG/ML
1.5 INJECTION, SOLUTION INTRAVENOUS CONTINUOUS
Status: DISPENSED | OUTPATIENT
Start: 2018-04-12 | End: 2018-04-12

## 2018-04-12 RX ORDER — SODIUM CHLORIDE, SODIUM LACTATE, POTASSIUM CHLORIDE, CALCIUM CHLORIDE 600; 310; 30; 20 MG/100ML; MG/100ML; MG/100ML; MG/100ML
100 INJECTION, SOLUTION INTRAVENOUS CONTINUOUS
Status: DISCONTINUED | OUTPATIENT
Start: 2018-04-12 | End: 2018-04-12 | Stop reason: HOSPADM

## 2018-04-12 RX ORDER — SODIUM CHLORIDE 9 MG/ML
9 INJECTION, SOLUTION INTRAVENOUS CONTINUOUS
Status: DISCONTINUED | OUTPATIENT
Start: 2018-04-12 | End: 2018-04-14

## 2018-04-12 RX ORDER — POTASSIUM CHLORIDE 29.8 MG/ML
20 INJECTION INTRAVENOUS
Status: DISPENSED | OUTPATIENT
Start: 2018-04-12 | End: 2018-04-13

## 2018-04-12 RX ORDER — INSULIN LISPRO 100 [IU]/ML
INJECTION, SOLUTION INTRAVENOUS; SUBCUTANEOUS
Status: DISCONTINUED | OUTPATIENT
Start: 2018-04-12 | End: 2018-04-15 | Stop reason: HOSPADM

## 2018-04-12 RX ORDER — LIDOCAINE HYDROCHLORIDE 20 MG/ML
INJECTION, SOLUTION EPIDURAL; INFILTRATION; INTRACAUDAL; PERINEURAL AS NEEDED
Status: DISCONTINUED | OUTPATIENT
Start: 2018-04-12 | End: 2018-04-12 | Stop reason: HOSPADM

## 2018-04-12 RX ORDER — FENTANYL CITRATE 50 UG/ML
50 INJECTION, SOLUTION INTRAMUSCULAR; INTRAVENOUS AS NEEDED
Status: DISCONTINUED | OUTPATIENT
Start: 2018-04-12 | End: 2018-04-12 | Stop reason: HOSPADM

## 2018-04-12 RX ORDER — SODIUM CHLORIDE 0.9 % (FLUSH) 0.9 %
10 SYRINGE (ML) INJECTION EVERY 8 HOURS
Status: DISCONTINUED | OUTPATIENT
Start: 2018-04-12 | End: 2018-04-14

## 2018-04-12 RX ORDER — SODIUM CHLORIDE 450 MG/100ML
10 INJECTION, SOLUTION INTRAVENOUS CONTINUOUS
Status: DISCONTINUED | OUTPATIENT
Start: 2018-04-12 | End: 2018-04-14

## 2018-04-12 RX ORDER — ROCURONIUM BROMIDE 10 MG/ML
INJECTION, SOLUTION INTRAVENOUS AS NEEDED
Status: DISCONTINUED | OUTPATIENT
Start: 2018-04-12 | End: 2018-04-12 | Stop reason: HOSPADM

## 2018-04-12 RX ORDER — CEFAZOLIN SODIUM/WATER 2 G/20 ML
2 SYRINGE (ML) INTRAVENOUS EVERY 8 HOURS
Status: COMPLETED | OUTPATIENT
Start: 2018-04-12 | End: 2018-04-13

## 2018-04-12 RX ORDER — OXYCODONE AND ACETAMINOPHEN 5; 325 MG/1; MG/1
1-2 TABLET ORAL
Status: DISCONTINUED | OUTPATIENT
Start: 2018-04-12 | End: 2018-04-15 | Stop reason: HOSPADM

## 2018-04-12 RX ORDER — POTASSIUM CHLORIDE 29.8 MG/ML
20 INJECTION INTRAVENOUS
Status: COMPLETED | OUTPATIENT
Start: 2018-04-12 | End: 2018-04-13

## 2018-04-12 RX ORDER — POTASSIUM CHLORIDE 29.8 MG/ML
20 INJECTION INTRAVENOUS
Status: ACTIVE | OUTPATIENT
Start: 2018-04-12 | End: 2018-04-13

## 2018-04-12 RX ORDER — TRAMADOL HYDROCHLORIDE 50 MG/1
50-100 TABLET ORAL
Status: DISCONTINUED | OUTPATIENT
Start: 2018-04-12 | End: 2018-04-15 | Stop reason: HOSPADM

## 2018-04-12 RX ORDER — LANOLIN ALCOHOL/MO/W.PET/CERES
400 CREAM (GRAM) TOPICAL 2 TIMES DAILY
Status: DISCONTINUED | OUTPATIENT
Start: 2018-04-13 | End: 2018-04-15 | Stop reason: HOSPADM

## 2018-04-12 RX ORDER — AMOXICILLIN 250 MG
1 CAPSULE ORAL 2 TIMES DAILY
Status: DISCONTINUED | OUTPATIENT
Start: 2018-04-13 | End: 2018-04-15 | Stop reason: HOSPADM

## 2018-04-12 RX ORDER — SODIUM CHLORIDE 0.9 % (FLUSH) 0.9 %
20 SYRINGE (ML) INJECTION AS NEEDED
Status: DISCONTINUED | OUTPATIENT
Start: 2018-04-12 | End: 2018-04-14

## 2018-04-12 RX ADMIN — Medication 2 G: at 22:29

## 2018-04-12 RX ADMIN — TRAMADOL HYDROCHLORIDE 50 MG: 50 TABLET, FILM COATED ORAL at 22:37

## 2018-04-12 RX ADMIN — PHENYLEPHRINE HYDROCHLORIDE 40 MCG/MIN: 10 INJECTION INTRAVENOUS at 11:46

## 2018-04-12 RX ADMIN — Medication 10 ML: at 14:00

## 2018-04-12 RX ADMIN — SODIUM CHLORIDE 20 MG: 9 INJECTION INTRAMUSCULAR; INTRAVENOUS; SUBCUTANEOUS at 12:52

## 2018-04-12 RX ADMIN — FENTANYL CITRATE 50 MCG: 50 INJECTION, SOLUTION INTRAMUSCULAR; INTRAVENOUS at 08:55

## 2018-04-12 RX ADMIN — TRAMADOL HYDROCHLORIDE 50 MG: 50 TABLET, FILM COATED ORAL at 16:48

## 2018-04-12 RX ADMIN — POTASSIUM CHLORIDE 20 MEQ: 400 INJECTION, SOLUTION INTRAVENOUS at 16:07

## 2018-04-12 RX ADMIN — ROCURONIUM BROMIDE 45 MG: 10 INJECTION, SOLUTION INTRAVENOUS at 09:08

## 2018-04-12 RX ADMIN — Medication 10 ML: at 22:29

## 2018-04-12 RX ADMIN — IOPAMIDOL 50 ML: 755 INJECTION, SOLUTION INTRAVENOUS at 11:00

## 2018-04-12 RX ADMIN — PHENYLEPHRINE HYDROCHLORIDE 45 MCG/MIN: 10 INJECTION INTRAVENOUS at 14:08

## 2018-04-12 RX ADMIN — FENTANYL CITRATE 50 MCG: 50 INJECTION, SOLUTION INTRAMUSCULAR; INTRAVENOUS at 09:04

## 2018-04-12 RX ADMIN — Medication 2 G: at 14:04

## 2018-04-12 RX ADMIN — PROTAMINE SULFATE 150 MG: 10 INJECTION, SOLUTION INTRAVENOUS at 10:24

## 2018-04-12 RX ADMIN — FENTANYL CITRATE 50 MCG: 50 INJECTION, SOLUTION INTRAMUSCULAR; INTRAVENOUS at 09:01

## 2018-04-12 RX ADMIN — PROPOFOL 150 MG: 10 INJECTION, EMULSION INTRAVENOUS at 09:04

## 2018-04-12 RX ADMIN — SODIUM CHLORIDE: 9 INJECTION, SOLUTION INTRAVENOUS at 08:53

## 2018-04-12 RX ADMIN — EPHEDRINE SULFATE 5 MG: 50 INJECTION, SOLUTION INTRAVENOUS at 11:07

## 2018-04-12 RX ADMIN — POTASSIUM CHLORIDE 20 MEQ: 400 INJECTION, SOLUTION INTRAVENOUS at 14:06

## 2018-04-12 RX ADMIN — HEPARIN SODIUM 2000 UNITS: 1000 INJECTION, SOLUTION INTRAVENOUS; SUBCUTANEOUS at 09:53

## 2018-04-12 RX ADMIN — ESMOLOL HYDROCHLORIDE 10 MG: 10 INJECTION INTRAVENOUS at 09:02

## 2018-04-12 RX ADMIN — SODIUM CHLORIDE 1.5 ML/KG/HR: 900 INJECTION, SOLUTION INTRAVENOUS at 12:00

## 2018-04-12 RX ADMIN — LIDOCAINE HYDROCHLORIDE 80 MG: 20 INJECTION, SOLUTION EPIDURAL; INFILTRATION; INTRACAUDAL; PERINEURAL at 09:04

## 2018-04-12 RX ADMIN — SODIUM CHLORIDE, SODIUM LACTATE, POTASSIUM CHLORIDE, CALCIUM CHLORIDE: 600; 310; 30; 20 INJECTION, SOLUTION INTRAVENOUS at 08:30

## 2018-04-12 RX ADMIN — HEPARIN SODIUM 9000 UNITS: 1000 INJECTION, SOLUTION INTRAVENOUS; SUBCUTANEOUS at 09:43

## 2018-04-12 RX ADMIN — HEPARIN SODIUM 3000 UNITS: 1000 INJECTION, SOLUTION INTRAVENOUS; SUBCUTANEOUS at 10:03

## 2018-04-12 RX ADMIN — SODIUM CHLORIDE 9 ML/HR: 900 INJECTION, SOLUTION INTRAVENOUS at 12:00

## 2018-04-12 RX ADMIN — POTASSIUM CHLORIDE 20 MEQ: 400 INJECTION, SOLUTION INTRAVENOUS at 15:08

## 2018-04-12 RX ADMIN — MIDAZOLAM HYDROCHLORIDE 3 MG: 1 INJECTION, SOLUTION INTRAMUSCULAR; INTRAVENOUS at 08:00

## 2018-04-12 RX ADMIN — SUCCINYLCHOLINE CHLORIDE 160 MG: 20 INJECTION INTRAMUSCULAR; INTRAVENOUS at 09:04

## 2018-04-12 RX ADMIN — ROCURONIUM BROMIDE 5 MG: 10 INJECTION, SOLUTION INTRAVENOUS at 09:04

## 2018-04-12 RX ADMIN — Medication 2 G: at 09:25

## 2018-04-12 RX ADMIN — MAGNESIUM SULFATE HEPTAHYDRATE 2 G: 40 INJECTION, SOLUTION INTRAVENOUS at 12:52

## 2018-04-12 NOTE — H&P
Date of Surgery Update: Daniella Guillory was seen and examined. History and physical has been reviewed. The patient has been examined.  There have been no significant clinical changes since the completion of the originally dated History and Physical.    Signed By: Marcela Cuellar PA-C     April 12, 2018 7:44 AM

## 2018-04-12 NOTE — PROGRESS NOTES
1145- Bedside report received from CCRN, Cath Lab RN, and .  JENNIFER. Nino @ 30.   1200- Primary Nurse Claudia Seo, KWAME and [facility], RN performed a dual skin assessment on this patient No impairment noted except for bilateral cath sites  Vaery score is 20  Patient resting on Total Care bed.  1300- Electrolytes replaced per protocol  1700- Bedside swallow study performed; no difficulty noted by RN or patient. Ultram administered to help with chronic back pain. 1930- Bedside shift change report given to Trinity Arias RN (oncoming nurse) by KWAME Kim (offgoing nurse). Report included the following information SBAR, OR Summary, Intake/Output and Recent Results.

## 2018-04-12 NOTE — IP AVS SNAPSHOT
[address] [facility] [address] [place] [address] 
[phone] Patient: Jose Shearer MRN: [id number] AAI:[birth year] A check latanya indicates which time of day the medication should be taken. My Medications CHANGE how you take these medications Instructions Each Dose to Equal  
 Morning Noon Evening Bedtime  
 azelastine 137 mcg (0.1 %) nasal spray Commonly known as:  ASTELIN What changed:   
- when to take this 
- reasons to take this 
- additional instructions Your last dose was: Your next dose is: 2 Sprays by Both Nostrils route two (2) times a day. Use in each nostril as directed 2 Spray  
    
   
   
   
  
 losartan 50 mg tablet Commonly known as:  COZAAR What changed:   
- medication strength 
- how much to take Your last dose was: Your next dose is: Take 1 Tab by mouth daily. Indications: hypertension 50 mg CONTINUE taking these medications Instructions Each Dose to Equal  
 Morning Noon Evening Bedtime AMARYL 2 mg tablet Generic drug:  glimepiride Your last dose was: Your next dose is: Take 1 mg by mouth as needed. 1 mg  
    
   
   
   
  
 aspirin 81 mg tablet Your last dose was: Your next dose is: Take 162 mg by mouth. 2 tabs prior to niacin 162 mg COD LIVER OIL PO Your last dose was: Your next dose is: Take 1,000 mg by mouth daily. 1000 mg  
    
   
   
   
  
 ezetimibe 10 mg tablet Commonly known as:  Marcos Clinton Your last dose was: Your next dose is: Take 1 Tab by mouth daily. 10 mg  
    
   
   
   
  
 FIBER PO Your last dose was: Your next dose is: Take  by mouth. 5 tabs daily  
     
   
   
   
  
 glucose blood VI test strips strip Commonly known as:  ACCU-CHEK ELBERT PLUS TEST STRP Your last dose was: Your next dose is:    
   
   
 Check bs daily. Dx 250.00 LASIX 40 mg tablet Generic drug:  furosemide Your last dose was: Your next dose is: Take 60 mg by mouth daily. 60 mg  
    
   
   
   
  
 metFORMIN 1,000 mg tablet Commonly known as:  GLUCOPHAGE Your last dose was: Your next dose is: Take 1 Tab by mouth two (2) times a day. 1000 mg  
    
   
   
   
  
 niacin 1,000 mg Tb24 tab Commonly known as:  Maria Dolores Nelson Your last dose was: Your next dose is:    
   
   
 2,000 mg nightly. 2000 mg  
    
   
   
   
  
 potassium chloride SA 10 mEq capsule Commonly known as:  Vladimir Garrison Your last dose was: Your next dose is: Take 1 Cap by mouth two (2) times a day. 10 mEq  
    
   
   
   
  
 rosuvastatin 5 mg tablet Commonly known as:  CRESTOR Your last dose was: Your next dose is: Take 1 Tab by mouth daily. 5 mg VITAMIN B-12 PO Your last dose was: Your next dose is: Take 1,500 mg by mouth daily. 1500 mg  
    
   
   
   
  
 VITAMIN D3 2,000 unit Tab Generic drug:  cholecalciferol (vitamin D3) Your last dose was: Your next dose is: Take 1 Tab by mouth daily (after lunch). 1 Tab STOP taking these medications   
 cephALEXin 250 mg capsule Commonly known as:  KEFLEX  
   
  
 dilTIAZem  mg ER capsule Commonly known as:  CARDIZEM CD Where to Get Your Medications Information on where to get these meds will be given to you by the nurse or doctor. ! Ask your nurse or doctor about these medications  
  losartan 50 mg tablet

## 2018-04-12 NOTE — PROGRESS NOTES
Problem: Falls - Risk of  Goal: *Absence of Falls  Document Ori Fall Risk and appropriate interventions in the flowsheet.    Outcome: Progressing Towards Goal  Fall Risk Interventions:  Mobility Interventions: Communicate number of staff needed for ambulation/transfer, Mechanical lift         Medication Interventions: Evaluate medications/consider consulting pharmacy    Elimination Interventions: Call light in reach, Elevated toilet seat

## 2018-04-12 NOTE — ANESTHESIA PROCEDURE NOTES
Central Line Placement    Start time: 4/12/2018 8:16 AM  End time: 4/12/2018 8:29 AM  Performed by: Wilder Parikh  Authorized by: Wilder Parikh     Indications: vascular access, central pressure monitoring and need for vasopressors  Preanesthetic Checklist: patient identified, risks and benefits discussed, anesthesia consent, site marked, patient being monitored and timeout performed      Pre-procedure: All elements of maximal sterile barrier technique followed? Yes    2% Chlorhexidine for cutaneous antisepsis, Hand hygiene performed prior to catheter insertion and Ultrasound guidance              Procedure:   Prep:  Chlorhexidine  Location:  Internal jugular  Orientation:  Right    Catheter type:  Triple lumen  Catheter size:  9 Fr  Catheter length:  16 cm  Number of attempts:  1  Successful placement: Yes      Assessment:   Post-procedure:  Catheter secured, sterile dressing applied and sterile dressing with CHG applied  Assessment:  Blood return through all ports and free fluid flow  Insertion:  Uncomplicated  Patient tolerance:  Patient tolerated the procedure well with no immediate complications  PA catheter inserted .

## 2018-04-12 NOTE — DIABETES MGMT
DTC TAVR Progress Note     Recommendations/ Comments:  mg/dL and 153 mg/dL today    If BG's rise > 140 mg/dL persistently, add Amaryl 1 mg daily (this is half of her home dose)  Resume Metformin on discharge    Current hospital medication: lispro correction scale, normal sensitivity    Chart reviewed on [address]. Patient is 80 y.o. female s/p TAVR  POD 0. Hx known DM on Amaryl 2 mg daily and Metformin 1000 mg BID at home. A1c:   Lab Results   Component Value Date/Time    Hemoglobin A1c 7.6 (H) 04/03/2018 01:08 PM         Recent Glucose Results:   Lab Results   Component Value Date/Time     (H) 04/12/2018 11:54 AM    GLUCPOC 153 (H) 04/12/2018 06:30 AM        Lab Results   Component Value Date/Time    Creatinine 0.70 04/12/2018 11:54 AM     Estimated Creatinine Clearance: 63.6 mL/min (based on Cr of 0.7). Active Orders   Diet    DIET NPO        PO intake: No data found. Will continue to follow as needed. Thank you.   Jane Jimenez RN, CDE

## 2018-04-12 NOTE — CARDIO/PULMONARY
Cardiac Rehab: Trans-catheter education folder to the bedside of Jose Marvin. Family member (son) at bedside. Introduced self and role of cardiac rehab. Discussed education folder and that cardiac rehab will follow-up with her tomorrow for further education and to discuss cardiac rehab. Jose Marvin and family verbalized understanding. Will continue to follow.  Weston Madrigal RN

## 2018-04-12 NOTE — IP AVS SNAPSHOT
2700 [facility] [address] [place] [address] 
[phone] Patient: Nurys Sender MRN: [id number] OJW:[id number] About your hospitalization You were admitted on:  April 12, 2018 You last received care in the:  [facility] 4 CV SERVICES UNIT You were discharged on:  April 15, 2018 Why you were hospitalized Your primary diagnosis was:  Not on File Your diagnoses also included: Aortic Stenosis, S/P Tavr (Transcatheter Aortic Valve Replacement), Obesity (Bmi 30.0-34. 9) Follow-up Information Follow up With Details Comments Contact Info Additional Information [facility] CARDIAC WELLNESS On 5/16/2018 Cardiac Rehab appointment is scheduled for 5/16/18 at 9:30 am. [address] [facility] 100 [address] 330 [facility], [address]. Please arrive 15 minutes prior to your appointment time and you will register in the [place] [address], [address], on the first floor of the [address] [place] [address] Road. Telephone: [phone] Fax: [phone] Driving directions To [facility] and V[facility] [place]. Building: Driving WEST on [address], take exit [address] to [place]. Turn left onto [facility], then turn right into [employer] Parking lot Driving EAST on [address], take exit 120 [facility]. Turn right at the end of the exit ramp. Turn left onto [facility], then turn right into [employer] lot. Magi Falcon MD Call call to make 3-5 day follow up appointment  200 [facility] G5 [facility] [address] [place] [address] 
[phone] [address] [facility] [address] [facility] [address] 
[phone] Your Scheduled Appointments Wednesday May 16, 2018  9:30 AM EDT  
CR INTAKE with Steven Orourke RN  
[address] ([address]) [address] [place] [address]  
[phone] [address], [address]. Please arrive 15 minutes prior to your appointment time and you will register in the [place] [address], [address], on the first floor of the [address] Telephone: [phone] Fax: [phone] Driving directions To [facility] and Vascular [place]. Building: Driving WEST on [address], take exit [address] to [place]. Turn left onto [facility], then turn right into [email or web address] lot Driving EAST on [address], take exit 120 [facility]. Turn right at the end of the exit ramp. Turn left onto [facility], then turn right into [employer] lot. Discharge Orders None A check latanya indicates which time of day the medication should be taken. My Medications CHANGE how you take these medications Instructions Each Dose to Equal  
 Morning Noon Evening Bedtime  
 azelastine 137 mcg (0.1 %) nasal spray Commonly known as:  ASTELIN What changed:   
- when to take this 
- reasons to take this 
- additional instructions Your last dose was: Your next dose is: 2 Sprays by Both Nostrils route two (2) times a day. Use in each nostril as directed 2 Spray  
    
   
   
   
  
 losartan 50 mg tablet Commonly known as:  COZAAR What changed:   
- medication strength 
- how much to take Your last dose was: Your next dose is: Take 1 Tab by mouth daily. Indications: hypertension 50 mg CONTINUE taking these medications Instructions Each Dose to Equal  
 Morning Noon Evening Bedtime AMARYL 2 mg tablet Generic drug:  glimepiride Your last dose was: Your next dose is: Take 1 mg by mouth as needed. 1 mg  
    
   
   
   
  
 aspirin 81 mg tablet Your last dose was: Your next dose is: Take 162 mg by mouth. 2 tabs prior to niacin  162 mg  
    
 COD LIVER OIL PO Your last dose was: Your next dose is: Take 1,000 mg by mouth daily. 1000 mg  
    
   
   
   
  
 ezetimibe 10 mg tablet Commonly known as:  Kita Clements Your last dose was: Your next dose is: Take 1 Tab by mouth daily. 10 mg  
    
   
   
   
  
 FIBER PO Your last dose was: Your next dose is: Take  by mouth. 5 tabs daily  
     
   
   
   
  
 glucose blood VI test strips strip Commonly known as:  ACCU-CHEK ELBERT PLUS TEST STRP Your last dose was: Your next dose is:    
   
   
 Check bs daily. Dx 250.00 LASIX 40 mg tablet Generic drug:  furosemide Your last dose was: Your next dose is: Take 60 mg by mouth daily. 60 mg  
    
   
   
   
  
 metFORMIN 1,000 mg tablet Commonly known as:  GLUCOPHAGE Your last dose was: Your next dose is: Take 1 Tab by mouth two (2) times a day. 1000 mg  
    
   
   
   
  
 niacin 1,000 mg Tb24 tab Commonly known as:  Benjamin Adames Your last dose was: Your next dose is:    
   
   
 2,000 mg nightly. 2000 mg  
    
   
   
   
  
 potassium chloride SA 10 mEq capsule Commonly known as:  Patti Rose Your last dose was: Your next dose is: Take 1 Cap by mouth two (2) times a day. 10 mEq  
    
   
   
   
  
 rosuvastatin 5 mg tablet Commonly known as:  CRESTOR Your last dose was: Your next dose is: Take 1 Tab by mouth daily. 5 mg VITAMIN B-12 PO Your last dose was: Your next dose is: Take 1,500 mg by mouth daily. 1500 mg  
    
   
   
   
  
 VITAMIN D3 2,000 unit Tab Generic drug:  cholecalciferol (vitamin D3) Your last dose was: Your next dose is: Take 1 Tab by mouth daily (after lunch). 1 Tab STOP taking these medications   
 cephALEXin 250 mg capsule Commonly known as:  KEFLEX  
   
  
 dilTIAZem  mg ER capsule Commonly known as:  CARDIZEM CD Where to Get Your Medications Information on where to get these meds will be given to you by the nurse or doctor. ! Ask your nurse or doctor about these medications  
  losartan 50 mg tablet Discharge Instructions Cardiac Surgery Specialist 
 
[address] [place], [place] [address] [address] 200 [address] [facility] Office- [phone]  Fax- [phone]       Office- [phone]  Fax- [phone] 
_____________________________________________________________ [facility]. [facility]   Silviobrice Aguiar, AGAP Elkin Virk PA-C Name:Yolanda Calderon Jaden Surgery & Date: Procedure(s): 
TRANSCATHETER AORTIC VALVE REPLACEMENT Right TRANSFEMORAL 26mm S3 - CARMEN by Dr. Larry Padron 
 
Discharge Date: 4/15/18 MEDICATIONS: 
Please refer to your After Visit Summary for your medication list.  
 
DO NOT TAKE ANY MEDICATIONS THAT ARE NOT ON THIS LIST INSTRUCTIONS: 
NO SMOKING OR TOBACCO PRODUCTS Do not follow the activity/exercise instructions in your discharge book given to you as an inpatient. You have no activity restrictions. You may shower. Wash all incisions twice daily with mild soap and water. No lotions, ointments or powder. Call the office immediately for any redness, swelling, or drainage from your incision. Take your temperature daily and call for a temperature of 101 degrees or higher or for any symptoms that make you think you have and infection. Weigh yourself each morning. Call if you gain more than 5 pounds in 48 hours. Use the incentive spirometer 6-8 times a day-10 breaths each time. Walk several hundred feet several times daily. DIET Eat an American Heart Association diet. If you are having trouble with your appetite, eat what you can. Try eating small, frequent meals throughout the day. ACTIVITY 1. You have no activity restrictions. You may resume your daily activities at home, based on your comfort level. You may also drive. FOLLOW UP 
1. Your follow up appointments will be called to you by the office tomorrow morning. Our office is located in [address] [place] [address] on floor G-5. You will need to have an ECHO prior to your appointment time. Our office will set that up for you. Please call our office at [phone] if you are unable to make either one of these appointments. 2. You will be receiving a call before your 3 day follow up appointment to begin cardiac rehab. They are located in the [address] [place] [address] on [facility]. Their phone number is [phone]. Please call if you have not been contacted 2-3 weeks after discharge from the hospital. 
3. We will make an appointment for you with your cardiologist in 4-5 weeks. 4. Consult you primary care physician regarding your influenza &  
pneumovax vaccines. 5.   Please bring all medications with you to your appointment. Signature:___________________________________________________ ACO Transitions of Care Bernadette [facility] offers a voluntary care coordination program to provide high quality service and care to [facility] fee-for-service beneficiaries. Molly Cockayne was designed to help you enhance your health and well-being through the following services: ? Transitions of Care  support for individuals who are transitioning from one care setting to another (example: Hospital to home). ? Chronic and Complex Care Coordination  support for individuals and caregivers of those with serious or chronic illnesses or with more than one chronic (ongoing) condition and those who take a number of different medications. If you meet specific medical criteria, a [facility] [address] may call you directly to coordinate your care with your primary care physician and your other care providers. For questions about the [facility] programs, please, contact your physicians office. For general questions or additional information about Accountable Care Organizations: 
Please visit www.medicare.gov/acos. html or call 1-800-MEDICARE (4-355.227.7332) [employer] users should call 9-456.651.1639. PredictAd Announcement We are excited to announce that we are making your provider's discharge notes available to you in PredictAd. You will see these notes when they are completed and signed by the physician that discharged you from your recent hospital stay. If you have any questions or concerns about any information you see in PredictAd, please call the Health Information Department where you were seen or reach out to your Primary Care Provider for more information about your plan of care. Introducing [facility]! Dear Blas Mcneal: 
Thank you for requesting a PredictAd account. Our records indicate that you already have an active PredictAd account. You can access your account anytime at [email or web address] Did you know that you can access your hospital and ER discharge instructions at any time in PredictAd? You can also review all of your test results from your hospital stay or ER visit. Additional Information If you have questions, please visit the Frequently Asked Questions section of the PredictAd website at [email or web address] Remember, MyChart is NOT to be used for urgent needs. For medical emergencies, dial 911. Now available from your iPhone and Android! Introducing Gary Mojica As a [facility]  patient, I wanted to make you aware of our electronic visit tool called Gary Mojica. [employer] allows you to connect within minutes with a medical provider 24 hours a day, seven days a week via a mobile device or tablet or logging into a secure website from your computer. You can access Gary Mojica from anywhere in the [place]. A virtual visit might be right for you when you have a simple condition and feel like you just dont want to get out of bed, or cant get away from work for an appointment, when your regular [facility] provider is not available (evenings, weekends or holidays), or when youre out of town and need minor care. Electronic visits cost only $49 and if the [employer]Polimet[employer] provider determines a prescription is needed to treat your condition, one can be electronically transmitted to a nearby pharmacy*. Please take a moment to enroll today if you have not already done so. The enrollment process is free and takes just a few minutes. To enroll, please download the [employer] demond to your tablet or phone, or visit [email or web address] to enroll on your computer. And, as an [address] [place] [address] patient with a MyRepublic account, the results of your visits will be scanned into your electronic medical record and your primary care provider will be able to view the scanned results. We urge you to continue to see your regular [facility] provider for your ongoing medical care. And while your primary care provider may not be the one available when you seek a Gary Mojica virtual visit, the peace of mind you get from getting a real diagnosis real time can be priceless.    
 
For more information on Gary Israelterrancefin, view our Frequently Asked Questions (FAQs) at [email or web address]. Sincerely, 
 
Hansa Hurtado MD 
Chief Medical Officer [address] *:  certain medications cannot be prescribed via Gary Mojica Providers Seen During Your Hospitalization Provider Specialty Primary office phone Albino Sosa MD Cardiothoracic Surgery [phone] Your Primary Care Physician (PCP) Primary Care Physician Office Phone Office Fax [address] [facility] [place] [address] [phone] You are allergic to the following Allergen Reactions Bees (Hymenoptera Allergenic Extract) Anaphylaxis Betadine (Povidone-Iodine) Other (comments) Abdominal swelling, one incident in 1977. Has had since without a reaction. Darvon (Propoxyphene) Nausea and Vomiting Griseofulvin Nausea and Vomiting Lipitor (Atorvastatin) Myalgia Recent Documentation Height Weight Breastfeeding? BMI OB Status Smoking Status 1.676 m 95.2 kg No 33.88 kg/m2 Postmenopausal Former Smoker Emergency Contacts Name Discharge Info Relation Home Work Mobile Petsy DECLINED CAREGIVER [4] Son [22] [phone] Nichole Meier  Other Relative [6]   [phone] Patient Belongings The following personal items are in your possession at time of discharge: 
  Dental Appliances: None  Visual Aid: Glasses, With patient   Hearing Aids/Status:  (NONE)  Home Medications: None      Clothing: At bedside    Other Valuables: None Please provide this summary of care documentation to your next provider. Signatures-by signing, you are acknowledging that this After Visit Summary has been reviewed with you and you have received a copy. Patient Signature:  ____________________________________________________________ Date:  ____________________________________________________________  
  
Ana Colbert  Provider Signature: ____________________________________________________________ Date:  ____________________________________________________________

## 2018-04-12 NOTE — ANESTHESIA PROCEDURE NOTES
CARMEN        Procedure Details: probe placement, image aquisition & interpretation    Site marked, Timeout performed  Risks and benefits discussed with the patient and plans are to proceed    Procedure Note    Performed by: [facility]  Authorized by: Mart DE SOUZA       Indications: assessment of ascending aorta and assessment of surgical repair  Modalities: 2D, CF, CWD, contrast, PWD  Probe Type: multiplane  Insertion: atraumatic  Patient Status: intubated    Echocardiographic and Doppler Measurements   Aorta  Size  Diam(cm)  Dissection PlaqueThick(mm)  Plaque Mobile    Ascending normal  No >3 No    Arch normal  No  No    Descending normal  No >3 No          Valves  Annulus  Stenosis  Area/Grad  Regurg  Leaflet   Morph  Leaflet   Motion    Aortic calcified severe 60/30 0 calcified, thickened restricted    Mitral  none  1+ normal normal    Tricuspid dilated none  1+ normal normal          Atria  Size  SEC (smoke)  Thrombus  Tumor  Device    Rt Atrium normal No No No No    Lt Atrium normal No No No No     Interatrial Septum Morphology: normal    Interventricular Septum Morphology: hypertrophy    Ventricle  Cavity Size  Cavity Dimension Hypertrophy  Thrombus  Gloal FXN  EF    RV normal    normal     LV normal  Yes  normal 60       Regional Function  (1 = normal, 2 = mildly hypokinetic, 3 = severely hypokinetic, 4 = akinetic, 5 = dyskinetic) LAV - Long [place] View   ME LAV = 0  ME LAV = 90  ME LAV = 130   Basal Sept:1 Basal Ant:1 Basal Post:1   Mid Sept:1 Mid Ant:1 Mid Post:1   Apical Sept:1 Apical Ant:1 Basal Ant Sept:1   Basal Lat:1 Basal Inf:1 Mid Ant Sept:1   Mid Lat:1 Mid Inf:1    Apical Lat:1 Apical Inf:1      Diastolic function: impared relaxation  Pericardium: normal    Post Intervention Follow-up Study  Ventricular Global Function: unchanged  Ventricular Regional Function: unchanged     Valve  Function  Regurgitation  Area    Aortic improved 1+     Mitral no change      Tricuspid no change      Prosthetic normal       Complications: None  Comments: Severe AS due to severe calcified leaflets. lvot measured at 24 mm. TAVR valve placed under CARMEN and Fluoro guidance. moderate PVL seen,balloon dilatation done and NO residual AI seen at end. LV fn unchanged.

## 2018-04-12 NOTE — OP NOTES
Transcatheter Aortic Valve Replacement     Indication  The valve was placed for severe aortic stenosis. Patient was at intermediate surgical risk due to co-morbid conditions. Co-Surgeon: Dyllan      Access  The valve was placed using a transfemoral approach. The 16F valve delivery sheath was placed in the right femoral artery via a percutaneous approach. 6 New Zealander sheaths were placed in the left artery for angiography. 6 New Zealander sheaths were placed in bilateral femoral veins.      Procedure  A 6 Fr sheath was placed in the left femoral artery for aortography. Prior to placement of the aortic valve, a left heart catheterization and aortogram were performed. A balloon aortic valvuloplasty was not performed prior to valve placement. A 26 S3 aortic valve was then deployed with rapid ventricular pacing. Post-deployment balloon inflation was performed. Cardiopulmonary bypass support was not used for hemodynamic support during the procedure.     Hemodynamics  The pre-TAVR hemodynamic assessment confirmed severe aortic stenosis with a gradient of 47 mmHg mean. Pre: PA 30/15, /22, Ao 150/76, RACHEL 0.4. Post: PA 28/15, /20, Ao 144/60, man gradient 12mmHg. The LVEDP was noted to be elevated and there was trivial aortic regurgitation. Post-deployment hemodynamics showed no transvalvular gradient and elevated LVEDP.     Angiography  1. Aortic angiography pre intervention demonstrates severe AS. 2. Aortic angiogrpahy post TAVR demonstrates the S3 valve in good position with trivial AI. 3. Aortic angiography showed no evidence of access site complication with good flow in bilateral femoral arteries      Conclusion:  Successful transcatheter aortic valve replacement with no significant paravalvular leak. Post-deployment aortography showed the valve was in good position with no aortic regurgitation. The coronary arteries were patent.     Complications:  None. I was present for the entire procedure.  I have edited the procedure note as appropriate.

## 2018-04-12 NOTE — PROCEDURES
Transcatheter Aortic Valve Replacement    Indication  The valve was placed for severe aortic stenosis. Patient was at intermediate surgical risk due to co-morbid conditions. Co-Surgeon: Almaer    Access  The valve was placed using a transfemoral approach. The 16F valve delivery sheath was placed in the right femoral artery via a percutaneous approach. 6 Afghan sheaths were placed in the left artery for angiography. 6 Afghan sheaths were placed in bilateral femoral veins. Procedure  A 6 Fr sheath was placed in the left femoral artery for aortography. Prior to placement of the aortic valve, a left heart catheterization and aortogram were performed. A balloon aortic valvuloplasty was not performed prior to valve placement. A 26 S3 aortic valve was then deployed with rapid ventricular pacing. Post-deployment balloon inflation was performed. Cardiopulmonary bypass support was not used for hemodynamic support during the procedure. Hemodynamics  The pre-TAVR hemodynamic assessment confirmed severe aortic stenosis with a gradient of 47 mmHg mean. Pre: PA 30/15, /22, Ao 150/76, RACHEL 0.4. Post: PA 28/15, /20, Ao 144/60, man gradient 12mmHg. The LVEDP was noted to be elevated and there was trivial aortic regurgitation. Post-deployment hemodynamics showed no transvalvular gradient and elevated LVEDP. Angiography  1. Aortic angiography pre intervention demonstrates severe AS. 2. Aortic angiogrpahy post TAVR demonstrates the S3 valve in good position with trivial AI. 3. Aortic angiography showed no evidence of access site complication with good flow in bilateral femoral arteries     Conclusion:  Successful transcatheter aortic valve replacement with no significant paravalvular leak. Post-deployment aortography showed the valve was in good position with no aortic regurgitation. The coronary arteries were patent. Complications:  None. I was present for the entire procedure.  I have edited the procedure note as appropriate.     Ivy Lagunas MD

## 2018-04-12 NOTE — ANESTHESIA POSTPROCEDURE EVALUATION
Post-Anesthesia Evaluation and Assessment    Patient: Yousuf Garcia MRN: [id number]  SSN: [id number]    YOB: 1930  Age: 80 y.o. Sex: female       Cardiovascular Function/Vital Signs  Visit Vitals    /43    Pulse (!) 57    Temp 37.2 °C (99 °F)    Resp (!) 5    Ht 5' 6\" (1.676 m)    Wt 89 kg (196 lb 3.4 oz)    SpO2 97%    Breastfeeding No    BMI 31.67 kg/m2       Patient is status post general anesthesia for Procedure(s):  TRANSCATHETER AORTIC VALVE REPLACEMENT Right TRANSFEMORAL 26mm S3 - CARMEN by Dr. Vania Hall. Nausea/Vomiting: None    Postoperative hydration reviewed and adequate. Pain:  Pain Scale 1: Numeric (0 - 10) (04/12/18 1200)  Pain Intensity 1: 0 (04/12/18 1200)   Managed    Neurological Status:   Neuro (WDL): Within Defined Limits (04/12/18 0630)   At baseline    Mental Status and Level of Consciousness: Arousable    Pulmonary Status:   O2 Device: Nasal cannula (04/12/18 1200)   Adequate oxygenation and airway patent    Complications related to anesthesia: None    Post-anesthesia assessment completed.  No concerns    Signed By: Amelia Jean MD     April 12, 2018

## 2018-04-12 NOTE — ANESTHESIA PREPROCEDURE EVALUATION
Anesthetic History   No history of anesthetic complications            Review of Systems / Medical History  Patient summary reviewed, nursing notes reviewed and pertinent labs reviewed    Pulmonary  Within defined limits  COPD    Sleep apnea           Neuro/Psych   Within defined limits           Cardiovascular  Within defined limits  Hypertension  Valvular problems/murmurs: aortic stenosis      Dysrhythmias   CAD    Exercise tolerance: <4 METS     GI/Hepatic/Renal  Within defined limits   GERD           Endo/Other  Within defined limits  Diabetes         Other Findings              Physical Exam    Airway  Mallampati: II  TM Distance: 4 - 6 cm  Neck ROM: normal range of motion   Mouth opening: Normal     Cardiovascular          Murmur: Grade 2, Aortic area     Dental    Dentition: Bridges     Pulmonary  Breath sounds clear to auscultation               Abdominal  GI exam deferred       Other Findings            Anesthetic Plan    ASA: 4  Anesthesia type: general    Monitoring Plan: Arterial line, CVP, BIS, Reading-Nava and CARMEN    Post procedure ventilation         Will use 6.5 et tube, h/o laryngeal injury in past from intubation

## 2018-04-12 NOTE — ANESTHESIA PROCEDURE NOTES
Arterial Line Placement    Start time: 4/12/2018 8:29 AM  End time: 4/12/2018 8:39 AM  Performed by: Ashlee Ochoa  Authorized by: Celena DE SOUZA     Pre-Procedure  Indications:  Arterial pressure monitoring and blood sampling  Preanesthetic Checklist: patient identified, risks and benefits discussed, anesthesia consent, site marked, patient being monitored, timeout performed and patient being monitored      Procedure:   Prep:  Chlorhexidine  Seldinger Technique?: Yes    Orientation:  Left  Location:  Radial artery  Catheter size:  20 G  Number of attempts:  1    Assessment:   Post-procedure:  Line secured and sterile dressing applied  Patient Tolerance:  Patient tolerated the procedure well with no immediate complications

## 2018-04-12 NOTE — PROGRESS NOTES
Cardiac Surgery Care Coordinator- Met with the son of Zarina Gonzalez, introduced role of the Cardiac Surgery Co- Nurse. Reviewed plan of care and day of surgery expectations. Provided him with a contact number and an update from OR. Encouraged him to verbalize and emotional support given. Will continue to update throughout the day. 1015- Met with Tierra Garcia son and Dr. Ubaldo Voss. Update given, Encouraged him to verbalize and offered emotional support. Reinforced Surgical waiting room instructions, he is to wait in the main surgical waiting room until contacted by the nursing staff. 1100- Escorted Mr Rocky Emanuel to the CCU, bedside nurse aware of son's location.  Coby Kingsley RN

## 2018-04-12 NOTE — PROGRESS NOTES
Underwent TAVR with 26mm S3 valve by Rola Gutierrez, and myself. Post implant balloon dilation performed with 2.5cc additional with resolution of the PVL. No complications.

## 2018-04-13 ENCOUNTER — HOME HEALTH ADMISSION (OUTPATIENT)
Dept: HOME HEALTH SERVICES | Facility: HOME HEALTH | Age: 83
End: 2018-04-13
Payer: MEDICARE

## 2018-04-13 ENCOUNTER — APPOINTMENT (OUTPATIENT)
Dept: GENERAL RADIOLOGY | Age: 83
DRG: 267 | End: 2018-04-13
Attending: NURSE PRACTITIONER
Payer: MEDICARE

## 2018-04-13 PROBLEM — E66.9 OBESITY (BMI 30.0-34.9): Status: ACTIVE | Noted: 2018-04-13

## 2018-04-13 PROBLEM — Z95.2 S/P TAVR (TRANSCATHETER AORTIC VALVE REPLACEMENT): Status: ACTIVE | Noted: 2018-04-13

## 2018-04-13 LAB
ABO + RH BLD: NORMAL
ALBUMIN SERPL-MCNC: 2.7 G/DL (ref 3.5–5)
ALBUMIN/GLOB SERPL: 0.9 {RATIO} (ref 1.1–2.2)
ALP SERPL-CCNC: 62 U/L (ref 45–117)
ALT SERPL-CCNC: 12 U/L (ref 12–78)
ANION GAP SERPL CALC-SCNC: 6 MMOL/L (ref 5–15)
ARTERIAL PATENCY WRIST A: ABNORMAL
AST SERPL-CCNC: 19 U/L (ref 15–37)
ATRIAL RATE: 55 BPM
BASE EXCESS BLDV CALC-SCNC: 2 MMOL/L
BDY SITE: ABNORMAL
BILIRUB SERPL-MCNC: 0.6 MG/DL (ref 0.2–1)
BLD PROD TYP BPU: NORMAL
BLD PROD TYP BPU: NORMAL
BLOOD GROUP ANTIBODIES SERPL: NORMAL
BPU ID: NORMAL
BPU ID: NORMAL
BUN SERPL-MCNC: 19 MG/DL (ref 6–20)
BUN/CREAT SERPL: 26 (ref 12–20)
CALCIUM SERPL-MCNC: 7.9 MG/DL (ref 8.5–10.1)
CALCULATED R AXIS, ECG10: -55 DEGREES
CALCULATED T AXIS, ECG11: 49 DEGREES
CHLORIDE SERPL-SCNC: 108 MMOL/L (ref 97–108)
CO2 SERPL-SCNC: 27 MMOL/L (ref 21–32)
CREAT SERPL-MCNC: 0.74 MG/DL (ref 0.55–1.02)
CROSSMATCH RESULT,%XM: NORMAL
CROSSMATCH RESULT,%XM: NORMAL
DIAGNOSIS, 93000: NORMAL
ERYTHROCYTE [DISTWIDTH] IN BLOOD BY AUTOMATED COUNT: 15.4 % (ref 11.5–14.5)
GAS FLOW.O2 O2 DELIVERY SYS: ABNORMAL L/MIN
GAS FLOW.O2 SETTING OXYMISER: 3 L/M
GLOBULIN SER CALC-MCNC: 3.1 G/DL (ref 2–4)
GLUCOSE BLD STRIP.AUTO-MCNC: 111 MG/DL (ref 65–100)
GLUCOSE BLD STRIP.AUTO-MCNC: 134 MG/DL (ref 65–100)
GLUCOSE BLD STRIP.AUTO-MCNC: 151 MG/DL (ref 65–100)
GLUCOSE BLD STRIP.AUTO-MCNC: 196 MG/DL (ref 65–100)
GLUCOSE SERPL-MCNC: 99 MG/DL (ref 65–100)
HCO3 BLDV-SCNC: 27 MMOL/L (ref 23–28)
HCT VFR BLD AUTO: 34.6 % (ref 35–47)
HGB BLD-MCNC: 10.7 G/DL (ref 11.5–16)
MAGNESIUM SERPL-MCNC: 2.3 MG/DL (ref 1.6–2.4)
MCH RBC QN AUTO: 28.1 PG (ref 26–34)
MCHC RBC AUTO-ENTMCNC: 30.9 G/DL (ref 30–36.5)
MCV RBC AUTO: 90.8 FL (ref 80–99)
NRBC # BLD: 0 K/UL (ref 0–0.01)
NRBC BLD-RTO: 0 PER 100 WBC
O2/TOTAL GAS SETTING VFR VENT: 32 %
PCO2 BLDV: 46.5 MMHG (ref 41–51)
PH BLDV: 7.37 [PH] (ref 7.32–7.42)
PLATELET # BLD AUTO: 168 K/UL (ref 150–400)
PMV BLD AUTO: 10.4 FL (ref 8.9–12.9)
PO2 BLDV: 33 MMHG (ref 25–40)
POTASSIUM SERPL-SCNC: 3.9 MMOL/L (ref 3.5–5.1)
PROT SERPL-MCNC: 5.8 G/DL (ref 6.4–8.2)
Q-T INTERVAL, ECG07: 518 MS
QRS DURATION, ECG06: 134 MS
QTC CALCULATION (BEZET), ECG08: 481 MS
RBC # BLD AUTO: 3.81 M/UL (ref 3.8–5.2)
SAO2 % BLDV: 60 % (ref 65–88)
SERVICE CMNT-IMP: ABNORMAL
SODIUM SERPL-SCNC: 141 MMOL/L (ref 136–145)
SPECIMEN EXP DATE BLD: NORMAL
SPECIMEN TYPE: ABNORMAL
STATUS OF UNIT,%ST: NORMAL
STATUS OF UNIT,%ST: NORMAL
TOTAL RESP. RATE, ITRR: 18
UNIT DIVISION, %UDIV: 0
UNIT DIVISION, %UDIV: 0
VENTRICULAR RATE, ECG03: 52 BPM
WBC # BLD AUTO: 9.5 K/UL (ref 3.6–11)

## 2018-04-13 PROCEDURE — 80053 COMPREHEN METABOLIC PANEL: CPT | Performed by: NURSE PRACTITIONER

## 2018-04-13 PROCEDURE — 82962 GLUCOSE BLOOD TEST: CPT

## 2018-04-13 PROCEDURE — 74011000250 HC RX REV CODE- 250: Performed by: NURSE PRACTITIONER

## 2018-04-13 PROCEDURE — 97535 SELF CARE MNGMENT TRAINING: CPT

## 2018-04-13 PROCEDURE — 83735 ASSAY OF MAGNESIUM: CPT | Performed by: NURSE PRACTITIONER

## 2018-04-13 PROCEDURE — 77010033678 HC OXYGEN DAILY

## 2018-04-13 PROCEDURE — 77030032490 HC SLV COMPR SCD KNE COVD -B

## 2018-04-13 PROCEDURE — 74011250636 HC RX REV CODE- 250/636: Performed by: NURSE PRACTITIONER

## 2018-04-13 PROCEDURE — 97530 THERAPEUTIC ACTIVITIES: CPT

## 2018-04-13 PROCEDURE — 82803 BLOOD GASES ANY COMBINATION: CPT

## 2018-04-13 PROCEDURE — 36416 COLLJ CAPILLARY BLOOD SPEC: CPT | Performed by: NURSE PRACTITIONER

## 2018-04-13 PROCEDURE — 71045 X-RAY EXAM CHEST 1 VIEW: CPT

## 2018-04-13 PROCEDURE — 74011250637 HC RX REV CODE- 250/637: Performed by: NURSE PRACTITIONER

## 2018-04-13 PROCEDURE — G8979 MOBILITY GOAL STATUS: HCPCS

## 2018-04-13 PROCEDURE — 93005 ELECTROCARDIOGRAM TRACING: CPT

## 2018-04-13 PROCEDURE — 65620000000 HC RM CCU GENERAL

## 2018-04-13 PROCEDURE — G8978 MOBILITY CURRENT STATUS: HCPCS

## 2018-04-13 PROCEDURE — 97161 PT EVAL LOW COMPLEX 20 MIN: CPT

## 2018-04-13 PROCEDURE — 85027 COMPLETE CBC AUTOMATED: CPT | Performed by: NURSE PRACTITIONER

## 2018-04-13 PROCEDURE — 97165 OT EVAL LOW COMPLEX 30 MIN: CPT

## 2018-04-13 PROCEDURE — 74011636637 HC RX REV CODE- 636/637: Performed by: NURSE PRACTITIONER

## 2018-04-13 RX ORDER — ROSUVASTATIN CALCIUM 10 MG/1
5 TABLET, COATED ORAL
Status: DISCONTINUED | OUTPATIENT
Start: 2018-04-13 | End: 2018-04-15 | Stop reason: HOSPADM

## 2018-04-13 RX ADMIN — Medication 2 G: at 05:07

## 2018-04-13 RX ADMIN — FAMOTIDINE 20 MG: 20 TABLET, FILM COATED ORAL at 08:29

## 2018-04-13 RX ADMIN — Medication 400 MG: at 18:05

## 2018-04-13 RX ADMIN — TRAMADOL HYDROCHLORIDE 50 MG: 50 TABLET, FILM COATED ORAL at 20:04

## 2018-04-13 RX ADMIN — Medication 10 ML: at 14:00

## 2018-04-13 RX ADMIN — FAMOTIDINE 20 MG: 20 TABLET, FILM COATED ORAL at 21:09

## 2018-04-13 RX ADMIN — ASPIRIN 81 MG 81 MG: 81 TABLET ORAL at 08:29

## 2018-04-13 RX ADMIN — POTASSIUM CHLORIDE 20 MEQ: 400 INJECTION, SOLUTION INTRAVENOUS at 05:22

## 2018-04-13 RX ADMIN — TRAMADOL HYDROCHLORIDE 50 MG: 50 TABLET, FILM COATED ORAL at 13:28

## 2018-04-13 RX ADMIN — Medication 10 ML: at 12:00

## 2018-04-13 RX ADMIN — ACETAMINOPHEN 650 MG: 325 TABLET, FILM COATED ORAL at 08:29

## 2018-04-13 RX ADMIN — SODIUM CHLORIDE 9 ML/HR: 900 INJECTION, SOLUTION INTRAVENOUS at 21:25

## 2018-04-13 RX ADMIN — ROSUVASTATIN CALCIUM 5 MG: 10 TABLET, FILM COATED ORAL at 21:09

## 2018-04-13 RX ADMIN — Medication 10 ML: at 05:01

## 2018-04-13 RX ADMIN — Medication 10 ML: at 21:10

## 2018-04-13 RX ADMIN — STANDARDIZED SENNA CONCENTRATE AND DOCUSATE SODIUM 1 TABLET: 8.6; 5 TABLET, FILM COATED ORAL at 08:29

## 2018-04-13 RX ADMIN — STANDARDIZED SENNA CONCENTRATE AND DOCUSATE SODIUM 1 TABLET: 8.6; 5 TABLET, FILM COATED ORAL at 18:05

## 2018-04-13 RX ADMIN — INSULIN LISPRO 2 UNITS: 100 INJECTION, SOLUTION INTRAVENOUS; SUBCUTANEOUS at 13:05

## 2018-04-13 RX ADMIN — SODIUM CHLORIDE 20 MG: 9 INJECTION INTRAMUSCULAR; INTRAVENOUS; SUBCUTANEOUS at 01:10

## 2018-04-13 RX ADMIN — Medication 400 MG: at 08:29

## 2018-04-13 NOTE — PROGRESS NOTES
26 - Bedside and Verbal shift change report given to Luciana Tijerina RN  (oncoming nurse) by Damien Franco RN  (offgoing nurse). Report included the following information SBAR, MAR, Recent Results and Cardiac Rhythm SR.      1930 - Bedside and Verbal shift change report given to Trev Reece RN (oncoming nurse) by Luciana Tijerina RN  (offgoing nurse). Report included the following information SBAR, MAR, Recent Results and Cardiac Rhythm sinus bradycardia.

## 2018-04-13 NOTE — CDMP QUERY
Patient is noted to have a BMI of 32.95. Please clarify if this patient is:     =>Obese (BMI 30 - 39.9)  =>Overweight (BMI 25 - 29.9)  =>Other explanation of clinical findings  =>Unable to determine (no explanation for clinical findings)    Presentation:   BMI: 32.95  HT 5'6\"   LBS. REFERENCE:  The [address] [place] [address] has issued a statement indicating that, \"Individuals who are overweight, obese, or morbidly obese are at an increased risk for certain medical conditions when compared to persons of normal weight. Therefore, these conditions are always clinically significant and reportable when documented by the provider. Please clarify and document your clinical opinion in the progress notes and discharge summary, including the definitive and or presumptive diagnosis, (suspected or probable), related to the above clinical findings. Please include clinical findings supporting your diagnosis.      Thank you  Chris Simon  [facility]  [phone]

## 2018-04-13 NOTE — INTERDISCIPLINARY ROUNDS
IDR/SLIDR Summary          Patient: Leta Farnsworth MRN: [id number]    Age: 80 y.o. YOB: 1930 Room/Bed: [address] [place] [address]   Admit Diagnosis: AORTIC STENOSIS  Aortic stenosis  Principal Diagnosis: <principal problem not specified>   Goals: progress off unit  Readmission: NO  Quality Measure: Not applicable  VTE Prophylaxis: Chemical  Influenza Vaccine screening completed? NO  Pneumococcal Vaccine screening completed? NO  Mobility needs: No   Nutrition plan:No  Consults: P. T and O.T. Financial concerns:No  Escalated to CM? NO  RRAT Score:    Inter[facility]   Testing due for pt today?  NO  LOS: 1 days Expected length of stay 4-5 days  Discharge plan: Home   PCP: Tavo Tony MD  Transportation needs: No    Days before discharge:two or more days before discharge   Discharge disposition: Home    Signed:     Polina Padilla RN  4/13/2018  7:32 AM

## 2018-04-13 NOTE — PROGRESS NOTES
Problem: Self Care Deficits Care Plan (Adult)  Goal: *Acute Goals and Plan of Care (Insert Text)  Occupational Therapy Goals  Initiated 4/13/2018  1. Patient will perform ADLs standing 5 mins without fatigue or LOB with modified independence within 7 day(s). 2.  Patient will perform lower body ADLs with modified independence within 7 day(s). 3.  Patient will perform bathing with modified independence within 7 day(s). 4.  Patient will perform gathering ADL items 4/4 with modified independence within 7 day(s). 5.  Patient will participate in cardiac upper extremity therapeutic exercise/activities to increase independence with ADLs with modified independence for 5 minutes within 7 day(s). Occupational Therapy EVALUATION  Patient: Jose Shearer (47 y.o. female)  Date: 4/13/2018  Primary Diagnosis: AORTIC STENOSIS  Aortic stenosis  Procedure(s) (LRB):  TRANSCATHETER AORTIC VALVE REPLACEMENT Right TRANSFEMORAL 26mm S3 - CARMEN by Dr. Gabriela Gifford (Bilateral) 1 Day Post-Op   Precautions:        ASSESSMENT :  Based on the objective data described below, the patient presents with independence to min A ADLs. ADLs limited by cardiac tolerance. Patient plans to discharge home with assistance from private aids. Recommend with nursing patient to complete as able in order to maintain strength, endurance and independence: ADLs with supervision/setup, OOB to chair 3x/day and mobilizing to the bathroom for toileting with 1 assist for lines. Thank you for your assistance. Patient will benefit from skilled intervention to address the above impairments.   Patients rehabilitation potential is considered to be Good  Factors which may influence rehabilitation potential include:   [x]             None noted  []             Mental ability/status  []             Medical condition  []             Home/family situation and support systems  []             Safety awareness  []             Pain tolerance/management  []             Other: PLAN :  Recommendations and Planned Interventions:  [x]               Self Care Training                  [x]        Therapeutic Activities  [x]               Functional Mobility Training    []        Cognitive Retraining  [x]               Therapeutic Exercises           [x]        Endurance Activities  [x]               Balance Training                   []        Neuromuscular Re-Education  []               Visual/Perceptual Training     [x]   Home Safety Training  [x]               Patient Education                 [x]        Family Training/Education  []               Other (comment):    Frequency/Duration: Patient will be followed by occupational therapy 5 times a week to address goals. Discharge Recommendations: None  Further Equipment Recommendations for Discharge: TBD/none noted     SUBJECTIVE:   Patient stated I look good for almost 88.     OBJECTIVE DATA SUMMARY:   HISTORY:   Past Medical History:   Diagnosis Date    Adverse effect of anesthesia     difficulty waking    Aortic stenosis     Arrhythmia     rapid heartrate seen in ER  4/2/18    CAD (coronary artery disease)     pt states she does not have this    Cancer ([address] 01/2018    left forehead and left ankle    Chronic pain     back - spinal stenosis    COPD     Diabetes ([address]     [facility]     Diverticulosis of colon     GERD (gastroesophageal reflux disease)     Hiatal Hernia - pt states she does not have this    Hypercholesterolemia     Hypertension     CINDA (obstructive sleep apnea) 08-    AHI: 18.6 per hour    Other ill-defined conditions(799.89)     heart murmur    Other ill-defined conditions(799.89)     colon infection    Peripheral neuropathy     Renal artery stenosis (HCC)     Right    Unspecified adverse effect of anesthesia     delayed awakening - \"does not need as much\"    UTI (urinary tract infection)      Past Surgical History:   Procedure Laterality Date    ABDOMEN SURGERY PROC UNLISTED 1977    R Renal Artery Stenosis repair    ABDOMEN SURGERY PROC UNLISTED  1982 or [address]    Lipectomy    ABDOMEN SURGERY PROC UNLISTED  1992    A/S L Knee    BREAST SURGERY PROCEDURE UNLISTED  1968    Left - benign cyst    ENDOSCOPY, COLON, DIAGNOSTIC  1999    hx of polys in the past    HX GYN  1966    D&C    HX HEENT      Bilat Cataracts    HX HEENT      R Retinal Hem    HX HEENT      Eyelid Cysts x 4    HX HEENT      cyst removed above eyebrow    HX HEENT      oral surgeries - gum surgery/floating root  removed x 2    HX ORTHOPAEDIC  11/06    L1-S1 2007    HX ORTHOPAEDIC  1985    L 4th Trigger finger    HX ORTHOPAEDIC      left knee arthroscopy    HX OTHER SURGICAL      renal artery bypass    HX OTHER SURGICAL      tonsillectomy  adenoidectomy    HX OTHER SURGICAL      retinal hemorrhage    HX OTHER SURGICAL      cataract bilateral    HX OTHER SURGICAL      back surgery    HX TONSILLECTOMY      T & A       Prior Level of Function/Environment/Context: independent with all ADLs, drives, plays bridge tournaments weekly  Occupations in which the patient is/was successful, what are the barriers preventing that success:   Performance Patterns (routines, roles, habits, and rituals): writing  Personal Interests and/or values:   Expanded or extensive additional review of patient history:     Home Situation  Home Environment: Private residence  # Steps to Enter: 15  One/Two Story Residence: Two story  Lift Chair Available: No  Living Alone: Yes  Support Systems: Family member(s)  Patient Expects to be Discharged to[de-identified] Private residence  Current DME Used/Available at Home: Adaptive dressing aides, Cane, straight, Walker, rolling, Shower chair  Tub or Shower Type: Shower  [x]  Right hand dominant   []  Left hand dominant    EXAMINATION OF PERFORMANCE DEFICITS:  Cognitive/Behavioral Status:  Neurologic State: Alert  Orientation Level: Oriented X4  Cognition: Appropriate decision making; Appropriate safety awareness; Appropriate for age attention/concentration; Follows commands  Perception: Appears intact  Perseveration: No perseveration noted  Safety/Judgement: Awareness of environment;Good awareness of safety precautions    Skin: intact R [place]    Edema: intact    Hearing: Auditory  Auditory Impairment: None  Hearing Aids/Status:  (NONE)    Vision/Perceptual:                           Acuity: Impaired near vision; Impaired far vision (baseline)    Corrective Lenses: Glasses    Range of Motion:    AROM: Within functional limits                         Strength:    Strength: Within functional limits                Coordination:  Coordination: Within functional limits  Fine Motor Skills-Upper: Left Intact; Right Intact    Gross Motor Skills-Upper: Left Intact; Right Intact    Tone & Sensation:    Tone: Normal  Sensation: Intact                      Balance:  Sitting: Intact; Without support    Functional Mobility and Transfers for ADLs:  Bed Mobility:  Supine to Sit: Contact guard assistance  Sit to Supine: Contact guard assistance    Transfers:  Sit to Stand: Contact guard assistance  Stand to Sit: Contact guard assistance  Toilet Transfer : Supervision [facility])  Shower Transfer:  Total assistance (not completed at this time due to safety)    ADL Assessment:  Feeding: Independent (reports no A with containers)    Oral Facial Hygiene/Grooming: Supervision (states setup on beside tray, sitting up in bed)    Bathing: min assistance report of CHG wipes    Upper Body Dressing: Supervision setup    Lower Body Dressing: Minimum assistance (tailor sitting, fair standing balance)    Toileting: Minimum assistance fair standing balance                 ADL Intervention and task modifications:     Patient instructed and indicated understanding the benefits of maintaining activity tolerance, functional mobility, and independence with self care tasks during acute stay  to ensure safe return home and to baseline. Encouraged patient to increase frequency and duration OOB, be out of bed for all meals, perform daily ADLs (as approved by RN/MD regarding bathing etc), and performing functional mobility to/from bathroom. Cognitive Retraining  Safety/Judgement: Awareness of environment;Good awareness of safety precautions    Therapeutic Exercise:  Encouraged to complete daily her back exercises and cardiac exercises. Pain:  Pain Scale 1: Numeric (0 - 10)  Pain Intensity 1: 2  Pain Location 1: Abdomen  Pain Orientation 1: Lower  Pain Description 1: Aching; Intermittent  Pain Intervention(s) 1: Medication (see MAR)  Activity Tolerance:   Vitals:    04/13/18 0900 04/13/18 1000 04/13/18 1100 04/13/18 1104   BP:  106/47 114/54 114/54   BP 1 Location:       BP Patient Position:       Pulse: (!) 52 (!) 53 63 65   Resp: 17 18 16 20   Temp:       SpO2: 97% 94% 95% 97%   Weight:       Height:           Please refer to the flowsheet for vital signs taken during this treatment. After treatment:   [] Patient left in no apparent distress sitting up in chair  [x] Patient left in no apparent distress in bed  [x] Call bell left within reach  [x] Nursing notified  [] Caregiver present  [] Bed alarm activated    COMMUNICATION/EDUCATION:   The patients plan of care was discussed with: Physical Therapist and Registered Nurse. [x] Home safety education was provided and the patient/caregiver indicated understanding. [x] Patient/family have participated as able in goal setting and plan of care. [x] Patient/family agree to work toward stated goals and plan of care. [] Patient understands intent and goals of therapy, but is neutral about his/her participation. [] Patient is unable to participate in goal setting and plan of care. This patients plan of care is appropriate for delegation to hospitals.     Thank you for this referral.  C[place]y [place]  Time Calculation: 18 mins

## 2018-04-13 NOTE — CONSULTS
Cardiac Electrophysiology Consultation Note     Subjective: Marshall Mccoy is a 80 y.o. patient who is seen for evaluation of new right bundle branch block & possible new left anterior fascicular block, is s/p TAVR Marcus Montanez) on 04/12/2018. She is currently receiving phenylephrine 25 mcg/min IV, with /47 at the time of exam.  Intermittent bradycardia (50s), 64 bpm at time of exam.  Sinus rhythm noted. She denies chest pain, palpitations, lightheadedness/dizziness, or shortness of breath. She is not currently paced. No prior history of syncope. States brother has a pacemaker. Patient Active Problem List   Diagnosis Code    Tachycardia R00.0    Hypertension I10    PVC (premature ventricular contraction) I49.3    Aortic stenosis I35.0    Edema R60.9    Foreign body in pharynx T17.208A    Type II or unspecified type diabetes mellitus without mention of complication, uncontrolled E11.65    Pure hypercholesterolemia E78.00    CINDA (obstructive sleep apnea) G47.33    Other dyspnea and respiratory abnormality R06.09, R09.89    Mitral stenosis I05.0    COPD (chronic obstructive pulmonary disease) ([facility]) J44.9    Fibromuscular dysplasia ([facility]) I77.3    Controlled type 2 diabetes mellitus without complication, without long-term current use of insulin ([facility]) E11.9    Advance directive on file Z78.9    Chest tightness R07.89    Dyspnea and respiratory abnormalities R06.00, R06.89    S/P TAVR (transcatheter aortic valve replacement) Z95.2    Obesity (BMI 30.0-34. 9) E66.9     Current Facility-Administered Medications   Medication Dose Route Frequency Provider Last Rate Last Dose    rosuvastatin (CRESTOR) tablet 5 mg  5 mg Oral QHS Venetta Miser, NP        sodium chloride (NS) flush 5-10 mL  5-10 mL IntraVENous Q8H Venetta Leandraer, NP   10 mL at 04/13/18 0501    sodium chloride (NS) flush 5-10 mL  5-10 mL IntraVENous PRN Manniettlm Arreguin NP        0.45% sodium chloride infusion 10 mL/hr IntraVENous CONTINUOUS Aris Hernandez NP        0.9% sodium chloride infusion  9 mL/hr IntraVENous CONTINUOUS Aris Hernandez NP 9 mL/hr at 04/12/18 1200 9 mL/hr at 04/12/18 1200    acetaminophen (TYLENOL) tablet 650 mg  650 mg Oral Q4H PRN Aris Hernandez, NP   650 mg at 04/13/18 5409    traMADol (ULTRAM) tablet  mg   mg Oral Q6H PRN Aris Hernandez NP   50 mg at 04/13/18 1328    oxyCODONE-acetaminophen (PERCOCET) 5-325 mg per tablet 1-2 Tab  1-2 Tab Oral Q4H PRN Aris Hernandez NP        naloxone [facility]) injection 0.4 mg  0.4 mg IntraVENous PRN Aris Hernandez NP        ondansetron [facility]) injection 4 mg  4 mg IntraVENous Q4H PRN Aris Hernandez NP        albuterol (PROVENTIL VENTOLIN) nebulizer solution 2.5 mg  2.5 mg Nebulization Q4H PRN Aris Hernandez NP        aspirin chewable tablet 81 mg  81 mg Oral DAILY Aris Hernandez NP   81 mg at 04/13/18 0829    famotidine (PEPCID) tablet 20 mg  20 mg Oral Q12H Aris Hernandez NP   20 mg at 04/13/18 8984    magnesium oxide (MAG-OX) tablet 400 mg  400 mg Oral BID Aris Hernandez NP   400 mg at 04/13/18 5917    calcium chloride 1 g in 0.9% sodium chloride 50 mL IVPB  1 g IntraVENous PRN Aris Hernnadez NP        bisacodyl (DULCOLAX) suppository 10 mg  10 mg Rectal DAILY PRN Aris Hernandez NP        senna-docusate (PERICOLACE) 8.6-50 mg per tablet 1 Tab  1 Tab Oral BID Aris Hernandez NP   1 Tab at 04/13/18 6503    ELECTROLYTE REPLACEMENT PROTOCOL  1 Each Other PRN Aris Hernandez NP        glucose chewable tablet 16 g  4 Tab Oral PRN Aris Hernandez NP        dextrose (D50W) injection syrg 12.5-25 g  12.5-25 g IntraVENous PRN Aris Hernandez NP        glucagon [facility]) injection 1 mg  1 mg IntraMUSCular PRN rAis Hernandez NP        insulin lispro (HUMALOG) injection   SubCUTAneous AC&HS Aris Hernandez NP   2 Units at 04/13/18 1305    sodium chloride (NS) flush 20 mL  20 mL InterCATHeter PRN Sujata Eddy, NP        sodium chloride (NS) flush 10 mL  10 mL InterCATHeter Q24H Sujata Eddy, NP   10 mL at 04/13/18 1200    sodium chloride (NS) flush 10 mL  10 mL InterCATHeter PRN Sujata Eddy, NP        sodium chloride (NS) flush 10 mL  10 mL InterCATHeter Q8H [place] Eddy, NP   10 mL at 04/13/18 0501    alteplase (CATHFLO) 1 mg in sterile water (preservative free) 1 mL injection  1 mg InterCATHeter PRN Sujata Eddy NP        bacitracin 500 unit/gram packet 1 Packet  1 Packet Topical PRN Sujata Eddy NP        niCARdipine (CARDENE) 50 mg in 0.9% sodium chloride 100 mL infusion  5-15 mg/hr IntraVENous TITRATE Fara Lew MD        PHENYLephrine (ANDIE-SYNEPHRINE) 30 mg in 0.9% sodium chloride 250 mL infusion   mcg/min IntraVENous TITRATE Fara Lew MD 12.5 mL/hr at 04/13/18 0500 25 mcg/min at 04/13/18 0500     Allergies   Allergen Reactions    Bees [Hymenoptera Allergenic Extract] Anaphylaxis    Betadine [Povidone-Iodine] Other (comments)     Abdominal swelling, one incident in 1977. Has had since without a reaction.     Darvon [Propoxyphene] Nausea and Vomiting    Griseofulvin Nausea and Vomiting    Lipitor [Atorvastatin] Myalgia     Past Medical History:   Diagnosis Date    Adverse effect of anesthesia     difficulty waking    Aortic stenosis     Arrhythmia     rapid heartrate seen in ER  4/2/18    CAD (coronary artery disease)     pt states she does not have this    Cancer ([address] 01/2018    left forehead and left ankle    Chronic pain     back - spinal stenosis    COPD     Diabetes ([address]     Diabetic neuropathy ([address]     Diverticulosis of colon     GERD (gastroesophageal reflux disease)     Hiatal Hernia - pt states she does not have this    Hypercholesterolemia     Hypertension     CINDA (obstructive sleep apnea) 08-    AHI: 18.6 per hour    Other ill-defined conditions(799.89) heart murmur    Other ill-defined conditions(799.89)     colon infection    Peripheral neuropathy     Renal artery stenosis (HCC)     Right    Unspecified adverse effect of anesthesia     delayed awakening - \"does not need as much\"    UTI (urinary tract infection)      Past Surgical History:   Procedure Laterality Date    ABDOMEN SURGERY PROC UNLISTED  1977    R Renal Artery Stenosis repair    ABDOMEN SURGERY [address] [facility] [address]   [address]    Left - benign cyst    ENDOSCOPY, COLON, DIAGNOSTIC  1999    hx of polys in the past    HX GYN  1966    D&C    HX HEENT      Bilat Cataracts    HX HEENT      R Retinal Hem    HX HEENT      Eyelid Cysts x 4    HX HEENT      cyst removed above eyebrow    HX HEENT      oral surgeries - gum surgery/floating root  removed x 2    HX ORTHOPAEDIC  11/06    L1-S1 2007    HX ORTHOPAEDIC  1985    L 4th Trigger finger    HX ORTHOPAEDIC      left knee arthroscopy    HX OTHER SURGICAL      renal artery bypass    HX OTHER SURGICAL      tonsillectomy  adenoidectomy    HX OTHER SURGICAL      retinal hemorrhage    HX OTHER SURGICAL      cataract bilateral    HX OTHER SURGICAL      back surgery    HX TONSILLECTOMY      T & A     Family History   Problem Relation Age of Onset    Stroke Mother     Cancer Mother      ovarian    Heart Attack Mother     Cancer Father      lung/throat    Alcohol abuse Father     Pacemaker Brother     Other Brother      als    Stroke Maternal Grandfather      Social History   Substance Use Topics    Smoking status: Former Smoker     Packs/day: 1.50     Years: 13.00     Types: Cigarettes     Quit date: 6/6/1977    Smokeless tobacco: Never Used      Comment: former cigarette smoker    Alcohol use No        Review of Systems:   Constitutional: Negative for fever, chills, weight loss, malaise/fatigue. HEENT: Negative for nosebleeds, vision changes. Respiratory: Negative for cough, hemoptysis  Cardiovascular: Negative for chest pain, palpitations, orthopnea, claudication, leg swelling, syncope, and PND. Gastrointestinal: Negative for nausea, vomiting, diarrhea, blood in stool and melena. Genitourinary: Negative for dysuria, and hematuria. Musculoskeletal: Negative for myalgias, arthralgia. Skin: Negative for rash. Heme: Does not bleed or bruise easily. Neurological: Negative for speech change and focal weakness     Objective:     Visit Vitals    BP (!) 115/35    Pulse (!) 52    Temp 99 °F (37.2 °C)    Resp 20    Ht 5' 6\" (1.676 m)    Wt 92.6 kg (204 lb 2.3 oz)    SpO2 95%    Breastfeeding No    BMI 32.95 kg/m2      Physical Exam:   Constitutional: well-developed and well-nourished. No respiratory distress. Head: Normocephalic and atraumatic. Eyes: Pupils are equal, round  ENT: hearing normal  Neck: supple. No JVD present. Cardiovascular: Normal rate, regular rhythm. Exam reveals no gallop and no friction rub. No murmur heard. Pulmonary/Chest: Effort normal and breath sounds normal. No wheezes. Abdominal: Soft, no tenderness. Musculoskeletal: no edema. Neurological: alert,oriented. Skin: Skin is warm and dry  Psychiatric: normal mood and affect. Behavior is normal. Judgment and thought content normal.      EKG: Sinus bradycardia, rate 52. Compared to pre-procedure ECG, new right bundle branch block is present, with left axis deviation. Very small p waves. Assessment/Plan:     New right bundle branch block with left axis deviation, is POD 1 s/p TAVR. Suspect limb lead reversal on yesterday's ECG. No left anterior fascicular block noted on today's ECG. No pacemaker is necessary at this time. Thank you for involving me in this patient's care and please call with further concerns or questions.       Addendum from EP attending:   I have seen, examined patient, and discussed with nurse practitioner, registered nurse, reviewed, updated note and agree with the assessment and plan    I have talked to her this pm. She is feeling fine, does not want pacemaker  She said her brother had one and it did not work  Vital signs are stable  Exam shows regular rhythm and no rub  Lungs clear  Assessment and Plan:  She appears to have recent RBBB but before TAVR and she has left axis deviation but not quite LAFB  I do not think she needs dual chamber pacer at this time  Krish Booker M.D.   Electrophysiology/Cardiology  [facility] [place]  [address] [place] [address] [place] [address]  [phone]

## 2018-04-13 NOTE — PROGRESS NOTES
2000   Bedside shift change report given to Trinity Arias (oncoming nurse) by Judy (offgoing nurse). Report included the following information SBAR, Kardex, Intake/Output, MAR, Recent Results and Cardiac Rhythm NSR. Complete assessment done and IV gtt rates verified. All pressure lines leveled and zeroed. 2130  Complete CHG bath done and linens changed. Sched meds given    2237  Tramadol 50 mg po given for generalized discomfort per  Pt request.    0000  Pt resting eyes closed. VSS. No changes in assessment. No distress noted. 0522  K 3.9  KCL 20 meq IVPB started per protcol.    0700  Hernandez cath D/C'd. Pt repositioned. Sched meds given. 0730  Bedside shift change report given to Salma Singh (oncoming nurse) by Trinity Arias (offgoing nurse). Report included the following information SBAR, Kardex, Intake/Output, MAR, Recent Results, Med Rec Status and Cardiac Rhythm NSR 1 degree AVB.

## 2018-04-13 NOTE — CARDIO/PULMONARY
Cardiac Rehab: Trans-catheter education folder is at the bedside of Isaac Anguiano. Patient reports \"I may be getting a pacemaker. \". Patient uses a walker at home but not while out and has had back surgery. Will need evaluation for safety with cardiac rehab. Discussed cardiac rehab program format, benefits, and encouraged enrollment. Initial cardiac rehab appointment scheduled for Wednesday, May 16, 2018 at 9:30 am. Appointment information on the AVS. [place] Parcel verbalized understanding and has no additional questions at this time.  Adrianna Nice RN

## 2018-04-13 NOTE — PROGRESS NOTES
Problem: Mobility Impaired (Adult and Pediatric)  Goal: *Acute Goals and Plan of Care (Insert Text)  Physical Therapy Goals  Initiated 04/13/18   1. Patient will move from supine to sit and sit to supine , scoot up and down and roll side to side in bed with modified independence within 7 day(s). 2.  Patient will transfer from bed to chair and chair to bed with modified independence using the least restrictive device within 7 day(s). 3.  Patient will perform sit to stand with modified independence within 7 day(s). 4.  Patient will ambulate with modified independence for 150 feet with the least restrictive device within 7 day(s). physical Therapy EVALUATION  Patient: Ponce Gonzales (39 y.o. female)  Date: 4/13/2018  Primary Diagnosis: AORTIC STENOSIS  Aortic stenosis  Procedure(s) (LRB):  TRANSCATHETER AORTIC VALVE REPLACEMENT Right TRANSFEMORAL 26mm S3 - CARMEN by Dr. Francesca Matt (Bilateral) 1 Day Post-Op   Precautions:        ASSESSMENT :  Based on the objective data described below, the patient presents with decreased functional independence compared to baseline with associated impaired standing balance, decreased tolerance to activity, and gait deviations. RN cleared patient for mobility and was received in bed. Session was limited by lines more than any physical limitations/deficits. Patient performed all mobility CGA with RW for all OOB transfers/sidestepping. Patient transferred on/off BSC and performed self care tasks independently. Patient returned to bed at end of session. Vital signs were stable throughout session with no adverse signs/symptoms reported. Anticipated clearance for d/c home soon once lined are removed as patient was moderately active PTA and lived alone independently. RN notified of patient's progress. Patient will benefit from skilled intervention to address the above impairments.   Patients rehabilitation potential is considered to be Good  Factors which may influence rehabilitation potential include:   [x]         None noted  []         Mental ability/status  []         Medical condition  []         Home/family situation and support systems  []         Safety awareness  []         Pain tolerance/management  []         Other:      PLAN :  Recommendations and Planned Interventions:  [x]           Bed Mobility Training             [x]    Neuromuscular Re-Education  [x]           Transfer Training                   []    Orthotic/Prosthetic Training  [x]           Gait Training                         []    Modalities  [x]           Therapeutic Exercises           []    Edema Management/Control  [x]           Therapeutic Activities            [x]    Patient and Family Training/Education  []           Other (comment):    Frequency/Duration: Patient will be followed by physical therapy  daily to address goals. Discharge Recommendations: Home Health vs None  Further Equipment Recommendations for Discharge: TBD     SUBJECTIVE:   Patient stated no no no I get by by myself.     OBJECTIVE DATA SUMMARY:   HISTORY:    Past Medical History:   Diagnosis Date    Adverse effect of anesthesia     difficulty waking    Aortic stenosis     Arrhythmia     rapid heartrate seen in ER  4/2/18    CAD (coronary artery disease)     pt states she does not have this    Cancer ([facility] [address] 01/2018    left forehead and left ankle    Chronic pain     back - spinal stenosis    COPD     Diabetes ([facility]     Diabetic neuropathy ([facility] [address]     Diverticulosis of colon     GERD (gastroesophageal reflux disease)     Hiatal Hernia - pt states she does not have this    Hypercholesterolemia     Hypertension     CINDA (obstructive sleep apnea) 08-    AHI: 18.6 per hour    Other ill-defined conditions(799.89)     heart murmur    Other ill-defined conditions(799.89)     colon infection    Peripheral neuropathy     Renal artery stenosis (HCC)     Right    Unspecified adverse effect of anesthesia     delayed awakening - \"does not need as much\"    UTI (urinary tract infection)      Past Surgical History:   Procedure Laterality Date    ABDOMEN SURGERY PROC UNLISTED  1977    R Renal Artery Stenosis repair    ABDOMEN SURGERY PROC UNLISTED  1982 or [address] [facility] [address]   [address]    Left - benign cyst    ENDOSCOPY, COLON, DIAGNOSTIC  1999    hx of polys in the past    HX GYN  1966    D&C    HX HEENT      Bilat Cataracts    HX HEENT      R Retinal Hem    HX HEENT      Eyelid Cysts x 4    HX HEENT      cyst removed above eyebrow    HX HEENT      oral surgeries - gum surgery/floating root  removed x 2    HX ORTHOPAEDIC  11/06    L1-S1 2007    HX ORTHOPAEDIC  1985    L 4th Trigger finger    HX ORTHOPAEDIC      left knee arthroscopy    HX OTHER SURGICAL      renal artery bypass    HX OTHER SURGICAL      tonsillectomy  adenoidectomy    HX OTHER SURGICAL      retinal hemorrhage    HX OTHER SURGICAL      cataract bilateral    HX OTHER SURGICAL      back surgery    HX TONSILLECTOMY      T & A     Prior Level of Function/Home Situation: independent, utilizes chair lift and RW  Personal factors and/or comorbidities impacting plan of care:     Home Situation  Home Environment: Private residence  # Steps to Enter: 15  One/Two Story Residence: Two story  Lift Chair Available: No  Living Alone: Yes  Support Systems: Family member(s)  Patient Expects to be Discharged to[de-identified] Private residence  Current DME Used/Available at Home: [employer] Teran aides, Cane, straight, Walker, rolling, Shower chair  Tub or Shower Type: Shower    EXAMINATION/PRESENTATION/DECISION MAKING:   Critical Behavior:  Neurologic State: Alert  Orientation Level: Oriented X4  Cognition: Appropriate decision making, Appropriate safety awareness, Appropriate for age attention/concentration, Follows commands  Safety/Judgement: Awareness of environment, Good awareness of safety precautions  Hearing: Auditory  Auditory Impairment: None  Hearing Aids/Status:  (NONE)  Skin:    Edema:   Range Of Motion:  AROM: Within functional limits                       Strength:    Strength: Within functional limits                    Tone & Sensation:   Tone: Normal              Sensation: Intact               Coordination:  Coordination: Within functional limits  Vision:   Acuity: Impaired near vision; Impaired far vision (baseline)  Corrective Lenses: Glasses  Functional Mobility:  Bed Mobility:     Supine to Sit: Contact guard assistance  Sit to Supine: Contact guard assistance     Transfers:  Sit to Stand: Contact guard assistance  Stand to Sit: Contact guard assistance                       Balance:   Sitting: Intact; Without support  Ambulation/Gait Training:                                                         Stairs: Therapeutic Exercises:       Functional Measure:  Tinetti test:    Sitting Balance: 1  Arises: 1  Attempts to Rise: 2  Immediate Standing Balance: 1  Standing Balance: 1  Nudged: 1  Eyes Closed: 1  Turn 360 Degrees - Continuous/Discontinuous: 1  Turn 360 Degrees - Steady/Unsteady: 1  Sitting Down: 1  Balance Score: 11  Indication of Gait: 0  R Step Length/Height: 0  L Step Length/Height: 0  R Foot Clearance: 0  L Foot Clearance: 0  Step Symmetry: 0  Step Continuity: 0  Path: 0  Trunk: 0  Walking Time: 0  Gait Score: 0  Total Score: 11       Tinetti Test and G-code impairment scale:  Percentage of Impairment CH    0%   CI    1-19% CJ    20-39% CK    40-59% CL    60-79% CM    80-99% CN     100%   Tinetti  Score 0-28 28 23-27 17-22 12-16 6-11 1-5 0       Tinetti Tool Score Risk of Falls  <19 = High Fall Risk  19-24 = Moderate Fall Risk  25-28 = Low Fall Risk  Allanetti ME. Performance-Oriented Assessment of Mobility Problems in Elderly Patients. Moore 66; [id number].  (Scoring Description: PT Bulletin Feb. 10, 1993)    Older adults: Jennifer Villalobos et al, 2009; n = 1000 [facility] elderly evaluated with ABC, MAJOR, ADL, and IADL)  · Mean MAJOR score for males aged 69-68 years = 26.21(3.40)  · Mean MAJOR score for females age 69-68 years = 25.16(4.30)  · Mean MAJOR score for males over 80 years = 23.29(6.02)  · Mean MAJOR score for females over 80 years = 17.20(8.32)         G codes: In compliance with CMSs Claims Based Outcome Reporting, the following G-code set was chosen for this patient based on their primary functional limitation being treated: The outcome measure chosen to determine the severity of the functional limitation was the Tinetti with a score of 11/28 which was correlated with the impairment scale. ? Mobility - Walking and Moving Around:     - CURRENT STATUS: CL - 60%-79% impaired, limited or restricted    - GOAL STATUS: CI - 1%-19% impaired, limited or restricted    - D/C STATUS:  ---------------To be determined---------------      Physical Therapy Evaluation Charge Determination   History Examination Presentation Decision-Making   LOW Complexity : Zero comorbidities / personal factors that will impact the outcome / POC MEDIUM Complexity : 3 Standardized tests and measures addressing body structure, function, activity limitation and / or participation in recreation  LOW Complexity : Stable, uncomplicated  Other outcome measures Tinetti  MEDIUM      Based on the above components, the patient evaluation is determined to be of the following complexity level: LOW     Pain:  Pain Scale 1: Numeric (0 - 10)  Pain Intensity 1: 2  Pain Location 1: Abdomen  Pain Orientation 1: Lower  Pain Description 1: Aching; Intermittent  Pain Intervention(s) 1: Medication (see MAR)  Activity Tolerance:   Good, limited by lines, VSS throughout  Please refer to the flowsheet for vital signs taken during this treatment.   After treatment:   []         Patient left in no apparent distress sitting up in chair  [x]         Patient left in no apparent distress in bed  [x]         Call bell left within reach  [x]         Nursing notified  []         Caregiver present  []         Bed alarm activated    COMMUNICATION/EDUCATION:   The patients plan of care was discussed with: Occupational Therapist and Registered Nurse. [x]         Fall prevention education was provided and the patient/caregiver indicated understanding. [x]         Patient/family have participated as able in goal setting and plan of care. [x]         Patient/family agree to work toward stated goals and plan of care. []         Patient understands intent and goals of therapy, but is neutral about his/her participation. []         Patient is unable to participate in goal setting and plan of care.     Thank you for this referral.  Steffi Mejia PT, DPT   Time Calculation: 24 mins

## 2018-04-13 NOTE — PROGRESS NOTES
1930 rcd report from [address] is sitting upright in bed  Denies discomfort    2000 c/o chronic low back pain also \" I need some sleep tonight\" I haven't had a good nite sleep since Wednesday\"  Tramadol given  Refused to wear C Pap at bedtime      0800 Bedside and Verbal shift change report given to RN (oncoming nurse) by Anni Beckman (offgoing nurse). Report included the following information SBAR, Kardex, Procedure Summary, Intake/Output, MAR, Recent Results and Cardiac Rhythm Taz BHAVIN Eddy.

## 2018-04-13 NOTE — PROGRESS NOTES
Problem: Falls - Risk of  Goal: *Absence of Falls  Document Ori Fall Risk and appropriate interventions in the flowsheet.    Outcome: Progressing Towards Goal  Fall Risk Interventions:  Mobility Interventions: Assess mobility with egress test, Bed/chair exit alarm, OT consult for ADLs, Patient to call before getting OOB, PT Consult for mobility concerns, PT Consult for assist device competence, Strengthening exercises (ROM-active/passive)         Medication Interventions: Assess postural VS orthostatic hypotension, Bed/chair exit alarm, Evaluate medications/consider consulting pharmacy, Patient to call before getting OOB, Teach patient to arise slowly    Elimination Interventions: Bed/chair exit alarm, Call light in reach, Patient to call for help with toileting needs, Toilet paper/wipes in reach, Toileting schedule/hourly rounds

## 2018-04-13 NOTE — PROGRESS NOTES
Reason for Admission:   Patient had aTranscatheter Aortic Valve Replacement          RRAT Score:  18     Do you (patient/family) have any concerns for transition/discharge? No indication at this time. Patient lives alone in her private residence, she is  and independent with her ADL's and IADL's. She already has 2 caregivers arranged to help with household chores,transportation, etc. Patient's daughter in-law is an OBGYN at [facility]. She has 3 sons, one in [place], [place] and [place]. One of her sons will provide transportation home. Plan for utilizing home health: Patient will be followed by [address] [place] [address] 3 x week x 2 for med rec, vital signs and wound care. Likelihood of readmission? Low. Patient will be followed by the cardiac surgical group and her PCP. Transition of Care Plan: Patient will be discharged home in care of her family, private caregivers and followed by [facility] X 2 weeks. Care Management Interventions  PCP Verified by CM: Yes  Palliative Care Criteria Met (RRAT>21 & CHF Dx)?: No  Mode of Transport at Discharge: Other (see comment) (Private car)  Transition of Care Consult (CM Consult): Discharge Planning  MyChart Signup: No  Discharge Durable Medical Equipment: No  Health Maintenance Reviewed: Yes  Physical Therapy Consult: Yes  Occupational Therapy Consult: Yes  Speech Therapy Consult: No  Current Support Network: Lives Alone  Confirm Follow Up Transport: Family  Plan discussed with Pt/Family/Caregiver: Yes  Freedom of Choice Offered: Yes  Discharge Location  Discharge Placement: Home with home health  [facility], Nash E Serjio JIMENEZ RN, CRM. 2:45 pm. CM discussed discharge planning with patient, offered a choice, she chose [facility]. Referral sent to [facility] via [employer]. [place] of Choice form signed and placed in patient's chart. Pat Horn MSA, RN, CRM.

## 2018-04-13 NOTE — PROGRESS NOTES
Problem: Falls - Risk of  Goal: *Absence of Falls  Document Ori Fall Risk and appropriate interventions in the flowsheet.    Outcome: Progressing Towards Goal  Fall Risk Interventions:  Mobility Interventions: Communicate number of staff needed for ambulation/transfer         Medication Interventions: Evaluate medications/consider consulting pharmacy    Elimination Interventions: Call light in reach

## 2018-04-13 NOTE — PROGRESS NOTES
[facility] ICU Progress Note    Admit Date: 2018  POD:  1 Day Post-Op    Procedure:  Procedure(s):  TRANSCATHETER AORTIC VALVE REPLACEMENT Right TRANSFEMORAL 26mm S3 - CARMEN by Dr. Edelmira Wei        Subjective:   Pt seen with Dr. Benito Roger. On carito 25. Tmax 99.8, on 4 L NC.    EKG shows new R BBB and L anterior fascicular block. Shabbir 46s. ? P waves      Objective:   Vitals:  Blood pressure 114/55, pulse (!) 52, temperature 98.5 °F (36.9 °C), resp. rate 17, height 5' 6\" (1.676 m), weight 204 lb 2.3 oz (92.6 kg), SpO2 97 %, not currently breastfeeding. Temp (24hrs), Av.3 °F (37.4 °C), Min:98.5 °F (36.9 °C), Max:99.8 °F (37.7 °C)    Hemodynamics:   CO: CO (l/min): 4.6 l/min   CI: CI (l/min/m2): 2.3 l/min/m2   CVP: CVP (mmHg): 24 mmHg (18)   SVR:     PAP Systolic: PAP Systolic: 42 (87/88/52 4883)   PAP Diastolic: PAP Diastolic: 33 (65/95/93 6556)   PVR:     SV02: SVO2 (%): 46 % (18)   SCV02:      EKG/Rhythm:   See above     Extubation Date / Time:  1118am     Oxygen Therapy:  Oxygen Therapy  O2 Sat (%): 97 % (18)  Pulse via Oximetry: 52 beats per minute (18)  O2 Device: Nasal cannula (18)  O2 Flow Rate (L/min): 4 l/min (18)    CXR:  Cardiomegaly.     Status post aortic stent graft.     Subtle interstitial edema and suspected trace left pleural effusion.     Admission Weight: Last Weight   Weight: 194 lb (88 kg) Weight: 204 lb 2.3 oz (92.6 kg)     Intake / Output / Drain:  Current Shift:    Last 24 hrs.:     Intake/Output Summary (Last 24 hours) at 18 1004  Last data filed at 18 0600   Gross per 24 hour   Intake          2194.84 ml   Output              795 ml   Net          1399.84 ml       EXAM:  General:  In no obvious distress                                                                                           Lungs:   Clear to auscultation upper, diminished in bases    Incision:  Groin drs CDI   Heart:  Regular rate and rhythm, S1, S2 normal, no murmur, click, rub or gallop. Abdomen:   Soft, non-tender. Bowel sounds normal. No masses,  No organomegaly. Extremities:  No edema. PPP. Neurologic:  Gross motor and sensory apparatus intact. Labs:   Recent Labs      18   0656  18   0330   18   1241   WBC   --   9.5   --    --    HGB   --   10.7*   --    --    HCT   --   34.6*   --    --    PLT   --   168   --    --    NA   --   141   < >   --    K   --   3.9   < >   --    BUN   --   19   < >   --    CREA   --   0.74   < >   --    GLU   --   99   < >   --    GLUCPOC  111*   --    < >   --    INR   --    --    --   1.1    < > = values in this interval not displayed. Assessment:     Active Problems: Aortic stenosis (2011)      S/P TAVR (transcatheter aortic valve replacement) (2018)         Plan/Recommendations/Medical Decision Makin. AS s/p TAVR:  On asa. EKG shows new RBBB and left anterior fasc block. Consult EP. Trend EKG daily. 2. Hypotension: Cont ANDIE For MAP > 65.    3. TR[place]ON-  bypassed in    4. DM- Last HgbA1c 7.6. BS ACHS, SSI per orders. Resume metformin >48 hrs postop. If BS > 140 consistently, consider amaryl 1 mg PO daily. 5. HLD- resume statin. 6. Vit D deficiency- at goal on supplement per cards  7. CINDA- CPAP for past 6+ yrs. 8. Hx vocal cord injury w/ intubation in  - chronic horse voice. No swallowing issues. 9. Atelectasis/pleural effusion:  Wean oxygen for 02 sat > 90%. Increase IS and activity as tolerated. Hold diuresis today. 10. Dispo:  Remain in CCU.       Signed By: Eric Almonte NP

## 2018-04-14 ENCOUNTER — APPOINTMENT (OUTPATIENT)
Dept: GENERAL RADIOLOGY | Age: 83
DRG: 267 | End: 2018-04-14
Attending: NURSE PRACTITIONER
Payer: MEDICARE

## 2018-04-14 LAB
ALBUMIN SERPL-MCNC: 2.8 G/DL (ref 3.5–5)
ALBUMIN/GLOB SERPL: 0.9 {RATIO} (ref 1.1–2.2)
ALP SERPL-CCNC: 68 U/L (ref 45–117)
ALT SERPL-CCNC: 14 U/L (ref 12–78)
ANION GAP SERPL CALC-SCNC: 5 MMOL/L (ref 5–15)
ARTERIAL PATENCY WRIST A: ABNORMAL
AST SERPL-CCNC: 23 U/L (ref 15–37)
ATRIAL RATE: 153 BPM
BASE EXCESS BLDV CALC-SCNC: 1 MMOL/L
BDY SITE: ABNORMAL
BILIRUB SERPL-MCNC: 0.6 MG/DL (ref 0.2–1)
BUN SERPL-MCNC: 23 MG/DL (ref 6–20)
BUN/CREAT SERPL: 37 (ref 12–20)
CALCIUM SERPL-MCNC: 8.4 MG/DL (ref 8.5–10.1)
CALCULATED R AXIS, ECG10: 118 DEGREES
CALCULATED T AXIS, ECG11: 6 DEGREES
CHLORIDE SERPL-SCNC: 108 MMOL/L (ref 97–108)
CO2 SERPL-SCNC: 26 MMOL/L (ref 21–32)
CREAT SERPL-MCNC: 0.63 MG/DL (ref 0.55–1.02)
DIAGNOSIS, 93000: NORMAL
ERYTHROCYTE [DISTWIDTH] IN BLOOD BY AUTOMATED COUNT: 15.3 % (ref 11.5–14.5)
GAS FLOW.O2 O2 DELIVERY SYS: ABNORMAL L/MIN
GAS FLOW.O2 SETTING OXYMISER: 2 L/M
GLOBULIN SER CALC-MCNC: 3 G/DL (ref 2–4)
GLUCOSE BLD STRIP.AUTO-MCNC: 140 MG/DL (ref 65–100)
GLUCOSE BLD STRIP.AUTO-MCNC: 141 MG/DL (ref 65–100)
GLUCOSE BLD STRIP.AUTO-MCNC: 170 MG/DL (ref 65–100)
GLUCOSE BLD STRIP.AUTO-MCNC: 179 MG/DL (ref 65–100)
GLUCOSE SERPL-MCNC: 124 MG/DL (ref 65–100)
HCO3 BLDV-SCNC: 26.2 MMOL/L (ref 23–28)
HCT VFR BLD AUTO: 34.1 % (ref 35–47)
HGB BLD-MCNC: 10.5 G/DL (ref 11.5–16)
MAGNESIUM SERPL-MCNC: 2.4 MG/DL (ref 1.6–2.4)
MCH RBC QN AUTO: 28.3 PG (ref 26–34)
MCHC RBC AUTO-ENTMCNC: 30.8 G/DL (ref 30–36.5)
MCV RBC AUTO: 91.9 FL (ref 80–99)
NRBC # BLD: 0 K/UL (ref 0–0.01)
NRBC BLD-RTO: 0 PER 100 WBC
PCO2 BLDV: 43.4 MMHG (ref 41–51)
PH BLDV: 7.39 [PH] (ref 7.32–7.42)
PLATELET # BLD AUTO: ABNORMAL K/UL (ref 150–400)
PO2 BLDV: 30 MMHG (ref 25–40)
POTASSIUM SERPL-SCNC: 4.1 MMOL/L (ref 3.5–5.1)
PROT SERPL-MCNC: 5.8 G/DL (ref 6.4–8.2)
Q-T INTERVAL, ECG07: 474 MS
QRS DURATION, ECG06: 130 MS
QTC CALCULATION (BEZET), ECG08: 465 MS
RBC # BLD AUTO: 3.71 M/UL (ref 3.8–5.2)
SAO2 % BLDV: 57 % (ref 65–88)
SERVICE CMNT-IMP: ABNORMAL
SODIUM SERPL-SCNC: 139 MMOL/L (ref 136–145)
SPECIMEN TYPE: ABNORMAL
VENTRICULAR RATE, ECG03: 58 BPM
WBC # BLD AUTO: 7.7 K/UL (ref 3.6–11)

## 2018-04-14 PROCEDURE — 74011250637 HC RX REV CODE- 250/637: Performed by: PHYSICIAN ASSISTANT

## 2018-04-14 PROCEDURE — 85027 COMPLETE CBC AUTOMATED: CPT | Performed by: NURSE PRACTITIONER

## 2018-04-14 PROCEDURE — 71045 X-RAY EXAM CHEST 1 VIEW: CPT

## 2018-04-14 PROCEDURE — 82803 BLOOD GASES ANY COMBINATION: CPT

## 2018-04-14 PROCEDURE — 74011250637 HC RX REV CODE- 250/637: Performed by: NURSE PRACTITIONER

## 2018-04-14 PROCEDURE — 82962 GLUCOSE BLOOD TEST: CPT

## 2018-04-14 PROCEDURE — 36415 COLL VENOUS BLD VENIPUNCTURE: CPT | Performed by: NURSE PRACTITIONER

## 2018-04-14 PROCEDURE — 80053 COMPREHEN METABOLIC PANEL: CPT | Performed by: NURSE PRACTITIONER

## 2018-04-14 PROCEDURE — 93005 ELECTROCARDIOGRAM TRACING: CPT

## 2018-04-14 PROCEDURE — 65660000000 HC RM CCU STEPDOWN

## 2018-04-14 PROCEDURE — 83735 ASSAY OF MAGNESIUM: CPT | Performed by: NURSE PRACTITIONER

## 2018-04-14 PROCEDURE — 97116 GAIT TRAINING THERAPY: CPT

## 2018-04-14 PROCEDURE — 77010033678 HC OXYGEN DAILY

## 2018-04-14 PROCEDURE — 77030027138 HC INCENT SPIROMETER -A

## 2018-04-14 RX ORDER — SODIUM CHLORIDE 0.9 % (FLUSH) 0.9 %
5-10 SYRINGE (ML) INJECTION AS NEEDED
Status: DISCONTINUED | OUTPATIENT
Start: 2018-04-14 | End: 2018-04-15 | Stop reason: HOSPADM

## 2018-04-14 RX ORDER — EZETIMIBE 10 MG/1
10 TABLET ORAL DAILY
Status: DISCONTINUED | OUTPATIENT
Start: 2018-04-15 | End: 2018-04-15 | Stop reason: HOSPADM

## 2018-04-14 RX ORDER — DILTIAZEM HYDROCHLORIDE 180 MG/1
180 CAPSULE, COATED, EXTENDED RELEASE ORAL DAILY
Status: DISCONTINUED | OUTPATIENT
Start: 2018-04-15 | End: 2018-04-14

## 2018-04-14 RX ORDER — MELATONIN
2000
Status: DISCONTINUED | OUTPATIENT
Start: 2018-04-14 | End: 2018-04-15 | Stop reason: HOSPADM

## 2018-04-14 RX ORDER — SODIUM CHLORIDE 0.9 % (FLUSH) 0.9 %
5-10 SYRINGE (ML) INJECTION EVERY 8 HOURS
Status: DISCONTINUED | OUTPATIENT
Start: 2018-04-14 | End: 2018-04-15 | Stop reason: HOSPADM

## 2018-04-14 RX ADMIN — ASPIRIN 81 MG 81 MG: 81 TABLET ORAL at 09:12

## 2018-04-14 RX ADMIN — ROSUVASTATIN CALCIUM 5 MG: 10 TABLET, FILM COATED ORAL at 22:49

## 2018-04-14 RX ADMIN — Medication 400 MG: at 09:12

## 2018-04-14 RX ADMIN — STANDARDIZED SENNA CONCENTRATE AND DOCUSATE SODIUM 1 TABLET: 8.6; 5 TABLET, FILM COATED ORAL at 18:14

## 2018-04-14 RX ADMIN — Medication 10 ML: at 08:19

## 2018-04-14 RX ADMIN — STANDARDIZED SENNA CONCENTRATE AND DOCUSATE SODIUM 1 TABLET: 8.6; 5 TABLET, FILM COATED ORAL at 09:12

## 2018-04-14 RX ADMIN — FAMOTIDINE 20 MG: 20 TABLET, FILM COATED ORAL at 22:49

## 2018-04-14 RX ADMIN — Medication 10 ML: at 22:00

## 2018-04-14 RX ADMIN — VITAMIN D, TAB 1000IU (100/BT) 2000 UNITS: 25 TAB at 15:16

## 2018-04-14 RX ADMIN — Medication 10 ML: at 15:17

## 2018-04-14 RX ADMIN — FAMOTIDINE 20 MG: 20 TABLET, FILM COATED ORAL at 09:12

## 2018-04-14 RX ADMIN — Medication 400 MG: at 18:14

## 2018-04-14 NOTE — PROGRESS NOTES
Cardiac Electrophysiology Progress Note         4/14/2018 10:56 AM    Admit Date: 4/12/2018    Admit Diagnosis: AORTIC STENOSIS;Aortic stenosis      Subjective:      Leta Farnsworth does not have syncope or dizziness  She wants to go home  Past Medical History:   Diagnosis Date    Adverse effect of anesthesia     difficulty waking    Aortic stenosis     Arrhythmia     rapid heartrate seen in ER  4/2/18    CAD (coronary artery disease)     pt states she does not have this    Cancer ([facility] [address] 01/2018    left forehead and left ankle    Chronic pain     back - spinal stenosis    COPD     Diabetes ([facility] [address]     Diabetic neuropathy ([facility] [address]     Diverticulosis of colon     GERD (gastroesophageal reflux disease)     Hiatal Hernia - pt states she does not have this    Hypercholesterolemia     Hypertension     CINDA (obstructive sleep apnea) 08-    AHI: 18.6 per hour    Other ill-defined conditions(799.89)     heart murmur    Other ill-defined conditions(799.89)     colon infection    Peripheral neuropathy     Renal artery stenosis (HCC)     Right    Unspecified adverse effect of anesthesia     delayed awakening - \"does not need as much\"    UTI (urinary tract infection)      Past Surgical History:   Procedure Laterality Date    ABDOMEN SURGERY PROC UNLISTED  1977    R Renal Artery Stenosis repair    ABDOMEN SURGERY PROC UNLISTED  1982 or 1983    Herniorrhaphy    ABDOMEN SURGERY [facility]   [facility]    Left - benign cyst    ENDOSCOPY, COLON, DIAGNOSTIC  1999    hx of polys in the past    HX GYN  1966    D&C    HX HEENT      Bilat Cataracts    HX HEENT      R Retinal Hem    HX HEENT      Eyelid Cysts x 4    HX HEENT      cyst removed above eyebrow    HX HEENT      oral surgeries - gum surgery/floating root  removed x 2    HX ORTHOPAEDIC  11/06    L1-S1 2007    HX ORTHOPAEDIC  1985    L 4th Trigger finger    HX ORTHOPAEDIC      left knee arthroscopy    HX OTHER SURGICAL      renal artery bypass    HX OTHER SURGICAL      tonsillectomy  adenoidectomy    HX OTHER SURGICAL      retinal hemorrhage    HX OTHER SURGICAL      cataract bilateral    HX OTHER SURGICAL      back surgery    HX TONSILLECTOMY      T & A     Social History     Social History    Marital status:      Spouse name: N/A    Number of children: N/A    Years of education: N/A     Occupational History    Not on file.      Social History Main Topics    Smoking status: Former Smoker     Packs/day: 1.50     Years: 13.00     Types: Cigarettes     Quit date: 6/6/1977    Smokeless tobacco: Never Used      Comment: former cigarette smoker    Alcohol use No    Drug use: No    Sexual activity: No     Other Topics Concern    Not on file     Social History Narrative         Visit Vitals    BP (!) 106/93    Pulse 65    Temp 98.5 °F (36.9 °C)    Resp 30    Ht 5' 6\" (1.676 m)    Wt 210 lb 1.6 oz (95.3 kg)    SpO2 96%    Breastfeeding No    BMI 33.91 kg/m2     Current Facility-Administered Medications   Medication Dose Route Frequency    rosuvastatin (CRESTOR) tablet 5 mg  5 mg Oral QHS    sodium chloride (NS) flush 5-10 mL  5-10 mL IntraVENous Q8H    sodium chloride (NS) flush 5-10 mL  5-10 mL IntraVENous PRN    0.45% sodium chloride infusion  10 mL/hr IntraVENous CONTINUOUS    0.9% sodium chloride infusion  9 mL/hr IntraVENous CONTINUOUS    acetaminophen (TYLENOL) tablet 650 mg  650 mg Oral Q4H PRN    traMADol (ULTRAM) tablet  mg   mg Oral Q6H PRN    oxyCODONE-acetaminophen (PERCOCET) 5-325 mg per tablet 1-2 Tab  1-2 Tab Oral Q4H PRN    naloxone (NARCAN) injection 0.4 mg  0.4 mg IntraVENous PRN    ondansetron (ZOFRAN) injection 4 mg  4 mg IntraVENous Q4H PRN    albuterol (PROVENTIL VENTOLIN) nebulizer solution 2.5 mg  2.5 mg Nebulization Q4H PRN    aspirin chewable tablet 81 mg  81 mg Oral DAILY    famotidine (PEPCID) tablet 20 mg  20 mg Oral Q12H    magnesium oxide (MAG-OX) tablet 400 mg  400 mg Oral BID    calcium chloride 1 g in 0.9% sodium chloride 50 mL IVPB  1 g IntraVENous PRN    bisacodyl (DULCOLAX) suppository 10 mg  10 mg Rectal DAILY PRN    senna-docusate (PERICOLACE) 8.6-50 mg per tablet 1 Tab  1 Tab Oral BID    ELECTROLYTE REPLACEMENT PROTOCOL  1 Each Other PRN    glucose chewable tablet 16 g  4 Tab Oral PRN    dextrose (D50W) injection syrg 12.5-25 g  12.5-25 g IntraVENous PRN    glucagon (GLUCAGEN) injection 1 mg  1 mg IntraMUSCular PRN    insulin lispro (HUMALOG) injection   SubCUTAneous AC&HS    sodium chloride (NS) flush 20 mL  20 mL InterCATHeter PRN    sodium chloride (NS) flush 10 mL  10 mL InterCATHeter Q24H    sodium chloride (NS) flush 10 mL  10 mL InterCATHeter PRN    sodium chloride (NS) flush 10 mL  10 mL InterCATHeter Q8H    alteplase (CATHFLO) 1 mg in sterile water (preservative free) 1 mL injection  1 mg InterCATHeter PRN    bacitracin 500 unit/gram packet 1 Packet  1 Packet Topical PRN    niCARdipine (CARDENE) 50 mg in 0.9% sodium chloride 100 mL infusion  5-15 mg/hr IntraVENous TITRATE    PHENYLephrine (ANDIE-SYNEPHRINE) 30 mg in 0.9% sodium chloride 250 mL infusion   mcg/min IntraVENous TITRATE         Objective:      Physical Exam:  Visit Vitals    BP 93/66    Pulse 62    Temp 98.5 °F (36.9 °C)    Resp 18    Ht 5' 6\" (1.676 m)    Wt 210 lb 1.6 oz (95.3 kg)    SpO2 96%    Breastfeeding No    BMI 33.91 kg/m2     General Appearance:  Well developed, well nourished,alert and oriented x 3, and individual in no acute distress. Ears/Nose/Mouth/Throat:   Hearing grossly normal.         Neck: Supple. Chest:   Lungs clear to auscultation bilaterally. Cardiovascular:  Regular rate and rhythm, no murmur. Abdomen:   Soft, obese   Extremities: No edema bilaterally. Skin: Warm and dry.                Data Review:   Labs:    Recent Results (from the past 24 hour(s))   GLUCOSE, POC    Collection Time: 04/13/18 12:08 PM   Result Value Ref Range    Glucose (POC) 151 (H) 65 - 100 mg/dL    Performed by Garrison Winchester    GLUCOSE, POC    Collection Time: 04/13/18  4:24 PM   Result Value Ref Range    Glucose (POC) 134 (H) 65 - 100 mg/dL    Performed by Garrison Winchester    GLUCOSE, POC    Collection Time: 04/13/18  9:07 PM   Result Value Ref Range    Glucose (POC) 196 (H) 65 - 100 mg/dL    Performed by Rach Alves    METABOLIC PANEL, COMPREHENSIVE    Collection Time: 04/14/18  3:42 AM   Result Value Ref Range    Sodium 139 136 - 145 mmol/L    Potassium 4.1 3.5 - 5.1 mmol/L    Chloride 108 97 - 108 mmol/L    CO2 26 21 - 32 mmol/L    Anion gap 5 5 - 15 mmol/L    Glucose 124 (H) 65 - 100 mg/dL    BUN 23 (H) 6 - 20 MG/DL    Creatinine 0.63 0.55 - 1.02 MG/DL    BUN/Creatinine ratio 37 (H) 12 - 20      GFR est AA >60 >60 ml/min/1.73m2    GFR est non-AA >60 >60 ml/min/1.73m2    Calcium 8.4 (L) 8.5 - 10.1 MG/DL    Bilirubin, total 0.6 0.2 - 1.0 MG/DL    ALT (SGPT) 14 12 - 78 U/L    AST (SGOT) 23 15 - 37 U/L    Alk. phosphatase 68 45 - 117 U/L    Protein, total 5.8 (L) 6.4 - 8.2 g/dL    Albumin 2.8 (L) 3.5 - 5.0 g/dL    Globulin 3.0 2.0 - 4.0 g/dL    A-G Ratio 0.9 (L) 1.1 - 2.2     MAGNESIUM    Collection Time: 04/14/18  3:42 AM   Result Value Ref Range    Magnesium 2.4 1.6 - 2.4 mg/dL   CBC W/O DIFF    Collection Time: 04/14/18  3:42 AM   Result Value Ref Range    WBC 7.7 3.6 - 11.0 K/uL    RBC 3.71 (L) 3.80 - 5.20 M/uL    HGB 10.5 (L) 11.5 - 16.0 g/dL    HCT 34.1 (L) 35.0 - 47.0 %    MCV 91.9 80.0 - 99.0 FL    MCH 28.3 26.0 - 34.0 PG    MCHC 30.8 30.0 - 36.5 g/dL    RDW 15.3 (H) 11.5 - 14.5 %    PLATELET  545 - 841 K/uL     UNABLE TO REPORT ACCURATE COUNT DUE TO PLATELET AGGREGATION, HOWEVER, PLATELETS APPEAR NORMAL IN NUMBER ON SMEAR. PLEASE RESUBMIT SODIUM CITRATE (BLUE) AND EDTA (LAVENDER) TUBES FOR HEMATOLOGICAL TESTING.     NRBC 0.0 0  WBC    ABSOLUTE NRBC 0.00 0.00 - 0.01 K/uL   POC VENOUS BLOOD GAS    Collection Time: 04/14/18  6:06 AM   Result Value Ref Range    Device: NASAL CANNULA      Flow rate (POC) 2 L/M    pH, venous (POC) 7.389 7.32 - 7.42      pCO2, venous (POC) 43.4 41 - 51 MMHG    pO2, venous (POC) 30 25 - 40 mmHg    HCO3, venous (POC) 26.2 23.0 - 28.0 MMOL/L    sO2, venous (POC) 57 (L) 65 - 88 %    Base excess, venous (POC) 1 mmol/L    Allens test (POC) N/A      Site SWAN ELVA      Specimen type (POC) MIXED VENOUS     EKG, 12 LEAD, INITIAL    Collection Time: 04/14/18  7:24 AM   Result Value Ref Range    Ventricular Rate 58 BPM    Atrial Rate 153 BPM    QRS Duration 130 ms    Q-T Interval 474 ms    QTC Calculation (Bezet) 465 ms    Calculated R Axis 118 degrees    Calculated T Axis 6 degrees    Diagnosis       Atrial fibrillation with slow ventricular response  Right bundle branch block  When compared with ECG of 13-APR-2018 07:39,  No significant change was found  Confirmed by Reynaldo Parry MD. ([id number]) on 4/14/2018 7:53:47 AM     GLUCOSE, POC    Collection Time: 04/14/18  7:34 AM   Result Value Ref Range    Glucose (POC) 140 (H) 65 - 100 mg/dL    Performed by [facility]        Telemetry: no av block  ECG ? Atrial fibrillation but I think it is NSR as RR interval regular, RBBB and RAD  (sinus rhythm until 4/3/2018)      Assessment:     Active Problems:     Aortic stenosis (12/1/2011)      S/P TAVR (transcatheter aortic valve replacement) (4/13/2018)      Obesity (BMI 30.0-34.9) (4/13/2018)      RBBB  Plan:     I do not think she needs pacemaker at this time  I have discussed with her son and her and communicated with CT surgery team and attending Dr Tracie Schwab today  She will get Lupie Maricarmen and arterial line out   Transfer to [facility] bed and home tomorrow  Future Appointments  Date Time Provider Beau Driscoll   5/16/2018 9:30 AM Kellen Abrams RN [facility]   9/18/2018 9:40 AM Dwayne Sanchez MD [facility] [address]   9/25/2018 9:20 AM Param Ritchie, MD [facility] [address]     I will sign off for now but please call if clinical change is noted  Sharlene Pruitt M.D.  [facility]  Electrophysiology/Cardiology  [facility] [place]  [address], [place] [address], [address]  [facility], [address]                             [address] [place] [address]  [phone]

## 2018-04-14 NOTE — PROGRESS NOTES
1200: TRANSFER - IN REPORT:    Verbal report received from IMVU (name) on Ponce Gonzales  being received from CCU (unit) for routine progression of care      Report consisted of patients Situation, Background, Assessment and   Recommendations(SBAR). Information from the following report(s) SBAR, Kardex, Intake/Output and Cardiac Rhythm SR was reviewed with the receiving nurse. Opportunity for questions and clarification was provided. Assessment completed upon patients arrival to unit and care assumed. 1930: Bedside shift change report given to Barbara RN (oncoming nurse) by Monica Low RN (offgoing nurse). Report included the following information SBAR, Kardex, Intake/Output and Cardiac Rhythm NSR. Problem: Diabetes Self-Management  Goal: *Disease process and treatment process  Define diabetes and identify own type of diabetes; list 3 options for treating diabetes. Outcome: Progressing Towards Goal  Patient understands diabetic disease process and diet    Problem: Falls - Risk of  Goal: *Absence of Falls  Document Ori Fall Risk and appropriate interventions in the flowsheet. Outcome: Progressing Towards Goal  Patient ambulating to bathroom with walker, will call if needs help to bathroom. Fall Risk Interventions:  Mobility Interventions: Patient to call before getting OOB         Medication Interventions: Patient to call before getting OOB    Elimination Interventions: Patient to call for help with toileting needs             Problem: Infection - Risk of, Central Venous Catheter-Associated Bloodstream Infection  Goal: *Absence of infection signs and symptoms  Outcome: Progressing Towards Goal  Patient using good hand hygiene. Problem: Post-procedure Day 1,TAVR  Goal: *Stable Cardiac Rhythm  Outcome: Progressing Towards Goal  Vitals stable. Denies SOB or chest pain. Ambulating to bathroom. Up with all meals.

## 2018-04-14 NOTE — PROGRESS NOTES
Problem: Falls - Risk of  Goal: *Absence of Falls  Document Ori Fall Risk and appropriate interventions in the flowsheet.    Outcome: Progressing Towards Goal  Fall Risk Interventions:  Mobility Interventions: Assess mobility with egress test, Communicate number of staff needed for ambulation/transfer, OT consult for ADLs, Patient to call before getting OOB, PT Consult for assist device competence, Strengthening exercises (ROM-active/passive), Utilize walker, cane, or other assitive device         Medication Interventions: Assess postural VS orthostatic hypotension, Evaluate medications/consider consulting pharmacy, Patient to call before getting OOB, Teach patient to arise slowly    Elimination Interventions: Bed/chair exit alarm, Call light in reach, Patient to call for help with toileting needs, Toilet paper/wipes in reach, Toileting schedule/hourly rounds

## 2018-04-14 NOTE — PROGRESS NOTES
TRANSFER - OUT REPORT:    Verbal report given to KWAME Rivera(name) on Fer Cook  being transferred to CVSU(unit) for routine progression of care       Report consisted of patients Situation, Background, Assessment and   Recommendations(SBAR). Information from the following report(s) SBAR, MAR, Recent Results and Cardiac Rhythm NSR  was reviewed with the receiving nurse. Lines:       Opportunity for questions and clarification was provided.       Patient transported with:   Monitor  Registered Nurse

## 2018-04-14 NOTE — PROGRESS NOTES
\[facility]\"" ICU Progress Note    Admit Date: 2018  POD:  2 Day Post-Op    Procedure:  Procedure(s):  TRANSCATHETER AORTIC VALVE REPLACEMENT Right TRANSFEMORAL 26mm S3 - CARMEN by Dr. Edelmira Wei        Subjective:   Pt seen with Dr. Avinash Falcon. On carito 5. Tmax 99.9, on 2 L NC.  EKG shows persistent R BBB and L anterior fascicular block and AF in 60's. Objective:   Vitals:  Blood pressure 93/66, pulse 62, temperature 98.5 °F (36.9 °C), resp. rate 18, height 5' 6\" (1.676 m), weight 210 lb 1.6 oz (95.3 kg), SpO2 96 %, not currently breastfeeding. Temp (24hrs), Av.9 °F (37.2 °C), Min:98.3 °F (36.8 °C), Max:99.9 °F (37.7 °C)    Hemodynamics:   CO: CO (l/min): 5 l/min   CI: CI (l/min/m2): 2.5 l/min/m2   CVP: CVP (mmHg): 7 mmHg (18 1000)   SVR:     PAP Systolic: PAP Systolic: 28 (76/09/41 8673)   PAP Diastolic: PAP Diastolic: 14 ( 2237)   PVR:     SV02: SVO2 (%): 52 % (18 0900)   SCV02:      EKG/Rhythm:   See above     Oxygen Therapy:  Oxygen Therapy  O2 Sat (%): 96 % (18 1000)  Pulse via Oximetry: 62 beats per minute (18 1000)  O2 Device: Room air (18 1000)  O2 Flow Rate (L/min): 4 l/min (18 0800)    CXR:  Findings:  A right IJ [place] catheter terminates in the main pulmonary  artery. The heart is mildly enlarged, but unchanged. A valvular stent overlies  the heart. There is unchanged mild edema. No focal consolidation, pleural  effusion, or pneumothorax    Admission Weight: Last Weight   Weight: 194 lb (88 kg) Weight: 210 lb 1.6 oz (95.3 kg)     Intake / Output / Drain:  Current Shift:  0701 -  1900  In: 353.5 [P.O.:240;  I.V.:113.5]  Out: 400 [Urine:400]  Last 24 hrs.:     Intake/Output Summary (Last 24 hours) at 18 1107  Last data filed at 18 0930   Gross per 24 hour   Intake          1188.88 ml   Output              400 ml   Net           788.88 ml       EXAM:  General:  In no obvious distress Lungs:   Clear to auscultation upper, diminished in bases    Incision:  Groin drs CDI   Heart:  Regular rate and rhythm   Abdomen:   Soft, non-tender. Bowel sounds normal. No masses,  No organomegaly. Extremities:  No edema. PPP. Neurologic:  Gross motor and sensory apparatus intact. Labs:   Recent Labs      18   0734  18   0342   18   1241   WBC   --   7.7   < >   --    HGB   --   10.5*   < >   --    HCT   --   34.1*   < >   --    PLT   --   UNABLE TO REPORT ACCURATE COUNT DUE TO PLATELET AGGREGATION, HOWEVER, PLATELETS APPEAR NORMAL IN NUMBER ON SMEAR. PLEASE RESUBMIT SODIUM CITRATE (BLUE) AND EDTA (LAVENDER) TUBES FOR HEMATOLOGICAL TESTING.   < >   --    NA   --   139   < >   --    K   --   4.1   < >   --    BUN   --   23*   < >   --    CREA   --   0.63   < >   --    GLU   --   124*   < >   --    GLUCPOC  140*   --    < >   --    INR   --    --    --   1.1    < > = values in this interval not displayed. Assessment:     Active Problems: Aortic stenosis (2011)      S/P TAVR (transcatheter aortic valve replacement) (2018)      Obesity (BMI 30.0-34.9) (2018)         Plan/Recommendations/Medical Decision Makin. AS s/p TAVR:  On asa. EKG shows AF, RBBB and left anterior fasc block. EP does not feel that she needs a PPM at this time. Trend EKG daily. 2. Hypotension: D/C low dose carito    3. [place]-  bypassed in    4. DM- Last HgbA1c 7.6. BS ACHS, SSI per orders. Resume metformin >48 hrs postop. If BS > 140 consistently, consider amaryl 1 mg PO daily. 5. HLD- resume statin. 6. Vit D deficiency- at goal on supplement per cards  7. CINDA- CPAP for past 6+ yrs. 8. Hx vocal cord injury w/ intubation in  - chronic horse voice. No swallowing issues. 9. Atelectasis/pleural effusion:  Wean oxygen for 02 sat > 90%. Increase IS and activity as tolerated. Hold diuresis today. 10. Dispo:  D/C lines, to floor today.     Signed By: Delmy Armstrong PA-C

## 2018-04-14 NOTE — PROGRESS NOTES
0730 - Bedside and Verbal shift change report given to Shaheed Roth RN (oncoming nurse) by Vladimir French RN (offgoing nurse).  Report included the following information SBAR, MAR, Recent Results and Cardiac Rhythm SR.

## 2018-04-14 NOTE — PROGRESS NOTES
Problem: Mobility Impaired (Adult and Pediatric)  Goal: *Acute Goals and Plan of Care (Insert Text)  Physical Therapy Goals  Initiated 04/13/18   1. Patient will move from supine to sit and sit to supine , scoot up and down and roll side to side in bed with modified independence within 7 day(s). 2.  Patient will transfer from bed to chair and chair to bed with modified independence using the least restrictive device within 7 day(s). 3.  Patient will perform sit to stand with modified independence within 7 day(s). 4.  Patient will ambulate with modified independence for 150 feet with the least restrictive device within 7 day(s). physical Therapy TREATMENT  Patient: Chirag Brown (46 y.o. female)  Date: 4/14/2018  Diagnosis: AORTIC STENOSIS  Aortic stenosis <principal problem not specified>  Procedure(s) (LRB):  TRANSCATHETER AORTIC VALVE REPLACEMENT Right TRANSFEMORAL 26mm S3 - CARMEN by Dr. Catarino Baltazar (Bilateral) 2 Days Post-Op  Precautions:    Chart, physical therapy assessment, plan of care and goals were reviewed. ASSESSMENT:  Patient received in bedside chair after transferring from ICU. Progressing well with activity tolerance. She was able to ambulate 150' without DME and an additional 100 using a RW. Noted increased trunk sway and impaired left foot clearance without DME. She reports using a cane or RW as needed at home and demonstrated much improved haleigh and safety using the RW. Encouraged use of the walker at home to prevent falls. She may benefit from HHPT follow up after discharge. Progression toward goals:  []    Improving appropriately and progressing toward goals  [x]    Improving slowly and progressing toward goals  []    Not making progress toward goals and plan of care will be adjusted     PLAN:  Patient continues to benefit from skilled intervention to address the above impairments. Continue treatment per established plan of care.   Discharge Recommendations:  Home Health  Further Equipment Recommendations for Discharge:  to be determined       SUBJECTIVE:   Patient stated I use the walker on bad mornings.     OBJECTIVE DATA SUMMARY:   Critical Behavior:  Neurologic State: Alert  Orientation Level: Oriented X4  Cognition: Appropriate safety awareness, Appropriate decision making, Appropriate for age attention/concentration  Safety/Judgement: Awareness of environment, Good awareness of safety precautions  Functional Mobility Training:  Bed Mobility:                    Transfers:  Sit to Stand: Contact guard assistance  Stand to Sit: Contact guard assistance   Discussed safe use of hands for transfers                             Balance:  Sitting: Intact  Standing: Impaired  Standing - Static: Fair  Standing - Dynamic : Good (with RW)  Ambulation/Gait Training:  Distance (ft): 250 Feet (ft) (150 without DME, 100 with RW)  Assistive Device: Gait belt;Walker, rolling  Ambulation - Level of Assistance: Contact guard assistance        Gait Abnormalities: Decreased step clearance;Trunk sway increased; Path deviations        Base of Support: Widened     Speed/Yvette: Pace decreased (<100 feet/min)     Swing Pattern: Left asymmetrical                   Pain:  Pain Scale 1: Numeric (0 - 10)  Pain Intensity 1: 0              Activity Tolerance:     Please refer to the flowsheet for vital signs taken during this treatment.   After treatment:   [x]    Patient left in no apparent distress sitting up in chair  []    Patient left in no apparent distress in bed  [x]    Call bell left within reach  [x]    Nursing notified  []    Caregiver present  []    Bed alarm activated    COMMUNICATION/COLLABORATION:   The patients plan of care was discussed with: Registered Nurse    Rocky May PT, DPT   Time Calculation: 25 mins

## 2018-04-15 ENCOUNTER — APPOINTMENT (OUTPATIENT)
Dept: GENERAL RADIOLOGY | Age: 83
DRG: 267 | End: 2018-04-15
Attending: PHYSICIAN ASSISTANT
Payer: MEDICARE

## 2018-04-15 VITALS
DIASTOLIC BLOOD PRESSURE: 75 MMHG | OXYGEN SATURATION: 97 % | BODY MASS INDEX: 33.73 KG/M2 | RESPIRATION RATE: 19 BRPM | WEIGHT: 209.88 LBS | HEART RATE: 60 BPM | HEIGHT: 66 IN | SYSTOLIC BLOOD PRESSURE: 165 MMHG | TEMPERATURE: 98.5 F

## 2018-04-15 LAB
ANION GAP SERPL CALC-SCNC: 7 MMOL/L (ref 5–15)
BASOPHILS # BLD: 0 K/UL (ref 0–0.1)
BASOPHILS NFR BLD: 0 % (ref 0–1)
BUN SERPL-MCNC: 20 MG/DL (ref 6–20)
BUN/CREAT SERPL: 37 (ref 12–20)
CALCIUM SERPL-MCNC: 8.9 MG/DL (ref 8.5–10.1)
CHLORIDE SERPL-SCNC: 110 MMOL/L (ref 97–108)
CO2 SERPL-SCNC: 25 MMOL/L (ref 21–32)
CREAT SERPL-MCNC: 0.54 MG/DL (ref 0.55–1.02)
DIFFERENTIAL METHOD BLD: ABNORMAL
EOSINOPHIL # BLD: 0.2 K/UL (ref 0–0.4)
EOSINOPHIL NFR BLD: 4 % (ref 0–7)
ERYTHROCYTE [DISTWIDTH] IN BLOOD BY AUTOMATED COUNT: 15.1 % (ref 11.5–14.5)
GLUCOSE BLD STRIP.AUTO-MCNC: 130 MG/DL (ref 65–100)
GLUCOSE BLD STRIP.AUTO-MCNC: 99 MG/DL (ref 65–100)
GLUCOSE SERPL-MCNC: 97 MG/DL (ref 65–100)
HCT VFR BLD AUTO: 32.4 % (ref 35–47)
HGB BLD-MCNC: 10.2 G/DL (ref 11.5–16)
IMM GRANULOCYTES # BLD: 0 K/UL (ref 0–0.04)
IMM GRANULOCYTES NFR BLD AUTO: 0 % (ref 0–0.5)
LYMPHOCYTES # BLD: 0.9 K/UL (ref 0.8–3.5)
LYMPHOCYTES NFR BLD: 14 % (ref 12–49)
MAGNESIUM SERPL-MCNC: 2.4 MG/DL (ref 1.6–2.4)
MCH RBC QN AUTO: 28.3 PG (ref 26–34)
MCHC RBC AUTO-ENTMCNC: 31.5 G/DL (ref 30–36.5)
MCV RBC AUTO: 89.8 FL (ref 80–99)
MONOCYTES # BLD: 0.8 K/UL (ref 0–1)
MONOCYTES NFR BLD: 13 % (ref 5–13)
NEUTS SEG # BLD: 4.5 K/UL (ref 1.8–8)
NEUTS SEG NFR BLD: 69 % (ref 32–75)
NRBC # BLD: 0 K/UL (ref 0–0.01)
NRBC BLD-RTO: 0 PER 100 WBC
PLATELET # BLD AUTO: 112 K/UL (ref 150–400)
PMV BLD AUTO: 10.5 FL (ref 8.9–12.9)
POTASSIUM SERPL-SCNC: 4.1 MMOL/L (ref 3.5–5.1)
RBC # BLD AUTO: 3.61 M/UL (ref 3.8–5.2)
SERVICE CMNT-IMP: ABNORMAL
SERVICE CMNT-IMP: NORMAL
SODIUM SERPL-SCNC: 142 MMOL/L (ref 136–145)
WBC # BLD AUTO: 6.5 K/UL (ref 3.6–11)

## 2018-04-15 PROCEDURE — 80048 BASIC METABOLIC PNL TOTAL CA: CPT | Performed by: PHYSICIAN ASSISTANT

## 2018-04-15 PROCEDURE — 97116 GAIT TRAINING THERAPY: CPT

## 2018-04-15 PROCEDURE — 83735 ASSAY OF MAGNESIUM: CPT | Performed by: PHYSICIAN ASSISTANT

## 2018-04-15 PROCEDURE — 85025 COMPLETE CBC W/AUTO DIFF WBC: CPT | Performed by: PHYSICIAN ASSISTANT

## 2018-04-15 PROCEDURE — 82962 GLUCOSE BLOOD TEST: CPT

## 2018-04-15 PROCEDURE — 74011250637 HC RX REV CODE- 250/637: Performed by: PHYSICIAN ASSISTANT

## 2018-04-15 PROCEDURE — 74011250637 HC RX REV CODE- 250/637: Performed by: NURSE PRACTITIONER

## 2018-04-15 PROCEDURE — 36415 COLL VENOUS BLD VENIPUNCTURE: CPT | Performed by: PHYSICIAN ASSISTANT

## 2018-04-15 PROCEDURE — 71046 X-RAY EXAM CHEST 2 VIEWS: CPT

## 2018-04-15 RX ORDER — FUROSEMIDE 40 MG/1
40 TABLET ORAL DAILY
Status: DISCONTINUED | OUTPATIENT
Start: 2018-04-15 | End: 2018-04-15 | Stop reason: HOSPADM

## 2018-04-15 RX ORDER — POTASSIUM CHLORIDE 750 MG/1
20 TABLET, FILM COATED, EXTENDED RELEASE ORAL DAILY
Status: DISCONTINUED | OUTPATIENT
Start: 2018-04-15 | End: 2018-04-15 | Stop reason: HOSPADM

## 2018-04-15 RX ORDER — LOSARTAN POTASSIUM 50 MG/1
50 TABLET ORAL DAILY
Qty: 30 TAB | Refills: 1 | Status: SHIPPED | OUTPATIENT
Start: 2018-04-15 | End: 2019-09-26 | Stop reason: SDUPTHER

## 2018-04-15 RX ORDER — METFORMIN HYDROCHLORIDE 500 MG/1
1000 TABLET ORAL 2 TIMES DAILY
Status: DISCONTINUED | OUTPATIENT
Start: 2018-04-15 | End: 2018-04-15 | Stop reason: HOSPADM

## 2018-04-15 RX ORDER — LOSARTAN POTASSIUM 50 MG/1
50 TABLET ORAL DAILY
Qty: 30 TAB | Refills: 1 | Status: SHIPPED | OUTPATIENT
Start: 2018-04-15 | End: 2018-04-15

## 2018-04-15 RX ORDER — LOSARTAN POTASSIUM 50 MG/1
50 TABLET ORAL DAILY
Status: DISCONTINUED | OUTPATIENT
Start: 2018-04-15 | End: 2018-04-15 | Stop reason: HOSPADM

## 2018-04-15 RX ADMIN — FAMOTIDINE 20 MG: 20 TABLET, FILM COATED ORAL at 09:29

## 2018-04-15 RX ADMIN — Medication 400 MG: at 09:29

## 2018-04-15 RX ADMIN — FUROSEMIDE 40 MG: 40 TABLET ORAL at 11:34

## 2018-04-15 RX ADMIN — STANDARDIZED SENNA CONCENTRATE AND DOCUSATE SODIUM 1 TABLET: 8.6; 5 TABLET, FILM COATED ORAL at 09:29

## 2018-04-15 RX ADMIN — ASPIRIN 81 MG 81 MG: 81 TABLET ORAL at 09:29

## 2018-04-15 RX ADMIN — POTASSIUM CHLORIDE 20 MEQ: 750 TABLET, EXTENDED RELEASE ORAL at 11:34

## 2018-04-15 RX ADMIN — Medication 10 ML: at 07:05

## 2018-04-15 RX ADMIN — VITAMIN D, TAB 1000IU (100/BT) 2000 UNITS: 25 TAB at 12:35

## 2018-04-15 RX ADMIN — EZETIMIBE 10 MG: 10 TABLET ORAL at 09:29

## 2018-04-15 RX ADMIN — LOSARTAN POTASSIUM 50 MG: 50 TABLET ORAL at 11:34

## 2018-04-15 NOTE — PROGRESS NOTES
Cardiac Electrophysiology Progress Note         4/15/2018 10:56 AM    Admit Date: 4/12/2018    Admit Diagnosis: AORTIC STENOSIS;Aortic stenosis      Subjective:      Hannah Oneil does not have syncope or dizziness  She wants to go home  Nurse called me at 5 am that she had atrial flutter    Past Medical History:   Diagnosis Date    Adverse effect of anesthesia     difficulty waking    Aortic stenosis     Arrhythmia     rapid heartrate seen in ER  4/2/18    CAD (coronary artery disease)     pt states she does not have this    Cancer ([address] 01/2018    left forehead and left ankle    Chronic pain     back - spinal stenosis    COPD     Diabetes ([address]     Diabetic neuropathy ([address] [address]     Diverticulosis of colon     GERD (gastroesophageal reflux disease)     Hiatal Hernia - pt states she does not have this    Hypercholesterolemia     Hypertension     CINDA (obstructive sleep apnea) 08-    AHI: 18.6 per hour    Other ill-defined conditions(799.89)     heart murmur    Other ill-defined conditions(799.89)     colon infection    Peripheral neuropathy     Renal artery stenosis (HCC)     Right    Unspecified adverse effect of anesthesia     delayed awakening - \"does not need as much\"    UTI (urinary tract infection)      Past Surgical History:   Procedure Laterality Date    ABDOMEN SURGERY PROC UNLISTED  1977    R Renal Artery Stenosis repair    ABDOMEN SURGERY PROC UNLISTED  1982 or 1983    Herniorrhaphy    ABDOMEN SURGERY [address] UNLISTED  1990    Lipectomy    ABDOMEN SURGERY [address] [facility] [address]   [address]    Left - benign cyst    ENDOSCOPY, COLON, DIAGNOSTIC  1999    hx of polys in the past    HX GYN  1966    D&C    HX HEENT      Bilat Cataracts    HX HEENT      R Retinal Hem    HX HEENT      Eyelid Cysts x 4    HX HEENT      cyst removed above eyebrow    HX HEENT      oral surgeries - gum surgery/floating root  removed x 2    HX ORTHOPAEDIC  11/06 L1-S1 2007    HX ORTHOPAEDIC  1985    L 4th Trigger finger    HX ORTHOPAEDIC      left knee arthroscopy    HX OTHER SURGICAL      renal artery bypass    HX OTHER SURGICAL      tonsillectomy  adenoidectomy    HX OTHER SURGICAL      retinal hemorrhage    HX OTHER SURGICAL      cataract bilateral    HX OTHER SURGICAL      back surgery    HX TONSILLECTOMY      T & A     Social History     Social History    Marital status:      Spouse name: N/A    Number of children: N/A    Years of education: N/A     Occupational History    Not on file.      Social History Main Topics    Smoking status: Former Smoker     Packs/day: 1.50     Years: 13.00     Types: Cigarettes     Quit date: 6/6/1977    Smokeless tobacco: Never Used      Comment: former cigarette smoker    Alcohol use No    Drug use: No    Sexual activity: No     Other Topics Concern    Not on file     Social History Narrative         Visit Vitals    /69 (BP 1 Location: Right arm, BP Patient Position: At rest)    Pulse 86    Temp 98.5 °F (36.9 °C)    Resp 18    Ht 5' 6\" (1.676 m)    Wt 209 lb 14.1 oz (95.2 kg)    SpO2 96%    Breastfeeding No    BMI 33.88 kg/m2     Current Facility-Administered Medications   Medication Dose Route Frequency    cholecalciferol (VITAMIN D3) tablet 2,000 Units  2,000 Units Oral PCL    ezetimibe (ZETIA) tablet 10 mg  10 mg Oral DAILY    sodium chloride (NS) flush 5-10 mL  5-10 mL IntraVENous Q8H    sodium chloride (NS) flush 5-10 mL  5-10 mL IntraVENous PRN    rosuvastatin (CRESTOR) tablet 5 mg  5 mg Oral QHS    acetaminophen (TYLENOL) tablet 650 mg  650 mg Oral Q4H PRN    traMADol (ULTRAM) tablet  mg   mg Oral Q6H PRN    oxyCODONE-acetaminophen (PERCOCET) 5-325 mg per tablet 1-2 Tab  1-2 Tab Oral Q4H PRN    ondansetron (ZOFRAN) injection 4 mg  4 mg IntraVENous Q4H PRN    aspirin chewable tablet 81 mg  81 mg Oral DAILY    famotidine (PEPCID) tablet 20 mg  20 mg Oral Q12H    magnesium oxide (MAG-OX) tablet 400 mg  400 mg Oral BID    bisacodyl (DULCOLAX) suppository 10 mg  10 mg Rectal DAILY PRN    senna-docusate (PERICOLACE) 8.6-50 mg per tablet 1 Tab  1 Tab Oral BID    glucose chewable tablet 16 g  4 Tab Oral PRN    dextrose (D50W) injection syrg 12.5-25 g  12.5-25 g IntraVENous PRN    glucagon (GLUCAGEN) injection 1 mg  1 mg IntraMUSCular PRN    insulin lispro (HUMALOG) injection   SubCUTAneous AC&HS         Objective:      Physical Exam:  Visit Vitals    /69 (BP 1 Location: Right arm, BP Patient Position: At rest)    Pulse 86    Temp 98.5 °F (36.9 °C)    Resp 18    Ht 5' 6\" (1.676 m)    Wt 209 lb 14.1 oz (95.2 kg)    SpO2 96%    Breastfeeding No    BMI 33.88 kg/m2     General Appearance:  Well developed, well nourished,alert and oriented x 3, and individual in no acute distress. Ears/Nose/Mouth/Throat:   Hearing grossly normal.         Neck: Supple. Chest:   Lungs clear to auscultation bilaterally. Cardiovascular:  Regular rate and rhythm, no murmur. Abdomen:   Soft, obese   Extremities: No edema bilaterally. Skin: Warm and dry.                Data Review:   Labs:    Recent Results (from the past 24 hour(s))   GLUCOSE, POC    Collection Time: 04/14/18  7:34 AM   Result Value Ref Range    Glucose (POC) 140 (H) 65 - 100 mg/dL    Performed by Bran Hernadez    GLUCOSE, POC    Collection Time: 04/14/18 12:02 PM   Result Value Ref Range    Glucose (POC) 170 (H) 65 - 100 mg/dL    Performed by Dakota Marin    GLUCOSE, POC    Collection Time: 04/14/18  4:05 PM   Result Value Ref Range    Glucose (POC) 141 (H) 65 - 100 mg/dL    Performed by Janny Brown    GLUCOSE, POC    Collection Time: 04/14/18  9:12 PM   Result Value Ref Range    Glucose (POC) 179 (H) 65 - 100 mg/dL    Performed by Mio Guardado    METABOLIC PANEL, BASIC    Collection Time: 04/15/18  3:07 AM   Result Value Ref Range    Sodium 142 136 - 145 mmol/L    Potassium 4.1 3.5 - 5.1 mmol/L Chloride 110 (H) 97 - 108 mmol/L    CO2 25 21 - 32 mmol/L    Anion gap 7 5 - 15 mmol/L    Glucose 97 65 - 100 mg/dL    BUN 20 6 - 20 MG/DL    Creatinine 0.54 (L) 0.55 - 1.02 MG/DL    BUN/Creatinine ratio 37 (H) 12 - 20      GFR est AA >60 >60 ml/min/1.73m2    GFR est non-AA >60 >60 ml/min/1.73m2    Calcium 8.9 8.5 - 10.1 MG/DL   MAGNESIUM    Collection Time: 04/15/18  3:07 AM   Result Value Ref Range    Magnesium 2.4 1.6 - 2.4 mg/dL   CBC WITH AUTOMATED DIFF    Collection Time: 04/15/18  3:07 AM   Result Value Ref Range    WBC 6.5 3.6 - 11.0 K/uL    RBC 3.61 (L) 3.80 - 5.20 M/uL    HGB 10.2 (L) 11.5 - 16.0 g/dL    HCT 32.4 (L) 35.0 - 47.0 %    MCV 89.8 80.0 - 99.0 FL    MCH 28.3 26.0 - 34.0 PG    MCHC 31.5 30.0 - 36.5 g/dL    RDW 15.1 (H) 11.5 - 14.5 %    PLATELET 573 (L) 733 - 400 K/uL    MPV 10.5 8.9 - 12.9 FL    NRBC 0.0 0  WBC    ABSOLUTE NRBC 0.00 0.00 - 0.01 K/uL    NEUTROPHILS 69 32 - 75 %    LYMPHOCYTES 14 12 - 49 %    MONOCYTES 13 5 - 13 %    EOSINOPHILS 4 0 - 7 %    BASOPHILS 0 0 - 1 %    IMMATURE GRANULOCYTES 0 0.0 - 0.5 %    ABS. NEUTROPHILS 4.5 1.8 - 8.0 K/UL    ABS. LYMPHOCYTES 0.9 0.8 - 3.5 K/UL    ABS. MONOCYTES 0.8 0.0 - 1.0 K/UL    ABS. EOSINOPHILS 0.2 0.0 - 0.4 K/UL    ABS. BASOPHILS 0.0 0.0 - 0.1 K/UL    ABS. IMM. GRANS. 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)      Obesity (BMI 30.0-34.9) (4/13/2018)      RBBB  Plan:     I do not think she needs pacemaker at this time  I think P waves are difficult to see on telemetry but RR interval is so regular that it is not likely she had atrial flutter or fibrillation  Will get 12 lead ECG today  I have discussed with her son and her and communicated with CT surgery team and attending Dr Lizet Madrigal yesterday that she does not meet indication for pacemaker now  She wants to go home  Future Appointments  Date Time Provider Beau Driscoll   5/16/2018 9:30 AM Simona Butts RN [facility]   9/18/2018 9:40 AM MD NEEL Terrazas [facility]   9/25/2018 9:20 MD Valentín Tubbs M.D.  [facility]  Electrophysiology/Cardiology  [facility] [place]  [address] [place] [address] , [address], [facility] [address]                             [address] [place] [address]  [phone]

## 2018-04-15 NOTE — ROUTINE PROCESS
1940 Bedside shift change report given to 541 Jere Rudolphkodi Ryan (oncoming nurse) by Yamini Roche RN (offgoing nurse). Report included the following information SBAR, Recent Results and Med Rec Status     0240 Bedside shift change report given to KWAME Jara (oncoming nurse) by Sharri Oneill RN (offgoing nurse). Report included the following information SBAR, Recent Results and Med Rec Status.

## 2018-04-15 NOTE — PROGRESS NOTES
hospitals ICU Progress Note    Admit Date: 2018  POD:  3 Day Post-Op    Procedure:  Procedure(s):  TRANSCATHETER AORTIC VALVE REPLACEMENT Right TRANSFEMORAL 26mm S3 - CARMEN by Dr. Danielle Landrum        Subjective:   Pt seen with Dr. Hyun Meeks. Off drips, looks well this AM. 12 lead EKG shows NSR w persistent RBBB. Afebrile, on RA. Pt anxious to go home today. Objective:   Vitals:  Blood pressure 140/47, pulse 63, temperature 98.9 °F (37.2 °C), resp. rate 19, height 5' 6\" (1.676 m), weight 209 lb 14.1 oz (95.2 kg), SpO2 97 %, not currently breastfeeding. Temp (24hrs), Av.4 °F (36.9 °C), Min:97.6 °F (36.4 °C), Max:98.9 °F (37.2 °C)    EKG/Rhythm:   See above     Oxygen Therapy:  Oxygen Therapy  O2 Sat (%): 97 % (04/15/18 0817)  Pulse via Oximetry: 62 beats per minute (18 1000)  O2 Device: Room air (04/15/18 0307)  O2 Flow Rate (L/min): 4 l/min (18 0800)      Admission Weight: Last Weight   Weight: 194 lb (88 kg) Weight: 209 lb 14.1 oz (95.2 kg)     Intake / Output / Drain:  Current Shift:    Last 24 hrs.:     Intake/Output Summary (Last 24 hours) at 04/15/18 1028  Last data filed at 04/15/18 0307   Gross per 24 hour   Intake              340 ml   Output              200 ml   Net              140 ml       EXAM:  General:  In no obvious distress                                                                                           Lungs:   Clear to auscultation upper, diminished in bases    Incision:  Groin drs CDI   Heart:  Regular rate and rhythm   Abdomen:   Soft, non-tender. Bowel sounds normal. No masses,  No organomegaly. Extremities:  No edema. PPP. Neurologic:  Gross motor and sensory apparatus intact.      Labs:   Recent Labs      04/15/18   0607  04/15/18   0307   18   1241   WBC   --   6.5   < >   --    HGB   --   10.2*   < >   --    HCT   --   32.4*   < >   --    PLT   --   112*   < >   --    NA   --   142   < >   --    K   --   4.1   < >   --    BUN   --   20   < >   --    CREA   -- 0.54*   < >   --    GLU   --   97   < >   --    GLUCPOC  99   --    < >   --    INR   --    --    --   1.1    < > = values in this interval not displayed. Assessment:     Active Problems: Aortic stenosis (2011)      S/P TAVR (transcatheter aortic valve replacement) (2018)      Obesity (BMI 30.0-34.9) (2018)         Plan/Recommendations/Medical Decision Makin. AS s/p TAVR:  On asa. EKG pending. EP does not feel that she needs a PPM. D/W Dr Sobia Laurent, [facility] to d/c home today  2. Hypertension: start decreased dose of home losartan  3. TRENTON-  bypassed in    4. DM- Last HgbA1c 7.6. BS ACHS, SSI per orders. Resume metformin. 5. HLD- resume statin. 6. Vit D deficiency- at goal on supplement per cards  7. CINDA- CPAP for past 6+ yrs. 8. Hx vocal cord injury w/ intubation in  - chronic horse voice. No swallowing issues. 9. Atelectasis/pleural effusion:  Wean oxygen for 02 sat > 90%. Increase IS and activity as tolerated. Start home lasix back. 10. Dispo:  ECG NSR, home today.     Signed By: Britt Humphreys PA-C

## 2018-04-15 NOTE — DISCHARGE SUMMARY
[facility] Discharge Summary     Patient ID:  Yanira Bhat  [id number]  48 y.o.  7/3/1930    Admit date: 4/12/2018    Discharge date: 4/15/2018     Admitting Physician: Michael Rahman MD     Referring Cardiologist:  Dr Shannon Pablo    PCP:      Admitting Diagnoses:  AS    Discharge Diagnoses: AS    Discharge Condition: stable    Hospital Problems  Date Reviewed: 3/27/2018          Codes Class Noted POA    S/P TAVR (transcatheter aortic valve replacement) ICD-10-CM: Z95.2  ICD-9-CM: V43.3  4/13/2018 Unknown        Obesity (BMI 30.0-34.9) ICD-10-CM: E66.9  ICD-9-CM: 278.00  4/13/2018 Unknown        Aortic stenosis ICD-10-CM: I35.0  ICD-9-CM: 424.1  12/1/2011 Yes              Procedures for this admission:  Procedure(s):  TRANSCATHETER AORTIC VALVE REPLACEMENT Right TRANSFEMORAL 26mm S3 - CARMEN by Dr. Sruthi Peña on Discharge: stable    Followup Care: Activity as tolerated  Diabetic Diet  Keep wound clean and dry    Follow-up Information     Follow up With Details Comments Contact Info Additional Information    [address] On 5/16/2018 Cardiac Rehab appointment is scheduled for 5/16/18 at 9:30 am. [address]  [address]  [facility] [address]  [phone] [facility] [address], [address]. Please arrive 15 minutes prior to your appointment time and you will register in the [place] [address], [address], on the first floor of the [address] [place] [address] Road. Telephone: [phone] Fax: [phone] Driving directions To [facility] [place]. Building: Driving [address]ST on [address], take exit [address] to [place]. Turn left onto [facility], then turn right into "[employer] Parking lot Driving EAST on [address], take exit [address] [facility]. Turn right at the end of the exit ramp. Turn left onto [facility] [address], then turn right into [place] lot.           Patient Disposition:  Home    DC med list:     My Medications      CHANGE how you take these medications Instructions Each Dose to Equal   Morning Noon Evening Bedtime    azelastine 137 mcg (0.1 %) nasal spray   Commonly known as:  ASTELIN   What changed:    - when to take this  - reasons to take this  - additional instructions       Your last dose was: Your next dose is: 2 Sprays by Both Nostrils route two (2) times a day. Use in each nostril as directed    2 Spray                        losartan 50 mg tablet   Commonly known as:  COZAAR   What changed:    - medication strength  - how much to take       Your last dose was: Your next dose is: Take 1 Tab by mouth daily. Indications: hypertension    50 mg                          CONTINUE taking these medications       Instructions Each Dose to Equal   Morning Noon Evening Bedtime    AMARYL 2 mg tablet   Generic drug:  glimepiride       Your last dose was: Your next dose is: Take 1 mg by mouth as needed. 1 mg                        aspirin 81 mg tablet       Your last dose was: Your next dose is: Take 162 mg by mouth. 2 tabs prior to niacin    162 mg                        COD LIVER OIL PO       Your last dose was: Your next dose is: Take 1,000 mg by mouth daily. 1000 mg                        ezetimibe 10 mg tablet   Commonly known as:  ZETIA       Your last dose was: Your next dose is: Take 1 Tab by mouth daily. 10 mg                        FIBER PO       Your last dose was: Your next dose is: Take  by mouth. 5 tabs daily                         glucose blood VI test strips strip   Commonly known as:  ACCU-CHEK ELBERT PLUS TEST STRP       Your last dose was: Your next dose is:              Check bs daily. Dx 250.00                         LASIX 40 mg tablet   Generic drug:  furosemide       Your last dose was: Your next dose is: Take 60 mg by mouth daily.     60 mg metFORMIN 1,000 mg tablet   Commonly known as:  GLUCOPHAGE       Your last dose was: Your next dose is: Take 1 Tab by mouth two (2) times a day. 1000 mg                        niacin 1,000 mg Tb24 tab   Commonly known as:  NIASPAN       Your last dose was: Your next dose is:              2,000 mg nightly. 2000 mg                        potassium chloride SA 10 mEq capsule   Commonly known as:  MICRO-K       Your last dose was: Your next dose is: Take 1 Cap by mouth two (2) times a day. 10 mEq                        rosuvastatin 5 mg tablet   Commonly known as:  CRESTOR       Your last dose was: Your next dose is: Take 1 Tab by mouth daily. 5 mg                        VITAMIN B-12 PO       Your last dose was: Your next dose is: Take 1,500 mg by mouth daily. 1500 mg                        VITAMIN D3 2,000 unit Tab   Generic drug:  cholecalciferol (vitamin D3)       Your last dose was: Your next dose is: Take 1 Tab by mouth daily (after lunch). 1 Tab                          STOP taking these medications          cephALEXin 250 mg capsule   Commonly known as:  KEFLEX           dilTIAZem  mg ER capsule   Commonly known as:  CARDIZEM CD                Where to Get Your Medications      Information on where to get these meds will be given to you by the nurse or doctor. ! Ask your nurse or doctor about these medications     losartan 50 mg tablet             HPI:  Sylvia Sutton is a 80 y.o. female  with 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 [id number] (0767), spinal stenosis w/ peripheral neuropathy, CINDA (CPAP), Vit D deficiency, vocal cords injury with intubation in 1985 that was referred to the [address] [place] [address] by Dr. Lucius Christina interventional evaluation of AS.  She is back today to review dx testing results and to discuss her interventional options.      Ms. Mustapha Mclean has been followed by Dr. Wally Ordonez for several years and they have been serially tracking her AS by echo and by symptoms. She has avoided addressing her AS for many yrs but now symptomatically is forced to address it.      Ms. Mustapha Mclean admits to some progressive fatigue and mild dizziness if she moves too quickly. Brian Beavers also states she has some mild chest tightness associated with ROBB that is more common in the recent months that can occur even when walking on a flat surface.  She has chronic back pain with peripheral neuropathy and feels that is a large contributor to her energy level which 'has good days and bad days. '  She denies any palpitations, orthopnea, PND, syncope or falls. Brian Beavers does have some long standing LE edema and has recently increased her furosemide for management.       Ms. Mustapha Mclean is deathly afraid of a debilitating stroke and continues to be a bit reluctant. She is desperate to avoid general anesthesia. She denies any cardiac hospitalizations in the past 12 months.      Pt [facility] to [facility] ER on 4/2 for palpitations/\"heart racing\"/dizzy. Discharged same day. Concerned about CPAP settings. Called pulmonary office.       Ms. Mustapha Mclean is  and lives alone. She has a son and daughter in law that live nearby. She has a life alert necklace on.  She has a chair lift in her home to help her with stairs d/t debilitating back pain. She is independent in her ADLs, medical decision making and medication management. She drives and is an avid bridge player and participates in tournaments weekly.      Hospital Course:     POD#1: 1. AS s/p TAVR:  On asa. EKG shows new RBBB and left anterior fasc block. Consult EP. Trend EKG daily. 2. Hypotension: Cont ANDIE For MAP > 65.    3. TRENTON-  bypassed in 1977   4. DM- Last HgbA1c 7.6. BS ACHS, SSI per orders. Resume metformin >48 hrs postop. If BS > 140 consistently, consider amaryl 1 mg PO daily. 5. HLD- resume statin.   6. Vit D deficiency- at goal on supplement per cards  7. CINDA- CPAP for past 6+ yrs. 8. Hx vocal cord injury w/ intubation in 1985 - chronic horse voice. No swallowing issues. 9. Atelectasis/pleural effusion:  Wean oxygen for 02 sat > 90%. Increase IS and activity as tolerated. Hold diuresis today. 10. Dispo:  Remain in CCU. POD#2: 1. AS s/p TAVR:  On asa. EKG shows AF, RBBB and left anterior fasc block. EP does not feel that she needs a PPM at this time. Trend EKG daily. 2. Hypotension: D/C low dose carito    3. TRENTON-  bypassed in 1977   4. DM- Last HgbA1c 7.6. BS ACHS, SSI per orders. Resume metformin >48 hrs postop. If BS > 140 consistently, consider amaryl 1 mg PO daily. 5. HLD- resume statin. 6. Vit D deficiency- at goal on supplement per cards  7. CINDA- CPAP for past 6+ yrs. 8. Hx vocal cord injury w/ intubation in 1985 - chronic horse voice. No swallowing issues. 9. Atelectasis/pleural effusion:  Wean oxygen for 02 sat > 90%. Increase IS and activity as tolerated. Hold diuresis today. 10. Dispo:  D/C lines, to floor today    POD#3: 1. AS s/p TAVR:  On asa. EKG pending. EP does not feel that she needs a PPM. D/W Dr Broderick Haley, [facility] to d/c home today  2. Hypertension: start decreased dose of home losartan  3. TRENTON-  bypassed in 1977   4. DM- Last HgbA1c 7.6. BS ACHS, SSI per orders. Resume metformin. 5. HLD- resume statin. 6. Vit D deficiency- at goal on supplement per cards  7. CINDA- CPAP for past 6+ yrs. 8. Hx vocal cord injury w/ intubation in 1985 - chronic horse voice. No swallowing issues. 9. Atelectasis/pleural effusion:  Wean oxygen for 02 sat > 90%. Increase IS and activity as tolerated. Start home lasix back.    10. Dispo:  ECG NSR, home today      Discharge Vital Signs:   Visit Vitals    /47 (BP 1 Location: Left arm, BP Patient Position: At rest)    Pulse 63    Temp 98.9 °F (37.2 °C)    Resp 19    Ht 5' 6\" (1.676 m)    Wt 209 lb 14.1 oz (95.2 kg)    SpO2 97%    Breastfeeding No    BMI 33.88 kg/m2       Labs:   Recent Labs      04/15/18   1109   04/15/18   0307   04/12/18   1241   WBC   --    --   6.5   < >   --    HGB   --    --   10.2*   < >   --    HCT   --    --   32.4*   < >   --    PLT   --    --   112*   < >   --    NA   --    --   142   < >   --    K   --    --   4.1   < >   --    BUN   --    --   20   < >   --    CREA   --    --   0.54*   < >   --    GLU   --    --   97   < >   --    GLUCPOC  130*   < >   --    < >   --    INR   --    --    --    --   1.1    < > = values in this interval not displayed. Diagnostics: CXR: pending    Patient Instructions:  Discharge instructions were reviewed with the patient and family present. Questions were also answered at this time. Prescriptions and medications were reviewed. The patient has follow up appointments which will be called to the patient by our office. The patient and family were encouraged to call with any questions or concerns.        Signed:  Edilia Hanson PA-C  4/15/2018  11:24 AM     Saw patient, agree with above - ok to go home   Risk of morbidity and mortality - high  Medical decision making - high complexity

## 2018-04-15 NOTE — PROGRESS NOTES
Pt states yesterday her bs was 179 so she took a 1/2 amyrl she had in her pocket and that is why her bs is so good this morining.

## 2018-04-15 NOTE — PROGRESS NOTES
Problem: Mobility Impaired (Adult and Pediatric)  Goal: *Acute Goals and Plan of Care (Insert Text)  Physical Therapy Goals  Initiated 04/13/18   1. Patient will move from supine to sit and sit to supine , scoot up and down and roll side to side in bed with modified independence within 7 day(s). 2.  Patient will transfer from bed to chair and chair to bed with modified independence using the least restrictive device within 7 day(s). 3.  Patient will perform sit to stand with modified independence within 7 day(s). 4.  Patient will ambulate with modified independence for 150 feet with the least restrictive device within 7 day(s). physical Therapy TREATMENT  Patient: Zarina Gonzalez (76 y.o. female)  Date: 4/15/2018  Diagnosis: AORTIC STENOSIS  Aortic stenosis <principal problem not specified>  Procedure(s) (LRB):  TRANSCATHETER AORTIC VALVE REPLACEMENT Right TRANSFEMORAL 26mm S3 - CARMEN by Dr. Judd Lenz (Bilateral) 3 Days Post-Op  Precautions:    Chart, physical therapy assessment, plan of care and goals were reviewed. ASSESSMENT:  Patient making good progress towards goals and eager to go home. Pt ambulated 200 ft using RW with SBA. Gait steady; verbal cues for slower haleigh for safety. Pt declined stair training, says she has chair lift at home for 15 steps. Pt left up in chair at end of session. Recommend HHPT at discharge. Pt has family and private aides to assist at home. Pt cleared for discharge from PT standpoint; RN notified. Progression toward goals:  [x]    Improving appropriately and progressing toward goals  []    Improving slowly and progressing toward goals  []    Not making progress toward goals and plan of care will be adjusted     PLAN:  Patient continues to benefit from skilled intervention to address the above impairments. Continue treatment per established plan of care.   Discharge Recommendations:  Home Health  Further Equipment Recommendations for Discharge:  None, has cane and RW SUBJECTIVE:   Patient stated I think I can leave today if you say it's ok.     OBJECTIVE DATA SUMMARY:   Critical Behavior:  Neurologic State: Alert  Orientation Level: Oriented X4  Cognition: Appropriate for age attention/concentration  Safety/Judgement: Awareness of environment, Good awareness of safety precautions  Functional Mobility Training:  Bed Mobility:     Supine to Sit: Stand-by assistance              Transfers:  Sit to Stand: Stand-by assistance  Stand to Sit: Stand-by assistance                             Balance:  Sitting: Intact  Standing: Impaired  Standing - Static: Constant support;Good  Standing - Dynamic : Fair  Ambulation/Gait Training:  Distance (ft): 200 Feet (ft)  Assistive Device: Gait belt;Walker, rolling  Ambulation - Level of Assistance: Stand-by assistance        Gait Abnormalities: Decreased step clearance        Base of Support: Widened     Speed/Yvette: Accelerated  Step Length: Left shortened;Right shortened    Pain:  Pain Scale 1: Numeric (0 - 10)  Pain Intensity 1: 0              Activity Tolerance:   VSS  Please refer to the flowsheet for vital signs taken during this treatment.   After treatment:   [x]    Patient left in no apparent distress sitting up in chair  []    Patient left in no apparent distress in bed  [x]    Call bell left within reach  [x]    Nursing notified  []    Caregiver present  []    Bed alarm activated    COMMUNICATION/COLLABORATION:   The patients plan of care was discussed with: Registered Nurse    Cody Wooten, PT   Time Calculation: 18 mins

## 2018-04-15 NOTE — PROGRESS NOTES
Bedside shift change report given to Pepper Khan  (oncoming nurse) by Queenie Pennington (offgoing nurse). Report included the following information SBAR, Kardex, Intake/Output and Recent Results.

## 2018-04-15 NOTE — PROGRESS NOTES
bp elevated at 0817, 185/131, 169/66. Spoke w/ Dr Duarte Sanders. Dr Duarte Sanders stated pt is anxious for d/c and to let her rest then taken again.  140/47 at 1739

## 2018-04-15 NOTE — DISCHARGE INSTRUCTIONS
[facility]    [address] [facility] [address]                                          Joao Masterson                                        [address], [address] [facility]  Office- [phone]  Fax- [phone]       Office- [phone]  Fax- [phone]  _____________________________________________________________  Dr. Jl Guaman, [facility]  Rob Virk PA-C    Name:Yolanda Danielle     Surgery & Date: Procedure(s):  TRANSCATHETER AORTIC VALVE REPLACEMENT Right TRANSFEMORAL 26mm S3 - CARMEN by Dr. Osvaldo Gant    Discharge Date: 4/15/18    MEDICATIONS:  Please refer to your After Visit Summary for your medication list.     DO NOT TAKE ANY MEDICATIONS THAT ARE NOT ON THIS LIST    INSTRUCTIONS:  NO SMOKING OR TOBACCO PRODUCTS  Do not follow the activity/exercise instructions in your discharge book given to you as an inpatient. You have no activity restrictions. You may shower. Wash all incisions twice daily with mild soap and water. No lotions, ointments or powder. Call the office immediately for any redness, swelling, or drainage from your incision. Take your temperature daily and call for a temperature of 101 degrees or higher or for any symptoms that make you think you have and infection. Weigh yourself each morning. Call if you gain more than 5 pounds in 48 hours. Use the incentive spirometer 6-8 times a day-10 breaths each time. Walk several hundred feet several times daily. DIET  Eat an American Heart Association diet. If you are having trouble with your appetite, eat what you can. Try eating small, frequent meals throughout the day. ACTIVITY  1. You have no activity restrictions. You may resume your daily activities at home, based on your comfort level.  You may also drive. FOLLOW UP  1. Your follow up appointments will be called to you by the office tomorrow morning. Our office is located in [address] [place] [address] on floor G-5. You will need to have an ECHO prior to your appointment time. Our office will set that up for you. Please call our office at [phone] if you are unable to make either one of these appointments. 2. You will be receiving a call before your 3 day follow up appointment to begin cardiac rehab. They are located in the [address] [place] [address] on [facility]. Their phone number is [phone]. Please call if you have not been contacted 2-3 weeks after discharge from the hospital.  3. We will make an appointment for you with your cardiologist in 4-5 weeks. 4. Consult you primary care physician regarding your influenza &   pneumovax vaccines. 5.   Please bring all medications with you to your appointment.     Signature:___________________________________________________

## 2018-04-15 NOTE — PROGRESS NOTES
Patient has been accepted by [facility] and AVS updated to reflect this information. In speaking with Central Intake: Start of Care is expected for Tuesday, 4/17/18.     Pal Astudillo RN

## 2018-04-16 ENCOUNTER — TELEPHONE (OUTPATIENT)
Dept: CASE MANAGEMENT | Age: 83
End: 2018-04-16

## 2018-04-16 NOTE — TELEPHONE ENCOUNTER
Cardiac Surgery Discharge - Follow up call placed to Zarina Gonzalez. Reviewed plan of care after discharge and encouraged Zarina Gonzalez to verbalize. Reviewed medications, patient without questions regarding medications. Encouraged continued use of the incentive spirometer. Confirmed follow up appts and reinforced importance of wearing red reminder bracelet. Zarina Gonzalez is without questions or concerns.  Coby Kingsley RN

## 2018-04-17 ENCOUNTER — HOME CARE VISIT (OUTPATIENT)
Dept: SCHEDULING | Facility: HOME HEALTH | Age: 83
End: 2018-04-17
Payer: MEDICARE

## 2018-04-17 VITALS
OXYGEN SATURATION: 97 % | HEART RATE: 76 BPM | TEMPERATURE: 97.7 F | SYSTOLIC BLOOD PRESSURE: 124 MMHG | DIASTOLIC BLOOD PRESSURE: 68 MMHG

## 2018-04-17 PROCEDURE — A6212 FOAM DRG <=16 SQ IN W/BORDER: HCPCS

## 2018-04-17 PROCEDURE — 400013 HH SOC

## 2018-04-17 PROCEDURE — 3331090001 HH PPS REVENUE CREDIT

## 2018-04-17 PROCEDURE — 3331090002 HH PPS REVENUE DEBIT

## 2018-04-17 PROCEDURE — G0299 HHS/HOSPICE OF RN EA 15 MIN: HCPCS

## 2018-04-18 ENCOUNTER — OFFICE VISIT (OUTPATIENT)
Dept: CARDIOTHORACIC SURGERY | Age: 83
End: 2018-04-18

## 2018-04-18 VITALS
HEART RATE: 62 BPM | BODY MASS INDEX: 31.66 KG/M2 | HEIGHT: 66 IN | DIASTOLIC BLOOD PRESSURE: 56 MMHG | TEMPERATURE: 98.4 F | OXYGEN SATURATION: 97 % | WEIGHT: 197 LBS | SYSTOLIC BLOOD PRESSURE: 122 MMHG | RESPIRATION RATE: 16 BRPM

## 2018-04-18 DIAGNOSIS — G47.33 OSA (OBSTRUCTIVE SLEEP APNEA): ICD-10-CM

## 2018-04-18 DIAGNOSIS — E11.9 CONTROLLED TYPE 2 DIABETES MELLITUS WITHOUT COMPLICATION, WITHOUT LONG-TERM CURRENT USE OF INSULIN (HCC): ICD-10-CM

## 2018-04-18 DIAGNOSIS — J44.9 CHRONIC OBSTRUCTIVE PULMONARY DISEASE, UNSPECIFIED COPD TYPE (HCC): ICD-10-CM

## 2018-04-18 DIAGNOSIS — Z95.2 S/P TAVR (TRANSCATHETER AORTIC VALVE REPLACEMENT): ICD-10-CM

## 2018-04-18 DIAGNOSIS — I77.3 FIBROMUSCULAR DYSPLASIA (HCC): ICD-10-CM

## 2018-04-18 DIAGNOSIS — I10 ESSENTIAL HYPERTENSION: ICD-10-CM

## 2018-04-18 DIAGNOSIS — I35.0 NONRHEUMATIC AORTIC VALVE STENOSIS: Primary | ICD-10-CM

## 2018-04-18 PROCEDURE — 3331090002 HH PPS REVENUE DEBIT

## 2018-04-18 PROCEDURE — 3331090001 HH PPS REVENUE CREDIT

## 2018-04-18 NOTE — PROGRESS NOTES
Patient: Hector William   Age: 80 y.o. Patient Care Team:  Amarilis Bland MD as PCP - General (Internal Medicine)  Sophia Bunch MD (Endocrinology)  Anyi Merchant MD as Physician (Ophthalmology)  Beaulah Homans, MD as Physician (Dermatology)  Chelsea Valle MD as Physician (Dermatology)  Masoud Lucio MD as Physician (Cardiology)  Penelope Lam MD as Physician (Sleep Medicine)  Seble Andres MD (Cardiothoracic Surgery)    PCP: Amarilis Bland MD    Cardiologist: Suhail Caballero    Diagnosis/Reason for Consultation: The primary encounter diagnosis was Nonrheumatic aortic valve stenosis. Diagnoses of S/P TAVR (transcatheter aortic valve replacement), Essential hypertension, Controlled type 2 diabetes mellitus without complication, without long-term current use of insulin ([address] Chronic obstructive pulmonary disease, unspecified COPD type ([address] CINDA (obstructive sleep apnea), and Fibromuscular dysplasia ([address] were also pertinent to this visit. Problem List:   Patient Active Problem List   Diagnosis Code    Tachycardia R00.0    Hypertension I10    PVC (premature ventricular contraction) I49.3    Aortic stenosis I35.0    Edema R60.9    Foreign body in pharynx T17.208A    Type II or unspecified type diabetes mellitus without mention of complication, uncontrolled E11.65    Pure hypercholesterolemia E78.00    CINDA (obstructive sleep apnea) G47.33    Other dyspnea and respiratory abnormality R06.09, R09.89    Mitral stenosis I05.0    COPD (chronic obstructive pulmonary disease) (HCC) J44.9    Fibromuscular dysplasia ([facility]) I77.3    Controlled type 2 diabetes mellitus without complication, without long-term current use of insulin (HCC) E11.9    Advance directive on file Z78.9    Chest tightness R07.89    Dyspnea and respiratory abnormalities R06.00, R06.89    S/P TAVR (transcatheter aortic valve replacement) Z95.2    Obesity (BMI 30.0-34. 9) E66.9         HPI: 80 y.o.   female POD# 6 s/p TAVR (26 S3 aortic valve was then deployed with rapid ventricular pacing. Post-deployment balloon inflation was performed via R femoral artery) on 4/12/2018 for severe and symptomatic AS. Post-op course notable for new LAFB and RBBB that was evaluated by EP and did not warrant PPM. She was discharged to home on POD#3 w/ services. She is here today for her initial post-op evalutation. Ms. Chelsea Reyes states she is rather fatigued today but has done more physically than she has for several days to include driving here and walking over from [address] parking. She has also dismissed her personal hired health aides as they weren't necessary any longer. She states got SOB during her walk to the office but does not w/ routine ADLs at home. She further denies any CP/chest tightness, orthopnea, PND, LE or abdominal edema, syncope or falls. She states she has some general lightheadedness that is persistent since pre-op, though improved. She continues to use her CPAP nightly. She is diligent w/ her IS and weighs herself daily at home.      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 [id number] (4628), spinal stenosis w/ peripheral neuropathy, CINDA (CPAP), Vit D deficiency, vocal cords injury with intubation in 1985    Past Medical History:   Diagnosis Date    Adverse effect of anesthesia     difficulty waking    Aortic stenosis     Arrhythmia     rapid heartrate seen in ER  4/2/18    CAD (coronary artery disease)     pt states she does not have this    Cancer ([facility] 01/2018    left forehead and left ankle    Chronic pain     back - spinal stenosis    COPD     Diabetes ([address]     Diabetic neuropathy ([facility]     Diverticulosis of colon     GERD (gastroesophageal reflux disease)     Hiatal Hernia - pt states she does not have this    Hypercholesterolemia     Hypertension     CINDA (obstructive sleep apnea) 08-    AHI: 18.6 per hour    Other ill-defined conditions(799.89) heart murmur    Other ill-defined conditions(799.89)     colon infection    Peripheral neuropathy     Renal artery stenosis (HCC)     Right    Unspecified adverse effect of anesthesia     delayed awakening - \"does not need as much\"    UTI (urinary tract infection)        Past Surgical History:   Procedure Laterality Date    ABDOMEN SURGERY PROC UNLISTED  1977    R Renal Artery Stenosis repair    ABDOMEN SURGERY [address] or [address] [place] [address] L Knee    BREAST SURGERY PROCEDURE UNLISTED  1968    Left - benign cyst    ENDOSCOPY, COLON, DIAGNOSTIC  1999    hx of polys in the past    HX GYN  1966    D&C    HX HEENT      Bilat Cataracts    HX HEENT      R Retinal Hem    HX HEENT      Eyelid Cysts x 4    HX HEENT      cyst removed above eyebrow    HX HEENT      oral surgeries - gum surgery/floating root  removed x 2    HX ORTHOPAEDIC  11/06    L1-S1 2007    HX ORTHOPAEDIC  1985    L 4th Trigger finger    HX ORTHOPAEDIC      left knee arthroscopy    HX OTHER SURGICAL      renal artery bypass    HX OTHER SURGICAL      tonsillectomy  adenoidectomy    HX OTHER SURGICAL      retinal hemorrhage    HX OTHER SURGICAL      cataract bilateral    HX OTHER SURGICAL      back surgery    HX TONSILLECTOMY      T & A      Social History   Substance Use Topics    Smoking status: Former Smoker     Packs/day: 1.50     Years: 13.00     Types: Cigarettes     Quit date: 6/6/1977    Smokeless tobacco: Never Used      Comment: former cigarette smoker    Alcohol use No      Family History   Problem Relation Age of Onset    Stroke Mother     Cancer Mother      ovarian    Heart Attack Mother     Cancer Father      lung/throat    Alcohol abuse Father     Pacemaker Brother     Other Brother      als    Stroke Maternal Grandfather      Prior to Admission medications    Medication Sig Start Date End Date Taking? Authorizing Provider   acetaminophen (TYLENOL) 500 mg tablet Take 1,000 mg by mouth every six (6) hours as needed for Pain. Yes Historical Provider   losartan (COZAAR) 50 mg tablet Take 1 Tab by mouth daily. Indications: hypertension 4/15/18  Yes Melani Garcia PA-C   glimepiride (AMARYL) 2 mg tablet Take 1 mg by mouth as needed. Yes Historical Provider   cholecalciferol, vitamin D3, (VITAMIN D3) 2,000 unit tab Take 1 Tab by mouth daily (after lunch). Yes Historical Provider   CYANOCOBALAMIN, VITAMIN B-12, (VITAMIN B-12 PO) Take 1,500 mg by mouth daily. Yes Historical Provider   furosemide (LASIX) 40 mg tablet Take 60 mg by mouth daily. Yes Historical Provider   niacin (NIASPAN) 1,000 mg Tb24 tab 2,000 mg nightly. 2/1/18  Yes Historical Provider   PSYLLIUM SEED, WITH DEXTROSE, (FIBER PO) Take  by mouth. 5 tabs daily   Yes Historical Provider   metFORMIN (GLUCOPHAGE) 1,000 mg tablet Take 1 Tab by mouth two (2) times a day. 6/28/17  Yes Rebeka Mares MD   ezetimibe (ZETIA) 10 mg tablet Take 1 Tab by mouth daily. 6/28/17  Yes Dakota Bond MD   potassium chloride SA (MICRO-K) 10 mEq capsule Take 1 Cap by mouth two (2) times a day. 6/28/17  Yes Rebeka Mares MD   rosuvastatin (CRESTOR) 5 mg tablet Take 1 Tab by mouth daily. 6/28/17  Yes Rebeka Mares MD   COD LIVER OIL PO Take 1,000 mg by mouth daily. Yes Historical Provider   azelastine (ASTELIN) 137 mcg (0.1 %) nasal spray 2 Sprays by Both Nostrils route two (2) times a day. Use in each nostril as directed  Patient taking differently: 2 Sprays by Both Nostrils route as needed. Use in each nostril as directed 6/23/16  Yes Dakota Bond MD   glucose blood VI test strips (ACCU-CHEK ELBERT PLUS) strip Check bs daily. Dx 250.00 8/1/13  Yes Dakota Bond MD   aspirin 81 mg tablet Take 162 mg by mouth.  2 tabs prior to niacin 2/1/11  Yes Historical Provider   albuterol (PROVENTIL HFA, VENTOLIN HFA, PROAIR HFA) 90 mcg/actuation inhaler Take 1 Puff by inhalation every six (6) hours as needed for Wheezing or Shortness of Breath. Historical Provider       Allergies   Allergen Reactions    Bees [Hymenoptera Allergenic Extract] Anaphylaxis    Betadine [Povidone-Iodine] Other (comments)     Abdominal swelling, one incident in 1977. Has had since without a reaction.  Darvon [Propoxyphene] Nausea and Vomiting    Griseofulvin Nausea and Vomiting    Lipitor [Atorvastatin] Myalgia    Pollen Extracts Cough       Current Medications:   Current Outpatient Prescriptions   Medication Sig Dispense Refill    acetaminophen (TYLENOL) 500 mg tablet Take 1,000 mg by mouth every six (6) hours as needed for Pain.  losartan (COZAAR) 50 mg tablet Take 1 Tab by mouth daily. Indications: hypertension 30 Tab 1    glimepiride (AMARYL) 2 mg tablet Take 1 mg by mouth as needed.  cholecalciferol, vitamin D3, (VITAMIN D3) 2,000 unit tab Take 1 Tab by mouth daily (after lunch).  CYANOCOBALAMIN, VITAMIN B-12, (VITAMIN B-12 PO) Take 1,500 mg by mouth daily.  furosemide (LASIX) 40 mg tablet Take 60 mg by mouth daily.  niacin (NIASPAN) 1,000 mg Tb24 tab 2,000 mg nightly.  PSYLLIUM SEED, WITH DEXTROSE, (FIBER PO) Take  by mouth. 5 tabs daily      metFORMIN (GLUCOPHAGE) 1,000 mg tablet Take 1 Tab by mouth two (2) times a day. 180 Tab 3    ezetimibe (ZETIA) 10 mg tablet Take 1 Tab by mouth daily. 90 Tab 3    potassium chloride SA (MICRO-K) 10 mEq capsule Take 1 Cap by mouth two (2) times a day. 180 Cap 3    rosuvastatin (CRESTOR) 5 mg tablet Take 1 Tab by mouth daily. 90 Tab 3    COD LIVER OIL PO Take 1,000 mg by mouth daily.  azelastine (ASTELIN) 137 mcg (0.1 %) nasal spray 2 Sprays by Both Nostrils route two (2) times a day. Use in each nostril as directed (Patient taking differently: 2 Sprays by Both Nostrils route as needed.  Use in each nostril as directed) 3 Bottle 3    glucose blood VI test strips (ACCU-CHEK ELBERT PLUS) strip Check bs daily. Dx 250.00 1 Package 11    aspirin 81 mg tablet Take 162 mg by mouth. 2 tabs prior to niacin      albuterol (PROVENTIL HFA, VENTOLIN HFA, PROAIR HFA) 90 mcg/actuation inhaler Take 1 Puff by inhalation every six (6) hours as needed for Wheezing or Shortness of Breath. Vitals: Blood pressure 122/56, pulse 62, temperature 98.4 °F (36.9 °C), temperature source Oral, resp. rate 16, height 5' 6\" (1.676 m), weight 197 lb (89.4 kg), SpO2 97 %. Allergies: is allergic to bees [hymenoptera allergenic extract]; betadine [povidone-iodine]; darvon [propoxyphene]; griseofulvin; lipitor [atorvastatin]; and pollen extracts. Review of Systems: Pertinent Positives per HPI   [x]Total of 13 systems reviewed as follows:  Constitutional: Negative fever, negative chills  Eyes:               Negative for amauroses fugax  ENT:                Negative sore throat,oral absecess  Endocrine       Negative for thyroid replacement Rx; goiter  Respiratory:    Negative chronic cough,sputum production  Cards:             Negative for palpitations, varicosities, claudication  GI:                    Negative for dysphagia, bleeding, nausea, vomiting, diarrhea, and abdominal pain  Genitourinary:  Negative for frequency, dysuria  Integument:      Negative for rash and pruritus  Hematologic:    Negative for easy bruising; bleeding dyscarsia  Musculoskel:     Negative for muscle weakness inhibiting ambulation  Neurological:    Negative for stroke, TIA, syncope, dizziness  Behavl/Psych: Negative for feelings of anxiety, depression       Cardiovascular Testing:   EKG 11/30/2017: SR 80 bpm. No axis deviations/blocks  EKG 4/14/2018: AFib 58 bpm. RBBB, LAFB  Rhythm Strip 4/16/2018: SR 58 bpm. RBBB. Physical Exam:  General: Well nourished well groomed woman appearing younger than stated age  Neuro: A&OX3. ARORA. PERRL. Steady assisted gait with walker  Head:Normocephalic. Atraumatic.  Symmetrical  Neck: Trachea Midline  Resp: CTA B. No Adv BS/cough/sputum/tachypnea with seated conversation  CV: S1S2 RRR. No/M/R/G/JVD/carotid bruits. Pink/warm/dry extremities. Trace LE peripheral edema  GI:Benign ab. Soft. NT/ND. Active BS  : Voids  Integ: No obvious s/s of infection or breakdown  Musculo/Skeletal: Limited ROM in all major joints. Good muscle tone    Assessment/Plan:   1. AS s/p TAVR - Progressing post-op. Has HH currently and discussed benefits of cardiac wellness afterward. To increase daily cumulative activity as possible. ASA 81mg daily for valve patency. Has 30 day f/u and TTE per TVT Registry Guidelines 5/15/2018. Will need f/u with primary cardiologist 4-6 weeks thereafter. 2. Post op EKG changes - brief Afib but SR prior to discharge. No anticoagulation/PPM need per EP  3.  HTN- CCB, CDZ per cards  4. TRENTON-  bypassed in 1977   5. DM- metformin/glimeperide per endocrine  6. HLD- well controlled on current statin dose per cards  7. Vit D deficiency- at goal on supplement per cards  8. CINDA- CPAP for past 6+ yrs. Pt unaware of setting   9. Hx vocal cord injury w/ intubation in 1985 - chronic horse voice. No swallowing issues.  Throat a little sore but improving

## 2018-04-19 ENCOUNTER — HOME CARE VISIT (OUTPATIENT)
Dept: SCHEDULING | Facility: HOME HEALTH | Age: 83
End: 2018-04-19
Payer: MEDICARE

## 2018-04-19 VITALS
TEMPERATURE: 97.6 F | OXYGEN SATURATION: 98 % | RESPIRATION RATE: 20 BRPM | SYSTOLIC BLOOD PRESSURE: 122 MMHG | BODY MASS INDEX: 31.18 KG/M2 | DIASTOLIC BLOOD PRESSURE: 82 MMHG | HEIGHT: 66 IN | WEIGHT: 194 LBS | HEART RATE: 58 BPM

## 2018-04-19 PROCEDURE — G0299 HHS/HOSPICE OF RN EA 15 MIN: HCPCS

## 2018-04-19 PROCEDURE — 3331090001 HH PPS REVENUE CREDIT

## 2018-04-19 PROCEDURE — 3331090002 HH PPS REVENUE DEBIT

## 2018-04-20 PROCEDURE — 3331090001 HH PPS REVENUE CREDIT

## 2018-04-20 PROCEDURE — 3331090002 HH PPS REVENUE DEBIT

## 2018-04-21 ENCOUNTER — HOME CARE VISIT (OUTPATIENT)
Dept: SCHEDULING | Facility: HOME HEALTH | Age: 83
End: 2018-04-21
Payer: MEDICARE

## 2018-04-21 VITALS
TEMPERATURE: 98.5 F | DIASTOLIC BLOOD PRESSURE: 60 MMHG | SYSTOLIC BLOOD PRESSURE: 118 MMHG | HEART RATE: 60 BPM | OXYGEN SATURATION: 98 % | RESPIRATION RATE: 18 BRPM

## 2018-04-21 PROCEDURE — 3331090002 HH PPS REVENUE DEBIT

## 2018-04-21 PROCEDURE — G0300 HHS/HOSPICE OF LPN EA 15 MIN: HCPCS

## 2018-04-21 PROCEDURE — 3331090001 HH PPS REVENUE CREDIT

## 2018-04-22 PROCEDURE — 3331090001 HH PPS REVENUE CREDIT

## 2018-04-22 PROCEDURE — 3331090002 HH PPS REVENUE DEBIT

## 2018-04-23 ENCOUNTER — HOME CARE VISIT (OUTPATIENT)
Dept: SCHEDULING | Facility: HOME HEALTH | Age: 83
End: 2018-04-23
Payer: MEDICARE

## 2018-04-23 ENCOUNTER — TELEPHONE (OUTPATIENT)
Dept: CARDIAC REHAB | Age: 83
End: 2018-04-23

## 2018-04-23 VITALS
WEIGHT: 192 LBS | HEART RATE: 65 BPM | OXYGEN SATURATION: 98 % | SYSTOLIC BLOOD PRESSURE: 140 MMHG | RESPIRATION RATE: 18 BRPM | BODY MASS INDEX: 30.99 KG/M2 | DIASTOLIC BLOOD PRESSURE: 60 MMHG | TEMPERATURE: 97.8 F

## 2018-04-23 PROCEDURE — 3331090001 HH PPS REVENUE CREDIT

## 2018-04-23 PROCEDURE — G0299 HHS/HOSPICE OF RN EA 15 MIN: HCPCS

## 2018-04-23 PROCEDURE — 3331090002 HH PPS REVENUE DEBIT

## 2018-04-23 NOTE — TELEPHONE ENCOUNTER
4/23/2018 Cardiac Rehab: Called Ms. Brisa Archibald to discuss participation in the Cardiac Rehab Program following TAVR on April 12, 2018. Confirmed intake appointment on May 16, 2018. Ms. Brisa Archibald is without questions or concerns.   Kieran Cagle

## 2018-04-24 PROCEDURE — 3331090002 HH PPS REVENUE DEBIT

## 2018-04-24 PROCEDURE — 3331090001 HH PPS REVENUE CREDIT

## 2018-04-25 PROCEDURE — 3331090001 HH PPS REVENUE CREDIT

## 2018-04-25 PROCEDURE — 3331090002 HH PPS REVENUE DEBIT

## 2018-04-26 ENCOUNTER — HOME CARE VISIT (OUTPATIENT)
Dept: SCHEDULING | Facility: HOME HEALTH | Age: 83
End: 2018-04-26
Payer: MEDICARE

## 2018-04-26 VITALS
HEART RATE: 70 BPM | OXYGEN SATURATION: 98 % | SYSTOLIC BLOOD PRESSURE: 108 MMHG | DIASTOLIC BLOOD PRESSURE: 71 MMHG | TEMPERATURE: 98.7 F | RESPIRATION RATE: 18 BRPM

## 2018-04-26 PROCEDURE — G0300 HHS/HOSPICE OF LPN EA 15 MIN: HCPCS

## 2018-04-26 PROCEDURE — 3331090002 HH PPS REVENUE DEBIT

## 2018-04-26 PROCEDURE — 3331090001 HH PPS REVENUE CREDIT

## 2018-04-27 PROCEDURE — 3331090002 HH PPS REVENUE DEBIT

## 2018-04-27 PROCEDURE — 3331090001 HH PPS REVENUE CREDIT

## 2018-04-28 ENCOUNTER — HOME CARE VISIT (OUTPATIENT)
Dept: SCHEDULING | Facility: HOME HEALTH | Age: 83
End: 2018-04-28
Payer: MEDICARE

## 2018-04-28 PROCEDURE — G0299 HHS/HOSPICE OF RN EA 15 MIN: HCPCS

## 2018-04-28 PROCEDURE — 3331090001 HH PPS REVENUE CREDIT

## 2018-04-28 PROCEDURE — 3331090002 HH PPS REVENUE DEBIT

## 2018-04-29 VITALS
OXYGEN SATURATION: 98 % | DIASTOLIC BLOOD PRESSURE: 65 MMHG | HEART RATE: 60 BPM | TEMPERATURE: 97.2 F | SYSTOLIC BLOOD PRESSURE: 130 MMHG | RESPIRATION RATE: 18 BRPM

## 2018-05-11 ENCOUNTER — HOSPITAL ENCOUNTER (OUTPATIENT)
Dept: NON INVASIVE DIAGNOSTICS | Age: 83
Discharge: HOME | End: 2018-05-11
Attending: NURSE PRACTITIONER
Payer: MEDICARE

## 2018-05-11 DIAGNOSIS — J44.9 CHRONIC OBSTRUCTIVE PULMONARY DISEASE, UNSPECIFIED COPD TYPE (HCC): ICD-10-CM

## 2018-05-11 DIAGNOSIS — I35.0 NONRHEUMATIC AORTIC VALVE STENOSIS: ICD-10-CM

## 2018-05-11 DIAGNOSIS — I10 ESSENTIAL HYPERTENSION: ICD-10-CM

## 2018-05-11 DIAGNOSIS — Z95.2 S/P TAVR (TRANSCATHETER AORTIC VALVE REPLACEMENT): ICD-10-CM

## 2018-05-11 DIAGNOSIS — I77.3 FIBROMUSCULAR DYSPLASIA (HCC): ICD-10-CM

## 2018-05-11 DIAGNOSIS — G47.33 OSA (OBSTRUCTIVE SLEEP APNEA): ICD-10-CM

## 2018-05-11 DIAGNOSIS — E11.9 CONTROLLED TYPE 2 DIABETES MELLITUS WITHOUT COMPLICATION, WITHOUT LONG-TERM CURRENT USE OF INSULIN (HCC): ICD-10-CM

## 2018-05-11 PROCEDURE — 93306 TTE W/DOPPLER COMPLETE: CPT

## 2018-05-15 ENCOUNTER — TELEPHONE (OUTPATIENT)
Dept: CARDIOLOGY CLINIC | Age: 83
End: 2018-05-15

## 2018-05-15 ENCOUNTER — TELEPHONE (OUTPATIENT)
Dept: CARDIAC REHAB | Age: 83
End: 2018-05-15

## 2018-05-15 ENCOUNTER — OFFICE VISIT (OUTPATIENT)
Dept: CARDIOTHORACIC SURGERY | Age: 83
End: 2018-05-15

## 2018-05-15 VITALS
HEIGHT: 66 IN | OXYGEN SATURATION: 95 % | WEIGHT: 203 LBS | HEART RATE: 58 BPM | SYSTOLIC BLOOD PRESSURE: 118 MMHG | TEMPERATURE: 98.8 F | BODY MASS INDEX: 32.62 KG/M2 | RESPIRATION RATE: 20 BRPM | DIASTOLIC BLOOD PRESSURE: 62 MMHG

## 2018-05-15 DIAGNOSIS — I10 ESSENTIAL HYPERTENSION: ICD-10-CM

## 2018-05-15 DIAGNOSIS — I35.0 NONRHEUMATIC AORTIC VALVE STENOSIS: Primary | ICD-10-CM

## 2018-05-15 DIAGNOSIS — J44.9 CHRONIC OBSTRUCTIVE PULMONARY DISEASE, UNSPECIFIED COPD TYPE (HCC): ICD-10-CM

## 2018-05-15 DIAGNOSIS — Z95.2 S/P TAVR (TRANSCATHETER AORTIC VALVE REPLACEMENT): ICD-10-CM

## 2018-05-15 DIAGNOSIS — E11.9 CONTROLLED TYPE 2 DIABETES MELLITUS WITHOUT COMPLICATION, WITHOUT LONG-TERM CURRENT USE OF INSULIN (HCC): ICD-10-CM

## 2018-05-15 DIAGNOSIS — G47.33 OSA (OBSTRUCTIVE SLEEP APNEA): ICD-10-CM

## 2018-05-15 RX ORDER — NYSTATIN 100000 U/G
CREAM TOPICAL 2 TIMES DAILY
Qty: 15 G | Refills: 0 | Status: SHIPPED | OUTPATIENT
Start: 2018-05-15 | End: 2018-05-22 | Stop reason: ALTCHOICE

## 2018-05-15 NOTE — TELEPHONE ENCOUNTER
Stephania with [facility] called in and stated that the patient has a TVRP and needs to be seen in the next week or so by Dr Broderick Haley. She stated to please call the patient to schedule. Thanks!   Phone [phone]

## 2018-05-15 NOTE — TELEPHONE ENCOUNTER
5/15/2018 Cardiac Rehab: Called Ms. Glory Duane to remind of intake appointment on Wednesday, May 16, 2018. Confirmed appointment with patient. Provided patient with contact information for [address] [place] [address] Cardiac Rehab. Also, reminded patient to bring a list of current medications, a personal schedule, and to wear comfortable clothes and shoes.  Dawne Angelucci

## 2018-05-15 NOTE — PROGRESS NOTES
Patient: Jett Lebron   Age: 80 y.o. Patient Care Team:  Osman Patel MD as PCP - General (Internal Medicine)  Manish Vasques MD (Endocrinology)  Mattie Honeycutt MD as Physician (Ophthalmology)  Holden Ford MD as Physician (Dermatology)  Daphney Horn MD as Physician (Dermatology)  Osvaldo Kelly MD as Physician (Cardiology)  Malissa Lugo MD as Physician (Sleep Medicine)  Jaylan Pal MD (Cardiothoracic Surgery)    PCP: Osman Patel MD    Cardiologist: Rupal Nobles    Diagnosis/Reason for Consultation: The primary encounter diagnosis was Nonrheumatic aortic valve stenosis. Diagnoses of S/P TAVR (transcatheter aortic valve replacement), Essential hypertension, Chronic obstructive pulmonary disease, unspecified COPD type ([address] Controlled type 2 diabetes mellitus without complication, without long-term current use of insulin ([address] and CINDA (obstructive sleep apnea) were also pertinent to this visit. Problem List:   Patient Active Problem List   Diagnosis Code    Tachycardia R00.0    Hypertension I10    PVC (premature ventricular contraction) I49.3    Aortic stenosis I35.0    Edema R60.9    Foreign body in pharynx T17.208A    Type II or unspecified type diabetes mellitus without mention of complication, uncontrolled E11.65    Pure hypercholesterolemia E78.00    CINDA (obstructive sleep apnea) G47.33    Other dyspnea and respiratory abnormality R06.09, R09.89    Mitral stenosis I05.0    COPD (chronic obstructive pulmonary disease) (HCC) J44.9    Fibromuscular dysplasia (HCC) I77.3    Controlled type 2 diabetes mellitus without complication, without long-term current use of insulin (HCC) E11.9    Advance directive on file Z78.9    Chest tightness R07.89    Dyspnea and respiratory abnormalities R06.00, R06.89    S/P TAVR (transcatheter aortic valve replacement) Z95.2    Obesity (BMI 30.0-34. 9) E66.9      HPI: 80 y.o.   female s/p TAVR (26 S3 aortic valve was then deployed with rapid ventricular pacing. Post-deployment balloon inflation was performed via R femoral artery) on 4/12/2018 for severe and symptomatic AS. Post-op course notable for new LAFB and RBBB that was evaluated by EP and did not warrant PPM. She was discharged to home on POD#3 w/ services. She is here today for her 30 day post-op evalutation.       Ms. Sergio Chinchilla was last seen in our office on POD#6 and she was progressing well. She also had [facility] services at the time and they concluded their care approximately 2-2.5 weeks ago per pt. Today, Ms. Sergio Chinchilla reports a 2 week hx of decline to include SOB/ROBB, orthopnea, LE and abdominal edema, occasional nausea and some diarrhea and fecal incontienence. In addition, she relays going to dermatologist 2 weeks ago for R cheek punch biopsy and mentioned the new occurrence of drainage from her R groin puncture site. According to Ms. Sergio Chinchilla, the dermatologist evaluated groin and was concerned w/ infection and offered abx but she declined with the preference of our evaluation/tx since we were responsible for puncture. However, Ms. Sergio Chinchilla has not reached out to our office, her cardiologist's office nor her PCP re: her concerns. Today is the first we are hearing of her issues. Today, Ms. Sergio Chinchilla comes w/ a list of concerns and reports her groin drainage has resolved & no longer leaves serosanguinous drainage on her clothes. She is concerned that she started a ketogenic diet last week and hasn't lost any wt (she reports weighing herself daily), has LE edema that is progressive and relatively unimproved each morning, & she reports orthopnea but denies PND. She denies CP and chest tightness. She reported feelings of 'shakiness' but denies any lightheadedness, syncope or falls. She reports AM 's recently but a few incidents of BG in 60's if she took prn dose of glimepiride.  She is also concerned w/ her HR in 50-60's & that she's not on her pre-op dose of losartan (discharged on 1/2 dose) and wants to know what dose she needs as she needs to refill her medications.     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 [id number] (7042), spinal stenosis w/ peripheral neuropathy, CINDA (CPAP), Vit D deficiency, vocal cords injury with intubation in 1985    She continues to live alone and has 'released' all of her in-home aides. She drove herself today and used  parking. She continues to use her walker d/t her occasional 'shakiness' and ROBB. NYHA Classification: III   Class III (Moderate): Marked limitation of physical activity.  Comfortable at rest, but less than ordinary activity causes fatigue, palpitation, or dyspnea    Angina Classification: 0   Class 0: No symptoms      Past Medical History:   Diagnosis Date    Adverse effect of anesthesia     difficulty waking    Aortic stenosis     Arrhythmia     rapid heartrate seen in ER  4/2/18    CAD (coronary artery disease)     pt states she does not have this    Cancer ([address] 01/2018    left forehead and left ankle    Chronic pain     back - spinal stenosis    COPD     Diabetes ([address]     Diabetic neuropathy ([address]     Diverticulosis of colon     GERD (gastroesophageal reflux disease)     Hiatal Hernia - pt states she does not have this    Hypercholesterolemia     Hypertension     CINDA (obstructive sleep apnea) 08-    AHI: 18.6 per hour    Other ill-defined conditions(799.89)     heart murmur    Other ill-defined conditions(799.89)     colon infection    Peripheral neuropathy     Renal artery stenosis (HCC)     Right    Unspecified adverse effect of anesthesia     delayed awakening - \"does not need as much\"    UTI (urinary tract infection)        Past Surgical History:   Procedure Laterality Date    ABDOMEN SURGERY PROC UNLISTED  1977    R Renal Artery Stenosis repair    ABDOMEN SURGERY PROC UNLISTED  1982 or [address] [facility] [employer] Li[facility]    ABDOMEN SURGERY PROC UNLISTED  1992    A/S L Knee    BREAST SURGERY PROCEDURE UNLISTED  1968    Left - benign cyst    ENDOSCOPY, COLON, DIAGNOSTIC  1999    hx of polys in the past    HX GYN  1966    D&C    HX HEENT      Bilat Cataracts    HX HEENT      R Retinal Hem    HX HEENT      Eyelid Cysts x 4    HX HEENT      cyst removed above eyebrow    HX HEENT      oral surgeries - gum surgery/floating root  removed x 2    HX ORTHOPAEDIC  11/06    L1-S1 2007    HX ORTHOPAEDIC  1985    L 4th Trigger finger    HX ORTHOPAEDIC      left knee arthroscopy    HX OTHER SURGICAL      renal artery bypass    HX OTHER SURGICAL      tonsillectomy  adenoidectomy    HX OTHER SURGICAL      retinal hemorrhage    HX OTHER SURGICAL      cataract bilateral    HX OTHER SURGICAL      back surgery    HX TONSILLECTOMY      T & A      Social History   Substance Use Topics    Smoking status: Former Smoker     Packs/day: 1.50     Years: 13.00     Types: Cigarettes     Quit date: 6/6/1977    Smokeless tobacco: Never Used      Comment: former cigarette smoker    Alcohol use No      Family History   Problem Relation Age of Onset    Stroke Mother     Cancer Mother      ovarian    Heart Attack Mother     Cancer Father      lung/throat    Alcohol abuse Father     Pacemaker Brother     Other Brother      als    Stroke Maternal Grandfather      Prior to Admission medications    Medication Sig Start Date End Date Taking? Authorizing Provider   losartan (COZAAR) 50 mg tablet Take 1 Tab by mouth daily. Indications: hypertension 4/15/18  Yes Contreras Garcia PA-C   glimepiride (AMARYL) 2 mg tablet Take 1 mg by mouth as needed. Yes Historical Provider   cholecalciferol, vitamin D3, (VITAMIN D3) 2,000 unit tab Take 1 Tab by mouth daily (after lunch). Yes Historical Provider   CYANOCOBALAMIN, VITAMIN B-12, (VITAMIN B-12 PO) Take 1,500 mg by mouth daily.    Yes Historical Provider   furosemide (LASIX) 40 mg tablet Take 60 mg by mouth daily. Yes Historical Provider   niacin (NIASPAN) 1,000 mg Tb24 tab 2,000 mg nightly. 2/1/18  Yes Historical Provider   PSYLLIUM SEED, WITH DEXTROSE, (FIBER PO) Take  by mouth. 5 tabs daily   Yes Historical Provider   metFORMIN (GLUCOPHAGE) 1,000 mg tablet Take 1 Tab by mouth two (2) times a day. 6/28/17  Yes Rebeka Garcia MD   ezetimibe (ZETIA) 10 mg tablet Take 1 Tab by mouth daily. 6/28/17  Yes Eros Bermudez MD   potassium chloride SA (MICRO-K) 10 mEq capsule Take 1 Cap by mouth two (2) times a day. 6/28/17  Yes Rebeka Garcia MD   rosuvastatin (CRESTOR) 5 mg tablet Take 1 Tab by mouth daily. 6/28/17  Yes Rebeka Garcia MD   COD LIVER OIL PO Take 1,000 mg by mouth daily. Yes Historical Provider   azelastine (ASTELIN) 137 mcg (0.1 %) nasal spray 2 Sprays by Both Nostrils route two (2) times a day. Use in each nostril as directed  Patient taking differently: 2 Sprays by Both Nostrils route as needed. Use in each nostril as directed 6/23/16  Yes Eros Bermudez MD   glucose blood VI test strips (ACCU-CHEK ELBERT PLUS) strip Check bs daily. Dx 250.00 8/1/13  Yes Eros Bermudez MD   aspirin 81 mg tablet Take 162 mg by mouth. 2 tabs prior to niacin 2/1/11  Yes Historical Provider   albuterol (PROVENTIL HFA, VENTOLIN HFA, PROAIR HFA) 90 mcg/actuation inhaler Take 1 Puff by inhalation every six (6) hours as needed for Wheezing or Shortness of Breath. Historical Provider       Allergies   Allergen Reactions    Bees [Hymenoptera Allergenic Extract] Anaphylaxis    Betadine [Povidone-Iodine] Other (comments)     Abdominal swelling, one incident in 1977. Has had since without a reaction.  Darvon [Propoxyphene] Nausea and Vomiting    Griseofulvin Nausea and Vomiting    Lipitor [Atorvastatin] Myalgia    Pollen Extracts Cough       Current Medications:   Current Outpatient Prescriptions   Medication Sig Dispense Refill    losartan (COZAAR) 50 mg tablet Take 1 Tab by mouth daily. Indications: hypertension 30 Tab 1    glimepiride (AMARYL) 2 mg tablet Take 1 mg by mouth as needed.  cholecalciferol, vitamin D3, (VITAMIN D3) 2,000 unit tab Take 1 Tab by mouth daily (after lunch).  CYANOCOBALAMIN, VITAMIN B-12, (VITAMIN B-12 PO) Take 1,500 mg by mouth daily.  furosemide (LASIX) 40 mg tablet Take 60 mg by mouth daily.  niacin (NIASPAN) 1,000 mg Tb24 tab 2,000 mg nightly.  PSYLLIUM SEED, WITH DEXTROSE, (FIBER PO) Take  by mouth. 5 tabs daily      metFORMIN (GLUCOPHAGE) 1,000 mg tablet Take 1 Tab by mouth two (2) times a day. 180 Tab 3    ezetimibe (ZETIA) 10 mg tablet Take 1 Tab by mouth daily. 90 Tab 3    potassium chloride SA (MICRO-K) 10 mEq capsule Take 1 Cap by mouth two (2) times a day. 180 Cap 3    rosuvastatin (CRESTOR) 5 mg tablet Take 1 Tab by mouth daily. 90 Tab 3    COD LIVER OIL PO Take 1,000 mg by mouth daily.  azelastine (ASTELIN) 137 mcg (0.1 %) nasal spray 2 Sprays by Both Nostrils route two (2) times a day. Use in each nostril as directed (Patient taking differently: 2 Sprays by Both Nostrils route as needed. Use in each nostril as directed) 3 Bottle 3    glucose blood VI test strips (ACCU-CHEK ELBERT PLUS) strip Check bs daily. Dx 250.00 1 Package 11    aspirin 81 mg tablet Take 162 mg by mouth. 2 tabs prior to niacin      albuterol (PROVENTIL HFA, VENTOLIN HFA, PROAIR HFA) 90 mcg/actuation inhaler Take 1 Puff by inhalation every six (6) hours as needed for Wheezing or Shortness of Breath. Vitals: Blood pressure 118/62, pulse (!) 58, temperature 98.8 °F (37.1 °C), temperature source Oral, resp. rate 20, height 5' 6\" (1.676 m), weight 203 lb (92.1 kg), SpO2 95 %. Allergies: is allergic to bees [hymenoptera allergenic extract]; betadine [povidone-iodine]; darvon [propoxyphene]; griseofulvin; lipitor [atorvastatin]; and pollen extracts.     Review of Systems: Pertinent Positives per HPI   [x]Total of 13 systems reviewed as follows:  Constitutional: Negative fever, negative chills  Eyes:               Negative for amauroses fugax  ENT:                Negative sore throat,oral absecess  Endocrine       Negative for thyroid replacement Rx; goiter  Respiratory:    Negative chronic cough,sputum production  Cards:             Negative for palpitations, varicosities, claudication  GI:                    Negative for dysphagia, bleeding, nausea, vomiting, diarrhea, and abdominal pain  Genitourinary:  Negative for frequency, dysuria  Integument:      Negative for rash and pruritus  Hematologic:    Negative for easy bruising; bleeding dyscarsia  Musculoskel:     Negative for muscle weakness inhibiting ambulation  Neurological:    Negative for stroke, TIA, syncope, dizziness  Behavl/Psych: Negative for feelings of anxiety, depression       Cardiovascular Testing:   EKG 11/30/2017: SR 80 bpm. No axis deviations/blocks  EKG 4/14/2018: AFib 58 bpm. RBBB, LAFB  Rhythm Strip 4/16/2018: SR 58 bpm. RBBB. EKG today: ? SB 59 bpm w potential 1st AVB & RBBB or ? JR? TTE 5/11/2018:  LEFT VENTRICLE: Size was normal. Systolic function was normal. Ejection fraction was estimated in the range of 55 % to 60 %. There were no regional wall motion abnormalities. Wall thickness was normal. RIGHT VENTRICLE: The size was normal. Systolic function was normal. LEFT ATRIUM: The atrium was moderately dilated. ATRIAL SEPTUM: The atrial septum appeared intact. RIGHT ATRIUM: Size was normal. MITRAL VALVE: Normal valve structure. DOPPLER: The findings were consistent with mild mitral stenosis. There was no  Regurgitation. AORTIC VALVE: s/p TAVR well seated elevated gradient DOPPLER: Transaortic velocity was within the normal range. There was no regurgitation. TRICUSPID VALVE: Normal valve structure. DOPPLER: There was no regurgitation. PULMONIC VALVE: Not well visualized, but normal Doppler findings. AORTA: The root exhibited normal size.  PERICARDIUM: There was no pericardial effusion. A pericardial fat pad was present. AV Vmax: 3.2 m/s  AV maxP.1 mmHg  AV meanP.7 mmHg  RACHEL (VTI): 1.3 cm2    Physical Exam:  General: Well nourished well groomed woman appearing younger than stated age  Neuro: A&OX3. ARORA. PERRL. Steady assisted gait with walker  Head:Normocephalic. Atraumatic. Symmetrical  Neck: Trachea Midline  Resp: CTA B. No Adv BS/cough/sputum/tachypnea with seated conversation  CV: S1S2 RRR. No/M/R/G/JVD/carotid bruits. Pink/warm/dry extremities. Mod LE peripheral edema  GI: Abdominal fluid retension. Soft. NT/ND. Active BS  : Voids  Integ: Groin puncture wounds w/o any s/s of infection. L groin puncture closed/hard to find, R groin visible but closed w/o any induration/errythema/tenderness to palpation. B groin folds w/ diffuse raised rash w/ malodor. Moist skin bed but no breakdown  Musculo/Skeletal: Limited ROM in all major joints. Good muscle tone    Clinic Evaluation:   KCCQ-12: scanned into EMR    5 meter gait: 8.18 seconds    Assessment/Plan:   1. AS s/p TAVR - TTE w/ good LVEF/RVEF and TAVR valve function. Mild MS. Volume overloaded currently. ASA 81mg daily for valve patency. Has intake appt w/ cardiac wellness tomorrow. Per d/w Dr. Kelly Pham, who saw pt, to double diuretic and K for 5 days. Also w/ rhythm concerns and to see Dr. Mercy Sapp this week to ensure bradycardia doesn't need intervention or responsible for dx. Has f/u w/ primary cardiologist in early July. RTC for eval and TTE at one yr anniversary per TVT Registry Guidelines. 2. Post op EKG changes - brief Afib but SR prior to discharge. No anticoagulation/PPM need per EP during in-house consultation. ?SB today or potentially JR. P waves not convincingly present. To get her into see Dr. Mercy Sapp this week for further eval. Currently w/o any peggy blocking agents. 3. HTN- Well controlled on current loop diuretic/ARB dosing. 4. TRENTON-  bypassed in    5. DM- metformin/glimeperide per endocrine  6. HLD- well controlled on current statin dose per cards  7. Vit D deficiency- at goal on supplement per cards  8. CINDA- CPAP for past 6+ yrs. Pt unaware of setting   9. Hx vocal cord injury w/ intubation in 1985 - chronic horse voice. No swallowing issues. Throat a little sore but improving    10. Volume overload - See #1. To increase furosemide to 120mg daily for 5 days as well as K per Dr. Benito Roger  11. Yeast dermatitis - B groin w/ yeast infection. Nystatin cream sent to Rx for BID application  12.  Further care/plan per Dr. Benito Roger

## 2018-05-15 NOTE — TELEPHONE ENCOUNTER
Verified patient with two types of identifiers. Patient scheduled to see Dr. Lidya Tejeda 5/22/18 at 2:20 pm. Patient verbalized understanding and will call with any other questions.

## 2018-05-16 ENCOUNTER — HOSPITAL ENCOUNTER (OUTPATIENT)
Dept: CARDIAC REHAB | Age: 83
Discharge: HOME | End: 2018-05-16
Payer: MEDICARE

## 2018-05-16 VITALS — WEIGHT: 200 LBS | BODY MASS INDEX: 32.14 KG/M2 | HEIGHT: 66 IN

## 2018-05-16 VITALS — BODY MASS INDEX: 32.28 KG/M2 | WEIGHT: 200 LBS

## 2018-05-16 PROCEDURE — 93798 PHYS/QHP OP CAR RHAB W/ECG: CPT

## 2018-05-16 NOTE — CARDIO/PULMONARY
Fer Cook  80 y.o.     Ms. Braden Bazzi presented to cardiac wellness for orientation and exercise tolerance test today with a primary diagnosis of a TAVR. Fer Cook has no significant cardiac history. Cardiac risk factors include family history, dyslipidemia, diabetes mellitus, hypertension, valvular heart disease and these were reviewed with the patient. Fer Cook lives alone, but has her son live locally. 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 PHQ9, depression score, is 12 and this is considered moderate. Pt stated the past two weeks she has been feeling more SOB and fatigue - saw the surgeon's office yesterday and her lasix was increased. She has already noticed a decrease in her leg swelling and she states she is doing better than she was two days ago. Will continue to monitor. Patient c/o SOB during 6 minute walk and was in afib/junctional, rare PVCs (sees Dr. Zena Zamudio next Tuesday). Fer Cook will attend a 60 minute class once a week and exercise 3 days a week in cardiac wellness. Education manual given. EF is 55%. She has reported having periods of feeling \"shaky\" which is why she uses her walker. Also stated sometimes she will get lightheaded while driving and will pull over until it passes. Verbalized resistance to a pacemaker at this time, but will let Dr. Zena Zamuido make that decision because if it helps her to feel better then she is willing to get one. At this time pt has some crackles in her lungs, but she reports her SOB is slightly improved with the increase in diuretic. Will continue to monitor.     Rosaura Guido RN  5/16/2018

## 2018-05-17 ENCOUNTER — APPOINTMENT (OUTPATIENT)
Dept: CARDIAC REHAB | Age: 83
End: 2018-05-17
Payer: MEDICARE

## 2018-05-17 ENCOUNTER — TELEPHONE (OUTPATIENT)
Dept: CARDIAC REHAB | Age: 83
End: 2018-05-17

## 2018-05-17 NOTE — TELEPHONE ENCOUNTER
Cardiac Wellness; Call received from Jose Eduardo Courser, who is sick with a cold and needs to cancel exercise for Friday 5/18/2018. Raina Santiago RN

## 2018-05-18 ENCOUNTER — APPOINTMENT (OUTPATIENT)
Dept: CARDIAC REHAB | Age: 83
End: 2018-05-18
Payer: MEDICARE

## 2018-05-18 RX ORDER — METFORMIN HYDROCHLORIDE 1000 MG/1
TABLET ORAL
Qty: 180 TAB | Refills: 3 | Status: SHIPPED | OUTPATIENT
Start: 2018-05-18 | End: 2019-06-14 | Stop reason: SDUPTHER

## 2018-05-18 RX ORDER — POTASSIUM CHLORIDE 750 MG/1
CAPSULE, EXTENDED RELEASE ORAL
Qty: 180 CAP | Refills: 3 | Status: SHIPPED | OUTPATIENT
Start: 2018-05-18 | End: 2018-05-22

## 2018-05-21 ENCOUNTER — APPOINTMENT (OUTPATIENT)
Dept: GENERAL RADIOLOGY | Age: 83
End: 2018-05-21
Attending: PHYSICIAN ASSISTANT
Payer: MEDICARE

## 2018-05-21 ENCOUNTER — TELEPHONE (OUTPATIENT)
Dept: CARDIOLOGY CLINIC | Age: 83
End: 2018-05-21

## 2018-05-21 ENCOUNTER — APPOINTMENT (OUTPATIENT)
Dept: CARDIAC REHAB | Age: 83
End: 2018-05-21
Payer: MEDICARE

## 2018-05-21 ENCOUNTER — HOSPITAL ENCOUNTER (EMERGENCY)
Age: 83
Discharge: HOME | End: 2018-05-21
Attending: STUDENT IN AN ORGANIZED HEALTH CARE EDUCATION/TRAINING PROGRAM | Admitting: EMERGENCY MEDICINE
Payer: MEDICARE

## 2018-05-21 ENCOUNTER — TELEPHONE (OUTPATIENT)
Dept: CARDIAC REHAB | Age: 83
End: 2018-05-21

## 2018-05-21 ENCOUNTER — APPOINTMENT (OUTPATIENT)
Dept: CT IMAGING | Age: 83
End: 2018-05-21
Attending: STUDENT IN AN ORGANIZED HEALTH CARE EDUCATION/TRAINING PROGRAM
Payer: MEDICARE

## 2018-05-21 VITALS
SYSTOLIC BLOOD PRESSURE: 110 MMHG | TEMPERATURE: 98.4 F | DIASTOLIC BLOOD PRESSURE: 82 MMHG | OXYGEN SATURATION: 94 % | HEART RATE: 56 BPM | HEIGHT: 66 IN | RESPIRATION RATE: 22 BRPM

## 2018-05-21 DIAGNOSIS — R07.9 ACUTE CHEST PAIN: Primary | ICD-10-CM

## 2018-05-21 DIAGNOSIS — E27.8 ADRENAL MASS (HCC): ICD-10-CM

## 2018-05-21 LAB
ALBUMIN SERPL-MCNC: 3.6 G/DL (ref 3.5–5)
ALBUMIN/GLOB SERPL: 1 {RATIO} (ref 1.1–2.2)
ALP SERPL-CCNC: 84 U/L (ref 45–117)
ALT SERPL-CCNC: 34 U/L (ref 12–78)
ANION GAP SERPL CALC-SCNC: 10 MMOL/L (ref 5–15)
AST SERPL-CCNC: 20 U/L (ref 15–37)
ATRIAL RATE: 36 BPM
BASOPHILS # BLD: 0 K/UL (ref 0–0.1)
BASOPHILS NFR BLD: 0 % (ref 0–1)
BILIRUB SERPL-MCNC: 1.3 MG/DL (ref 0.2–1)
BNP SERPL-MCNC: 1175 PG/ML (ref 0–450)
BUN SERPL-MCNC: 23 MG/DL (ref 6–20)
BUN/CREAT SERPL: 23 (ref 12–20)
CALCIUM SERPL-MCNC: 9.3 MG/DL (ref 8.5–10.1)
CALCULATED R AXIS, ECG10: 120 DEGREES
CALCULATED T AXIS, ECG11: 12 DEGREES
CHLORIDE SERPL-SCNC: 105 MMOL/L (ref 97–108)
CO2 SERPL-SCNC: 28 MMOL/L (ref 21–32)
CREAT SERPL-MCNC: 0.98 MG/DL (ref 0.55–1.02)
D DIMER PPP FEU-MCNC: 1.39 MG/L FEU (ref 0–0.65)
DIAGNOSIS, 93000: NORMAL
DIFFERENTIAL METHOD BLD: ABNORMAL
EOSINOPHIL # BLD: 0.1 K/UL (ref 0–0.4)
EOSINOPHIL NFR BLD: 1 % (ref 0–7)
ERYTHROCYTE [DISTWIDTH] IN BLOOD BY AUTOMATED COUNT: 14.8 % (ref 11.5–14.5)
GLOBULIN SER CALC-MCNC: 3.5 G/DL (ref 2–4)
GLUCOSE BLD STRIP.AUTO-MCNC: 174 MG/DL (ref 65–100)
GLUCOSE SERPL-MCNC: 129 MG/DL (ref 65–100)
HCT VFR BLD AUTO: 41.1 % (ref 35–47)
HGB BLD-MCNC: 12.5 G/DL (ref 11.5–16)
IMM GRANULOCYTES # BLD: 0 K/UL (ref 0–0.04)
IMM GRANULOCYTES NFR BLD AUTO: 0 % (ref 0–0.5)
LYMPHOCYTES # BLD: 1.5 K/UL (ref 0.8–3.5)
LYMPHOCYTES NFR BLD: 15 % (ref 12–49)
MCH RBC QN AUTO: 26.5 PG (ref 26–34)
MCHC RBC AUTO-ENTMCNC: 30.4 G/DL (ref 30–36.5)
MCV RBC AUTO: 87.3 FL (ref 80–99)
MONOCYTES # BLD: 1 K/UL (ref 0–1)
MONOCYTES NFR BLD: 10 % (ref 5–13)
NEUTS SEG # BLD: 7.2 K/UL (ref 1.8–8)
NEUTS SEG NFR BLD: 73 % (ref 32–75)
NRBC # BLD: 0 K/UL (ref 0–0.01)
NRBC BLD-RTO: 0 PER 100 WBC
PLATELET # BLD AUTO: 176 K/UL (ref 150–400)
PMV BLD AUTO: 10.5 FL (ref 8.9–12.9)
POTASSIUM SERPL-SCNC: 3.8 MMOL/L (ref 3.5–5.1)
PROT SERPL-MCNC: 7.1 G/DL (ref 6.4–8.2)
Q-T INTERVAL, ECG07: 518 MS
QRS DURATION, ECG06: 120 MS
QTC CALCULATION (BEZET), ECG08: 508 MS
RBC # BLD AUTO: 4.71 M/UL (ref 3.8–5.2)
SERVICE CMNT-IMP: ABNORMAL
SODIUM SERPL-SCNC: 143 MMOL/L (ref 136–145)
TROPONIN I BLD-MCNC: <0.04 NG/ML (ref 0–0.08)
TROPONIN I SERPL-MCNC: <0.04 NG/ML
VENTRICULAR RATE, ECG03: 58 BPM
WBC # BLD AUTO: 9.9 K/UL (ref 3.6–11)

## 2018-05-21 PROCEDURE — 71045 X-RAY EXAM CHEST 1 VIEW: CPT

## 2018-05-21 PROCEDURE — 84484 ASSAY OF TROPONIN QUANT: CPT | Performed by: PHYSICIAN ASSISTANT

## 2018-05-21 PROCEDURE — 74011000250 HC RX REV CODE- 250: Performed by: EMERGENCY MEDICINE

## 2018-05-21 PROCEDURE — 74011250637 HC RX REV CODE- 250/637: Performed by: EMERGENCY MEDICINE

## 2018-05-21 PROCEDURE — 99285 EMERGENCY DEPT VISIT HI MDM: CPT

## 2018-05-21 PROCEDURE — 83880 ASSAY OF NATRIURETIC PEPTIDE: CPT | Performed by: PHYSICIAN ASSISTANT

## 2018-05-21 PROCEDURE — 85379 FIBRIN DEGRADATION QUANT: CPT | Performed by: NURSE PRACTITIONER

## 2018-05-21 PROCEDURE — 85025 COMPLETE CBC W/AUTO DIFF WBC: CPT | Performed by: PHYSICIAN ASSISTANT

## 2018-05-21 PROCEDURE — 82962 GLUCOSE BLOOD TEST: CPT

## 2018-05-21 PROCEDURE — 71275 CT ANGIOGRAPHY CHEST: CPT

## 2018-05-21 PROCEDURE — 74011000258 HC RX REV CODE- 258: Performed by: EMERGENCY MEDICINE

## 2018-05-21 PROCEDURE — 74011636320 HC RX REV CODE- 636/320: Performed by: EMERGENCY MEDICINE

## 2018-05-21 PROCEDURE — 80053 COMPREHEN METABOLIC PANEL: CPT | Performed by: PHYSICIAN ASSISTANT

## 2018-05-21 PROCEDURE — 36415 COLL VENOUS BLD VENIPUNCTURE: CPT | Performed by: PHYSICIAN ASSISTANT

## 2018-05-21 PROCEDURE — 93005 ELECTROCARDIOGRAM TRACING: CPT

## 2018-05-21 PROCEDURE — 93970 EXTREMITY STUDY: CPT

## 2018-05-21 RX ORDER — SODIUM CHLORIDE 0.9 % (FLUSH) 0.9 %
10 SYRINGE (ML) INJECTION
Status: COMPLETED | OUTPATIENT
Start: 2018-05-21 | End: 2018-05-21

## 2018-05-21 RX ADMIN — IOPAMIDOL 100 ML: 755 INJECTION, SOLUTION INTRAVENOUS at 17:15

## 2018-05-21 RX ADMIN — SODIUM CHLORIDE 100 ML: 900 INJECTION, SOLUTION INTRAVENOUS at 17:15

## 2018-05-21 RX ADMIN — Medication 10 ML: at 17:15

## 2018-05-21 RX ADMIN — LIDOCAINE HYDROCHLORIDE 40 ML: 20 SOLUTION ORAL; TOPICAL at 19:19

## 2018-05-21 NOTE — DISCHARGE INSTRUCTIONS
Chest Pain: Care Instructions  Your Care Instructions    There are many things that can cause chest pain. Some are not serious and will get better on their own in a few days. But some kinds of chest pain need more testing and treatment. Your doctor may have recommended a follow-up visit in the next 8 to 12 hours. If you are not getting better, you may need more tests or treatment. Even though your doctor has released you, you still need to watch for any problems. The doctor carefully checked you, but sometimes problems can develop later. If you have new symptoms or if your symptoms do not get better, get medical care right away. If you have worse or different chest pain or pressure that lasts more than 5 minutes or you passed out (lost consciousness), call 911 or seek other emergency help right away. A medical visit is only one step in your treatment. Even if you feel better, you still need to do what your doctor recommends, such as going to all suggested follow-up appointments and taking medicines exactly as directed. This will help you recover and help prevent future problems. How can you care for yourself at home? · Rest until you feel better. · Take your medicine exactly as prescribed. Call your doctor if you think you are having a problem with your medicine. · Do not drive after taking a prescription pain medicine. When should you call for help? Call 911 if:  ? · You passed out (lost consciousness). ? · You have severe difficulty breathing. ? · You have symptoms of a heart attack. These may include:  ¨ Chest pain or pressure, or a strange feeling in your chest.  ¨ Sweating. ¨ Shortness of breath. ¨ Nausea or vomiting. ¨ Pain, pressure, or a strange feeling in your back, neck, jaw, or upper belly or in one or both shoulders or arms. ¨ Lightheadedness or sudden weakness. ¨ A fast or irregular heartbeat.   After you call 911, the  may tell you to chew 1 adult-strength or 2 to 4 low-dose aspirin. Wait for an ambulance. Do not try to drive yourself. ?Call your doctor today if:  ? · You have any trouble breathing. ? · Your chest pain gets worse. ? · You are dizzy or lightheaded, or you feel like you may faint. ? · You are not getting better as expected. ? · You are having new or different chest pain. Where can you learn more? Go to http://jose raul-bryan.info/. Enter A120 in the search box to learn more about \"Chest Pain: Care Instructions. \"  Current as of: March 20, 2017  Content Version: 11.4  © 5032-1691 [employer]. Care instructions adapted under license by [employer] (which disclaims liability or warranty for this information). If you have questions about a medical condition or this instruction, always ask your healthcare professional. [place] [address] any warranty or liability for your use of this information. We hope that we have addressed all of your medical concerns. The examination and treatment you received in the Emergency Department were for an emergent problem and were not intended as complete care. It is important that you follow up with your healthcare provider(s) for ongoing care. If your symptoms worsen or do not improve as expected, and you are unable to reach your usual health care provider(s), you should return to the Emergency Department. Today's healthcare is undergoing tremendous change, and patient satisfaction surveys are one of the many tools to assess the quality of medical care. You may receive a survey from the [employer] regarding your experience in the Emergency Department. I hope that your experience has been completely positive, particularly the medical care that I provided. As such, please participate in the survey; anything less than excellent does not meet my expectations or intentions.         [facility] and [employer] participate in nationally recognized quality of care measures. If your blood pressure is greater than 120/80, as reported below, we urge that you seek medical care to address the potential of high blood pressure, commonly known as hypertension. Hypertension can be hereditary or can be caused by certain medical conditions, pain, stress, or \"white coat syndrome. \"       Please make an appointment with your health care provider(s) for follow up of your Emergency Department visit. VITALS:   Patient Vitals for the past 8 hrs:   Temp Pulse Resp BP SpO2   05/21/18 1830 - (!) 56 22 110/82 94 %   05/21/18 1800 - (!) 55 13 110/56 93 %   05/21/18 1700 - (!) 57 13 145/57 93 %   05/21/18 1630 - 62 16 150/48 98 %   05/21/18 1627 - - 18 149/47 -   05/21/18 1500 - 64 17 147/45 95 %   05/21/18 1430 - 64 20 128/56 94 %   05/21/18 1400 - 66 17 124/46 96 %   05/21/18 1330 - (!) 58 16 125/52 92 %   05/21/18 1230 - 63 15 143/52 94 %   05/21/18 1159 - - - 169/56 -   05/21/18 1139 99.3 °F (37.4 °C) (!) 57 20 151/78 99 %          Thank you for allowing us to provide you with medical care today. We realize that you have many choices for your emergency care needs. Please choose us in the future for any continued health care needs. Pedro Aguirre [facility], [address] [place] [address] 20.   Office: [phone]            Recent Results (from the past 24 hour(s))   EKG, 12 LEAD, INITIAL    Collection Time: 05/21/18 11:37 AM   Result Value Ref Range    Ventricular Rate 58 BPM    Atrial Rate 36 BPM    QRS Duration 120 ms    Q-T Interval 518 ms    QTC Calculation (Bezet) 508 ms    Calculated R Axis 120 degrees    Calculated T Axis 12 degrees    Diagnosis       Wide QRS rhythm  Low voltage QRS  Right bundle branch block  Left posterior fascicular block  ** Bifascicular block **  Inferior infarct , age undetermined  When compared with ECG of 14-APR-2018 07:24,  Wide QRS rhythm has replaced Atrial fibrillation     CBC WITH AUTOMATED DIFF    Collection Time: 05/21/18 11:46 AM   Result Value Ref Range    WBC 9.9 3.6 - 11.0 K/uL    RBC 4.71 3.80 - 5.20 M/uL    HGB 12.5 11.5 - 16.0 g/dL    HCT 41.1 35.0 - 47.0 %    MCV 87.3 80.0 - 99.0 FL    MCH 26.5 26.0 - 34.0 PG    MCHC 30.4 30.0 - 36.5 g/dL    RDW 14.8 (H) 11.5 - 14.5 %    PLATELET 138 880 - 863 K/uL    MPV 10.5 8.9 - 12.9 FL    NRBC 0.0 0  WBC    ABSOLUTE NRBC 0.00 0.00 - 0.01 K/uL    NEUTROPHILS 73 32 - 75 %    LYMPHOCYTES 15 12 - 49 %    MONOCYTES 10 5 - 13 %    EOSINOPHILS 1 0 - 7 %    BASOPHILS 0 0 - 1 %    IMMATURE GRANULOCYTES 0 0.0 - 0.5 %    ABS. NEUTROPHILS 7.2 1.8 - 8.0 K/UL    ABS. LYMPHOCYTES 1.5 0.8 - 3.5 K/UL    ABS. MONOCYTES 1.0 0.0 - 1.0 K/UL    ABS. EOSINOPHILS 0.1 0.0 - 0.4 K/UL    ABS. BASOPHILS 0.0 0.0 - 0.1 K/UL    ABS. IMM. GRANS. 0.0 0.00 - 0.04 K/UL    DF AUTOMATED     METABOLIC PANEL, COMPREHENSIVE    Collection Time: 05/21/18 11:46 AM   Result Value Ref Range    Sodium 143 136 - 145 mmol/L    Potassium 3.8 3.5 - 5.1 mmol/L    Chloride 105 97 - 108 mmol/L    CO2 28 21 - 32 mmol/L    Anion gap 10 5 - 15 mmol/L    Glucose 129 (H) 65 - 100 mg/dL    BUN 23 (H) 6 - 20 MG/DL    Creatinine 0.98 0.55 - 1.02 MG/DL    BUN/Creatinine ratio 23 (H) 12 - 20      GFR est AA >60 >60 ml/min/1.73m2    GFR est non-AA 54 (L) >60 ml/min/1.73m2    Calcium 9.3 8.5 - 10.1 MG/DL    Bilirubin, total 1.3 (H) 0.2 - 1.0 MG/DL    ALT (SGPT) 34 12 - 78 U/L    AST (SGOT) 20 15 - 37 U/L    Alk.  phosphatase 84 45 - 117 U/L    Protein, total 7.1 6.4 - 8.2 g/dL    Albumin 3.6 3.5 - 5.0 g/dL    Globulin 3.5 2.0 - 4.0 g/dL    A-G Ratio 1.0 (L) 1.1 - 2.2     TROPONIN I    Collection Time: 05/21/18 11:46 AM   Result Value Ref Range    Troponin-I, Qt. <0.04 <0.05 ng/mL   NT-PRO BNP    Collection Time: 05/21/18 11:46 AM   Result Value Ref Range    NT pro-BNP 1175 (H) 0 - 450 PG/ML   D DIMER    Collection Time: 05/21/18 11:46 AM   Result Value Ref Range    D-dimer 1.39 (H) 0.00 - 0.65 mg/L FEU   POC TROPONIN-I    Collection Time: 05/21/18  6:42 PM   Result Value Ref Range    Troponin-I (POC) <0.04 0.00 - 0.08 ng/mL   GLUCOSE, POC    Collection Time: 05/21/18  6:43 PM   Result Value Ref Range    Glucose (POC) 174 (H) 65 - 100 mg/dL    Performed by Vasiliy Saxena        Cta Chest W Or W Wo Cont    Result Date: 5/21/2018  EXAM:  CTA CHEST W OR W WO CONT INDICATION:   Chest pain and shortness of breath COMPARISON: 1/9/2018. TECHNIQUE: Precontrast  images were obtained to localize the volume for acquisition. Multislice helical CT arteriography was performed from the diaphragm to the thoracic inlet during uneventful rapid bolus of 100 cc Isovue-370. Lung and soft tissue windows were generated. Coronal and sagittal images were generated and 3D post processing consisting of coronal maximum intensity images was performed. CT dose reduction was achieved through use of a standardized protocol tailored for this examination and automatic exposure control for dose modulation. FINDINGS: CHEST: THYROID: No nodule. MEDIASTINUM: No mass or lymphadenopathy. FRED: No mass or lymphadenopathy. THORACIC AORTA: The patient is status post TAVR. MAIN PULMONARY ARTERY: Enlarged compatible with pulmonary arterial hypertension. There is no evidence of pulmonary embolism. TRACHEA/BRONCHI: Patent. ESOPHAGUS: No wall thickening or dilatation. HEART: Normal in size. PLEURA: No effusion or pneumothorax. LUNGS: Areas of air-trapping are noted throughout both lung fields. No acute abnormality or pulmonary nodule. INCIDENTALLY IMAGED UPPER ABDOMEN: There is a stable 2.7 cm indeterminate left adrenal mass. Bilateral simple and hemorrhagic renal cysts are noted. BONES: No destructive bone lesion. IMPRESSION: No evidence of pulmonary embolism or acute abnormality. Stable indeterminate left adrenal mass.     Xr Chest Port    Result Date: 5/21/2018  EXAM:  XR CHEST PORT INDICATION:  Chest pain and shortness of breath. COMPARISON:  4/15/2018. FINDINGS:  An AP portable view was obtained of the chest.  The heart is enlarged with TAVR. There is a possible small left pleural effusion. No pulmonary edema is evident. IMPRESSION: Possible small left pleural effusion.

## 2018-05-21 NOTE — ED NOTES
Patient restless, removed leads, etc off monitor. Patient attempted to get OOB without calling for assist.  Reminded to call for assist r/t fall risk, updated on plan of care.   Call bell remains in reach, friend at bedside

## 2018-05-21 NOTE — CONSULTS
Cardiology Consult Note    Assessment/Plan/Discussion:Cardiology Attending:     Patient seen on the day of progress note and examined  and agree with Advance Practice Provider (DASHA, NP,PA)  assessment and plans. Param Roland is a 80 y.o. female   Pt seen mid day at request of Dr Robinson Chahal in ER  She reports a mild pleuritic pain across left chest that is sharp and not severe  She thought it felt like indigestino and it happened while watching TV and went to her left jaw   Had TAVR in 2018, echo this month stable position and EF  Mild dizzy  chronic left leg swelling  Some edema on slight diuretic since post op visit    Her pre TAVR cath films were personally visualized and show no significant CAD  A CT chest shows no PE or dissection   Her exam reveals stable vitals and clear lungs with RRR smrg and no edema  The proBNP is unhelpful to me and the trop is normal  So does not seem to be CAD, PE or dissection  musculoskeletal pain versus GERD seem the likeliest  Has fu tomorrow with Dr Carey Kerr  She also notes green stools for 4 days but no melena or BRBPR  Her ekg shows undetermined rhythm with bifasicular block (EP appt tomorrow)  She does not appear to be in CHF  Overall suggest keep fu in am, no CV reason to admit that we can tell and treat for GERD, if not better after that as OP then treat with NSAIDs cautiously for age  Curt Mark MD          Patient Name: Param Roland  : 7/3/1930 MRN: [id number]  Date: 2018  Time: 2:29 PM    Admit Diagnosis: There are no admission diagnoses documented for this encounter. Primary Cardiologist: Dr. Nicole Valdes MD    Consulting Cardiologist: Ric Hernandez M.D.    Babita  for Consult: chest pain, SOB    Requesting MD: Dr. Rukhsana Georeg MD    HPI:  Param Roland is a 80 y.o. female  With PMH of  HTN, TAVR 2018, COPD, diabetes, skin CA.  Had  RBB post procedure and was seen by Dr. Carey Kerr at that time.  No PPM was needed. Presented from home with chief c/o of chest pain. Reports pain started when she was sitting watching TV and has progressively worsened, intermittently radiating to back. Reports that this a.m. She noticed that it radiated to her left neck and jaw. Currently pain is nonradiating 5/10 pain. Pt reports the pain is exacerbated by deep inspiration or leaning backwards. Pt states the pain felt similar to indigestion when it started. Reports she had associated nausea this a.m. Also reports associated SOB, saying it is hard for her to take a deep breath due to pain. Reports that she felt dizzy/lightheaded. Has had some dizziness over last couple weeks. No diaphoresis or vomiting. No palpitations. She called Dr. Merritt [place] office and it was recommended she come to ER. Pt reports on 4/12/18 she had her aortic valve replaced, had a F/U appointment last week and rearlier this week, after which her diuretics were increased d/t bilateral leg swelling, which has been improving. Alda Antony HAs had poor appetite since 3rd seek postop. Pt denies any diaphoresis or vomiting. Reports BLE edema and abdominal distension has improved since Valve team increased her diuretics to lasix 60 mg BID at last appointment last week (5/15). Reports left leg more edematous than right. Reports a weight loss of 4 lbs over the last 3 days. SH:  Former smoker. Occasional ETOH use    Subjective:  Continues to have left sided chest pain, non radiating. Reports some SOB, difficult to take a deep breath due to pain. Assessment and Plan     1. Chest pain:  Atypical, pleuritic  -Troponin <0.04  -[facility] 11/2017:minor luminal irregularities cx and RCA. -12 lead EKG:  NSR with bifasicular block. -CXR:  Possible Left basilar effusion.  -Chest pain atypical.  Do not believe this is CAD as pt had recent C in 11/2017 with no significant CAD  -Obtain D-dimer    2.  SOB:    -TTE 5/11/18:  NL EF, NWMA, mild MS, TAVR valve well seated. -ProBNP: 1175 (1800 is cutoff for age)  -Will check D-dimer- if abnormal, then will CTA chest    3. BLE edema:  Trace -1+ Left ankle, Rt ankle trace  -check BLE venous dopplers.    -on  lasix 60 mg BID PTA    4. Hx of TAVR 4/2017  -TTE well seated on recent TTE (5/11/18)    Pt with presenting chief c/o atypical, pleuritic chest pain and SOB. Do not suspect CAD as cause of chest pain. No evidence of pulmonary edema, do not feel SOB is due to any fluid overload. Recommend check D-Dimer and BLE US. If d-Dimer positive would proceed to CTA chest. If findings negative, consider PPI   Pt has f/u with  tomorrow. Review of Symptoms:  Constitutional: Negative for fever, chills, + weight loss, . HEENT: Negative for nosebleeds, vision changes. Respiratory: Negative for cough, hemoptysis  Cardiovascular: Negative for chest pain, palpitations, orthopnea, leg swelling, syncope, and PND. Gastrointestinal: + for nausea,negative for vomiting, diarrhea, blood in stool and melena, abdominal pain, +green stools  Genitourinary: Negative for dysuria, and hematuria. Musculoskeletal: Negative for myalgias  Skin: Negative for rash. Heme:no bleeding  Neurological: Negative for speech changes and focal weakness      Previous treatment/evaluation includes echocardiogram, cardiac catheterization and TAVR .   Cardiac risk factors: smoking/ tobacco exposure, diabetes mellitus, sedentary life style, hypertension, post-menopausal.    Past Medical History:   Diagnosis Date    Adverse effect of anesthesia     difficulty waking    Aortic stenosis     Arrhythmia     rapid heartrate seen in ER  4/2/18    Cancer ([facility] [address] 01/2018    skin spots, removed periodically    Chronic pain     back - spinal stenosis    COPD     Diabetes ([facility] [address] [facility]     Diabetic neuropathy ([facility] [address]     knees down    Diverticulosis of colon     Hypercholesterolemia     Hypertension     CINDA (obstructive sleep apnea) 08-    AHI: 18.6 per hour - CPAP    Other ill-defined conditions(799.89)     heart murmur    Peripheral neuropathy     Renal artery stenosis (HCC)     Right    Unspecified adverse effect of anesthesia     delayed awakening - \"does not need as much\"     Past Surgical History:   Procedure Laterality Date    ABDOMEN SURGERY PROC UNLISTED  1977    R Renal Artery Stenosis bypassed    ABDOMEN SURGERY [address] or 1983    hernia repair    ABDOMEN SURGERY [facility] - removal of fatty tissue around abdomen    BREAST SURGERY PROCEDURE UNLISTED  1968    Left - benign cyst    CARDIAC SURG PROCEDURE UNLIST  04/13/2018    TAVR    ENDOSCOPY, COLON, DIAGNOSTIC  1999    hx of polys in the past    HX CATARACT REMOVAL      bilateral    HX GYN  1966    D&C    HX HEENT      Eyelid Cysts x 4    HX HEENT      oral surgeries - gum surgery/floating root  removed x 2    HX HEENT      retinal hemmorage repair    HX ORTHOPAEDIC  11/06    L1-S1 2007    HX ORTHOPAEDIC  1985    L 4th Trigger finger    HX ORTHOPAEDIC      left knee arthroscopy    HX OTHER SURGICAL      skin cancer removal, periodically    HX TONSILLECTOMY      T & A     No current facility-administered medications for this encounter. Current Outpatient Prescriptions   Medication Sig    potassium chloride SA (MICRO-K) 10 mEq capsule TAKE 1 CAPSULE BY MOUTH TWO TIMES DAILY    metFORMIN (GLUCOPHAGE) 1,000 mg tablet TAKE 1 TABLET BY MOUTH TWO  TIMES DAILY    nystatin (MYCOSTATIN) topical cream Apply  to affected area two (2) times a day.  losartan (COZAAR) 50 mg tablet Take 1 Tab by mouth daily. Indications: hypertension    glimepiride (AMARYL) 2 mg tablet Take 1 mg by mouth as needed.  cholecalciferol, vitamin D3, (VITAMIN D3) 2,000 unit tab Take 1 Tab by mouth daily (after lunch).  CYANOCOBALAMIN, VITAMIN B-12, (VITAMIN B-12 PO) Take 1,500 mg by mouth daily.  furosemide (LASIX) 40 mg tablet Take 40 mg by mouth daily.     niacin (NIASPAN) 1,000 mg Tb24 tab 2,000 mg nightly.  PSYLLIUM SEED, WITH DEXTROSE, (FIBER PO) Take  by mouth. 5 tabs daily    ezetimibe (ZETIA) 10 mg tablet Take 1 Tab by mouth daily.  rosuvastatin (CRESTOR) 5 mg tablet Take 1 Tab by mouth daily.  COD LIVER OIL PO Take 1,000 mg by mouth daily.  azelastine (ASTELIN) 137 mcg (0.1 %) nasal spray 2 Sprays by Both Nostrils route two (2) times a day. Use in each nostril as directed (Patient taking differently: 2 Sprays by Both Nostrils route as needed. Use in each nostril as directed)    glucose blood VI test strips (ACCU-CHEK ELBERT PLUS) strip Check bs daily. Dx 250.00    aspirin 81 mg tablet Take 162 mg by mouth. 2 tabs prior to niacin       Allergies   Allergen Reactions    Bees [Hymenoptera Allergenic Extract] Anaphylaxis    Lipitor [Atorvastatin] Myalgia    Betadine [Povidone-Iodine] Other (comments)     Abdominal swelling, one incident in 1977. Has had since without a reaction.     Darvon [Propoxyphene] Nausea and Vomiting    Griseofulvin Nausea and Vomiting    Pollen Extracts Cough      Family History   Problem Relation Age of Onset    Stroke Mother      1    Cancer Mother      ovarian    Cancer Father      lung/throat    Alcohol abuse Father     Lung Disease Father     Pacemaker Brother     Heart Attack Brother     Heart Disease Brother     Other Brother      als    Stroke Maternal Grandfather     Heart Disease Paternal Uncle     Heart Attack Paternal Uncle       Social History     Social History    Marital status:      Spouse name: N/A    Number of children: N/A    Years of education: N/A     Social History Main Topics    Smoking status: Former Smoker     Packs/day: 1.50     Years: 13.00     Types: Cigarettes     Quit date: 6/6/1977    Smokeless tobacco: Never Used      Comment: former cigarette smoker    Alcohol use No      Comment: very seldom    Drug use: No    Sexual activity: No     Other Topics Concern    None Social History Narrative       Objective:    Physical Exam    Vitals:   Vitals:    05/21/18 1159 05/21/18 1230 05/21/18 1330 05/21/18 1400   BP: 169/56 143/52 125/52 124/46   Pulse:  63 (!) 58 66   Resp:  15 16 17   Temp:       SpO2:  94% 92% 96%   Height:           General:    Alert, cooperative, no distress, appears stated age. Neck:   Supple, no carotid bruit and no JVD. Back:     Symmetric,    Lungs:     Clear to auscultation bilaterally. Heart[de-identified]    Regular rate and rhythm, S1, S2 normal, no murmur, click, rub or gallop. Abdomen:     Soft, non-tender. Bowel sounds normal. No masses,  No      organomegaly. Extremities:  trace-1+ left ankle, trace right ankle. Vascular:   Pulses - 2+   Skin:   Skin color normal. No rashes or lesions   Neurologic:   ARORA. Normal strength throughout. Telemetry: normal sinus rhythm    ECG: NSR, bifasicular block    Data Review:     Radiology:   CXR reviewed. Recent Labs      05/21/18   1146   TROIQ  <0.04     Recent Labs      05/21/18   1146   NA  143   K  3.8   CL  105   CO2  28   BUN  23*   CREA  0.98   GLU  129*   CA  9.3     Recent Labs      05/21/18   1146   WBC  9.9   HGB  12.5   HCT  41.1   PLT  176     Recent Labs      05/21/18   1146   SGOT  20   AP  84     No results for input(s): CHOL, LDLC in the last 72 hours. No lab exists for component: TGL, HDLC,  HBA1C  No results for input(s): CRP, TSH, TSHEXT in the last 72 hours. No lab exists for component: ESR    Thank you very much for this referral. I appreciate the opportunity to participate in this patient's care. I will follow along with above stated plan. Velia Casey. YVETTE Breaux         [facility] [address] [place] [address] [address]     [facility]     [phone]    CC: Fermín Rascon MD

## 2018-05-21 NOTE — TELEPHONE ENCOUNTER
5/21/2018 [facility]: Ms. Garcia Living called to cancel today's appointment d/t may need pacemaker. Will not be able to return for next appointment, unless Dr. Yesy Canas gives her the okay since she may possibly may need a pacemaker. She will see him 5/22/2018.    Chris Garrido

## 2018-05-21 NOTE — ED NOTES
Patient has received discharge instructions from ER MD, verbalizes understanding. Discharged via wheelchair with friend.

## 2018-05-21 NOTE — ED PROVIDER NOTES
HPI Comments: 80 y.o. female with past medical history significant for diverticulosis, HTN, COPD, diabetes, skin CA, and s/p TAVR who presents from home via private vehicle with chief complaint of chest pain. Pt reports starting with L sided chest pain yesterday afternoon that has progressively worsened and intermittently radiated to her back. Pt reports the pain was at its worst this morning after 0800, radiating into her L neck and jaw, currently is nonradiating 5/10 pain. Pt reports the pain is exacerbated by deep inspiration or leaning backwards. Pt states the pain felt similar to indigestion when it started. Pt reports accompanying nausea. Pt reports calling the cardiologist office twice this morning and was recommended to come into the ED. Pt reports on 4/12 she had her aortic valve replaced, had a F/U appointment earlier this week, after which her diuretics were increased d/t bilateral leg swelling, which has slowly been resolving. Pt notes she has another cardiology appointment tomorrow. Pt denies any diaphoresis or vomiting. There are no other acute medical concerns at this time. Social hx: Former smoker (Quit 8518); Very rare EtOH use  PCP: Garfield White MD  Cardiologist: David Prasad MD    Note written by Lila Gardner, as dictated by Gulshan Matta MD 12:02 PM    The history is provided by the patient. No  was used.         Past Medical History:   Diagnosis Date    Adverse effect of anesthesia     difficulty waking    Aortic stenosis     Arrhythmia     rapid heartrate seen in ER  4/2/18    Cancer ([facility] [address] 01/2018    skin spots, removed periodically    Chronic pain     back - spinal stenosis    COPD     Diabetes ([address]     Diabetic neuropathy ([address]     knees down    Diverticulosis of colon     Hypercholesterolemia     Hypertension     CINDA (obstructive sleep apnea) 08-    AHI: 18.6 per hour - CPAP    Other ill-defined conditions(799.89)     heart murmur    Peripheral neuropathy     Renal artery stenosis (HCC)     Right    Unspecified adverse effect of anesthesia     delayed awakening - \"does not need as much\"       Past Surgical History:   Procedure Laterality Date    ABDOMEN SURGERY PROC UNLISTED  1977    R Renal Artery Stenosis bypassed    ABDOMEN SURGERY [address] or 1983    hernia repair    ABDOMEN SURGERY [address] [place] [address] - removal of fatty tissue around abdomen    BREAST SURGERY PROCEDURE UNLISTED  1968    Left - benign cyst    CARDIAC SURG PROCEDURE UNLIST  04/13/2018    TAVR    ENDOSCOPY, COLON, DIAGNOSTIC  1999    hx of polys in the past    HX CATARACT REMOVAL      bilateral    HX GYN  1966    D&C    HX HEENT      Eyelid Cysts x 4    HX HEENT      oral surgeries - gum surgery/floating root  removed x 2    HX HEENT      retinal hemmorage repair    HX ORTHOPAEDIC  11/06    L1-S1 2007    HX ORTHOPAEDIC  1985    L 4th Trigger finger    HX ORTHOPAEDIC      left knee arthroscopy    HX OTHER SURGICAL      skin cancer removal, periodically    HX TONSILLECTOMY      T & A         Family History:   Problem Relation Age of Onset    Stroke Mother      1    Cancer Mother      ovarian    Cancer Father      lung/throat    Alcohol abuse Father     Lung Disease Father     Pacemaker Brother     Heart Attack Brother     Heart Disease Brother     Other Brother      als    Stroke Maternal Grandfather     Heart Disease Paternal Uncle     Heart Attack Paternal Uncle        Social History     Social History    Marital status:      Spouse name: N/A    Number of children: N/A    Years of education: N/A     Occupational History    Not on file.      Social History Main Topics    Smoking status: Former Smoker     Packs/day: 1.50     Years: 13.00     Types: Cigarettes     Quit date: 6/6/1977    Smokeless tobacco: Never Used      Comment: former cigarette smoker    Alcohol use No      Comment: very seldom    Drug use: No    Sexual activity: No     Other Topics Concern    Not on file     Social History Narrative         ALLERGIES: Bees [hymenoptera allergenic extract]; Lipitor [atorvastatin]; Betadine [povidone-iodine]; Darvon [propoxyphene]; Griseofulvin; and Pollen extracts    Review of Systems   Constitutional: Negative for chills, diaphoresis and fever. HENT: Negative for sore throat. Respiratory: Negative for cough and shortness of breath. Cardiovascular: Positive for chest pain and leg swelling. Gastrointestinal: Positive for nausea. Negative for abdominal pain and vomiting. Genitourinary: Negative for dysuria. Musculoskeletal: Negative for back pain (Resolved) and neck pain (Resolved). Skin: Negative for rash. Neurological: Negative for syncope and headaches. Psychiatric/Behavioral: Negative for confusion. All other systems reviewed and are negative. Vitals:    05/21/18 1139   BP: 151/78   Pulse: (!) 57   Resp: 20   Temp: 99.3 °F (37.4 °C)   SpO2: 99%   Height: 5' 6\" (1.676 m)            Physical Exam   Constitutional: She is oriented to person, place, and time. She appears well-developed. No distress. HENT:   Head: Normocephalic and atraumatic. Eyes: Conjunctivae and EOM are normal. Pupils are equal, round, and reactive to light. Neck: Normal range of motion. Neck supple. Cardiovascular: Regular rhythm. Bradycardia present. Murmur heard. Systolic murmur is present with a grade of 3/6   3/6 systolic murmur. Bradycardic. Pulmonary/Chest: Effort normal and breath sounds normal. No respiratory distress. Abdominal: Soft. Bowel sounds are normal. She exhibits no distension. There is no tenderness. There is no rebound. Musculoskeletal: Normal range of motion. She exhibits edema. Trace edema bilateral lower extremities. Neurological: She is alert and oriented to person, place, and time. No cranial nerve deficit. She exhibits normal muscle tone.  Coordination normal. Skin: Skin is warm and dry. No rash noted. Psychiatric: She has a normal mood and affect. Her behavior is normal.   Nursing note and vitals reviewed. Note written by Lila Wallace, as dictated by Cuate Giordano MD 12:02 PM     [facility]      ED Course       Procedures    ED EKG interpretation: 11:37  Rhythm: Wide QRS rhythm; and regular . Rate (approx.): 58 bpm; Low voltage QRS; RBBB; L posterior fascicular block; inferior infarct, age undetermined. No significant changes to 5/15/18 study. Note written by Lila Wallace, as dictated by Cuate Giordano MD 12:09 PM      CONSULT NOTE:  1:32 PM uCate Giordano MD spoke with Dr. Abilio Dumont, Consult for Cardiology. Discussed available diagnostic tests and clinical findings. Dr. Abilio Dumont will come see the pt. PROGRESS NOTE:  1:58 PM  Vicki Lefort, NP, in ED to see the pt. CONSULT NOTE:  2:55 PM Cuate Giordano MD spoke with Dr. Abilio Dumont, Consult for Cardiology. Discussed available diagnostic tests and clinical findings. Dr. Abilio Dumont recommends doppler studies and a D dimer, and if normal, pt can be discharged home, as she already has a scheduled appt tomorrow. 4:32 PM  Change of shift. Care of patient signed over to Precious Argueta MD.  Bedside handoff complete.

## 2018-05-21 NOTE — ED TRIAGE NOTES
Pt c/o chest pain radiating into left side of neck onset last night.  Cardiologist Dr. Edith Milton. +SOB

## 2018-05-21 NOTE — TELEPHONE ENCOUNTER
I spoke to patient and she is c/o worsening CP. I advised patient she needed to be seen at the nearest ED for further evaluation. She has a scheduled appt with Dr. Yesy Canas tomorrow will forward message to his nurse.   2 pt identifiers used

## 2018-05-21 NOTE — ED NOTES
4:32 PM  Change of shift. Care of patient taken over from Salma Vance MD; H&P reviewed, bedside handoff complete. Awaiting CTA chest.    6:25 PM  CTA chest reveals no evidence of pulmonary embolism or acute abnormality. Stable indeterminate left adrenal mass. Discussed available lab and imaging results with patient and family member at bedside. Both verbalize understanding and agree with care plan. Will discharge patient home pending remaining labs. 7:11 PM  Repeat troponin negative    Discussed available lab and imaging results with patient and family member at bedside. Both verbalize understanding and agree with care plan. Will discharge patient home with specific return precautions; no prescriptions; f/u with cardiology, PCP, [facility] ED as needed. Patient's results have been reviewed with them. Patient and/or family have verbally conveyed their understanding and agreement of the patient's signs, symptoms, diagnosis, treatment and prognosis and additionally agree to follow up as recommended or return to the Emergency Room should their condition change prior to follow-up. Discharge instructions have also been provided to the patient with some educational information regarding their diagnosis as well a list of reasons why they would want to return to the ER prior to their follow-up appointment should their condition change. No pertinent past medical history

## 2018-05-21 NOTE — TELEPHONE ENCOUNTER
Pt called with chest discomfort that feels like bad indigestion. Pt expressed she had it since last night and wanted to know if Dr. Kayla Mayfield wanted to see her prior to her seeing Dr. Rissa Chahal tomorrow. Pt can be reached at [phone].   Thanks

## 2018-05-21 NOTE — PROGRESS NOTES
Date of previous inpatient admission/ ED visit? 4/12/18    What brought the patient back to ED? CP    Did patient decline recommended services during last admission/ ED visit (if yes, what)? No    Has patient seen a provider since their last inpatient admission/ED visit (if yes, when)? 5/15/18 Cardiac Surgery Specialist    CM Interventions:  From previous inpatient admission/ED visit: Referral to [facility]  From current inpatient admission/ED visit: Assessment    EMR reviewed (hospitalization noted 4/12/18-4/15/18). History significant for diverticulosis, HTN, COPD, diabetes, skin CA, and s/p TAVR. Patient presents to the ED w/ chest pain. Met w/patient and friend at bedside - introduced to role of CM. Dave Cope prefers for friend to remain. Informed chest pain started on yesterday and has had 8 days of diuretics. Patient states her and friend were to attend bridge together however friend sought medical attention.  lives home independently and utilizes a cane outside of home when walking from store parking lot - \" I don't want to break a bone. I have a bad back and had back surgery in the past.\" Patient also has a RW - acknowledges having a fall 5 years ago was running down a hill. Patient has 3 sons and DNL is MD at [employer] EMERGENCY [employer]. Family is supportive.  states the need to know cause of current medical symptoms. Case Management will follow for transitional care needs. Care Management Interventions  PCP Verified by CM: Yes  Last Visit to PCP: 03/15/18  Palliative Care Criteria Met (RRAT>21 & CHF Dx)?: No  Transition of Care Consult (CM Consult):  Other ([facility]/ TRST)  MyChart Signup: Yes  Discharge Durable Medical Equipment: No  Health Maintenance Reviewed: Yes  Physical Therapy Consult: No  Occupational Therapy Consult: No  Speech Therapy Consult: No  Current Support Network: Own Home, Lives Alone  Confirm Follow Up Transport:  (TBD)  Plan discussed with Pt/Family/Caregiver: Yes  [employer] Information Provided?: No  Discharge Location  Discharge Placement:  (TBD)

## 2018-05-21 NOTE — ED NOTES
11:39 AM  I have evaluated the patient as the Provider in Triage. I have reviewed Her vital signs and the triage nurse assessment. I have talked with the patient and any available family and advised that I am the provider in triage and have ordered the appropriate study to initiate their work up based on the clinical presentation during my assessment. I have advised that the patient will be accommodated in the Main ED as soon as possible. I have also requested to contact the triage nurse or myself immediately if the patient experiences any changes in their condition during this brief waiting period. [facility], [address]      Generalized chest pain, +ROBB, +orthopnea since last night radiating into left neck. -N/V, diaphoresis Hx of AV replacement.   Dr. Chaz Cabrera and Dr. Hossein Mercado cardiologist. Migdalia Bynum, [address]

## 2018-05-22 ENCOUNTER — OFFICE VISIT (OUTPATIENT)
Dept: CARDIOLOGY CLINIC | Age: 83
End: 2018-05-22

## 2018-05-22 VITALS
HEIGHT: 66 IN | BODY MASS INDEX: 31.18 KG/M2 | DIASTOLIC BLOOD PRESSURE: 62 MMHG | SYSTOLIC BLOOD PRESSURE: 136 MMHG | WEIGHT: 194 LBS | HEART RATE: 56 BPM

## 2018-05-22 DIAGNOSIS — I10 ESSENTIAL HYPERTENSION: ICD-10-CM

## 2018-05-22 DIAGNOSIS — R00.1 SINUS BRADYCARDIA: Primary | ICD-10-CM

## 2018-05-22 DIAGNOSIS — I05.0 MITRAL VALVE STENOSIS, UNSPECIFIED ETIOLOGY: ICD-10-CM

## 2018-05-22 DIAGNOSIS — J44.9 CHRONIC OBSTRUCTIVE PULMONARY DISEASE, UNSPECIFIED COPD TYPE (HCC): ICD-10-CM

## 2018-05-22 DIAGNOSIS — I35.0 AORTIC VALVE STENOSIS, UNSPECIFIED ETIOLOGY: ICD-10-CM

## 2018-05-22 DIAGNOSIS — E66.9 OBESITY (BMI 30.0-34.9): ICD-10-CM

## 2018-05-22 DIAGNOSIS — R07.81 PLEURITIC CHEST PAIN: ICD-10-CM

## 2018-05-22 DIAGNOSIS — Z95.2 S/P TAVR (TRANSCATHETER AORTIC VALVE REPLACEMENT): ICD-10-CM

## 2018-05-22 RX ORDER — FUROSEMIDE 40 MG/1
60 TABLET ORAL 2 TIMES DAILY
COMMUNITY
End: 2018-05-22 | Stop reason: SDUPTHER

## 2018-05-22 RX ORDER — POTASSIUM CHLORIDE 1500 MG/1
20 TABLET, FILM COATED, EXTENDED RELEASE ORAL 2 TIMES DAILY
Qty: 180 TAB | Refills: 1 | Status: ON HOLD | OUTPATIENT
Start: 2018-05-22 | End: 2018-06-15

## 2018-05-22 RX ORDER — FUROSEMIDE 40 MG/1
40 TABLET ORAL 2 TIMES DAILY
Qty: 180 TAB | Refills: 1 | Status: SHIPPED | OUTPATIENT
Start: 2018-05-22 | End: 2018-06-15

## 2018-05-22 RX ORDER — PREDNISONE 10 MG/1
10 TABLET ORAL
Qty: 7 TAB | Refills: 0 | Status: SHIPPED | OUTPATIENT
Start: 2018-05-22 | End: 2018-06-15

## 2018-05-22 RX ORDER — POTASSIUM CHLORIDE 750 MG/1
30 TABLET, FILM COATED, EXTENDED RELEASE ORAL 2 TIMES DAILY
COMMUNITY
End: 2018-05-22 | Stop reason: SDUPTHER

## 2018-05-22 NOTE — PATIENT INSTRUCTIONS
Start Prednisone 10 mg daily for 7 days     Decrease Lasix 40 mg twice daily and KCL 20 mEq twice daily

## 2018-05-22 NOTE — MR AVS SNAPSHOT
[address] [facility] [address] [place] [address] 
[phone] Patient: Yuri Beltrán MRN: [id number] KDR:[birth year] Visit Information Date & Time Provider Department Dept. Phone Encounter #  
 5/22/2018  2:00 PM Dominga Nicole MD [facility] [place] [facility] [phone] [id number] Your Appointments 5/25/2018  8:40 AM  
ROUTINE CARE with Ann Burnett MD  
[facility] Appt Note: f/u hospital per Luis Miguel Ruelas [place] [address] [facility] [address]  
[address] [address]  
  
    
 7/5/2018  9:20 AM  
ESTABLISHED PATIENT with Birgit Lu MD  
[facility] ([facility]) Appt Note: appt schd by pt per Gal Rossi for TAVR f/u [address] [place] [address]  
[address] [facility] [address]  
  
    
 9/18/2018  9:40 AM  
ROUTINE CARE with Ann Burnett MD  
[facility] Internal Medicine [facility]) Appt Note: R F/U  
 [place] [address] [address]  
[phone]  
  
    
 9/25/2018  9:20 AM  
Any with Dianne Oseguera MD  
[address] [facility] ([facility]) Appt Note: Follow up [address] [facility] [address]  
  
   
 [address] [facility] [address] [facility] [id number] Upcoming Health Maintenance Date Due DTaP/Tdap/Td series (1 - Tdap) 9/21/2011 Influenza Age 5 to Adult 8/1/2018 HEMOGLOBIN A1C Q6M 10/3/2018 FOOT EXAM Q1 3/22/2019 MEDICARE YEARLY EXAM 3/23/2019 MICROALBUMIN Q1 3/29/2019 EYE EXAM RETINAL OR DILATED Q1 3/29/2019 LIPID PANEL Q1 3/29/2019 GLAUCOMA SCREENING Q2Y 3/29/2020 Allergies as of 5/22/2018  Review Complete On: 5/22/2018 By: Dominga Nicole MD  
  
 Severity Noted Reaction Type Reactions Bees [Hymenoptera Allergenic Extract] High 07/23/2012   Systemic Anaphylaxis Lipitor [Atorvastatin] High 06/13/2011   Systemic Myalgia Betadine [Povidone-iodine]  08/17/2012    Other (comments) Abdominal swelling, one incident in 1977. Has had since without a reaction. Darvon [Propoxyphene]  02/01/2011   Systemic Nausea and Vomiting Griseofulvin  02/01/2011    Nausea and Vomiting Pollen Extracts  04/18/2018    Cough Current Immunizations  Reviewed on 4/27/2015 Name Date Influenza High Dose Vaccine PF 8/27/2017 Influenza Vaccine 8/28/2013 Pneumococcal Conjugate (PCV-13) 4/27/2015 TD Vaccine 9/20/2011  6:32 PM  
 ZZZ-RETIRED (DO NOT USE) Pneumococcal Vaccine (Unspecified Type) 10/1/2006 Zoster Vaccine, Live 6/30/2004 Not reviewed this visit Vitals BP Pulse Height(growth percentile) Weight(growth percentile) BMI OB Status 136/62 (BP 1 Location: Right arm, BP Patient Position: Sitting) (!) 56 5' 6\" (1.676 m) 194 lb (88 kg) 31.31 kg/m2 Postmenopausal  
 Smoking Status Former Smoker Vitals History BMI and BSA Data Body Mass Index Body Surface Area  
 31.31 kg/m 2 2.02 m 2 Preferred Pharmacy Pharmacy Name Phone [facility]/PHARMACY #[id number], [address]  [address] [phone] Your Updated Medication List  
  
   
This list is accurate as of 5/22/18  2:48 PM.  Always use your most recent med list.  
  
  
  
  
 aspirin 81 mg tablet Take 162 mg by mouth. 2 tabs prior to niacin  
  
 azelastine 137 mcg (0.1 %) nasal spray Commonly known as:  ASTELIN  
2 Sprays by Both Nostrils route two (2) times a day. Use in each nostril as directed COD LIVER OIL PO Take 1,000 mg by mouth daily. ezetimibe 10 mg tablet Commonly known as:  Corinda [place] Take 1 Tab by mouth daily. FIBER PO Take  by mouth. 5 tabs daily  
  
 furosemide 40 mg tablet Commonly known as:  LASIX Take 1 Tab by mouth two (2) times a day. glucose blood VI test strips strip Commonly known as:  ACCU-CHEK ELBERT PLUS TEST STRP Check bs daily. Dx 250.00  
  
 losartan 50 mg tablet Commonly known as:  COZAAR Take 1 Tab by mouth daily. Indications: hypertension  
  
 metFORMIN 1,000 mg tablet Commonly known as:  GLUCOPHAGE  
TAKE 1 TABLET BY MOUTH TWO  TIMES DAILY  
  
 niacin 1,000 mg Tb24 tab Commonly known as:  NIASPAN  
2,000 mg nightly. potassium chloride SR 20 mEq tablet Commonly known as:  K-TAB Take 1 Tab by mouth two (2) times a day. predniSONE 10 mg tablet Commonly known as:  Yoanna Look Take 1 Tab by mouth daily (with breakfast). rosuvastatin 5 mg tablet Commonly known as:  CRESTOR Take 1 Tab by mouth daily. VITAMIN B-12 PO Take 1,500 mg by mouth daily. VITAMIN D3 2,000 unit Tab Generic drug:  cholecalciferol (vitamin D3) Take 1 Tab by mouth daily (after lunch). Prescriptions Sent to Pharmacy Refills  
 predniSONE (DELTASONE) 10 mg tablet 0 Sig: Take 1 Tab by mouth daily (with breakfast). Class: Normal  
 Pharmacy: [facility]/pharmacy #[id number] Ph #: [phone] Route: Oral  
 furosemide (LASIX) 40 mg tablet 1 Sig: Take 1 Tab by mouth two (2) times a day. Class: Normal  
 Pharmacy: [facility]/pharmacy #[id number] Ph #: [phone] Route: Oral  
 potassium chloride SR (K-TAB) 20 mEq tablet 1 Sig: Take 1 Tab by mouth two (2) times a day. Class: Normal  
 Pharmacy: [facility]/pharmacy #[id number] Ph #: [phone]  Route: Oral  
  
To-Do List   
 05/24/2018 8:30 AM  
  Appointment with [address] [place] [address] at [address] [place] [address] ([phone])  
  
 05/24/2018 9:30 AM  
  Appointment with [address] [facility] at [address] [place] [address] ([phone])  
  
 05/25/2018 8:30 AM  
 Appointment with [address] at [address] [place] [address] ([phone])  
  
 05/31/2018 8:30 AM  
  Appointment with [address] at [address] [place] [address] ([phone])  
  
 05/31/2018 9:30 AM  
  Appointment with [address] at [address] [place] [address] ([phone])  
  
 06/01/2018 8:30 AM  
  Appointment with [address] at [address] [place] [address] ([phone])  
  
 06/04/2018 8:30 AM  
  Appointment with [address] at [address] [place] [address] ([phone])  
  
 06/07/2018 8:30 AM  
  Appointment with [address] at [address] [place] [address] ([phone])  
  
 06/07/2018 9:30 AM  
  Appointment with [address] at [address] [place] [address] ([phone])  
  
 06/08/2018 8:30 AM  
  Appointment with [address] at [address] [place] [address] ([phone])  
  
 06/11/2018 8:30 AM  
  Appointment with [address] at [address] [place] [address] ([phone])  
  
 06/14/2018 8:30 AM  
  Appointment with [address] at [address] [place] [address] ([phone])  
  
 06/14/2018 9:30 AM  
  Appointment with [address] at [address] [place] [address] ([phone])  
  
 06/15/2018 8:30 AM  
  Appointment with [address] at [address] [place] [address] ([phone])  
  
 06/18/2018 8:30 AM  
  Appointment with [address] at [address] [place] [address] ([phone])  
  
 06/21/2018 8:30 AM  
  Appointment with [address] at [address] [place] [address] ([phone])  
  
 06/21/2018 9:30 AM  
  Appointment with [address] at [address] [place] [address] ([phone])  
  
 06/22/2018 8:30 AM  
  Appointment with [address] at [address] [place] [address] ([phone])  
  
 06/25/2018 8:30 AM  
  Appointment with [address] at [address] [place] [address] ([phone])  
  
 06/28/2018 8:30 AM  
 Appointment with [address] at [address] [place] [address] ([phone])  
  
 06/28/2018 9:30 AM  
  Appointment with [address] at [address] [place] [address] ([phone])  
  
 06/29/2018 8:30 AM  
  Appointment with [address] at [address] [place] [address] ([phone])  
  
 07/02/2018 8:30 AM  
  Appointment with [address] at [address] [place] [address] ([phone])  
  
 07/05/2018 10:30 AM  
  Appointment with [address] at [address] [place] [address] ([phone])  
  
 07/06/2018 8:30 AM  
  Appointment with [address] at [address] [place] [address] ([phone])  
  
 07/09/2018 8:30 AM  
  Appointment with [address] at [address] [place] [address] ([phone])  
  
 07/12/2018 8:30 AM  
  Appointment with [address] at [address] [place] [address] ([phone])  
  
 07/12/2018 9:30 AM  
  Appointment with [address] at [address] [place] [address] ([phone])  
  
 07/13/2018 8:30 AM  
  Appointment with [address] at [address] [place] [address] ([phone])  
  
 07/16/2018 8:30 AM  
  Appointment with [address] at [address] [place] [address] ([phone])  
  
 07/19/2018 8:30 AM  
  Appointment with [address] at [address] [place] [address] ([phone])  
  
 07/19/2018 9:30 AM  
  Appointment with [address] at [address] [place] [address] ([phone]) Patient Instructions Start Prednisone 10 mg daily for 7 days Decrease Lasix 40 mg twice daily and KCL 20 mEq twice daily Introducing [facility]! Dear Jan Balm: 
Thank you for requesting a [employer] account. Our records indicate that you already have an active [employer] account. You can access your account anytime at [email or web address] Did you know that you can access your hospital and ER discharge instructions at any time in [place]? You can also review all of your test results from your hospital stay or ER visit. Additional Information If you have questions, please visit the Frequently Asked Questions section of the [place] website at [email or web address] With Me [email or web address] Remember, [place] is NOT to be used for urgent needs. For medical emergencies, dial 911. Now available from your iPhone and Android! Please provide this summary of care documentation to your next provider. Your primary care clinician is listed as KEERTHI JENSEN. If you have any questions after today's visit, please call [phone].

## 2018-05-22 NOTE — PROCEDURES
[facility]  *** FINAL REPORT ***    Name: Haleigh Rodríguez  MRN: [id number]  : 1930  HIS Order #: [id number]  [address] [facility] Visit #: [id number]  Date: 21 May 2018    TYPE OF TEST: Peripheral Venous Testing    REASON FOR TEST  Edema    Right Leg:-  Deep venous thrombosis:           No  Superficial venous thrombosis:    No  Deep venous insufficiency:        Not examined  Superficial venous insufficiency: Not examined    Left Leg:-  Deep venous thrombosis:           No  Superficial venous thrombosis:    No  Deep venous insufficiency:        Not examined  Superficial venous insufficiency: Not examined      INTERPRETATION/FINDINGS  PROCEDURE:  Color duplex ultrasound imaging of lower extremity veins. FINDINGS:       Right: The common femoral, deep femoral, femoral, popliteal,  posterior tibial, peroneal, and great saphenous are patent and without   evidence of thrombus;  each is fully compressible and there is no  narrowing of the flow channel on color Doppler imaging. Phasic flow  is observed in the common femoral vein. Left:   The common femoral, deep femoral, femoral, popliteal,  posterior tibial, peroneal, and great saphenous are patent and without   evidence of thrombus;  each is fully compressible and there is no  narrowing of the flow channel on color Doppler imaging. Phasic flow  is observed in the common femoral vein. IMPRESSION:  No evidence of right or left lower extremity vein  thrombosis. ADDITIONAL COMMENTS    I have personally reviewed the data relevant to the interpretation of  this  study. TECHNOLOGIST: Narinder Briceno.  Luciano Favre  Signed:    PHYSICIAN: Fariha Friedman MD  Signed: 2018 10:45 AM

## 2018-05-23 NOTE — PROGRESS NOTES
Cardiac Electrophysiology OFFICE Note     Subjective: Marshall Mccoy is a 80 y.o. patient who is seen for evaluation for Pacemaker  She is not dizzy  She was in ER and seen by Dr Maribel Pizarro and Daniel Nguyen 5/21/2018  ECG yesterday artifacts but more likely sinus than AF with RBBB   Troponin negative  Pro BNP elevated  CTA 5/21/2018chest reveals no evidence of pulmonary embolism or acute abnormality. Stable indeterminate left adrenal mass. She is concerned about pleuritic chest pain that persists since TAVR  Ankle edema and she has been on lasix 60 mg bid  Weak, dizzy, + cramps  Chest pain yesterday in ER and work ups are negative  TAVR, cath negative before    I had seen her in April [facility] for evaluation of new right bundle branch block & possible new left anterior fascicular block, is s/p TAVR ([facility]) on 04/12/2018.       No prior history of syncope. States brother has a pacemaker. Patient Active Problem List   Diagnosis Code    Tachycardia R00.0    Hypertension I10    PVC (premature ventricular contraction) I49.3    Aortic stenosis I35.0    Edema R60.9    Foreign body in pharynx T17.208A    Type II or unspecified type diabetes mellitus without mention of complication, uncontrolled E11.65    Pure hypercholesterolemia E78.00    CINDA (obstructive sleep apnea) G47.33    Other dyspnea and respiratory abnormality R06.09, R09.89    Mitral stenosis I05.0    COPD (chronic obstructive pulmonary disease) ([facility]) J44.9    Fibromuscular dysplasia ([facility]) I77.3    Controlled type 2 diabetes mellitus without complication, without long-term current use of insulin ([facility]) E11.9    Advance directive on file Z78.9    Chest tightness R07.89    Dyspnea and respiratory abnormalities R06.00, R06.89    S/P TAVR (transcatheter aortic valve replacement) Z95.2    Obesity (BMI 30.0-34. 9) E66.9     Current Outpatient Prescriptions   Medication Sig Dispense Refill    predniSONE (DELTASONE) 10 mg tablet Take 1 Tab by mouth daily (with breakfast). 7 Tab 0    furosemide (LASIX) 40 mg tablet Take 1 Tab by mouth two (2) times a day. 180 Tab 1    potassium chloride SR (K-TAB) 20 mEq tablet Take 1 Tab by mouth two (2) times a day. 180 Tab 1    metFORMIN (GLUCOPHAGE) 1,000 mg tablet TAKE 1 TABLET BY MOUTH TWO  TIMES DAILY 180 Tab 3    losartan (COZAAR) 50 mg tablet Take 1 Tab by mouth daily. Indications: hypertension 30 Tab 1    cholecalciferol, vitamin D3, (VITAMIN D3) 2,000 unit tab Take 1 Tab by mouth daily (after lunch).  CYANOCOBALAMIN, VITAMIN B-12, (VITAMIN B-12 PO) Take 1,500 mg by mouth daily.  niacin (NIASPAN) 1,000 mg Tb24 tab 2,000 mg nightly.  PSYLLIUM SEED, WITH DEXTROSE, (FIBER PO) Take  by mouth. 5 tabs daily      ezetimibe (ZETIA) 10 mg tablet Take 1 Tab by mouth daily. 90 Tab 3    rosuvastatin (CRESTOR) 5 mg tablet Take 1 Tab by mouth daily. 90 Tab 3    COD LIVER OIL PO Take 1,000 mg by mouth daily.  azelastine (ASTELIN) 137 mcg (0.1 %) nasal spray 2 Sprays by Both Nostrils route two (2) times a day. Use in each nostril as directed (Patient taking differently: 2 Sprays by Both Nostrils route as needed. Use in each nostril as directed) 3 Bottle 3    glucose blood VI test strips (ACCU-CHEK ELBERT PLUS) strip Check bs daily. Dx 250.00 1 Package 11    aspirin 81 mg tablet Take 162 mg by mouth. 2 tabs prior to niacin       Allergies   Allergen Reactions    Bees [Hymenoptera Allergenic Extract] Anaphylaxis    Lipitor [Atorvastatin] Myalgia    Betadine [Povidone-Iodine] Other (comments)     Abdominal swelling, one incident in 1977. Has had since without a reaction.     Darvon [Propoxyphene] Nausea and Vomiting    Griseofulvin Nausea and Vomiting    Pollen Extracts Cough     Past Medical History:   Diagnosis Date    Adverse effect of anesthesia     difficulty waking    Aortic stenosis     Arrhythmia     rapid heartrate seen in ER  4/2/18    Cancer ([facility] 01/2018    skin spots, removed periodically    Chronic pain     back - spinal stenosis    COPD     Diabetes ([address]     Diabetic neuropathy ([address]     knees down    Diverticulosis of colon     Hypercholesterolemia     Hypertension     CINDA (obstructive sleep apnea) 08-    AHI: 18.6 per hour - CPAP    Other ill-defined conditions(799.89)     heart murmur    Peripheral neuropathy     Renal artery stenosis ([address]     Right    Unspecified adverse effect of anesthesia     delayed awakening - \"does not need as much\"     Past Surgical History:   Procedure Laterality Date    ABDOMEN SURGERY PROC UNLISTED  1977    R Renal Artery Stenosis bypassed    ABDOMEN SURGERY [address] or 1983    hernia repair    ABDOMEN SURGERY [facility] - removal of fatty tissue around abdomen    BREAST SURGERY PROCEDURE UNLISTED  1968    Left - benign cyst    CARDIAC SURG PROCEDURE UNLIST  04/13/2018    TAVR    ENDOSCOPY, COLON, DIAGNOSTIC  1999    hx of polys in the past    HX CATARACT REMOVAL      bilateral    HX GYN  1966    D&C    HX HEENT      Eyelid Cysts x 4    HX HEENT      oral surgeries - gum surgery/floating root  removed x 2    HX HEENT      retinal hemmorage repair    HX ORTHOPAEDIC  11/06    L1-S1 2007    HX ORTHOPAEDIC  1985    L 4th Trigger finger    HX ORTHOPAEDIC      left knee arthroscopy    HX OTHER SURGICAL      skin cancer removal, periodically    HX TONSILLECTOMY      T & A     Family History   Problem Relation Age of Onset    Stroke Mother      1    Cancer Mother      ovarian    Cancer Father      lung/throat    Alcohol abuse Father     Lung Disease Father     Pacemaker Brother     Heart Attack Brother     Heart Disease Brother     Other Brother      als    Stroke Maternal Grandfather     Heart Disease Paternal Uncle     Heart Attack Paternal Uncle      Social History   Substance Use Topics    Smoking status: Former Smoker     Packs/day: 1.50     Years: 13.00     Types: Cigarettes Quit date: 6/6/1977    Smokeless tobacco: Never Used      Comment: former cigarette smoker    Alcohol use No      Comment: very seldom        Review of Systems:   Constitutional: Negative for fever, chills, weight loss, malaise/fatigue. HEENT: Negative for nosebleeds, vision changes. Respiratory: Negative for cough, hemoptysis  Cardiovascular: Negative for chest pain, palpitations, orthopnea, claudication, leg swelling, syncope, and PND. Gastrointestinal: Negative for nausea, vomiting, diarrhea, blood in stool and melena. Genitourinary: Negative for dysuria, and hematuria. Musculoskeletal: Negative for myalgias, arthralgia. Skin: Negative for rash. Heme: Does not bleed or bruise easily. Neurological: Negative for speech change and focal weakness     Objective:     Visit Vitals    /62 (BP 1 Location: Right arm, BP Patient Position: Sitting)    Pulse (!) 56    Ht 5' 6\" (1.676 m)    Wt 194 lb (88 kg)    BMI 31.31 kg/m2      Physical Exam:   Constitutional: well-developed and well-nourished. No respiratory distress. Head: Normocephalic and atraumatic. Eyes: Pupils are equal, round  ENT: hearing normal  Neck: supple. No JVD present. Cardiovascular: slow rate, regular rhythm. Exam reveals no gallop and no friction rub. No murmur heard. Pulmonary/Chest: Effort normal and breath sounds normal. + mild wheezes. Abdominal: Soft, obese  Musculoskeletal: no edema. Neurological: alert, oriented. Skin: Skin is warm and dry  Psychiatric: normal mood and affect. Behavior is normal. Judgment and thought content normal.         Assessment/Plan:       ICD-10-CM ICD-9-CM    1. Sinus bradycardia R00.1 427.89    2. S/P TAVR (transcatheter aortic valve replacement) Z95.2 V43.3    3. Essential hypertension I10 401.9    4. Chronic obstructive pulmonary disease, unspecified COPD type ([facility] J44.9 496    5. Aortic valve stenosis, unspecified etiology I35.0 424.1    6.  Mitral valve stenosis, unspecified etiology I05.0 394.0    7. Obesity (BMI 30.0-34. 9) E66.9 278.00    8. Pleuritic chest pain R07.81 786.52    May 11 2018 echo normal LVEF mild+MS and TAVR ok  reviewed diet, exercise and weight control  reviewed medications and side effects in detail   She is not fluid overload and wants to reduce lasix and Kcl  She may try prednisone low dose for COPD wheezing and pleuritic pain.   Small left pleural effusion noted on ER chest xray report and I reviewed  She is not symptomatic with mild bradycardia and is not ready for pacer unless symptomatic at rehab then Dr Kayla Mayfield will refer her back  She walks with a walker today  Future Appointments  Date Time Provider Beau Driscoll   5/24/2018 8:30 AM [address]   5/24/2018 9:30 AM [address]   5/25/2018 8:30  [address] [facility]   5/25/2018 8:40 AM Rebeka Blue MD [facility]   5/31/2018 8:30 AM [address]   5/31/2018 9:30 AM [address]   6/1/2018 8:30 AM [address]   6/4/2018 8:30  [address] [facility]   6/7/2018 8:30  [address] [facility] [facility]   6/7/2018 9:30 AM [address]   6/8/2018 8:30 AM [address]   6/11/2018 8:30  [address] [facility]   6/14/2018 8:30 AM [address]   6/14/2018 9:30 AM [address]   6/15/2018 8:30  [address] [facility] [facility]   6/18/2018 8:30  [address] [facility] [facility]   6/21/2018 8:30 AM [facility]   6/21/2018 9:30 AM [facility]   6/22/2018 8:30 AM [facility] CARDIAC WELLNESS EXERCISE [address] H   6/25/2018 8:30 AM [facility] CARDIAC WELLNESS EXERCISE [address] [facility]   6/28/2018 8:30  [address] [facility]   6/28/2018 9:30  [address] [facility]   6/29/2018 8:30  [address] [facility]   7/2/2018 8:30 AM [facility] CARDIAC WELLNESS EXERCISE [facility]   7/5/2018 9:20 AM [address] [address]   7/5/2018 10:30  [address] [facility]   7/6/2018 8:30  [address] [facility]   7/9/2018 8:30  [address] [facility]   7/12/2018 8:30 AM [facility] CARDIAC WELLNESS EXERCISE [facility]   7/12/2018 9:30  [address] [facility]   7/13/2018 8:30  [address] [facility]   7/16/2018 8:30 AM [facility] CARDIAC WELLNESS EXERCISE [facility]   7/19/2018 8:30  [address] [facility]   7/19/2018 9:30 AM [place]   9/18/2018 9:40 AM [facility]   9/25/2018 9:20 AM William Tiwari MD [facility] [address]       Thank you for involving me in this patient's care and please call with further concerns or questions. Gasper Rockwell M.D.   Electrophysiology/Cardiology  [facility] [place]  [address], [place] [address], [address]  [address]  [phone]

## 2018-05-24 ENCOUNTER — APPOINTMENT (OUTPATIENT)
Dept: CARDIAC REHAB | Age: 83
End: 2018-05-24
Payer: MEDICARE

## 2018-05-25 ENCOUNTER — OFFICE VISIT (OUTPATIENT)
Dept: INTERNAL MEDICINE CLINIC | Age: 83
End: 2018-05-25

## 2018-05-25 ENCOUNTER — APPOINTMENT (OUTPATIENT)
Dept: CARDIAC REHAB | Age: 83
End: 2018-05-25
Payer: MEDICARE

## 2018-05-25 VITALS
SYSTOLIC BLOOD PRESSURE: 140 MMHG | HEIGHT: 66 IN | BODY MASS INDEX: 32.05 KG/M2 | DIASTOLIC BLOOD PRESSURE: 46 MMHG | WEIGHT: 199.4 LBS | OXYGEN SATURATION: 98 % | RESPIRATION RATE: 19 BRPM | HEART RATE: 53 BPM | TEMPERATURE: 98 F

## 2018-05-25 DIAGNOSIS — J44.9 CHRONIC OBSTRUCTIVE PULMONARY DISEASE, UNSPECIFIED COPD TYPE (HCC): Primary | ICD-10-CM

## 2018-05-25 DIAGNOSIS — R53.83 FATIGUE, UNSPECIFIED TYPE: ICD-10-CM

## 2018-05-25 DIAGNOSIS — Z95.2 S/P TAVR (TRANSCATHETER AORTIC VALVE REPLACEMENT): ICD-10-CM

## 2018-05-25 RX ORDER — POTASSIUM CHLORIDE 1500 MG/1
TABLET, EXTENDED RELEASE ORAL
COMMUNITY
Start: 2018-05-22 | End: 2018-05-25 | Stop reason: SDUPTHER

## 2018-05-25 RX ORDER — FLUTICASONE FUROATE AND VILANTEROL 100; 25 UG/1; UG/1
1 POWDER RESPIRATORY (INHALATION) DAILY
Qty: 1 INHALER | Refills: 1 | Status: SHIPPED | OUTPATIENT
Start: 2018-05-25 | End: 2018-06-25 | Stop reason: ALTCHOICE

## 2018-05-25 NOTE — MR AVS SNAPSHOT
[address] [place] [address] [address] 
[phone] Patient: Nurys Daniels MRN: [id number] TGK:[id number] Visit Information Date & Time Provider Department Dept. Phone Encounter #  
 5/25/2018  8:40 AM Garfield White MD [facility] [phone] [id number] Your Appointments 6/8/2018  9:00 AM  
ROUTINE CARE with Garfield White MD  
[facility] [address] [place], [place] [address]) Appt Note: R F/U  
 [place] [address] [facility] [address]  
[address] [address]  
  
    
 7/5/2018  9:20 AM  
ESTABLISHED PATIENT with Shahnaz Arguelles MD  
[facility] ([address]) Appt Note: appt schd by pt per [place] for TAVR f/u [address] [facility]  
[address] [id number]  
  
    
 9/18/2018  9:40 AM  
ROUTINE CARE with Garfield White MD  
[facility] [address]) Appt Note: R F/U  
 [place] [address] [address]  
[phone]  
  
    
 9/25/2018  9:20 AM  
Any with Stormy Henning MD  
[address] [place] [address] ([address]) Appt Note: Follow up [address] [facility] [place] [id number] Upcoming Health Maintenance Date Due DTaP/Tdap/Td series (1 - Tdap) 9/21/2011 Influenza Age 5 to Adult 8/1/2018 HEMOGLOBIN A1C Q6M 10/3/2018 FOOT EXAM Q1 3/22/2019 MEDICARE YEARLY EXAM 3/23/2019 MICROALBUMIN Q1 3/29/2019 EYE EXAM RETINAL OR DILATED Q1 3/29/2019 LIPID PANEL Q1 3/29/2019 GLAUCOMA SCREENING Q2Y 3/29/2020 Allergies as of 5/25/2018  Review Complete On: 5/25/2018 By: Garfield White MD  
  
 Severity Noted Reaction Type Reactions Bees [Hymenoptera Allergenic Extract] High 07/23/2012   Systemic Anaphylaxis Lipitor [Atorvastatin] High 06/13/2011   Systemic Myalgia Betadine [Povidone-iodine]  08/17/2012    Other (comments) Abdominal swelling, one incident in 1977. Has had since without a reaction. Darvon [Propoxyphene]  02/01/2011   Systemic Nausea and Vomiting Griseofulvin  02/01/2011    Nausea and Vomiting Pollen Extracts  04/18/2018    Cough Current Immunizations  Reviewed on 4/27/2015 Name Date Influenza High Dose Vaccine PF 8/27/2017 Influenza Vaccine 8/28/2013 Pneumococcal Conjugate (PCV-13) 4/27/2015 TD Vaccine 9/20/2011  6:32 PM  
 ZZZ-RETIRED (DO NOT USE) Pneumococcal Vaccine (Unspecified Type) 10/1/2006 Zoster Vaccine, Live 6/30/2004 Not reviewed this visit You Were Diagnosed With   
  
 Codes Comments Chronic obstructive pulmonary disease, unspecified COPD type ([facility]    -  Primary ICD-10-CM: J44.9 ICD-9-CM: 517 Vitals BP Pulse Temp Resp Height(growth percentile) Weight(growth percentile) 140/46 (!) 53 98 °F (36.7 °C) (Oral) 19 5' 6\" (1.676 m) 199 lb 6.4 oz (90.4 kg) SpO2 BMI OB Status Smoking Status 98% 32.18 kg/m2 Postmenopausal Former Smoker Vitals History BMI and BSA Data Body Mass Index Body Surface Area  
 32.18 kg/m 2 2.05 m 2 Preferred Pharmacy Pharmacy Name Phone [id number] [address], [address] [phone] Your Updated Medication List  
  
   
This list is accurate as of 5/25/18  9:20 AM.  Always use your most recent med list.  
  
  
  
  
 aspirin 81 mg tablet Take 162 mg by mouth. 2 tabs prior to niacin  
  
 azelastine 137 mcg (0.1 %) nasal spray Commonly known as:  ASTELIN  
2 Sprays by Both Nostrils route two (2) times a day. Use in each nostril as directed COD LIVER OIL PO Take 1,000 mg by mouth daily. ezetimibe 10 mg tablet Commonly known as:  Jing Blood Take 1 Tab by mouth daily. FIBER PO Take  by mouth. 5 tabs daily  
  
 furosemide 40 mg tablet Commonly known as:  LASIX Take 1 Tab by mouth two (2) times a day. glucose blood VI test strips strip Commonly known as:  ACCU-CHEK ELBERT PLUS TEST STRP Check bs daily. Dx 250.00  
  
 losartan 50 mg tablet Commonly known as:  COZAAR Take 1 Tab by mouth daily. Indications: hypertension  
  
 metFORMIN 1,000 mg tablet Commonly known as:  GLUCOPHAGE  
TAKE 1 TABLET BY MOUTH TWO  TIMES DAILY  
  
 niacin 1,000 mg Tb24 tab Commonly known as:  NIASPAN  
2,000 mg nightly. potassium chloride SR 20 mEq tablet Commonly known as:  K-TAB Take 1 Tab by mouth two (2) times a day. predniSONE 10 mg tablet Commonly known as:  Kathy Mireya Take 1 Tab by mouth daily (with breakfast). rosuvastatin 5 mg tablet Commonly known as:  CRESTOR Take 1 Tab by mouth daily. VITAMIN B-12 PO Take 1,500 mg by mouth daily. VITAMIN D3 2,000 unit Tab Generic drug:  cholecalciferol (vitamin D3) Take 1 Tab by mouth daily (after lunch).   
  
  
  
  
To-Do List   
 06/01/2018 8:30 AM  
  Appointment with [address] [place] [address] St at [address] [place] [address] ([phone])  
  
 06/04/2018 8:30 AM  
  Appointment with [address] [place] [address] St at [address] [place] [address] ([phone])  
  
 06/08/2018 8:30 AM  
  Appointment with [address] [place] [address] St at [address] [place] [address] ([phone])  
  
 06/11/2018 8:30 AM  
  Appointment with [address] [place] [address] St at [address] [place] [address] ([phone])  
  
 06/11/2018 9:30 AM  
  Appointment with [address] RD at [address] [place] [address] ([phone])  
  
 06/15/2018 8:30 AM  
  Appointment with [address] [place] [address] St at [address] [place] [address] ([phone])  
  
 06/18/2018 8:30 AM  
  Appointment with [address] [place] [address] St at [address] [place] [address] ([phone])  
  
 06/22/2018 8:30 AM  
  Appointment with [address] at [address] [place] [address] ([phone])  
  
 06/25/2018 8:30 AM  
  Appointment with [address] at [address] [place] [address] ([phone])  
  
 06/29/2018 8:30 AM  
  Appointment with [address] at [address] [place] [address] ([phone])  
  
 07/02/2018 8:30 AM  
  Appointment with [address] at [address] [place] [address] ([phone])  
  
 07/06/2018 8:30 AM  
  Appointment with [address] at [address] [place] [address] ([phone])  
  
 07/09/2018 8:30 AM  
  Appointment with [address] at [address] [place] [address] ([phone])  
  
 07/13/2018 8:30 AM  
  Appointment with [address] at [address] [place] [address] ([phone])  
  
 07/16/2018 8:30 AM  
  Appointment with [address] at [address] [place] [address] ([phone])  
  
 07/20/2018 8:30 AM  
  Appointment with [address] at [address] [place] [address] ([phone])  
  
 07/23/2018 8:30 AM  
  Appointment with [address] at [address] [place] [address] ([phone])  
  
 07/27/2018 8:30 AM  
  Appointment with [address] at [address] [place] [address] ([phone])  
  
 07/30/2018 8:30 AM  
  Appointment with [address] at [address] [place] [address] ([phone])  
  
 08/03/2018 8:30 AM  
  Appointment with [address] at [address] [place] [address] ([phone]) [facility]! Dear Barb Riojas: 
Thank you for requesting a [employer] account. Our records indicate that you already have an active [employer] account. You can access your account anytime at [email or web address]/"[email or web address]" Did you know that you can access your hospital and ER discharge instructions at any time in [employer]? You can also review all of your test results from your hospital stay or ER visit. Additional Information If you have questions, please visit the Frequently Asked Questions section of the [email or web address] website at [email or web address] Remember, [employer] is NOT to be used for urgent needs. For medical emergencies, dial 911. Now available from your iPhone and Android! Please provide this summary of care documentation to your next provider. Your primary care clinician is listed as KEERTHI JENSEN. If you have any questions after today's visit, please call [phone].

## 2018-05-25 NOTE — PROGRESS NOTES
HISTORY OF PRESENT ILLNESS  Param Roland is a 80 y.o. female. HPI  In the ER Monday 4/21 for chest pain and weakness. Cardiac workup failed to reveal cardiac source. Had Chest CT, CXR, labs - reviewed with pt. Pulm htn and air trapping seen but otw chest was nml. Saw Dr. Carey Kerr for follow up on Tuesday. He lowered her Furosemide and potassium from 3 Furosemide a day to 2 per day and decreased her potassium from 6 to 2 tabs per day. Felt chest pain was secondary to indigestion. Given GI coctail in ER without improvement. Chest pain slowly decreased over the week and has now resolved. Pt worked on deep breathing which seemed to help. Dr. Carey Kerr also placed her on low dose prednisone ? COPD exacerbation to help with her weakness and shakiness (10mg x 7 days). Has had 2 days - unsure if helping.  + sob and wheezing. Coughing more since in ER. Feeling week and shaky. Has an albuterol hfa at home but does not use (approx 6 months old). Hx of smoking for approx 20 years but not recently. Discharged from hospital originally 4/15 for TAVR. Sixes great x 2 weeks until the chest pain, weakness and shakiness began. Went to 1 visit with PT but wore her out and has not returned. She is scheduled to restart PT on 6/1 at only 2 times per week. Sciatica is acting up, worried b/c she cannot walk on the treadmill. 2 weeks ago reports le edema - awoke with swollen ankles. Took 8 days of diuretics to improve the swelling. Studies for DVT negative in ER. Left ankle may have trace swelling by the end of the day. Lying in bed mostly. Reports dark green stools - soft with urgency. Lasted for approx 2 weeks. Today stools more brown. No watery or loose stools. No foul smell. Right groin with fungal infection. Used nystatin cream x 5 days. Current Outpatient Prescriptions:     predniSONE (DELTASONE) 10 mg tablet, Take 1 Tab by mouth daily (with breakfast). , Disp: 7 Tab, Rfl: 0    furosemide (LASIX) 40 mg tablet, Take 1 Tab by mouth two (2) times a day., Disp: 180 Tab, Rfl: 1    potassium chloride SR (K-TAB) 20 mEq tablet, Take 1 Tab by mouth two (2) times a day., Disp: 180 Tab, Rfl: 1    metFORMIN (GLUCOPHAGE) 1,000 mg tablet, TAKE 1 TABLET BY MOUTH TWO  TIMES DAILY, Disp: 180 Tab, Rfl: 3    losartan (COZAAR) 50 mg tablet, Take 1 Tab by mouth daily. Indications: hypertension, Disp: 30 Tab, Rfl: 1    cholecalciferol, vitamin D3, (VITAMIN D3) 2,000 unit tab, Take 1 Tab by mouth daily (after lunch). , Disp: , Rfl:     CYANOCOBALAMIN, VITAMIN B-12, (VITAMIN B-12 PO), Take 1,500 mg by mouth daily. , Disp: , Rfl:     niacin (NIASPAN) 1,000 mg Tb24 tab, 2,000 mg nightly., Disp: , Rfl:     PSYLLIUM SEED, WITH DEXTROSE, (FIBER PO), Take  by mouth. 5 tabs daily, Disp: , Rfl:     ezetimibe (ZETIA) 10 mg tablet, Take 1 Tab by mouth daily. , Disp: 90 Tab, Rfl: 3    rosuvastatin (CRESTOR) 5 mg tablet, Take 1 Tab by mouth daily. , Disp: 90 Tab, Rfl: 3    COD LIVER OIL PO, Take 1,000 mg by mouth daily. , Disp: , Rfl:     azelastine (ASTELIN) 137 mcg (0.1 %) nasal spray, 2 Sprays by Both Nostrils route two (2) times a day. Use in each nostril as directed (Patient taking differently: 2 Sprays by Both Nostrils route as needed. Use in each nostril as directed), Disp: 3 Bottle, Rfl: 3    glucose blood VI test strips (ACCU-CHEK ELBERT PLUS) strip, Check bs daily. Dx 250.00, Disp: 1 Package, Rfl: 11    aspirin 81 mg tablet, Take 162 mg by mouth. 2 tabs prior to niacin, Disp: , Rfl:     KLOR-CON M20 20 mEq tablet, , Disp: , Rfl:     Visit Vitals    /46    Pulse (!) 53    Temp 98 °F (36.7 °C) (Oral)    Resp 19    Ht 5' 6\" (1.676 m)    Wt 199 lb 6.4 oz (90.4 kg)    SpO2 98%    BMI 32.18 kg/m2       ROSs  See above  Physical Exam   Constitutional: She appears well-developed and well-nourished. HENT:   Head: Normocephalic and atraumatic. Neck: Neck supple. No thyromegaly present.    Cardiovascular: Normal rate, regular rhythm and normal heart sounds. Exam reveals no gallop and no friction rub. No murmur heard. Pulmonary/Chest: Effort normal and breath sounds normal.   Abdominal: Soft. Bowel sounds are normal. She exhibits no distension and no mass. There is no tenderness. Musculoskeletal: She exhibits no edema. Lymphadenopathy:     She has no cervical adenopathy. Vitals reviewed. skin right groin wnl  ASSESSMENT and PLAN  S/p TAVR -per cardiologist heart is functioning well. Encouraged her to start walking and restart PT in 1 week. ? COPD - PFTs wnl in January 2018 but several CTs showing air trapping. Trial Breo. Complete course of steroids  Fatigue and weakness - ? Deconditioning from surgery. ? Copd as above. Pt and meds as above  Tinea cruris - resolved.     Orders Placed This Encounter    DISCONTD: KLOR-CON M20 20 mEq tablet    fluticasone-vilanterol (BREO ELLIPTA) 100-25 mcg/dose inhaler     Follow-up Disposition: Not on File

## 2018-05-25 NOTE — LETTER
2018 8:49 AM 
 
 
 
[employer] [facility] [employer] [facility] [id number] To whom it may concern: 
 
I am the primary care provider for Ms. Chuy Lentz ( 1930). Please allow Solid Waste personnel to enter her private property to service her super-can for waste disposal.  Due to her multiple medical issues, Ms. Eleuterio Mcburney cannot pull her can to the curb/alley. Sincerely, Sharon Wood MD

## 2018-05-25 NOTE — PROGRESS NOTES
Chief Complaint   Patient presents with   [facility] Follow Up     ED- 05/21 for chest pain/SOB     1. Have you been to the ER, urgent care clinic since your last visit? Hospitalized since your last visit? In [address] [place] [address] on 05/21    2. Have you seen or consulted any other health care providers outside of the [address] [place] [address] since your last visit? Include any pap smears or colon screening.  No

## 2018-05-30 ENCOUNTER — APPOINTMENT (OUTPATIENT)
Dept: CARDIAC REHAB | Age: 83
End: 2018-05-30
Payer: MEDICARE

## 2018-05-31 ENCOUNTER — APPOINTMENT (OUTPATIENT)
Dept: CARDIAC REHAB | Age: 83
End: 2018-05-31
Payer: MEDICARE

## 2018-06-01 ENCOUNTER — APPOINTMENT (OUTPATIENT)
Dept: CARDIAC REHAB | Age: 83
End: 2018-06-01

## 2018-06-01 ENCOUNTER — TELEPHONE (OUTPATIENT)
Dept: CARDIAC REHAB | Age: 83
End: 2018-06-01

## 2018-06-01 NOTE — TELEPHONE ENCOUNTER
Cardiac Wellness: Yanira Bhat arrived today to the facility but is unable to exercise. She will need to place her exercise on hold while her PCP determines if she has pulmonary disease component related to her dyspnea. Exercise sessions cancelled for 2 weeks but she will come in for her scheduled nutrition consult. Agreed to reevaluate at that time. Connie Grant RN

## 2018-06-04 ENCOUNTER — APPOINTMENT (OUTPATIENT)
Dept: CARDIAC REHAB | Age: 83
End: 2018-06-04

## 2018-06-07 ENCOUNTER — APPOINTMENT (OUTPATIENT)
Dept: CARDIAC REHAB | Age: 83
End: 2018-06-07

## 2018-06-08 ENCOUNTER — APPOINTMENT (OUTPATIENT)
Dept: CARDIAC REHAB | Age: 83
End: 2018-06-08

## 2018-06-08 ENCOUNTER — HOSPITAL ENCOUNTER (OUTPATIENT)
Dept: GENERAL RADIOLOGY | Age: 83
Discharge: HOME | End: 2018-06-08
Payer: MEDICARE

## 2018-06-08 ENCOUNTER — TELEPHONE (OUTPATIENT)
Dept: CARDIOLOGY CLINIC | Age: 83
End: 2018-06-08

## 2018-06-08 ENCOUNTER — OFFICE VISIT (OUTPATIENT)
Dept: INTERNAL MEDICINE CLINIC | Age: 83
End: 2018-06-08

## 2018-06-08 ENCOUNTER — HOSPITAL ENCOUNTER (OUTPATIENT)
Dept: LAB | Age: 83
Discharge: HOME | End: 2018-06-08
Payer: MEDICARE

## 2018-06-08 VITALS
OXYGEN SATURATION: 98 % | HEIGHT: 66 IN | TEMPERATURE: 98.1 F | BODY MASS INDEX: 32.08 KG/M2 | HEART RATE: 70 BPM | RESPIRATION RATE: 20 BRPM | DIASTOLIC BLOOD PRESSURE: 58 MMHG | SYSTOLIC BLOOD PRESSURE: 175 MMHG | WEIGHT: 199.6 LBS

## 2018-06-08 DIAGNOSIS — J44.9 CHRONIC OBSTRUCTIVE PULMONARY DISEASE, UNSPECIFIED COPD TYPE (HCC): ICD-10-CM

## 2018-06-08 DIAGNOSIS — R05.9 COUGH: ICD-10-CM

## 2018-06-08 DIAGNOSIS — R06.02 SOB (SHORTNESS OF BREATH): ICD-10-CM

## 2018-06-08 DIAGNOSIS — I35.0 NONRHEUMATIC AORTIC VALVE STENOSIS: ICD-10-CM

## 2018-06-08 DIAGNOSIS — R60.0 LEG EDEMA: ICD-10-CM

## 2018-06-08 DIAGNOSIS — R06.02 SOB (SHORTNESS OF BREATH): Primary | ICD-10-CM

## 2018-06-08 PROCEDURE — 83880 ASSAY OF NATRIURETIC PEPTIDE: CPT

## 2018-06-08 PROCEDURE — 71046 X-RAY EXAM CHEST 2 VIEWS: CPT

## 2018-06-08 PROCEDURE — 85025 COMPLETE CBC W/AUTO DIFF WBC: CPT

## 2018-06-08 PROCEDURE — 80053 COMPREHEN METABOLIC PANEL: CPT

## 2018-06-08 PROCEDURE — 85379 FIBRIN DEGRADATION QUANT: CPT

## 2018-06-08 PROCEDURE — 36415 COLL VENOUS BLD VENIPUNCTURE: CPT

## 2018-06-08 NOTE — PROGRESS NOTES
Chief Complaint   Patient presents with    Follow Up Chronic Condition     1. Have you been to the ER, urgent care clinic since your last visit? Hospitalized since your last visit? No    2. Have you seen or consulted any other health care providers outside of the [address] [place] [address] since your last visit? Include any pap smears or colon screening.  No

## 2018-06-08 NOTE — PROGRESS NOTES
HISTORY OF PRESENT ILLNESS  Daniella Guillory is a 80 y.o. female. HPI  Seen 2 weeks ago for chest pain and sob which started 2 weeks post TAVR. Had been seen in ER - see previous note. SOB and le edema is worsening. Worse issues walking, very shakey. Has not seen the cardiologist since last week. Cough is productive but swallowing so unsure of color, rhinorrhea. Voice has lowered. Denies sinus pain or pressure. Temp is higher than her normal 97.1F. Cardiologist had previously started on prednisone (low dose 10mg) which she has finished without improvement. Using Breo without any improvement. Weight has been fluctuating from 194-196. Eating well.  5/21 in ER had CXR - possible small left effusion, CTA of chest was normal except for areas of air trapping. Has follow up with cardiologist in July  Has not started PT because of her breathing. Current Outpatient Prescriptions:     fluticasone-vilanterol (BREO ELLIPTA) 100-25 mcg/dose inhaler, Take 1 Puff by inhalation daily. , Disp: 1 Inhaler, Rfl: 1    predniSONE (DELTASONE) 10 mg tablet, Take 1 Tab by mouth daily (with breakfast). , Disp: 7 Tab, Rfl: 0    furosemide (LASIX) 40 mg tablet, Take 1 Tab by mouth two (2) times a day., Disp: 180 Tab, Rfl: 1    potassium chloride SR (K-TAB) 20 mEq tablet, Take 1 Tab by mouth two (2) times a day. (Patient taking differently: Take 20 mEq by mouth four (4) times daily.), Disp: 180 Tab, Rfl: 1    metFORMIN (GLUCOPHAGE) 1,000 mg tablet, TAKE 1 TABLET BY MOUTH TWO  TIMES DAILY, Disp: 180 Tab, Rfl: 3    losartan (COZAAR) 50 mg tablet, Take 1 Tab by mouth daily. Indications: hypertension, Disp: 30 Tab, Rfl: 1    cholecalciferol, vitamin D3, (VITAMIN D3) 2,000 unit tab, Take 1 Tab by mouth daily (after lunch). , Disp: , Rfl:     CYANOCOBALAMIN, VITAMIN B-12, (VITAMIN B-12 PO), Take 1,500 mg by mouth daily. , Disp: , Rfl:     niacin (NIASPAN) 1,000 mg Tb24 tab, 2,000 mg nightly., Disp: , Rfl:     PSYLLIUM SEED, WITH DEXTROSE, (FIBER PO), Take  by mouth. 5 tabs daily, Disp: , Rfl:     ezetimibe (ZETIA) 10 mg tablet, Take 1 Tab by mouth daily. , Disp: 90 Tab, Rfl: 3    rosuvastatin (CRESTOR) 5 mg tablet, Take 1 Tab by mouth daily. , Disp: 90 Tab, Rfl: 3    COD LIVER OIL PO, Take 1,000 mg by mouth daily. , Disp: , Rfl:     azelastine (ASTELIN) 137 mcg (0.1 %) nasal spray, 2 Sprays by Both Nostrils route two (2) times a day. Use in each nostril as directed (Patient taking differently: 2 Sprays by Both Nostrils route as needed. Use in each nostril as directed), Disp: 3 Bottle, Rfl: 3    glucose blood VI test strips (ACCU-CHEK ELBERT PLUS) strip, Check bs daily. Dx 250.00, Disp: 1 Package, Rfl: 11    aspirin 81 mg tablet, Take 162 mg by mouth. 2 tabs prior to niacin, Disp: , Rfl:     Visit Vitals    /58    Pulse 70    Temp 98.1 °F (36.7 °C) (Oral)    Resp 20    Ht 5' 6\" (1.676 m)    Wt 199 lb 9.6 oz (90.5 kg)    SpO2 98%    BMI 32.22 kg/m2       ROS  See above  Physical Exam   Constitutional: She appears well-developed and well-nourished. HENT:   Head: Normocephalic and atraumatic. Right Ear: External ear normal.   Left Ear: External ear normal.   OP = mild posterior erythema  SInuses nontender  Nares - clear discharge, scant   Neck: Neck supple. No thyromegaly present. Cardiovascular: Normal rate, regular rhythm and normal heart sounds. Exam reveals no gallop and no friction rub. No murmur heard. Pulmonary/Chest: Effort normal and breath sounds normal.   Musculoskeletal: She exhibits edema (1+ edema bilat to mid shin). Lymphadenopathy:     She has no cervical adenopathy. Vitals reviewed. ASSESSMENT and PLAN  SOB - ? Cause. ? Copd vs CHF/fluid overload [place] with le edema. Check labs inc d-dimer and BNP. Check CXR.   If all normal consider higher dose of STeroids and possible home PT  Orders Placed This Encounter    XR CHEST PA LAT    CBC WITH AUTOMATED DIFF    METABOLIC PANEL, COMPREHENSIVE  D DIMER    BNP     Follow-up Disposition: Not on File

## 2018-06-09 LAB
ALBUMIN SERPL-MCNC: 4.1 G/DL (ref 3.5–4.7)
ALBUMIN/GLOB SERPL: 2.2 {RATIO} (ref 1.2–2.2)
ALP SERPL-CCNC: 74 IU/L (ref 39–117)
ALT SERPL-CCNC: 34 IU/L (ref 0–32)
AST SERPL-CCNC: 25 IU/L (ref 0–40)
BASOPHILS # BLD AUTO: 0 X10E3/UL (ref 0–0.2)
BASOPHILS NFR BLD AUTO: 0 %
BILIRUB SERPL-MCNC: 0.9 MG/DL (ref 0–1.2)
BNP SERPL-MCNC: 187.6 PG/ML (ref 0–100)
BUN SERPL-MCNC: 24 MG/DL (ref 8–27)
BUN/CREAT SERPL: 29 (ref 12–28)
CALCIUM SERPL-MCNC: 9.3 MG/DL (ref 8.7–10.3)
CHLORIDE SERPL-SCNC: 106 MMOL/L (ref 96–106)
CO2 SERPL-SCNC: 23 MMOL/L (ref 18–29)
CREAT SERPL-MCNC: 0.84 MG/DL (ref 0.57–1)
D DIMER PPP FEU-MCNC: 2.24 MG/L FEU (ref 0–0.49)
EOSINOPHIL # BLD AUTO: 0.2 X10E3/UL (ref 0–0.4)
EOSINOPHIL NFR BLD AUTO: 2 %
ERYTHROCYTE [DISTWIDTH] IN BLOOD BY AUTOMATED COUNT: 15.4 % (ref 12.3–15.4)
GFR SERPLBLD CREATININE-BSD FMLA CKD-EPI: 63 ML/MIN/1.73
GFR SERPLBLD CREATININE-BSD FMLA CKD-EPI: 72 ML/MIN/1.73
GLOBULIN SER CALC-MCNC: 1.9 G/DL (ref 1.5–4.5)
GLUCOSE SERPL-MCNC: 95 MG/DL (ref 65–99)
HCT VFR BLD AUTO: 38.3 % (ref 34–46.6)
HGB BLD-MCNC: 11.8 G/DL (ref 11.1–15.9)
IMM GRANULOCYTES # BLD: 0 X10E3/UL (ref 0–0.1)
IMM GRANULOCYTES NFR BLD: 0 %
LYMPHOCYTES # BLD AUTO: 1.7 X10E3/UL (ref 0.7–3.1)
LYMPHOCYTES NFR BLD AUTO: 19 %
MCH RBC QN AUTO: 25.8 PG (ref 26.6–33)
MCHC RBC AUTO-ENTMCNC: 30.8 G/DL (ref 31.5–35.7)
MCV RBC AUTO: 84 FL (ref 79–97)
MONOCYTES # BLD AUTO: 0.9 X10E3/UL (ref 0.1–0.9)
MONOCYTES NFR BLD AUTO: 10 %
NEUTROPHILS # BLD AUTO: 6.2 X10E3/UL (ref 1.4–7)
NEUTROPHILS NFR BLD AUTO: 69 %
PLATELET # BLD AUTO: 148 X10E3/UL (ref 150–379)
POTASSIUM SERPL-SCNC: 3.8 MMOL/L (ref 3.5–5.2)
PROT SERPL-MCNC: 6 G/DL (ref 6–8.5)
RBC # BLD AUTO: 4.57 X10E6/UL (ref 3.77–5.28)
SODIUM SERPL-SCNC: 146 MMOL/L (ref 134–144)
WBC # BLD AUTO: 9.1 X10E3/UL (ref 3.4–10.8)

## 2018-06-11 ENCOUNTER — HOSPITAL ENCOUNTER (OUTPATIENT)
Dept: CARDIAC REHAB | Age: 83
Discharge: HOME | End: 2018-06-11

## 2018-06-11 ENCOUNTER — HOSPITAL ENCOUNTER (EMERGENCY)
Age: 83
Discharge: LEFT BEFORE DISCHARGE | DRG: 242 | End: 2018-06-11
Attending: EMERGENCY MEDICINE
Payer: MEDICARE

## 2018-06-11 ENCOUNTER — APPOINTMENT (OUTPATIENT)
Dept: CARDIAC REHAB | Age: 83
End: 2018-06-11

## 2018-06-11 ENCOUNTER — TELEPHONE (OUTPATIENT)
Dept: INTERNAL MEDICINE CLINIC | Age: 83
End: 2018-06-11

## 2018-06-11 ENCOUNTER — TELEPHONE (OUTPATIENT)
Dept: CARDIOLOGY CLINIC | Age: 83
End: 2018-06-11

## 2018-06-11 VITALS
SYSTOLIC BLOOD PRESSURE: 164 MMHG | BODY MASS INDEX: 32.92 KG/M2 | RESPIRATION RATE: 16 BRPM | HEART RATE: 57 BPM | DIASTOLIC BLOOD PRESSURE: 78 MMHG | TEMPERATURE: 98.6 F | OXYGEN SATURATION: 98 % | WEIGHT: 204.8 LBS | HEIGHT: 66 IN

## 2018-06-11 DIAGNOSIS — R60.0 LEG EDEMA: Primary | ICD-10-CM

## 2018-06-11 DIAGNOSIS — R79.89 POSITIVE D DIMER: ICD-10-CM

## 2018-06-11 PROCEDURE — 75810000275 HC EMERGENCY DEPT VISIT NO LEVEL OF CARE

## 2018-06-11 PROCEDURE — 93970 EXTREMITY STUDY: CPT

## 2018-06-11 NOTE — ED TRIAGE NOTES
Pt reports having bilateral lower extremity doppler 2 weeks ago. Referred to ED today by PCP for another doppler. Reports increased swelling to BLE, pain in bilateral ribs with palpation, shortness of breath with inspiration. Denies CP.

## 2018-06-11 NOTE — TELEPHONE ENCOUNTER
Feeling a bit worse - legs are very swollen. Improved overnight but back up after walking into the kitchen. SOB continues unchanged. Coughing a lot and issues taking a deep breath. CXR was normal.    D-dimer elevated and BMP mildly elevated. Discussed with pt. Repeat dopplers. Had negative CTA of lungs 5/22 with similar symptoms.

## 2018-06-11 NOTE — PROCEDURES
[facility]  *** FINAL REPORT ***    Name: Kartik Duran  MRN: [id number]  : 1930  HIS Order #: [id number]  [id number] [facility] Visit #: [id number]  Date: 2018    TYPE OF TEST: Peripheral Venous Testing    REASON FOR TEST  Limb swelling    Right Leg:-  Deep venous thrombosis:           No  Superficial venous thrombosis:    No  Deep venous insufficiency:        Not examined  Superficial venous insufficiency: Not examined    Left Leg:-  Deep venous thrombosis:           No  Superficial venous thrombosis:    No  Deep venous insufficiency:        Not examined  Superficial venous insufficiency: Not examined      INTERPRETATION/FINDINGS  PROCEDURE:  Color duplex ultrasound imaging of lower extremity veins. FINDINGS:       Right: The common femoral, deep femoral, femoral, popliteal,  posterior tibial, peroneal, and great saphenous are patent and without   evidence of thrombus;  each is fully compressible and there is no  narrowing of the flow channel on color Doppler imaging. Phasic flow  is observed in the common femoral vein. Left:   The common femoral, deep femoral, femoral, popliteal,  posterior tibial, peroneal, and great saphenous are patent and without   evidence of thrombus;  each is fully compressible and there is no  narrowing of the flow channel on color Doppler imaging. Phasic flow  is observed in the common femoral vein. IMPRESSION:  No evidence of right or left lower extremity vein  thrombosis. ADDITIONAL COMMENTS    I have personally reviewed the data relevant to the interpretation of  this  study. TECHNOLOGIST: Betty Velazquez  Signed: 2018 04:52 PM    PHYSICIAN: Nader Brooke MD  Signed: 2018 07:42 AM

## 2018-06-12 ENCOUNTER — TELEPHONE (OUTPATIENT)
Dept: CARDIOLOGY CLINIC | Age: 83
End: 2018-06-12

## 2018-06-12 ENCOUNTER — HOSPITAL ENCOUNTER (INPATIENT)
Age: 83
LOS: 3 days | Discharge: HOME HEALTH | DRG: 242 | End: 2018-06-15
Attending: INTERNAL MEDICINE | Admitting: INTERNAL MEDICINE
Payer: MEDICARE

## 2018-06-12 DIAGNOSIS — L76.82 PAIN AT SURGICAL INCISION: Primary | ICD-10-CM

## 2018-06-12 PROBLEM — I50.30 (HFPEF) HEART FAILURE WITH PRESERVED EJECTION FRACTION (HCC): Status: ACTIVE | Noted: 2018-06-12

## 2018-06-12 LAB
ALBUMIN SERPL-MCNC: 3.1 G/DL (ref 3.5–5)
ALBUMIN/GLOB SERPL: 0.9 {RATIO} (ref 1.1–2.2)
ALP SERPL-CCNC: 82 U/L (ref 45–117)
ALT SERPL-CCNC: 59 U/L (ref 12–78)
ANION GAP SERPL CALC-SCNC: 12 MMOL/L (ref 5–15)
AST SERPL-CCNC: 33 U/L (ref 15–37)
BILIRUB SERPL-MCNC: 1.2 MG/DL (ref 0.2–1)
BNP SERPL-MCNC: 919 PG/ML (ref 0–450)
BUN SERPL-MCNC: 17 MG/DL (ref 6–20)
BUN/CREAT SERPL: 17 (ref 12–20)
CALCIUM SERPL-MCNC: 8.8 MG/DL (ref 8.5–10.1)
CHLORIDE SERPL-SCNC: 107 MMOL/L (ref 97–108)
CO2 SERPL-SCNC: 26 MMOL/L (ref 21–32)
CREAT SERPL-MCNC: 1.03 MG/DL (ref 0.55–1.02)
ERYTHROCYTE [DISTWIDTH] IN BLOOD BY AUTOMATED COUNT: 15.8 % (ref 11.5–14.5)
GLOBULIN SER CALC-MCNC: 3.3 G/DL (ref 2–4)
GLUCOSE BLD STRIP.AUTO-MCNC: 140 MG/DL (ref 65–100)
GLUCOSE BLD STRIP.AUTO-MCNC: 153 MG/DL (ref 65–100)
GLUCOSE SERPL-MCNC: 134 MG/DL (ref 65–100)
HCT VFR BLD AUTO: 36.5 % (ref 35–47)
HGB BLD-MCNC: 11.2 G/DL (ref 11.5–16)
MAGNESIUM SERPL-MCNC: 1.2 MG/DL (ref 1.6–2.4)
MCH RBC QN AUTO: 25.9 PG (ref 26–34)
MCHC RBC AUTO-ENTMCNC: 30.7 G/DL (ref 30–36.5)
MCV RBC AUTO: 84.5 FL (ref 80–99)
NRBC # BLD: 0 K/UL (ref 0–0.01)
NRBC BLD-RTO: 0 PER 100 WBC
PLATELET # BLD AUTO: 129 K/UL (ref 150–400)
PMV BLD AUTO: 10.5 FL (ref 8.9–12.9)
POTASSIUM SERPL-SCNC: 3.4 MMOL/L (ref 3.5–5.1)
PROT SERPL-MCNC: 6.4 G/DL (ref 6.4–8.2)
RBC # BLD AUTO: 4.32 M/UL (ref 3.8–5.2)
SERVICE CMNT-IMP: ABNORMAL
SERVICE CMNT-IMP: ABNORMAL
SODIUM SERPL-SCNC: 145 MMOL/L (ref 136–145)
WBC # BLD AUTO: 8 K/UL (ref 3.6–11)

## 2018-06-12 PROCEDURE — 83735 ASSAY OF MAGNESIUM: CPT | Performed by: NURSE PRACTITIONER

## 2018-06-12 PROCEDURE — 82962 GLUCOSE BLOOD TEST: CPT

## 2018-06-12 PROCEDURE — 65660000000 HC RM CCU STEPDOWN

## 2018-06-12 PROCEDURE — 74011000250 HC RX REV CODE- 250: Performed by: INTERNAL MEDICINE

## 2018-06-12 PROCEDURE — 74011250636 HC RX REV CODE- 250/636

## 2018-06-12 PROCEDURE — 94640 AIRWAY INHALATION TREATMENT: CPT

## 2018-06-12 PROCEDURE — 80053 COMPREHEN METABOLIC PANEL: CPT | Performed by: NURSE PRACTITIONER

## 2018-06-12 PROCEDURE — 77030029684 HC NEB SM VOL KT MONA -A

## 2018-06-12 PROCEDURE — 74011250637 HC RX REV CODE- 250/637: Performed by: NURSE PRACTITIONER

## 2018-06-12 PROCEDURE — 74011250637 HC RX REV CODE- 250/637

## 2018-06-12 PROCEDURE — 85027 COMPLETE CBC AUTOMATED: CPT | Performed by: NURSE PRACTITIONER

## 2018-06-12 PROCEDURE — 36415 COLL VENOUS BLD VENIPUNCTURE: CPT | Performed by: NURSE PRACTITIONER

## 2018-06-12 PROCEDURE — 83880 ASSAY OF NATRIURETIC PEPTIDE: CPT | Performed by: NURSE PRACTITIONER

## 2018-06-12 PROCEDURE — 74011250636 HC RX REV CODE- 250/636: Performed by: NURSE PRACTITIONER

## 2018-06-12 PROCEDURE — 74011000250 HC RX REV CODE- 250

## 2018-06-12 PROCEDURE — 93005 ELECTROCARDIOGRAM TRACING: CPT

## 2018-06-12 RX ORDER — METFORMIN HYDROCHLORIDE 500 MG/1
TABLET ORAL
Status: COMPLETED
Start: 2018-06-12 | End: 2018-06-12

## 2018-06-12 RX ORDER — METOLAZONE 5 MG/1
2.5 TABLET ORAL ONCE
Status: COMPLETED | OUTPATIENT
Start: 2018-06-12 | End: 2018-06-12

## 2018-06-12 RX ORDER — LANOLIN ALCOHOL/MO/W.PET/CERES
2000 CREAM (GRAM) TOPICAL
Status: DISCONTINUED | OUTPATIENT
Start: 2018-06-12 | End: 2018-06-12

## 2018-06-12 RX ORDER — POTASSIUM CHLORIDE 750 MG/1
TABLET, FILM COATED, EXTENDED RELEASE ORAL
Status: COMPLETED
Start: 2018-06-12 | End: 2018-06-12

## 2018-06-12 RX ORDER — GUAIFENESIN 100 MG/5ML
162 LIQUID (ML) ORAL DAILY
Status: DISCONTINUED | OUTPATIENT
Start: 2018-06-13 | End: 2018-06-12

## 2018-06-12 RX ORDER — BUMETANIDE 0.25 MG/ML
INJECTION INTRAMUSCULAR; INTRAVENOUS
Status: COMPLETED
Start: 2018-06-12 | End: 2018-06-12

## 2018-06-12 RX ORDER — BUDESONIDE 0.5 MG/2ML
500 INHALANT ORAL
Status: DISCONTINUED | OUTPATIENT
Start: 2018-06-12 | End: 2018-06-15 | Stop reason: HOSPADM

## 2018-06-12 RX ORDER — METOLAZONE 5 MG/1
TABLET ORAL
Status: COMPLETED
Start: 2018-06-12 | End: 2018-06-12

## 2018-06-12 RX ORDER — BUMETANIDE 0.25 MG/ML
1 INJECTION INTRAMUSCULAR; INTRAVENOUS
Status: DISCONTINUED | OUTPATIENT
Start: 2018-06-13 | End: 2018-06-14

## 2018-06-12 RX ORDER — POTASSIUM CHLORIDE 750 MG/1
20 TABLET, FILM COATED, EXTENDED RELEASE ORAL 2 TIMES DAILY
Status: DISCONTINUED | OUTPATIENT
Start: 2018-06-12 | End: 2018-06-15 | Stop reason: HOSPADM

## 2018-06-12 RX ORDER — EZETIMIBE 10 MG/1
10 TABLET ORAL DAILY
Status: DISCONTINUED | OUTPATIENT
Start: 2018-06-13 | End: 2018-06-13

## 2018-06-12 RX ORDER — LANOLIN ALCOHOL/MO/W.PET/CERES
400 CREAM (GRAM) TOPICAL 2 TIMES DAILY
Status: DISCONTINUED | OUTPATIENT
Start: 2018-06-12 | End: 2018-06-15 | Stop reason: HOSPADM

## 2018-06-12 RX ORDER — LOSARTAN POTASSIUM 50 MG/1
50 TABLET ORAL DAILY
Status: DISCONTINUED | OUTPATIENT
Start: 2018-06-12 | End: 2018-06-15 | Stop reason: HOSPADM

## 2018-06-12 RX ORDER — POTASSIUM CHLORIDE 750 MG/1
40 TABLET, FILM COATED, EXTENDED RELEASE ORAL
Status: COMPLETED | OUTPATIENT
Start: 2018-06-12 | End: 2018-06-12

## 2018-06-12 RX ORDER — SODIUM CHLORIDE 0.9 % (FLUSH) 0.9 %
5-10 SYRINGE (ML) INJECTION EVERY 8 HOURS
Status: DISCONTINUED | OUTPATIENT
Start: 2018-06-12 | End: 2018-06-15 | Stop reason: HOSPADM

## 2018-06-12 RX ORDER — BUMETANIDE 0.25 MG/ML
2 INJECTION INTRAMUSCULAR; INTRAVENOUS ONCE
Status: COMPLETED | OUTPATIENT
Start: 2018-06-12 | End: 2018-06-12

## 2018-06-12 RX ORDER — ROSUVASTATIN CALCIUM 10 MG/1
5 TABLET, COATED ORAL
Status: DISCONTINUED | OUTPATIENT
Start: 2018-06-13 | End: 2018-06-15 | Stop reason: HOSPADM

## 2018-06-12 RX ORDER — LANOLIN ALCOHOL/MO/W.PET/CERES
CREAM (GRAM) TOPICAL
Status: COMPLETED
Start: 2018-06-12 | End: 2018-06-12

## 2018-06-12 RX ORDER — POLYETHYLENE GLYCOL 3350 17 G/17G
17 POWDER, FOR SOLUTION ORAL
Status: DISCONTINUED | OUTPATIENT
Start: 2018-06-12 | End: 2018-06-15 | Stop reason: SDUPTHER

## 2018-06-12 RX ORDER — MAGNESIUM SULFATE HEPTAHYDRATE 40 MG/ML
INJECTION, SOLUTION INTRAVENOUS
Status: COMPLETED
Start: 2018-06-12 | End: 2018-06-12

## 2018-06-12 RX ORDER — GUAIFENESIN 100 MG/5ML
81 LIQUID (ML) ORAL DAILY
Status: DISCONTINUED | OUTPATIENT
Start: 2018-06-13 | End: 2018-06-15 | Stop reason: HOSPADM

## 2018-06-12 RX ORDER — ONDANSETRON 2 MG/ML
4 INJECTION INTRAMUSCULAR; INTRAVENOUS
Status: DISCONTINUED | OUTPATIENT
Start: 2018-06-12 | End: 2018-06-14 | Stop reason: SDUPTHER

## 2018-06-12 RX ORDER — MELATONIN
2000
Status: DISCONTINUED | OUTPATIENT
Start: 2018-06-13 | End: 2018-06-15 | Stop reason: HOSPADM

## 2018-06-12 RX ORDER — MAGNESIUM SULFATE HEPTAHYDRATE 40 MG/ML
2 INJECTION, SOLUTION INTRAVENOUS ONCE
Status: COMPLETED | OUTPATIENT
Start: 2018-06-12 | End: 2018-06-12

## 2018-06-12 RX ORDER — ARFORMOTEROL TARTRATE 15 UG/2ML
15 SOLUTION RESPIRATORY (INHALATION)
Status: DISCONTINUED | OUTPATIENT
Start: 2018-06-12 | End: 2018-06-15 | Stop reason: HOSPADM

## 2018-06-12 RX ORDER — PREDNISONE 5 MG/1
10 TABLET ORAL
Status: DISCONTINUED | OUTPATIENT
Start: 2018-06-13 | End: 2018-06-12

## 2018-06-12 RX ORDER — ACETAMINOPHEN 325 MG/1
650 TABLET ORAL
Status: DISCONTINUED | OUTPATIENT
Start: 2018-06-12 | End: 2018-06-14 | Stop reason: SDUPTHER

## 2018-06-12 RX ORDER — LANOLIN ALCOHOL/MO/W.PET/CERES
1500 CREAM (GRAM) TOPICAL DAILY
Status: DISCONTINUED | OUTPATIENT
Start: 2018-06-13 | End: 2018-06-15 | Stop reason: HOSPADM

## 2018-06-12 RX ORDER — METFORMIN HYDROCHLORIDE 500 MG/1
1000 TABLET ORAL 2 TIMES DAILY WITH MEALS
Status: DISCONTINUED | OUTPATIENT
Start: 2018-06-12 | End: 2018-06-13

## 2018-06-12 RX ADMIN — BUMETANIDE 2 MG: 0.25 INJECTION INTRAMUSCULAR; INTRAVENOUS at 16:29

## 2018-06-12 RX ADMIN — Medication 10 ML: at 21:29

## 2018-06-12 RX ADMIN — POTASSIUM CHLORIDE 20 MEQ: 750 TABLET, EXTENDED RELEASE ORAL at 16:30

## 2018-06-12 RX ADMIN — METOLAZONE 2.5 MG: 5 TABLET ORAL at 16:31

## 2018-06-12 RX ADMIN — LOSARTAN POTASSIUM 50 MG: 50 TABLET ORAL at 21:29

## 2018-06-12 RX ADMIN — Medication 400 MG: at 16:30

## 2018-06-12 RX ADMIN — BUDESONIDE 500 MCG: 0.5 INHALANT RESPIRATORY (INHALATION) at 21:05

## 2018-06-12 RX ADMIN — POTASSIUM CHLORIDE 40 MEQ: 750 TABLET, EXTENDED RELEASE ORAL at 16:31

## 2018-06-12 RX ADMIN — MAGNESIUM SULFATE HEPTAHYDRATE 2 G: 40 INJECTION, SOLUTION INTRAVENOUS at 16:32

## 2018-06-12 RX ADMIN — METFORMIN HYDROCHLORIDE 1000 MG: 500 TABLET, FILM COATED ORAL at 16:31

## 2018-06-12 RX ADMIN — ARFORMOTEROL TARTRATE 15 MCG: 15 SOLUTION RESPIRATORY (INHALATION) at 21:05

## 2018-06-12 NOTE — PROGRESS NOTES
Direct admit for fatigue, exhaustion, exercise intolerance. V-rate is low 50s. EKG with afib/junctional hector and rbbb? Prior sinus rhythm with distinct p's preop. 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 Pt agreeable to pacer implant.

## 2018-06-12 NOTE — IP AVS SNAPSHOT
[address] 
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 
[phone] [facility] Office on [address] [place] [address] Street from agency will contact you [address] [address] [phone] Patito Hernandez NP On 6/22/2018 9:00 AM [address] [facility] [address] 1007 [facility] 
[phone] Rob Leon MD On 7/5/2018 9:20 AM [address] [place] 
[phone] Almas Enriquez MD On 7/18/2018 11:30am pacemaker check and appt with Dr. Melani Valle [address] 1007 [facility] 
[phone] Mary Cleveland MD On 6/25/2018 Hospital f/u PCP 10-14 day appointment Monday, 6/25/18 @ 1:00 [address] [facility] [place] 
[phone] [address] [place] [address] On 6/17/2018 skilled nursing CHF and PT evaluation [address] 
1st Floor [facility] [address] 
[phone] Your Scheduled Appointments Monday June 18, 2018  3:00 PM EDT  
ESTABLISHED PATIENT with Dotty Schuster PA-C  
[facility] ([facility]) [address] [place] [address] [address] [place]  
[phone] Friday June 22, 2018  9:00 AM EDT  
ESTABLISHED PATIENT with Patito Hernandez NP  
[facility] ([facility]) 320 [facility] 600 0110 [facility] [address] [address] [phone] Monday June 25, 2018  8:30 AM EDT  
CR PHASE II with [address] [place] [address] ([address]) [address] [facility] [address]  
[phone]  
  
    
[address]. Please arrive 15 minutes prior to your appointment time and you will register in the [facility] 100, [address], on the first floor of the [address] [place] [address] Road. Telephone: [phone] Fax: [phone] Driving directions To [facility] [place]. Building: Driving WEST on A-14, take exit 183A to [employer]. Turn left onto [facility], then turn right into [employer] Parking lot Driving EAST on H[address]01, take exit [address] Turn right at the end of the exit ramp. Turn left onto [facility], then turn right into [employer] lot. Monday June 25, 2018  1:00 PM EDT Office Visit with Eros Bermudez MD  
Critical access hospital Internal Medicine [facility]) [address] [facility]  
[phone] Friday June 29, 2018  8:30 AM EDT  
CR PHASE II with [address] [place] [address] ([address]) [address] [facility] [address]  
[phone]  
  
    
[address]. Please arrive 15 minutes prior to your appointment time and you will register in the [facility] 100, [address], on the first floor of the [address] [place] [address] Road. Telephone: [phone] Fax: [phone] Driving directions To [facility]. Building: Driving WEST on [address], take exit [address]A to [employer]. Turn left onto [facility], then turn right into [employer] Parking lot Driving EAST on [address]-45, take exit [address] Turn right at the end of the exit ramp. Turn left onto [facility], then turn right into [employer] lot. Monday July 02, 2018  8:30 AM EDT  
CR PHASE II with [address] ([address]) [address] [facility] [address]  
[phone]  
  
    
[address]. Please arrive 15 minutes prior to your appointment time and you will register in the [facility] 100, [address], on the first floor of the [address] [place] [address] Road. Telephone: [phone] Fax: [phone] Driving directions To [facility] [place]. Building: Driving WEST on [address]-18, take exit 183A to [employer]. Turn left onto [facility] then turn right into [employer] Parking lot Driving EAST on P-02, take exit [address] Turn right at the end of the exit ramp. Turn left onto [facility] then turn right into [employer] lot. Thursday July 05, 2018  9:20 AM EDT  
ESTABLISHED PATIENT with Edmundo Zurita MD  
[facility] ([facility]) [address] [place] [address]  
[phone] Friday July 06, 2018  8:30 AM EDT  
CR PHASE II with [address] ([address]) [address] [facility] [address]  
[phone]  
  
    
[address]. Please arrive 15 minutes prior to your appointment time and you will register in the [facility] 100, [address], on the first floor of the [address] [place] [address] Road. Telephone: [phone] Fax: [phone] Driving directions To [facility]. Building: Driving WEST on [address]86, take exit [address]A to [employer]. Turn left onto [facility] then turn right into [employer] Parking lot Driving EAST on [address]64, take exit [address] Turn right at the end of the exit ramp. Turn left onto [facility] then turn right into [employer] lot. Monday July 09, 2018  8:30 AM EDT  
CR PHASE II with [address] ([address]) [address] [facility] [address]  
[phone]  
  
    
[address]. Please arrive 15 minutes prior to your appointment time and you will register in the [facility] 100, [address], on the first floor of the [address] [place] [address] Road. Telephone: [phone] Fax: [phone] Driving directions To [facility] [place]. Building: Driving WEST on K-15, take exit [address]A to [employer]. Turn left onto [facility], then turn right into [employer] Parking lot Driving EAST on A-22, take exit [address] Turn right at the end of the exit ramp. Turn left onto [facility], then turn right into [employer] lot. Friday July 13, 2018  8:30 AM EDT  
CR PHASE II with [address] ([address]) [address] [facility] [address]  
[phone]  
  
    
[address]. Please arrive 15 minutes prior to your appointment time and you will register in the [facility] 100, [address], on the first floor of the [address] [place] [address] Road. Telephone: [phone] Fax: [phone] Driving directions To [facility] [place]. Building: Driving WEST on [address]62, take exit 183A to [employer]. Turn left onto [facility], then turn right into [employer] Parking lot Driving EAST on V-91, take exit [address] Turn right at the end of the exit ramp. Turn left onto [facility], then turn right into [employer] lot. Monday July 16, 2018  8:30 AM EDT  
CR PHASE II with [address] ([address]) [address] [place] [address]  
[phone] [address]. Please arrive 15 minutes prior to your appointment time and you will register in the Critical access hospital 100, [address], on the first floor of the [address] [place] [address] Road. Telephone: [phone] Fax: [phone] Driving directions To [facility]. Building: Driving WEST on K-14, take exit [address]A to [employer]. Turn left onto [facility], then turn right into [employer] Parking lot Driving EAST on [address], take exit [address] Turn right at the end of the exit ramp. Turn left onto [facility], then turn right into [employer] lot. Wednesday July 18, 2018 11:30 AM EDT  
PACEMAKER with KIM GRACE  
[facility] (SHIRA SCHEDULING) 320 [facility] [place] 600 1007 [facility]  
[phone] Wednesday July 18, 2018 11:40 AM EDT  
ESTABLISHED PATIENT with Juan Pablo Allen MD  
[facility] ([facility]) 320 [facility] [place] 600 1007 [facility]  
[phone] Friday July 20, 2018  8:30 AM EDT  
CR PHASE II with [facility] [place] [address] ([address]) [address] [facility] [address]  
[phone]  
  
    
[address]. Please arrive 15 minutes prior to your appointment time and you will register in the [facility] 100, [address], on the first floor of the [address] [place] [address] Road. Telephone: [phone] Fax: [phone] Driving directions To [facility]. Building: Driving WEST on [address]43, take exit 183A to [employer]. Turn left onto [facility], then turn right into [employer] Parking lot Driving EAST on Y[address]71, take exit [address] Turn right at the end of the exit ramp.  Turn left onto [facility], then turn right into [employer] lot.  
  
    
 Monday July 23, 2018  8:30 AM EDT  
CR PHASE II with [address] ([address]) [address] [place] [address] E [facility] [id number] [phone]  
  
    
[address]. Please arrive 15 minutes prior to your appointment time and you will register in the Critical access hospital 100, [address], on the first floor of the [address] [place] [address] Road. Telephone: [phone] Fax: [phone] Driving directions To [facility]. Building: Driving WEST on S-61, take exit [address]A to [employer]. Turn left onto [facility], then turn right into [employer] Parking lot Driving EAST on [address]03, take exit [address] Turn right at the end of the exit ramp. Turn left onto [facility], then turn right into [employer] lot. Friday July 27, 2018  8:30 AM EDT  
CR PHASE II with [address]) [address] [place] 2000 [facility] [address]  
[phone]  
  
    
[address]. Please arrive 15 minutes prior to your appointment time and you will register in the Critical access hospital 100, [address], on the first floor of the [address] [place] [address] Road. Telephone: [phone] Fax: [phone] Driving directions To [facility]. Building: Driving WEST on V-67, take exit 183A to [employer]. Turn left onto [facility], then turn right into [employer] Parking lot Driving EAST on Z-26, take exit [address] Turn right at the end of the exit ramp. Turn left onto [facility], then turn right into [employer] lot. Monday July 30, 2018  8:30 AM EDT  
CR PHASE II with [address] ([address]) [address] [place] [address]  
[phone] [address]. Please arrive 15 minutes prior to your appointment time and you will register in the [facility] 100, [address], on the first floor of the [address] [place] [address] Road. Telephone: [phone] Fax: [phone] Driving directions To [facility]. Building: Driving WEST on [address]-29, take exit 183A to [employer]. Turn left onto [facility], then turn right into [employer] Parking lot Driving EAST on [address], take exit [address] Turn right at the end of the exit ramp. Turn left onto [facility], then turn right into [employer] lot. Friday August 03, 2018  8:30 AM EDT  
CR PHASE II with [facility] [place] [address] ([address]) [address] [facility] [address]  
[phone]  
  
    
[address]. Please arrive 15 minutes prior to your appointment time and you will register in the [facility] 100, [address], on the first floor of the [address] [place] [address] Road. Telephone: [phone] Fax: [phone] Driving directions To [facility]. Building: Driving WEST on [address]-42, take exit 183A to [employer]. Turn left onto [facility], then turn right into [employer] Parking lot Driving EAST on [address]54, take exit [address] [facility] [address]. Turn right at the end of the exit ramp. Turn left onto [facility], then turn right into [employer] lot. Discharge Orders None A check latanya indicates which time of day the medication should be taken. My Medications START taking these medications Instructions Each Dose to Equal  
 Morning Noon Evening Bedtime  
 apixaban 5 mg tablet Commonly known as:  Arbutus Rafi Your last dose was: Your next dose is: Take 1 Tab by mouth two (2) times a day. 5 mg aspirin 81 mg chewable tablet Replaces:  aspirin 81 mg tablet Your last dose was: Your next dose is: Take 1 Tab by mouth daily. 81 mg  
    
   
   
   
  
 bumetanide 1 mg tablet Commonly known as:  Natalya Barnes Your last dose was: Your next dose is: Take 1 Tab by mouth two (2) times a day. 1 mg  
    
   
   
   
  
 cephALEXin 500 mg capsule Commonly known as:  Winston Dominiqueum Your last dose was: Your next dose is: Take 1 Cap by mouth two (2) times a day for 5 days. 500 mg HYDROcodone-acetaminophen 5-325 mg per tablet Commonly known as:  Corazon Randall Your last dose was: Your next dose is: Take 1 Tab by mouth every six (6) hours as needed. Max Daily Amount: 4 Tabs. 1 Tab  
    
   
   
   
  
 magnesium oxide 400 mg tablet Commonly known as:  MAG-OX Your last dose was: Your next dose is: Take 1 Tab by mouth two (2) times a day. 400 mg  
    
   
   
   
  
 spironolactone 25 mg tablet Commonly known as:  ALDACTONE Your last dose was: Your next dose is: Take 1 Tab by mouth daily. 25 mg CHANGE how you take these medications Instructions Each Dose to Equal  
 Morning Noon Evening Bedtime  
 azelastine 137 mcg (0.1 %) nasal spray Commonly known as:  ASTELIN What changed:   
- when to take this 
- reasons to take this 
- additional instructions Your last dose was: Your next dose is: 2 Sprays by Both Nostrils route two (2) times a day. Use in each nostril as directed 2 Spray  
    
   
   
   
  
 potassium chloride SR 20 mEq tablet Commonly known as:  K-TAB What changed:  when to take this Your last dose was: Your next dose is: Take 1 Tab by mouth two (2) times a day. 20 mEq CONTINUE taking these medications Instructions Each Dose to Equal  
 Morning Noon Evening Bedtime COD LIVER OIL PO Your last dose was: Your next dose is: Take 1,000 mg by mouth daily. 1000 mg FIBER PO Your last dose was: Your next dose is: Take  by mouth. 5 tabs daily  
     
   
   
   
  
 fluticasone-vilanterol 100-25 mcg/dose inhaler Commonly known as:  BREO ELLIPTA Your last dose was: Your next dose is: Take 1 Puff by inhalation daily. 1 Puff  
    
   
   
   
  
 glucose blood VI test strips strip Commonly known as:  ACCU-CHEK ELBERT PLUS TEST STRP Your last dose was: Your next dose is:    
   
   
 Check bs daily. Dx 250.00  
     
   
   
   
  
 losartan 50 mg tablet Commonly known as:  COZAAR Your last dose was: Your next dose is: Take 1 Tab by mouth daily. Indications: hypertension 50 mg  
    
   
   
   
  
 metFORMIN 1,000 mg tablet Commonly known as:  GLUCOPHAGE Your last dose was: Your next dose is: TAKE 1 TABLET BY MOUTH TWO  TIMES DAILY  
     
   
   
   
  
 rosuvastatin 5 mg tablet Commonly known as:  CRESTOR Your last dose was: Your next dose is: TAKE 1 TABLET BY MOUTH  DAILY  
     
   
   
   
  
 VITAMIN B-12 PO Your last dose was: Your next dose is: Take 1,500 mg by mouth daily. 1500 mg  
    
   
   
   
  
 VITAMIN D3 2,000 unit Tab Generic drug:  cholecalciferol (vitamin D3) Your last dose was: Your next dose is: Take 1 Tab by mouth daily (after lunch). 1 Tab STOP taking these medications   
 aspirin 81 mg tablet Replaced by:  aspirin 81 mg chewable tablet  
   
  
 ezetimibe 10 mg tablet Commonly known as:  ZETIA  
   
  
 furosemide 40 mg tablet Commonly known as:  LASIX  
   
  
 niacin 1,000 mg Tb24 tab Commonly known as:  NIASPAN  
   
  
 predniSONE 10 mg tablet Commonly known as:  [place] Where to Get Your Medications These medications were sent to [facility] #[id number]- [address], [address]  [address] [facility], 185 [facility] [address] Phone:  [phone]  
  apixaban 5 mg tablet  
 bumetanide 1 mg tablet  
 cephALEXin 500 mg capsule  
 magnesium oxide 400 mg tablet  
 potassium chloride SR 20 mEq tablet  
 spironolactone 25 mg tablet These medications were sent to [address] [facility], 2450 [facility] [address], [facility] [address] Phone:  [phone]  
  aspirin 81 mg chewable tablet Information on where to get these meds will be given to you by the nurse or doctor. ! Ask your nurse or doctor about these medications HYDROcodone-acetaminophen 5-325 mg per tablet Opioid Education Prescription Opioids: What You Need to Know: 
 
Prescription opioids can be used to help relieve moderate-to-severe pain and are often prescribed following a surgery or injury, or for certain health conditions. These medications can be an important part of treatment but also come with serious risks. Opioids are strong pain medicines. Examples include hydrocodone, oxycodone, fentanyl, and morphine. Heroin is an example of an illegal opioid. It is important to work with your health care provider to make sure you are getting the safest, most effective care. WHAT ARE THE RISKS AND SIDE EFFECTS OF OPIOID USE? Prescription opioids carry serious risks of addiction and overdose, especially with prolonged use. An opioid overdose, often marked by slow breathing, can cause sudden death. The use of prescription opioids can have a number of side effects as well, even when taken as directed. · Tolerance-meaning you might need to take more of a medication for the same pain relief · Physical dependence-meaning you have symptoms of withdrawal when the medication is stopped. Withdrawal symptoms can include nausea, sweating, chills, diarrhea, stomach cramps, and muscle aches. Withdrawal can last up to several weeks, depending on which drug you took and how long you took it. · Increased sensitivity to pain · Constipation · Nausea, vomiting, and dry mouth · Sleepiness and dizziness · Confusion · Depression · Low levels of testosterone that can result in lower sex drive, energy, and strength · Itching and sweating RISKS ARE GREATER WITH:      
· History of drug misuse, substance use disorder, or overdose · Mental health conditions (such as depression or anxiety) · Sleep apnea · Older age (72 years or older) · Pregnancy Avoid alcohol while taking prescription opioids. Also, unless specifically advised by your health care provider, medications to avoid include: · Benzodiazepines (such as Xanax or Valium) · Muscle relaxants (such as Soma or Flexeril) · Hypnotics (such as Ambien or Lunesta) · Other prescription opioids KNOW YOUR OPTIONS Talk to your health care provider about ways to manage your pain that don't involve prescription opioids. Some of these options may actually work better and have fewer risks and side effects. Options may include: 
· Pain relievers such as acetaminophen, ibuprofen, and naproxen · Some medications that are also used for depression or seizures · Physical therapy and exercise · Counseling to help patients learn how to cope better with triggers of pain and stress. · Application of heat or cold compress · Massage therapy · Relaxation techniques Be Informed Make sure you know the name of your medication, how much and how often to take it, and its potential risks & side effects.  
 
IF YOU ARE PRESCRIBED OPIOIDS FOR PAIN: 
 · Never take opioids in greater amounts or more often than prescribed. Remember the goal is not to be pain-free but to manage your pain at a tolerable level. · Follow up with your primary care provider to: · Work together to create a plan on how to manage your pain. · Talk about ways to help manage your pain that don't involve prescription opioids. · Talk about any and all concerns and side effects. · Help prevent misuse and abuse. · Never sell or share prescription opioids · Help prevent misuse and abuse. · Store prescription opioids in a secure place and out of reach of others (this may include visitors, children, friends, and family). · Safely dispose of unused/unwanted prescription opioids: Find your community drug take-back program or your pharmacy mail-back program, or flush them down the toilet, following guidance from the Food and Drug Administration (www.fda.gov/Drugs/ResourcesForYou). · Visit www.cdc.gov/drugoverdose to learn about the risks of opioid abuse and overdose. · If you believe you may be struggling with addiction, tell your health care provider and ask for guidance or call [employer] at 0-907-876-KRFY. Discharge Instructions Pacemaker Discharge Instructions Please make sure you have received your Temporary Pacemaker identification card with your discharge instructions MEDICATIONS ? Take only the medications prescribed to you at discharge. ACTIVITY ? Return to your normal activity, except as noted below. o Do not lift anything heavier than 10 pounds for 4 weeks with the affected arm. This is how long it takes the muscles to heal, and the leads inside your heart to stabilize their position. o Do not reach above your head with the affected arm for 4 weeks, doing so increases the risk of lead dislodgement. o It is, however, important to move the affected arm to prevent shoulder stiffness and locking. o Avoid tight clothes or unnecessary pressure over your incision (such as bra straps or seat belts). If it is tender or sensitive to clothing, cover the incision with a soft dressing or pad. 
o Questions about driving are individualized and should be discussed with one of the EP Physicians prior to discharge. SHOWERING  
  
 
? Leave the bandage over your incision for 7 days after the Pacemaker implant. You bandage will be removed in clinic in 7 days. ? It is important to keep the bandaged area clean and dry. You may shower around the site until the bandage is removed in clinic. Thereafter, you may shower after the bandage is removed, washing it gently with soap and water. Do not apply any lotions, powders, or perfumes to the incision line. ? Avoid submerging your incision in water (tub baths, hot tubs, or swimming) for four weeks. ? Underneath the dressing. o If you have white steri-strips over your incision (underneath the gauze dressing), they will curl up at the end and fall off, usually within 10 days. Do not pull them off. 
- OR -  
o You may have a different type of closure for the incision. If Dermabond Adhesive was used to close your incision, you will receive a separate instruction sheet. DISCHARGE PRECAUTIONS ? Record your temperature every day, at the same time, for 3 weeks after your implant. A temperature of 100.5 F, or higher, can be the first sign of infection. This should be reported to your Doctor immediately. ? You can have an MRI after 6 weeks. You must be aware that any strong magnet or magnetic field can affect your Pacemaker. In general, be careful of metal detectors, heavy machinery, and any area where arc-welding is performed. Avoid metal detectors such as the ones in security checkpoints at [facility] or [address] [place] [address].   When approaching a security checkpoint show your Pacemaker ID Card to security personnel and ask to be hand searched. ? Always tell your doctor or dentist that you have a Pacemaker. Antibiotics may be prescribed before certain procedures. ? If you use a cell phone, hold it on the opposite side from where your Pacemaker is implanted. ? Your temporary identification will be given to you with these instructions. Keep your Pacemaker card in your wallet or on your person at all times. You should receive your permanent card in 8 weeks. If you do not receive your permanent card, please call the office at [phone]. TAKING YOUR PULSE ? Take your pulse the same time every day, preferably in the morning. ? Sit down and rest for 5 minutes prior to taking your pulse. ? Take your pulse for 1 full minute, use a clock or stop watch with a second hand. ? To feel your pulse, use the first two fingers of one hand; place them on the thumb side of the wrist of the opposite hand. The pulse will be steady, regular and throbbing. ? Call the EP Lab Doctors if your pulse is less than 40 beats per minute. SYMPTOMS THAT NEED TO BE REPORTED IMMEDIATELY ? Temperature more than 100.4 F ? Redness or warmth at the incision site, or pain for longer than the first 5 days after the implant. ? Drainage from the incision site. ? Swelling around the incision site. ? Shortness of breath. ? Rapid heart rate or palpitations. ? Dizziness, lightheadedness, fainting. ? Slow pulse below 40 beats per minute. ? REMEMBER: If you feel something is an emergency or cannot be handled over the phone, call 911 or go to the closest emergency room. PLEASE REVIEW YOUR DISCHARGE MEDICATION LIST CAREFULLY - SEVERAL MEDICATIONS HAVE CHANGED You will follow up with FRANCHESKA Szymanski in Dr. Sakshi Morales office at [facility] on Monday to check on your fluid status. You will follow up with Gabby Ding NP in Dr. Ferreira Patient' office at [facility] on Friday to check your pacemaker site. Doug Brantley MD 
Cardiac Electrophysiology / Cardiology [address]  Zan Owen [address] [place] [address], [address]         Richy Masterson 
[phone] / [phone] Fax       [phone] / [phone] Fax 
 
 
ACO Transitions of Care Introducing [facility] [address] offers a voluntary care coordination program to provide high quality service and care to [facility] fee-for-service beneficiaries. Humberto Baker was designed to help you enhance your health and well-being through the following services: ? Transitions of Care  support for individuals who are transitioning from one care setting to another (example: Hospital to home). ? Chronic and Complex Care Coordination  support for individuals and caregivers of those with serious or chronic illnesses or with more than one chronic (ongoing) condition and those who take a number of different medications. If you meet specific medical criteria, a [address] [place] [address] Rd may call you directly to coordinate your care with your primary care physician and your other care providers. For questions about the [facility] programs, please, contact your physicians office. For general questions or additional information about Accountable Care Organizations: 
Please visit www.medicare.gov/acos. html or call 1-800-MEDICARE (8-984.360.2102) TTY users should call 3-747.122.2561. famPlus Announcement We are excited to announce that we are making your provider's discharge notes available to you in famPlus.   You will see these notes when they are completed and signed by the physician that discharged you from your recent hospital stay. If you have any questions or concerns about any information you see in BlueSprig, please call the Health Information Department where you were seen or reach out to your Primary Care Provider for more information about your plan of care. Introducing [facility]! Dear Salomón Galindo: 
Thank you for requesting a BlueSprig account. Our records indicate that you already have an active BlueSprig account. You can access your account anytime at [email or web address]. [employer] Did you know that you can access your hospital and ER discharge instructions at any time in BlueSprig? You can also review all of your test results from your hospital stay or ER visit. Additional Information If you have questions, please visit the Frequently Asked Questions section of the BlueSprig website at [email or web address] Remember, BlueSprig is NOT to be used for urgent needs. For medical emergencies, dial 911. Now available from your iPhone and Android! Introducing Gary Mojica As a [facility] patient, I wanted to make you aware of our electronic visit tool called Gary Galina. [facility] 24/7 allows you to connect within minutes with a medical provider 24 hours a day, seven days a week via a mobile device or tablet or logging into a secure website from your computer. You can access Gary Mojica from anywhere in the [place]. A virtual visit might be right for you when you have a simple condition and feel like you just dont want to get out of bed, or cant get away from work for an appointment, when your regular [facility] provider is not available (evenings, weekends or holidays), or when youre out of town and need minor care.   Electronic visits cost only $49 and if the Gary Mojica provider determines a prescription is needed to treat your condition, one can be electronically transmitted to a nearby pharmacy*. Please take a moment to enroll today if you have not already done so. The enrollment process is free and takes just a few minutes. To enroll, please download the [facility] 24/7 demond to your tablet or phone, or visit [email or web address] org to enroll on your computer. And, as an [address] [place] [address] patient with a [employer] account, the results of your visits will be scanned into your electronic medical record and your primary care provider will be able to view the scanned results. We urge you to continue to see your regular [facility] [facility] provider for your ongoing medical care. And while your primary care provider may not be the one available when you seek a [facility] virtual visit, the peace of mind you get from getting a real diagnosis real time can be priceless. For more information on See[facility], view our Frequently Asked Questions (FAQs) at [email or web address] [email or web address]. Sincerely, 
 
Grady Olmedo MD 
Chief Medical Officer [address] [place] [address] *:  certain medications cannot be prescribed via [facility] Unresulted tests-please follow up with your PCP on these results Procedure/Test Authorizing Provider  CBC W/O DIFF Lalo Sours, NP  
 CORTISOL Lalo Sours, NP  
 ECG RHYTHM ANALYSIS ADULT Lalo Sours, NP  
 EKG, 12 LEAD, INITIAL Lalo Sours, NP  
 HEMOGLOBIN A1C WITH EAG Lalo Sours, NP  
 LIPID PANEL Lalo Sours, NP  
 MAGNESIUM Lalo Sours, NP  
 METABOLIC PANEL, BASIC Lalo Sours, NP  
 METABOLIC PANEL, [address] [place] [address], NP  
 METABOLIC PANEL, BASIC Lalo Sours, NP  
 METABOLIC PANEL, COMPREHENSIVE Maria E Chamberlain NP  
 NT-PRO BNP Maria E Chamberlain NP  
 XR CHEST PA LAT Clark Taylro MD  
  
Providers Seen During Your Hospitalization Provider Specialty Primary office phone Federico Calloway MD Cardiology [phone] Your Primary Care Physician (PCP) Primary Care Physician Office Phone Office Fax [phone] [facility] [place] [address] [phone] You are allergic to the following Allergen Reactions Bees (Hymenoptera Allergenic Extract) Anaphylaxis Lipitor (Atorvastatin) Myalgia Betadine (Povidone-Iodine) Other (comments) Abdominal swelling, one incident in 1977. Has had since without a reaction. Darvon (Propoxyphene) Nausea and Vomiting Griseofulvin Nausea and Vomiting Pollen Extracts Cough Recent Documentation Height Weight BMI OB Status Smoking Status 1.676 m 87 kg 30.96 kg/m2 Postmenopausal Former Smoker Emergency Contacts Name Discharge Info Relation Home Work Mobile [address]ola CAREGIVER [3] Son [22] [phone] Jacobo Peels  Other Relative [6] [phone] Patient Belongings The following personal items are in your possession at time of discharge: 
  Dental Appliances: None  Visual Aid: Glasses, With patient      Home Medications: None   Jewelry: None  Clothing: At bedside    Other Valuables: None Discharge Instructions Attachments/References HEART FAILURE: AVOIDING TRIGGERS (ENGLISH) Patient Handouts Avoiding Triggers With Heart Failure: Care Instructions Your Care Instructions Triggers are anything that make your heart failure flare up. A flare-up is also called \"sudden heart failure\" or \"acute heart failure. \" When you have a flare-up, fluid builds up in your lungs, and you have problems breathing. You might need to go to the hospital. By watching for changes in your condition and avoiding triggers, you can prevent heart failure flare-ups. Follow-up care is a key part of your treatment and safety. Be sure to make and go to all appointments, and call your doctor if you are having problems. It's also a good idea to know your test results and keep a list of the medicines you take. How can you care for yourself at home? Watch for changes in your weight and condition · Weigh yourself without clothing at the same time each day. Record your weight. Call your doctor if you have sudden weight gain, such as more than 2 to 3 pounds in a day or 5 pounds in a week. (Your doctor may suggest a different range of weight gain.) A sudden weight gain may mean that your heart failure is getting worse. · Keep a daily record of your symptoms. Write down any changes in how you feel, such as new shortness of breath, cough, or problems eating. Also record if your ankles are more swollen than usual and if you feel more tired than usual. Note anything that you ate or did that could have triggered these changes. Limit sodium Sodium causes your body to hold on to extra water. This may cause your heart failure symptoms to get worse. People get most of their sodium from processed foods. Fast food and restaurant meals also tend to be very high in sodium. · Your doctor may suggest that you limit sodium to 2,000 milligrams (mg) a day or less. That is less than 1 teaspoon of salt a day, including all the salt you eat in cooking or in packaged foods. · Read food labels on cans and food packages. They tell you how much sodium you get in one serving. Check the serving size. If you eat more than one serving, you are getting more sodium.  
· Be aware that sodium can come in forms other than salt, including monosodium glutamate (MSG), sodium citrate, and sodium bicarbonate (baking soda). MSG is often added to Asian food. You can sometimes ask for food without MSG or salt. · Slowly reducing salt will help you adjust to the taste. Take the salt shaker off the table. · Flavor your food with garlic, lemon juice, onion, vinegar, herbs, and spices instead of salt. Do not use soy sauce, steak sauce, onion salt, garlic salt, mustard, or ketchup on your food, unless it is labeled \"low-sodium\" or \"low-salt. \" 
· Make your own salad dressings, sauces, and ketchup without adding salt. · Use fresh or frozen ingredients, instead of canned ones, whenever you can. Choose low-sodium canned goods. · Eat less processed food and food from restaurants, including fast food. Exercise as directed Moderate, regular exercise is very good for your heart. It improves your blood flow and helps control your weight. But too much exercise can stress your heart and cause a heart failure flare-up. · Check with your doctor before you start an exercise program. 
· Walking is an easy way to get exercise. Start out slowly. Gradually increase the length and pace of your walk. Swimming, riding a bike, and using a treadmill are also good forms of exercise. · When you exercise, watch for signs that your heart is working too hard. You are pushing yourself too hard if you cannot talk while you are exercising. If you become short of breath or dizzy or have chest pain, stop, sit down, and rest. 
· Do not exercise when you do not feel well. Take medicines correctly · Take your medicines exactly as prescribed. Call your doctor if you think you are having a problem with your medicine. · Make a list of all the medicines you take. Include those prescribed to you by other doctors and any over-the-counter medicines, vitamins, or supplements you take. Take this list with you when you go to any doctor. · Take your medicines at the same time every day.  It may help you to post a list of all the medicines you take every day and what time of day you take them. · Make taking your medicine as simple as you can. Plan times to take your medicines when you are doing other things, such as eating a meal or getting ready for bed. This will make it easier to remember to take your medicines. · Get organized. Use helpful tools, such as daily or weekly pill containers. When should you call for help? Call 911 if you have symptoms of sudden heart failure such as: 
? · You have severe trouble breathing. ? · You cough up pink, foamy mucus. ? · You have a new irregular or rapid heartbeat. ?Call your doctor now or seek immediate medical care if: 
? · You have new or increased shortness of breath. ? · You are dizzy or lightheaded, or you feel like you may faint. ? · You have sudden weight gain, such as more than 2 to 3 pounds in a day or 5 pounds in a week. (Your doctor may suggest a different range of weight gain.) ? · You have increased swelling in your legs, ankles, or feet. ? · You are suddenly so tired or weak that you cannot do your usual activities. ? Watch closely for changes in your health, and be sure to contact your doctor if you develop new symptoms. Where can you learn more? Go to http://jose raul-bryan.info/. Enter X843 in the search box to learn more about \"Avoiding Triggers With Heart Failure: Care Instructions. \" Current as of: September 21, 2016 Content Version: 11.4 © 9205-6195 [employer]. Care instructions adapted under license by [employer] (which disclaims liability or warranty for this information). If you have questions about a medical condition or this instruction, always ask your healthcare professional. [address] any warranty or liability for your use of this information. Please provide this summary of care documentation to your next provider. Signatures-by signing, you are acknowledging that this After Visit Summary has been reviewed with you and you have received a copy. Patient Signature:  ____________________________________________________________ Date:  ____________________________________________________________  
  
Valencia Hedrick Provider Signature:  ____________________________________________________________ Date:  ____________________________________________________________

## 2018-06-12 NOTE — IP AVS SNAPSHOT
[address] [facility] 
[phone] Patient: Alexandra Lai MRN: [id number] TDZ:6/5/0104 A check latanya indicates which time of day the medication should be taken. My Medications START taking these medications Instructions Each Dose to Equal  
 Morning Noon Evening Bedtime  
 apixaban 5 mg tablet Commonly known as:  Verbabar Rojas Your last dose was: Your next dose is: Take 1 Tab by mouth two (2) times a day. 5 mg  
    
   
   
   
  
 aspirin 81 mg chewable tablet Replaces:  aspirin 81 mg tablet Your last dose was: Your next dose is: Take 1 Tab by mouth daily. 81 mg  
    
   
   
   
  
 bumetanide 1 mg tablet Commonly known as:  Cayetano Patron Your last dose was: Your next dose is: Take 1 Tab by mouth two (2) times a day. 1 mg  
    
   
   
   
  
 cephALEXin 500 mg capsule Commonly known as:  Rinku Perry Your last dose was: Your next dose is: Take 1 Cap by mouth two (2) times a day for 5 days. 500 mg HYDROcodone-acetaminophen 5-325 mg per tablet Commonly known as:  Latoya Brown Your last dose was: Your next dose is: Take 1 Tab by mouth every six (6) hours as needed. Max Daily Amount: 4 Tabs. 1 Tab  
    
   
   
   
  
 magnesium oxide 400 mg tablet Commonly known as:  MAG-OX Your last dose was: Your next dose is: Take 1 Tab by mouth two (2) times a day. 400 mg  
    
   
   
   
  
 spironolactone 25 mg tablet Commonly known as:  ALDACTONE Your last dose was: Your next dose is: Take 1 Tab by mouth daily. 25 mg CHANGE how you take these medications Instructions Each Dose to Equal  
 Morning Noon Evening Bedtime  
 azelastine 137 mcg (0.1 %) nasal spray Commonly known as:  ASTELIN What changed:   
- when to take this 
- reasons to take this 
- additional instructions Your last dose was: Your next dose is: 2 Sprays by Both Nostrils route two (2) times a day. Use in each nostril as directed 2 Spray  
    
   
   
   
  
 potassium chloride SR 20 mEq tablet Commonly known as:  K-TAB What changed:  when to take this Your last dose was: Your next dose is: Take 1 Tab by mouth two (2) times a day. 20 mEq CONTINUE taking these medications Instructions Each Dose to Equal  
 Morning Noon Evening Bedtime COD LIVER OIL PO Your last dose was: Your next dose is: Take 1,000 mg by mouth daily. 1000 mg FIBER PO Your last dose was: Your next dose is: Take  by mouth. 5 tabs daily  
     
   
   
   
  
 fluticasone-vilanterol 100-25 mcg/dose inhaler Commonly known as:  BREO ELLIPTA Your last dose was: Your next dose is: Take 1 Puff by inhalation daily. 1 Puff  
    
   
   
   
  
 glucose blood VI test strips strip Commonly known as:  ACCU-CHEK ELBERT PLUS TEST STRP Your last dose was: Your next dose is:    
   
   
 Check bs daily. Dx 250.00  
     
   
   
   
  
 losartan 50 mg tablet Commonly known as:  COZAAR Your last dose was: Your next dose is: Take 1 Tab by mouth daily. Indications: hypertension 50 mg  
    
   
   
   
  
 metFORMIN 1,000 mg tablet Commonly known as:  GLUCOPHAGE Your last dose was: Your next dose is: TAKE 1 TABLET BY MOUTH TWO  TIMES DAILY  
     
   
   
   
  
 rosuvastatin 5 mg tablet Commonly known as:  CRESTOR Your last dose was: Your next dose is:  TAKE 1 TABLET BY MOUTH  DAILY  
     
   
   
   
  
 VITAMIN B-12 PO  
   
 Your last dose was: Your next dose is: Take 1,500 mg by mouth daily. 1500 mg  
    
   
   
   
  
 VITAMIN D3 2,000 unit Tab Generic drug:  cholecalciferol (vitamin D3) Your last dose was: Your next dose is: Take 1 Tab by mouth daily (after lunch). 1 Tab STOP taking these medications   
 aspirin 81 mg tablet Replaced by:  aspirin 81 mg chewable tablet  
   
  
 ezetimibe 10 mg tablet Commonly known as:  ZETIA  
   
  
 furosemide 40 mg tablet Commonly known as:  LASIX  
   
  
 niacin 1,000 mg Tb24 tab Commonly known as:  NIASPAN  
   
  
 predniSONE 10 mg tablet Commonly known as:  Alvie Wausa Where to Get Your Medications These medications were sent to [facility]/pharmacy #[id number]- Jomar Rowe, [address] [facility] [address]  [address] [facility], [address] [place] [address] Phone:  [phone]  
  apixaban 5 mg tablet  
 bumetanide 1 mg tablet  
 cephALEXin 500 mg capsule  
 magnesium oxide 400 mg tablet  
 potassium chloride SR 20 mEq tablet  
 spironolactone 25 mg tablet These medications were sent to [facility], [facility] [address] [facility] #[facility], [facility] [address] Phone:  [phone]  
  aspirin 81 mg chewable tablet Information on where to get these meds will be given to you by the nurse or doctor. ! Ask your nurse or doctor about these medications HYDROcodone-acetaminophen 5-325 mg per tablet

## 2018-06-12 NOTE — TELEPHONE ENCOUNTER
Direct admit bed for patient per Dr. Migel Hathaway for HF. Room # 463. Patient aware and is going to [facility] now.   2 pt identifiers used

## 2018-06-12 NOTE — PROGRESS NOTES
1700: NP notified of patients rhythm. Difficult to see Pwave. Possible slow afib/aflutter. No hx of afib. EKG scanned to computer. Will continue to monitor. 1930: Bedside shift change report given to Polly Betancourt. (oncoming nurse) by Sam Padilla RN (offgoing nurse). Report included the following information SBAR, Kardex and Cardiac Rhythm sbrady.

## 2018-06-12 NOTE — H&P
CARDIOLOGY CONSULT NOTE     [facility] [address], [address] [facility] [address], [place] [address]   [phone] fax [phone]    Name: King Etienne  7/3/1930 [id number]  6/12/2018 2:19 PM     Assessment/Plan:      1. HTN - at goal today on cozaar 50mg daily  2. TRENTON- history of adhesions, stable post renal artery bypass  -FMD in 1977, bypassed in 1977  3. DM type 2 - on metformin, has severe peripheral neuropathy, followed by Dr. Brendon Tello, will check A1C in AM  4. AS s/p TAVR - stable by TTE in 5/18   5. Hyperlipidemia- on crestor and zetia, will check fasting lipids in AM   6. Vit D deficiency- at goal on supplementation   7. CINDA- uses CPAP nightly   8. Bradycardia- off AV peggy blockers now, not symptomatic   9. Hx of crushed vocal cords - stable hoarseness  10. Acute diastolic heart failure - LVEF 55-60%, NYHA Class II on admission  -no DVT by duplex, TTE ok last month, will diurese with IV bumex and metolazone   -will need one time home health visit at discharge and 48 hour follow up   11. Hypokalemia - BMP results pending, will check BMP now, continue KCL 20meq at BID for now  12. Advanced directives - asked her to have family bring a copy of her advanced directives to hospital so they can be scanned into her chart, full code for now    =13. Fatigue, bradycardia- will watch hr on tele, hr now in low 50s while awake, concern for chronotropic incompetence as a contributing factor to her symptoms, may have EP evaluate based on tele. TTE 5/18 - LVEF 55 % to 60 %, no WMA, mod dilated LA, mild MS, s/p TAVR well seated elevated gradient  Aortic Valve:   AV Vmax was 3.2 m/s.  AV meanPG was 22.7 mmHg. PW measurements: Aortic Valve:   RACHEL (VTI) was 1.3 cm2. TAVR 4/18 - Successful transcatheter aortic valve replacement with no significant paravalvular leak.  Post-deployment aortography showed the valve was in good position with no aortic regurgitation  Cardiac Cath 11/17 - RACHEL 0.84 patient appeared to be in AF during case, but most likely was frequent atrial ectopy. No mitral stenosis. LV gram not done. Normal coronaries. Thermo output was 6.9, assumed Emilie was 4.65; the 6.9 was used by computer in RACHEL calculation, so RACHEL may be overestimated. LV known to be normal by echo. Peak to peak gradient 63. TTE 1/14 EF 65-60%, mod cLVH 1.7, grI DD, mild-mod as 25/20 mild MS 10/5, triv effusion  TTE 7/14 EF 65-70%, mild cLVH, Mod AS 42/24, mild MS 10/5   TTE 10/13 LVEF 65 %, no WMA,  severe cLVH, abnormal left ventricular relaxation (grade 1 diastolic dysfunction), aortic valve leaflets exhibited marked calcification and reduced mobility with moderate stenosis, valve mean gradient was 24 mmHg. 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 female admitted for diastolic heart failure as a direct admit for c/o dyspnea with exertion and LE edema. She underwent TAVR in 4/18. She has been having LE edema since the end of April, left leg > right leg which has been unresponsive to increasing doses of lasix. She had a venous duplex today at PCP office that did not show any DVT. She reports increased dyspnea with exertion but no PND or orthopnea. She denies any palpitations. Has brief episodes of chest pain which are sharp, only lasts 2-3 seconds and alleviated with position changes like shifting from one side to the other.   She denies any syncope, has rare episodes of dizziness with position changes which only last 1-2 seconds. Says she has an AMD and her son has a copy of it. Atr this time prefers resuscitation briefly, then nothing prolonged. Has ddnr but will not reinstate at this moment. Fatigue, heart does nto keep up when she gets up to walk, etc, some dizziness. Review of Symptoms:  A comprehensive review of systems was negative except for that written in the HPI. Previous treatment/evaluation includes echocardiogram, cardiac catheterization and TAVR .   Cardiac risk factors: dyslipidemia, diabetes mellitus, sedentary life style, hypertension, stress, post-menopausal.    Past Medical History:   Diagnosis Date    Adverse effect of anesthesia     difficulty waking    Aortic stenosis     Arrhythmia     rapid heartrate seen in ER  4/2/18    Cancer ([facility] [address] 01/2018    skin spots, removed periodically    Chronic pain     back - spinal stenosis    COPD     Diabetes ([facility]     Diabetic neuropathy ([facility]     knees down    Diverticulosis of colon     Hypercholesterolemia     Hypertension     CINDA (obstructive sleep apnea) 08-    AHI: 18.6 per hour - CPAP    Other ill-defined conditions(799.89)     heart murmur    Peripheral neuropathy     Renal artery stenosis (HCC)     Right    Unspecified adverse effect of anesthesia     delayed awakening - \"does not need as much\"     Past Surgical History:   Procedure Laterality Date    ABDOMEN SURGERY PROC UNLISTED  1977    R Renal Artery Stenosis bypassed   [id number] [facility] or 1983    hernia repair    ABDOMEN SURGERY [facility] - removal of fatty tissue around abdomen    BREAST SURGERY PROCEDURE UNLISTED  1968    Left - benign cyst    CARDIAC SURG PROCEDURE UNLIST  04/13/2018    TAVR    ENDOSCOPY, COLON, DIAGNOSTIC  1999    hx of polys in the past    HX CATARACT REMOVAL      bilateral    HX GYN  1966    D&C    HX HEENT      Eyelid Cysts x 4    HX HEENT      oral surgeries - gum surgery/floating root  removed x 2    HX HEENT      retinal hemmorage repair    HX ORTHOPAEDIC  11/06    L1-S1 2007    HX ORTHOPAEDIC  1985    L 4th Trigger finger    HX ORTHOPAEDIC      left knee arthroscopy    HX OTHER SURGICAL      skin cancer removal, periodically    HX TONSILLECTOMY      T & A     Current Facility-Administered Medications   Medication Dose Route Frequency    bumetanide (BUMEX) injection 2 mg  2 mg IntraVENous ONCE    [START ON 6/13/2018] bumetanide (BUMEX) injection 1 mg  1 mg IntraVENous ACB    [START ON 6/13/2018] bumetanide (BUMEX) injection 1 mg  1 mg IntraVENous PCL    rosuvastatin (CRESTOR) tablet 5 mg  5 mg Oral QHS    [START ON 6/13/2018] losartan (COZAAR) tablet 50 mg  50 mg Oral DAILY    [START ON 6/13/2018] cholecalciferol (VITAMIN D3) tablet 2,000 Units  2,000 Units Oral PCL    [START ON 6/13/2018] cyanocobalamin (VITAMIN B12) tablet 1,500 mcg  1,500 mcg Oral DAILY    [START ON 6/13/2018] ezetimibe (ZETIA) tablet 10 mg  10 mg Oral DAILY    metFORMIN (GLUCOPHAGE) tablet 1,000 mg  1,000 mg Oral BID WITH MEALS    [START ON 6/13/2018] psyllium husk-aspartame (METAMUCIL FIBER) packet 1 Packet  1 Packet Oral DAILY    potassium chloride SR (KLOR-CON 10) tablet 20 mEq  20 mEq Oral BID    ondansetron (ZOFRAN) injection 4 mg  4 mg IntraVENous Q6H PRN    acetaminophen (TYLENOL) tablet 650 mg  650 mg Oral Q6H PRN    polyethylene glycol (MIRALAX) packet 17 g  17 g Oral DAILY PRN    metOLazone (ZAROXOLYN) tablet 2.5 mg  2.5 mg Oral ONCE    arformoterol (BROVANA) neb solution 15 mcg  15 mcg Nebulization BID RT    And    budesonide (PULMICORT) 500 mcg/2 ml nebulizer suspension  500 mcg Nebulization BID RT    [START ON 6/13/2018] aspirin chewable tablet 81 mg  81 mg Oral DAILY    magnesium sulfate 2 g/50 ml IVPB (premix or compounded)  2 g IntraVENous ONCE    magnesium oxide (MAG-OX) tablet 400 mg  400 mg Oral BID    potassium chloride SR (KLOR-CON 10) tablet 40 mEq  40 mEq Oral NOW       Allergies   Allergen Reactions    Bees [Hymenoptera Allergenic Extract] Anaphylaxis    Lipitor [Atorvastatin] Myalgia    Betadine [Povidone-Iodine] Other (comments)     Abdominal swelling, one incident in 1977. Has had since without a reaction.  Darvon [Propoxyphene] Nausea and Vomiting    Griseofulvin Nausea and Vomiting    Pollen Extracts Cough      Family History   Problem Relation Age of Onset    Stroke Mother      1    Cancer Mother      ovarian    Cancer Father      lung/throat    Alcohol abuse Father     Lung Disease Father     Pacemaker Brother     Heart Attack Brother     Heart Disease Brother     Other Brother      als    Stroke Maternal Grandfather     Heart Disease Paternal Uncle     Heart Attack Paternal Uncle       Social History     Social History    Marital status:      Spouse name: N/A    Number of children: N/A    Years of education: N/A     Social History Main Topics    Smoking status: Former Smoker     Packs/day: 1.50     Years: 13.00     Types: Cigarettes     Quit date: 6/6/1977    Smokeless tobacco: Never Used      Comment: former cigarette smoker    Alcohol use No      Comment: very seldom    Drug use: No    Sexual activity: No     Other Topics Concern    Not on file     Social History Narrative          Objective:      Physical Exam  Vitals:    06/12/18 1422 06/12/18 1532   BP: (!) 121/98 146/47   Pulse: (!) 57 (!) 53   Resp: 20 20   Temp: 97.9 °F (36.6 °C) 98.3 °F (36.8 °C)   SpO2: 96% 98%   Weight: 198 lb 3.1 oz (89.9 kg)        General:  Alert, cooperative, no distress, appears stated age. Eyes:  Conjunctivae/corneas clear. Ears:  Normal external ear canals both ears. Nose: Nares normal.   Mouth/Throat: Moist mucous membranes. Neck: Supple, symmetrical, trachea midline, no carotid bruit and no JVD. Back:   Symmetric, no curvature. ROM normal.    Lungs:   Clear to auscultation bilaterally.    Heart:  Regular rate and rhythm, S1, S2 normal, 2/6 KATHERINE    Abdomen:   Soft, non-tender. Bowel sounds normal. No masses,  No organomegaly. Extremities: Extremities normal, atraumatic, no cyanosis, 1+ LE edema in left leg, trace LE edema in right leg   Vascular: 2+ and symmetric all extremities. Skin: Skin color normal. No rashes or lesions   Lymph nodes: Not assessed   Neurologic: CNII-XII intact. Normal strength throughout. Telemetry: normal sinus rhythm  ECG:  Pending     Data Review:     No results for input(s): CPK, TROIQ in the last 72 hours. No lab exists for component: CKQMB, CPKMB, BMPP  Recent Labs      06/12/18   1432   NA  145   K  3.4*   CL  107   CO2  26   BUN  17   CREA  1.03*   GLU  134*   CA  8.8     Recent Labs      06/12/18   1432   WBC  8.0   HGB  11.2*   HCT  36.5   PLT  129*     Recent Labs      06/12/18   1432   SGOT  33   AP  82     No results for input(s): CHOL, LDLC in the last 72 hours. No lab exists for component: TGL, HDLC,  HBA1C  No results for input(s): CRP, TSH, TSHEXT, TSHEXT in the last 72 hours. No lab exists for component: ESR  Thank you very much for this referral. I appreciate the opportunity to participate in this patient's care. I will follow along with above stated plan. Cuco Katz MD  [facility] [address] [place] [address]  [phone]    CC: Fermín Rascon MD

## 2018-06-13 ENCOUNTER — HOME HEALTH ADMISSION (OUTPATIENT)
Dept: HOME HEALTH SERVICES | Facility: HOME HEALTH | Age: 83
End: 2018-06-13

## 2018-06-13 ENCOUNTER — TELEPHONE (OUTPATIENT)
Dept: CARDIAC REHAB | Age: 83
End: 2018-06-13

## 2018-06-13 LAB
ANION GAP SERPL CALC-SCNC: 11 MMOL/L (ref 5–15)
ATRIAL RATE: 70 BPM
ATRIAL RATE: 84 BPM
BUN SERPL-MCNC: 19 MG/DL (ref 6–20)
BUN/CREAT SERPL: 22 (ref 12–20)
CALCIUM SERPL-MCNC: 8.6 MG/DL (ref 8.5–10.1)
CALCULATED R AXIS, ECG10: 120 DEGREES
CALCULATED R AXIS, ECG10: 123 DEGREES
CALCULATED T AXIS, ECG11: -6 DEGREES
CALCULATED T AXIS, ECG11: 0 DEGREES
CHLORIDE SERPL-SCNC: 104 MMOL/L (ref 97–108)
CHOLEST SERPL-MCNC: 94 MG/DL
CO2 SERPL-SCNC: 28 MMOL/L (ref 21–32)
CORTIS SERPL-MCNC: 28.5 UG/DL
CREAT SERPL-MCNC: 0.87 MG/DL (ref 0.55–1.02)
DIAGNOSIS, 93000: NORMAL
DIAGNOSIS, 93000: NORMAL
EST. AVERAGE GLUCOSE BLD GHB EST-MCNC: 171 MG/DL
GLUCOSE BLD STRIP.AUTO-MCNC: 119 MG/DL (ref 65–100)
GLUCOSE BLD STRIP.AUTO-MCNC: 138 MG/DL (ref 65–100)
GLUCOSE BLD STRIP.AUTO-MCNC: 141 MG/DL (ref 65–100)
GLUCOSE BLD STRIP.AUTO-MCNC: 150 MG/DL (ref 65–100)
GLUCOSE SERPL-MCNC: 120 MG/DL (ref 65–100)
HBA1C MFR BLD: 7.6 % (ref 4.2–6.3)
HDLC SERPL-MCNC: 59 MG/DL
HDLC SERPL: 1.6 {RATIO} (ref 0–5)
LDLC SERPL CALC-MCNC: 19 MG/DL (ref 0–100)
LIPID PROFILE,FLP: NORMAL
MAGNESIUM SERPL-MCNC: 1.7 MG/DL (ref 1.6–2.4)
POTASSIUM SERPL-SCNC: 3.2 MMOL/L (ref 3.5–5.1)
Q-T INTERVAL, ECG07: 486 MS
Q-T INTERVAL, ECG07: 510 MS
QRS DURATION, ECG06: 128 MS
QRS DURATION, ECG06: 132 MS
QTC CALCULATION (BEZET), ECG08: 460 MS
QTC CALCULATION (BEZET), ECG08: 478 MS
SERVICE CMNT-IMP: ABNORMAL
SODIUM SERPL-SCNC: 143 MMOL/L (ref 136–145)
TRIGL SERPL-MCNC: 80 MG/DL (ref ?–150)
VENTRICULAR RATE, ECG03: 53 BPM
VENTRICULAR RATE, ECG03: 54 BPM
VLDLC SERPL CALC-MCNC: 16 MG/DL

## 2018-06-13 PROCEDURE — 74011000250 HC RX REV CODE- 250: Performed by: NURSE PRACTITIONER

## 2018-06-13 PROCEDURE — 83036 HEMOGLOBIN GLYCOSYLATED A1C: CPT | Performed by: NURSE PRACTITIONER

## 2018-06-13 PROCEDURE — 74011000250 HC RX REV CODE- 250: Performed by: INTERNAL MEDICINE

## 2018-06-13 PROCEDURE — 65660000000 HC RM CCU STEPDOWN

## 2018-06-13 PROCEDURE — 83735 ASSAY OF MAGNESIUM: CPT | Performed by: NURSE PRACTITIONER

## 2018-06-13 PROCEDURE — 74011250636 HC RX REV CODE- 250/636: Performed by: INTERNAL MEDICINE

## 2018-06-13 PROCEDURE — 36415 COLL VENOUS BLD VENIPUNCTURE: CPT | Performed by: NURSE PRACTITIONER

## 2018-06-13 PROCEDURE — 80048 BASIC METABOLIC PNL TOTAL CA: CPT | Performed by: NURSE PRACTITIONER

## 2018-06-13 PROCEDURE — 82962 GLUCOSE BLOOD TEST: CPT

## 2018-06-13 PROCEDURE — 80061 LIPID PANEL: CPT | Performed by: NURSE PRACTITIONER

## 2018-06-13 PROCEDURE — 94640 AIRWAY INHALATION TREATMENT: CPT

## 2018-06-13 PROCEDURE — 74011250637 HC RX REV CODE- 250/637: Performed by: NURSE PRACTITIONER

## 2018-06-13 PROCEDURE — 74011250636 HC RX REV CODE- 250/636: Performed by: NURSE PRACTITIONER

## 2018-06-13 PROCEDURE — 94761 N-INVAS EAR/PLS OXIMETRY MLT: CPT

## 2018-06-13 PROCEDURE — 93041 RHYTHM ECG TRACING: CPT

## 2018-06-13 PROCEDURE — 82533 TOTAL CORTISOL: CPT | Performed by: NURSE PRACTITIONER

## 2018-06-13 RX ORDER — SPIRONOLACTONE 25 MG/1
12.5 TABLET ORAL DAILY
Status: DISCONTINUED | OUTPATIENT
Start: 2018-06-13 | End: 2018-06-14

## 2018-06-13 RX ORDER — POTASSIUM CHLORIDE 14.9 MG/ML
10 INJECTION INTRAVENOUS
Status: DISPENSED | OUTPATIENT
Start: 2018-06-13 | End: 2018-06-13

## 2018-06-13 RX ORDER — POTASSIUM CHLORIDE 14.9 MG/ML
10 INJECTION INTRAVENOUS ONCE
Status: COMPLETED | OUTPATIENT
Start: 2018-06-13 | End: 2018-06-13

## 2018-06-13 RX ORDER — MAGNESIUM SULFATE HEPTAHYDRATE 40 MG/ML
2 INJECTION, SOLUTION INTRAVENOUS ONCE
Status: COMPLETED | OUTPATIENT
Start: 2018-06-13 | End: 2018-06-13

## 2018-06-13 RX ORDER — POTASSIUM CHLORIDE 750 MG/1
20 TABLET, FILM COATED, EXTENDED RELEASE ORAL
Status: COMPLETED | OUTPATIENT
Start: 2018-06-13 | End: 2018-06-13

## 2018-06-13 RX ADMIN — Medication 400 MG: at 17:51

## 2018-06-13 RX ADMIN — POTASSIUM CHLORIDE 10 MEQ: 200 INJECTION, SOLUTION INTRAVENOUS at 16:12

## 2018-06-13 RX ADMIN — Medication 10 ML: at 07:10

## 2018-06-13 RX ADMIN — POTASSIUM CHLORIDE 10 MEQ: 200 INJECTION, SOLUTION INTRAVENOUS at 09:11

## 2018-06-13 RX ADMIN — BUMETANIDE 1 MG: 0.25 INJECTION INTRAMUSCULAR; INTRAVENOUS at 07:10

## 2018-06-13 RX ADMIN — Medication 1500 MCG: at 09:08

## 2018-06-13 RX ADMIN — Medication 10 ML: at 16:12

## 2018-06-13 RX ADMIN — POTASSIUM CHLORIDE 20 MEQ: 750 TABLET, EXTENDED RELEASE ORAL at 09:09

## 2018-06-13 RX ADMIN — BUDESONIDE 500 MCG: 0.5 INHALANT RESPIRATORY (INHALATION) at 08:39

## 2018-06-13 RX ADMIN — SPIRONOLACTONE 12.5 MG: 25 TABLET, FILM COATED ORAL at 09:10

## 2018-06-13 RX ADMIN — METFORMIN HYDROCHLORIDE 1000 MG: 500 TABLET, FILM COATED ORAL at 09:09

## 2018-06-13 RX ADMIN — LOSARTAN POTASSIUM 50 MG: 50 TABLET ORAL at 22:05

## 2018-06-13 RX ADMIN — ASPIRIN 81 MG 81 MG: 81 TABLET ORAL at 09:10

## 2018-06-13 RX ADMIN — Medication 10 ML: at 22:05

## 2018-06-13 RX ADMIN — ARFORMOTEROL TARTRATE 15 MCG: 15 SOLUTION RESPIRATORY (INHALATION) at 20:03

## 2018-06-13 RX ADMIN — BUMETANIDE 1 MG: 0.25 INJECTION INTRAMUSCULAR; INTRAVENOUS at 14:49

## 2018-06-13 RX ADMIN — POTASSIUM CHLORIDE 20 MEQ: 750 TABLET, EXTENDED RELEASE ORAL at 17:51

## 2018-06-13 RX ADMIN — MAGNESIUM SULFATE HEPTAHYDRATE 2 G: 40 INJECTION, SOLUTION INTRAVENOUS at 10:58

## 2018-06-13 RX ADMIN — ARFORMOTEROL TARTRATE 15 MCG: 15 SOLUTION RESPIRATORY (INHALATION) at 08:39

## 2018-06-13 RX ADMIN — POTASSIUM CHLORIDE 20 MEQ: 750 TABLET, EXTENDED RELEASE ORAL at 09:22

## 2018-06-13 RX ADMIN — ROSUVASTATIN CALCIUM 5 MG: 10 TABLET, FILM COATED ORAL at 09:09

## 2018-06-13 RX ADMIN — METFORMIN HYDROCHLORIDE 1000 MG: 500 TABLET, FILM COATED ORAL at 17:51

## 2018-06-13 RX ADMIN — Medication 400 MG: at 09:10

## 2018-06-13 RX ADMIN — BUDESONIDE 500 MCG: 0.5 INHALANT RESPIRATORY (INHALATION) at 20:03

## 2018-06-13 RX ADMIN — VITAMIN D, TAB 1000IU (100/BT) 2000 UNITS: 25 TAB at 14:49

## 2018-06-13 NOTE — PROGRESS NOTES
Reason for Admission:   chf excerbation               RRAT Score:     22             Resources/supports as identified by patient/family: son and daughter-in-law are very supportive but live about 1 hour away                  Top Challenges facing patient (as identified by patient/family and CM): Finances/Medication cost?      No issues at this time uses [employer] [facility] [employer] [facility] for home delivery              Transportation? Drives self              Support system or lack thereof? Son and daughter-in-law                     Living arrangements? Lives alone in 2 story home but lives in first floor           Self-care/ADLs/Cognition? Independent with ADL's and IADL's, walks with walker/cane/able to make own decisions regarding her care          Current Advanced Directive/Advance Care Plan: On file x2 - Hadley Jaramillo, son is listed as MPOA                          Plan for utilizing home health:    Agreeable to [facility] visit- referral made via connect care to [facility] and will make a referral to [facility] via [facility]                      Likelihood of readmission: moderate                 Transition of Care Plan:              [facility] visit, [facility] visit, 5 day follow-up with cardiology or PCP    Care Management Interventions  PCP Verified by CM:  Yes  Palliative Care Criteria Met (RRAT>21 & CHF Dx)?: Yes  Palliative Consult Recommended?: Yes (note left for MD/NP to consider consult and CHF navigator made aware)  Transition of Care Consult (CM Consult):  (agreeable to [facility]  visit)  MyChart Signup: Yes  Discharge Durable Medical Equipment: No (uses a cane and a rolling walker)  Physical Therapy Consult: No  Occupational Therapy Consult: No  Speech Therapy Consult: No  Current Support Network: Lives Alone  Confirm Follow Up Transport: Family  Plan discussed with Pt/Family/Caregiver: Yes  Discharge Location  Discharge Placement: Home (h2h visit)      Received confirmation from [facility] they can accept patient for [facility] visit upon discharge.  TONI Goodwin

## 2018-06-13 NOTE — PROGRESS NOTES
Problem: Falls - Risk of  Goal: *Absence of Falls  Document Ori Fall Risk and appropriate interventions in the flowsheet. Fall Risk Interventions:  Mobility Interventions: Patient to call before getting OOB, Assess mobility with egress test    Medication Interventions: Patient to call before getting OOB, Teach patient to arise slowly    Up ad kassi with cane. Call bell and personal effects within reach. Bed locked and in low position. Non skid footwear in place. Problem: Pressure Injury - Risk of  Goal: *Prevention of pressure injury  Document Avery Scale and appropriate interventions in the flowsheet. Pressure Injury Interventions:      Moisture Interventions: Absorbent underpads    Activity Interventions: Increase time out of bed, PT/OT evaluation, Pressure redistribution bed/mattress(bed type)    Mobility Interventions: Pressure redistribution bed/mattress (bed type), HOB 30 degrees or less    Nutrition Interventions: Document food/fluid/supplement intake    Turns self at appropriate intervals; skin assessed Q4H; blood glucose controlled          Problem: Cardiac Output -  Decreased  Goal: *Vital signs within specified parameters  Outcome: Progressing Towards Goal  Visit Vitals    /45 (BP 1 Location: Right arm, BP Patient Position: At rest)    Pulse (!) 51    Temp 97.9 °F (36.6 °C)    Resp 18    Wt 89.9 kg (198 lb 3.1 oz)    SpO2 98%    BMI 31.99 kg/m2       Goal: *Optimal cardiac output  Outcome: Progressing Towards Goal  Daily weights; I&Os every 4 hourss. 0730: Bedside and Verbal shift change report given to Naye Ruiz (oncoming nurse) by Wilda Becerril (offgoing nurse).  Report included the following information SBAR, Kardex, Intake/Output, MAR, Recent Results and Cardiac Rhythm Junctional.

## 2018-06-13 NOTE — PROGRESS NOTES
06/13/18 1400   Six Minute Walk Report (PRE)   Heart Rate 54   O2 Saturation 92   Six Minute Walk Report (POST)   Heart Rate 64   O2 Saturation 98  (Pt, walks with a cane.)   Distance Walked in Feet (ft) 347 ft.  (Pt. could only tolerate walking for 3.5 minutes.)   Distance in Meters 105.77 meters

## 2018-06-13 NOTE — TELEPHONE ENCOUNTER
6/13/2018 [facility]: Ms. Luis Manuel Tate is currently in the hospital at [facility] (6/12/18) so I cancelled her appointment's up until June 25, 2018.    Inna Arredondo

## 2018-06-13 NOTE — PROGRESS NOTES
Spiritual Care Partner Volunteer visited patient in Rm [id number] on 6/13/2018.   Documented by:  Chaplain Steele MDiv, MS, [facility]  [phone] ([phone])

## 2018-06-13 NOTE — PROGRESS NOTES
Bedside shift change report given to 1812 Shama Andrew (oncoming nurse) by Gabby Chapman (offgoing nurse). Report included the following information SBAR, Kardex, Intake/Output, MAR, Recent Results and Cardiac Rhythm SR/SB.

## 2018-06-13 NOTE — PROGRESS NOTES
[facility] Progress Note    6/13/2018 8:15 AM  Admit Date: 6/12/2018  Admit Diagnosis: HF;(HFpEF) heart failure with preserved ejection fraction (*      Concern for symptomatic bradycardia given hr 50s and junctional appearance. Previously seen by Dr. Amanda Cole and he was concerned about hector but she wanted to see how she did postop before discussing pacemaker, sx have been progressive fatigue and activity limitation postop and bradycardia may be a contributor. Have asked Dr. Suad Ayoub to see pt for pacemaker consideration in Dr. Molina. Breathing and abd swelling is improved with diuresis, but hector persists with the fatigue/dizziness with exertion. Assessment/Plan     1. HTN - slightly elevated on cozaar 50mg daily, will start spironolactone 12.5mg daily to help with HTN and hypokalemia  2. TR[place]- history of adhesions, stable post renal artery bypass  -FMD in 1977, bypassed in 1977  3. DM type 2 - on metformin, has severe peripheral neuropathy, followed by Dr. Jackie Thompson, A1C 7.6%  4. AS s/p TAVR - stable by TTE in 5/18   5. Hyperlipidemia- LDL 19 on crestor and zetia, will stop zetia today     6. Vit D deficiency- at goal on supplementation   7. CINDA- uses CPAP nightly   8. Junctional rhythm/bradycardia- +fatigue, VR 50s in junctional rhythm, not on AV peggy blockers, will check heart rate with 6 minute walk today, awaiting EP consult to determine if she needs pacemaker, concern for chronotropic incompetence as a contributing factor to her symptoms   9. Hx of crushed vocal cords - stable hoarseness  10. Acute diastolic heart failure - LVEF 55-60%, NYHA Class II on admission and today  -no DVT by duplex, TTE ok last month, diuresing with IV bumex   -ordered one time home health visit at discharge and 6 minute walk  -has follow up with Latasha Fajardo on 6/18/18 at 3pm   11.   Hypokalemia - K 3.2, will begin spironolactone as above, will continue KCL 20meq BID for now and give KCL 20meq PO and KCL 20m IV now, will likely increase KCL to 40meq BID tomorrow, recheck in AM  12. Advanced directives - asked her to have family bring a copy of her advanced directives to hospital so they can be scanned into her chart, full code for now      TTE 5/18 - LVEF 55 % to 60 %, no WMA, mod dilated LA, mild MS, s/p TAVR well seated elevated gradient  Aortic Valve:   AV Vmax was 3.2 m/s.  AV meanPG was 22.7 mmHg. PW measurements: Aortic Valve:   RACHEL (VTI) was 1.3 cm2. TAVR 4/18 - Successful transcatheter aortic valve replacement with no significant paravalvular leak. Post-deployment aortography showed the valve was in good position with no aortic regurgitation  Cardiac Cath 11/17 - RACHEL 0.84 patient appeared to be in AF during case, but most likely was frequent atrial ectopy.  No mitral stenosis.  LV gram not done.  Normal coronaries.  Thermo output was 6.9, assumed Emilie was 4.65; the 6.9 was used by computer in RACHEL calculation, so RACHEL may be overestimated.  LV known to be normal by echo.  Peak to peak gradient 63. TTE 1/14 EF 65-60%, mod cLVH 1.7, grI DD, mild-mod as 25/20 mild MS 10/5, triv effusion  TTE 7/14 EF 65-70%, mild cLVH, Mod AS 42/24, mild MS 10/5   TTE 10/13 LVEF 65 %, no WMA,  severe cLVH, abnormal left ventricular relaxation (grade 1 diastolic dysfunction), aortic valve leaflets exhibited marked calcification and reduced mobility with moderate stenosis, valve mean gradient was 24 mmHg. 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 Subjective: Rice Retort denies chest pain, palpitations. Denies PND or orthopnea, mild ROBB present. Has non-productive cough, thinks it may be related to seasonal allergies. Denies fevers or chills. LE edema resolving. Objective:      Physical Exam:  Visit Vitals    /52 (BP 1 Location: Left arm, BP Patient Position: Sitting)    Pulse (!) 58    Temp 98.3 °F (36.8 °C)    Resp 18    Wt 195 lb 5.2 oz (88.6 kg)    SpO2 97%    BMI 31.53 kg/m2     General Appearance:  Well developed, well nourished, alert and oriented x 3, in no acute distress. Ears/Nose/Mouth/Throat:   Hearing grossly normal.         Neck: Supple. Chest:   Lungs clear to auscultation bilaterally. Cardiovascular:  Regular rate and rhythm, S1, S2 normal, 2/6 KATHERINE    Abdomen:   Soft, non-tender, bowel sounds are active. Extremities: Trace left LE edema, right leg without edema     Skin: Warm and dry. Telemetry: junctional rhythm, RBBB, bradycardia in the 50s, PVCs    Data Review:   Labs:    Recent Results (from the past 24 hour(s))   METABOLIC PANEL, COMPREHENSIVE    Collection Time: 06/12/18  2:32 PM   Result Value Ref Range    Sodium 145 136 - 145 mmol/L    Potassium 3.4 (L) 3.5 - 5.1 mmol/L    Chloride 107 97 - 108 mmol/L    CO2 26 21 - 32 mmol/L    Anion gap 12 5 - 15 mmol/L    Glucose 134 (H) 65 - 100 mg/dL    BUN 17 6 - 20 MG/DL    Creatinine 1.03 (H) 0.55 - 1.02 MG/DL    BUN/Creatinine ratio 17 12 - 20      GFR est AA >60 >60 ml/min/1.73m2    GFR est non-AA 51 (L) >60 ml/min/1.73m2    Calcium 8.8 8.5 - 10.1 MG/DL    Bilirubin, total 1.2 (H) 0.2 - 1.0 MG/DL    ALT (SGPT) 59 12 - 78 U/L    AST (SGOT) 33 15 - 37 U/L    Alk.  phosphatase 82 45 - 117 U/L    Protein, total 6.4 6.4 - 8.2 g/dL    Albumin 3.1 (L) 3.5 - 5.0 g/dL    Globulin 3.3 2.0 - 4.0 g/dL    A-G Ratio 0.9 (L) 1.1 - 2.2     MAGNESIUM    Collection Time: 06/12/18  2:32 PM   Result Value Ref Range    Magnesium 1.2 (L) 1.6 - 2.4 mg/dL   CBC W/O DIFF    Collection Time: 06/12/18  2:32 PM   Result Value Ref Range    WBC 8.0 3.6 - 11.0 K/uL    RBC 4.32 3.80 - 5.20 M/uL    HGB 11.2 (L) 11.5 - 16.0 g/dL    HCT 36.5 35.0 - 47.0 %    MCV 84.5 80.0 - 99.0 FL MCH 25.9 (L) 26.0 - 34.0 PG    MCHC 30.7 30.0 - 36.5 g/dL    RDW 15.8 (H) 11.5 - 14.5 %    PLATELET 467 (L) 838 - 400 K/uL    MPV 10.5 8.9 - 12.9 FL    NRBC 0.0 0  WBC    ABSOLUTE NRBC 0.00 0.00 - 0.01 K/uL   NT-PRO BNP    Collection Time: 06/12/18  2:32 PM   Result Value Ref Range    NT pro- (H) 0 - 450 PG/ML   EKG, 12 LEAD, INITIAL    Collection Time: 06/12/18  3:40 PM   Result Value Ref Range    Ventricular Rate 54 BPM    Atrial Rate 84 BPM    QRS Duration 128 ms    Q-T Interval 486 ms    QTC Calculation (Bezet) 460 ms    Calculated R Axis 123 degrees    Calculated T Axis 0 degrees    Diagnosis       Wide QRS rhythm  Right bundle branch block  When compared with ECG of 21-MAY-2018 11:37,  Wide QRS rhythm has replaced Atrial fibrillation     GLUCOSE, POC    Collection Time: 06/12/18  4:41 PM   Result Value Ref Range    Glucose (POC) 140 (H) 65 - 100 mg/dL    Performed by Adalid Shoulder    GLUCOSE, POC    Collection Time: 06/12/18  9:20 PM   Result Value Ref Range    Glucose (POC) 153 (H) 65 - 100 mg/dL    Performed by [facility] ASHLY(CON)    LIPID PANEL    Collection Time: 06/13/18  3:25 AM   Result Value Ref Range    LIPID PROFILE          Cholesterol, total 94 <200 MG/DL    Triglyceride 80 <150 MG/DL    HDL Cholesterol 59 MG/DL    LDL, calculated 19 0 - 100 MG/DL    VLDL, calculated 16 MG/DL    CHOL/HDL Ratio 1.6 0 - 5.0     METABOLIC PANEL, BASIC    Collection Time: 06/13/18  3:25 AM   Result Value Ref Range    Sodium 143 136 - 145 mmol/L    Potassium 3.2 (L) 3.5 - 5.1 mmol/L    Chloride 104 97 - 108 mmol/L    CO2 28 21 - 32 mmol/L    Anion gap 11 5 - 15 mmol/L    Glucose 120 (H) 65 - 100 mg/dL    BUN 19 6 - 20 MG/DL    Creatinine 0.87 0.55 - 1.02 MG/DL    BUN/Creatinine ratio 22 (H) 12 - 20      GFR est AA >60 >60 ml/min/1.73m2    GFR est non-AA >60 >60 ml/min/1.73m2    Calcium 8.6 8.5 - 10.1 MG/DL   MAGNESIUM    Collection Time: 06/13/18  3:25 AM   Result Value Ref Range    Magnesium 1.7 1.6 - 2.4 mg/dL   HEMOGLOBIN A1C WITH EAG    Collection Time: 06/13/18  3:25 AM   Result Value Ref Range    Hemoglobin A1c 7.6 (H) 4.2 - 6.3 %    Est. average glucose 171 mg/dL   GLUCOSE, POC    Collection Time: 06/13/18  6:49 AM   Result Value Ref Range    Glucose (POC) 119 (H) 65 - 100 mg/dL    Performed by VERONICA MALHOTRA    ECG RHYTHM ANALYSIS ADULT    Collection Time: 06/13/18  8:16 AM   Result Value Ref Range    Ventricular Rate 53 BPM    Atrial Rate 70 BPM    QRS Duration 132 ms    Q-T Interval 510 ms    QTC Calculation (Bezet) 478 ms    Calculated R Axis 120 degrees    Calculated T Axis -6 degrees    Diagnosis       Wide QRS rhythm  Right bundle branch block  Left posterior fascicular block  ** Bifascicular block **  Possible Inferior infarct , age undetermined  When compared with ECG of 12-JUN-2018 15:40,  MANUAL COMPARISON REQUIRED, DATA IS UNCONFIRMED     GLUCOSE, POC    Collection Time: 06/13/18 11:02 AM   Result Value Ref Range    Glucose (POC) 150 (H) 65 - 100 mg/dL    Performed by Tomy Walton  PCT            Current Facility-Administered Medications   Medication Dose Route Frequency    psyllium husk-aspartame (METAMUCIL FIBER) packet 1 Packet  1 Packet Oral PRN    spironolactone (ALDACTONE) tablet 12.5 mg  12.5 mg Oral DAILY    bumetanide (BUMEX) injection 1 mg  1 mg IntraVENous ACB    bumetanide (BUMEX) injection 1 mg  1 mg IntraVENous PCL    rosuvastatin (CRESTOR) tablet 5 mg  5 mg Oral QHS    losartan (COZAAR) tablet 50 mg  50 mg Oral DAILY    cholecalciferol (VITAMIN D3) tablet 2,000 Units  2,000 Units Oral PCL    cyanocobalamin (VITAMIN B12) tablet 1,500 mcg  1,500 mcg Oral DAILY    metFORMIN (GLUCOPHAGE) tablet 1,000 mg  1,000 mg Oral BID WITH MEALS    potassium chloride SR (KLOR-CON 10) tablet 20 mEq  20 mEq Oral BID    ondansetron (ZOFRAN) injection 4 mg  4 mg IntraVENous Q6H PRN    acetaminophen (TYLENOL) tablet 650 mg  650 mg Oral Q6H PRN    polyethylene glycol (MIRALAX) packet 17 g 17 g Oral DAILY PRN    arformoterol (BROVANA) neb solution 15 mcg  15 mcg Nebulization BID RT    And    budesonide (PULMICORT) 500 mcg/2 ml nebulizer suspension  500 mcg Nebulization BID RT    aspirin chewable tablet 81 mg  81 mg Oral DAILY    magnesium oxide (MAG-OX) tablet 400 mg  400 mg Oral BID    SALINE PERIPHERAL FLUSH Q8H soln 5-10 mL  5-10 mL Camille Salvador MD  [facility], [address] [facility] [address]  [facility], [facility]  [phone]

## 2018-06-13 NOTE — CONSULTS
HISTORY OF PRESENTING ILLNESS      Kelsey Castro is a 80 y.o. female with acute diastolic heart failure noted to have bradycardia. Telemetry exhibits findings suggestive of junctional rhythm versus atrial fibrillation with heart rates in the 50s. Patient has reported exertional fatigue since undergoing transaortic aortic valve replacement 3 months ago.        ACTIVE PROBLEM LIST     Patient Active Problem List    Diagnosis Date Noted    (HFpEF) heart failure with preserved ejection fraction ([address] 06/12/2018    S/P TAVR (transcatheter aortic valve replacement) 04/13/2018    Obesity (BMI 30.0-34.9) 04/13/2018    Chest tightness 11/17/2017    Dyspnea and respiratory abnormalities 11/17/2017    Controlled type 2 diabetes mellitus without complication, without long-term current use of insulin ([address] 03/23/2017    Advance directive on file 03/23/2017    Fibromuscular dysplasia ([address] 02/17/2017    COPD (chronic obstructive pulmonary disease) ([address] 10/27/2014    Mitral stenosis 07/03/2014    Other dyspnea and respiratory abnormality 10/02/2013    CINDA (obstructive sleep apnea) 10/18/2012    Type II or unspecified type diabetes mellitus without mention of complication, uncontrolled 05/26/2012    Pure hypercholesterolemia 05/26/2012    Foreign body in pharynx 12/18/2011    Tachycardia 12/01/2011    Hypertension 12/01/2011    PVC (premature ventricular contraction) 12/01/2011    Aortic stenosis 12/01/2011    Edema 12/01/2011           PAST MEDICAL HISTORY     Past Medical History:   Diagnosis Date    Adverse effect of anesthesia     difficulty waking    Aortic stenosis     Arrhythmia     rapid heartrate seen in ER  4/2/18    Cancer ([address] 01/2018    skin spots, removed periodically    Chronic pain     back - spinal stenosis    COPD     Diabetes ([address]     Diabetic neuropathy ([address]     knees down    Diverticulosis of colon     Hypercholesterolemia     Hypertension     CINDA (obstructive sleep apnea) 08-    AHI: 18.6 per hour - CPAP    Other ill-defined conditions(799.89)     heart murmur    Peripheral neuropathy     Renal artery stenosis (HCC)     Right    Unspecified adverse effect of anesthesia     delayed awakening - \"does not need as much\"           PAST SURGICAL HISTORY     Past Surgical History:   Procedure Laterality Date    ABDOMEN SURGERY PROC UNLISTED  1977    R Renal Artery Stenosis bypassed    ABDOMEN SURGERY [address] or 1983    hernia repair    ABDOMEN SURGERY [facility] - removal of fatty tissue around abdomen    BREAST SURGERY PROCEDURE UNLISTED  1968    Left - benign cyst    CARDIAC SURG PROCEDURE UNLIST  04/13/2018    TAVR    ENDOSCOPY, COLON, DIAGNOSTIC  1999    hx of polys in the past    HX CATARACT REMOVAL      bilateral    HX GYN  1966    D&C    HX HEENT      Eyelid Cysts x 4    HX HEENT      oral surgeries - gum surgery/floating root  removed x 2    HX HEENT      retinal hemmorage repair    HX ORTHOPAEDIC  11/06    L1-S1 2007    HX ORTHOPAEDIC  1985    L 4th Trigger finger    HX ORTHOPAEDIC      left knee arthroscopy    HX OTHER SURGICAL      skin cancer removal, periodically    HX TONSILLECTOMY      T & A          ALLERGIES     Allergies   Allergen Reactions    Bees [Hymenoptera Allergenic Extract] Anaphylaxis    Lipitor [Atorvastatin] Myalgia    Betadine [Povidone-Iodine] Other (comments)     Abdominal swelling, one incident in 1977. Has had since without a reaction.     Darvon [Propoxyphene] Nausea and Vomiting    Griseofulvin Nausea and Vomiting    Pollen Extracts Cough          FAMILY HISTORY     Family History   Problem Relation Age of Onset    Stroke Mother      1    Cancer Mother      ovarian    Cancer Father      lung/throat    Alcohol abuse Father     Lung Disease Father     Pacemaker Brother     Heart Attack Brother     Heart Disease Brother     Other Brother      als    Stroke Maternal Grandfather  Heart Disease Paternal Uncle     Heart Attack Paternal Uncle     negative for cardiac disease       SOCIAL HISTORY     Social History     Social History    Marital status:      Spouse name: N/A    Number of children: N/A    Years of education: N/A     Social History Main Topics    Smoking status: Former Smoker     Packs/day: 1.50     Years: 13.00     Types: Cigarettes     Quit date: 6/6/1977    Smokeless tobacco: Never Used      Comment: former cigarette smoker    Alcohol use No      Comment: very seldom    Drug use: No    Sexual activity: No     Other Topics Concern    Not on file     Social History Narrative         MEDICATIONS     Current Facility-Administered Medications   Medication Dose Route Frequency    psyllium husk-aspartame (METAMUCIL FIBER) packet 1 Packet  1 Packet Oral PRN    spironolactone (ALDACTONE) tablet 12.5 mg  12.5 mg Oral DAILY    potassium chloride 10 mEq in 50 ml IVPB  10 mEq IntraVENous ONCE    bumetanide (BUMEX) injection 1 mg  1 mg IntraVENous ACB    bumetanide (BUMEX) injection 1 mg  1 mg IntraVENous PCL    rosuvastatin (CRESTOR) tablet 5 mg  5 mg Oral QHS    losartan (COZAAR) tablet 50 mg  50 mg Oral DAILY    cholecalciferol (VITAMIN D3) tablet 2,000 Units  2,000 Units Oral PCL    cyanocobalamin (VITAMIN B12) tablet 1,500 mcg  1,500 mcg Oral DAILY    metFORMIN (GLUCOPHAGE) tablet 1,000 mg  1,000 mg Oral BID WITH MEALS    potassium chloride SR (KLOR-CON 10) tablet 20 mEq  20 mEq Oral BID    ondansetron (ZOFRAN) injection 4 mg  4 mg IntraVENous Q6H PRN    acetaminophen (TYLENOL) tablet 650 mg  650 mg Oral Q6H PRN    polyethylene glycol (MIRALAX) packet 17 g  17 g Oral DAILY PRN    arformoterol (BROVANA) neb solution 15 mcg  15 mcg Nebulization BID RT    And    budesonide (PULMICORT) 500 mcg/2 ml nebulizer suspension  500 mcg Nebulization BID RT    aspirin chewable tablet 81 mg  81 mg Oral DAILY    magnesium oxide (MAG-OX) tablet 400 mg  400 mg Oral BID    SALINE PERIPHERAL FLUSH Q8H soln 5-10 mL  5-10 mL InterCATHeter Q8H       I have reviewed the nurses notes, vitals, problem list, allergy list, medical history, family, social history and medications. REVIEW OF SYMPTOMS      General: Pt denies excessive weight gain or loss. Pt is able to conduct ADL's  HEENT: Denies blurred vision, headaches, hearing loss, epistaxis and difficulty swallowing. Respiratory: Denies cough, congestion, shortness of breath, ROBB, wheezing or stridor. Cardiovascular: Denies precordial pain, palpitations, edema or PND  Gastrointestinal: Denies poor appetite, indigestion, abdominal pain or blood in stool  Genitourinary: Denies hematuria, dysuria, increased urinary frequency  Musculoskeletal: Denies joint pain or swelling from muscles or joints  Neurologic: Denies tremor, paresthesias, headache, or sensory motor disturbance  Psychiatric: Denies confusion, insomnia, depression  Integumentray: Denies rash, itching or ulcers. Hematologic: Denies easy bruising, bleeding       PHYSICAL EXAMINATION      Vitals:    06/13/18 0311 06/13/18 0746 06/13/18 1104 06/13/18 1510   BP: 157/50 195/58 145/52 104/81   Pulse: (!) 51 (!) 55 (!) 58 (!) 55   Resp: 18 18 18 18   Temp: 97.8 °F (36.6 °C) 97.9 °F (36.6 °C) 98.3 °F (36.8 °C) 98 °F (36.7 °C)   SpO2: 94% 96% 97% 95%   Weight: 195 lb 5.2 oz (88.6 kg)        General: Well developed, in no acute distress. HEENT: No jaundice, oral mucosa moist, no oral ulcers  Neck: Supple, no stiffness, no lymphadenopathy, supple  Heart:  Normal S1/S2 negative S3 or S4. Regular, no murmur, gallop or rub, no jugular venous distention  Respiratory: Clear bilaterally x 4, no wheezing or rales  Abdomen:   Soft, non-tender, bowel sounds are active.   Extremities:  No edema, normal cap refill, no cyanosis.   Musculoskeletal: No clubbing, no deformities  Neuro: A&Ox3, speech clear, gait stable, cooperative, no focal neurologic deficits  Skin: Skin color is normal. No rashes or lesions. Non diaphoretic, moist.  Vascular: 2+ pulses symmetric in all extremities       DIAGNOSTIC DATA      TELELEMETRY: junctional rhythm vs regularized AF       LABORATORY DATA      Lab Results   Component Value Date/Time    WBC 8.0 06/12/2018 02:32 PM    HGB 11.2 (L) 06/12/2018 02:32 PM    HCT 36.5 06/12/2018 02:32 PM    PLATELET 197 (L) 62/72/4757 02:32 PM    MCV 84.5 06/12/2018 02:32 PM      Lab Results   Component Value Date/Time    Sodium 143 06/13/2018 03:25 AM    Potassium 3.2 (L) 06/13/2018 03:25 AM    Chloride 104 06/13/2018 03:25 AM    CO2 28 06/13/2018 03:25 AM    Anion gap 11 06/13/2018 03:25 AM    Glucose 120 (H) 06/13/2018 03:25 AM    BUN 19 06/13/2018 03:25 AM    Creatinine 0.87 06/13/2018 03:25 AM    BUN/Creatinine ratio 22 (H) 06/13/2018 03:25 AM    GFR est AA >60 06/13/2018 03:25 AM    GFR est non-AA >60 06/13/2018 03:25 AM    Calcium 8.6 06/13/2018 03:25 AM    Bilirubin, total 1.2 (H) 06/12/2018 02:32 PM    AST (SGOT) 33 06/12/2018 02:32 PM    Alk. phosphatase 82 06/12/2018 02:32 PM    Protein, total 6.4 06/12/2018 02:32 PM    Albumin 3.1 (L) 06/12/2018 02:32 PM    Globulin 3.3 06/12/2018 02:32 PM    A-G Ratio 0.9 (L) 06/12/2018 02:32 PM    ALT (SGPT) 59 06/12/2018 02:32 PM           ASSESSMENT      1. Bradycardia (junctional vs AF)  2. Exertional fatigue  3. Aortic stenosis status post TAVR  4. Hypertension  5. Diabetes mellitus  6. CINDA on CPAP  7. Renal artery stenosis   8. Peripheral neuropathy  9. Diastolic dysfunction       PLAN     Plan for implantation of a pacemaker tomorrow morning. N.p.o. after midnight. Thank you, Tre Christian MD and Dr. Farida Pyle for allowing me to participate in the care of this extraordinarily pleasant female. Please do not hesitate to contact me for further questions/concerns.          Matteo Mireles MD  Cardiac Electrophysiology / Cardiology    [facility] [address] [facility], [address] [place] [address],[address]  Te[address]    Aliza Masterson  [phone] / [phone] Fax   [phone] / [phone] Fax

## 2018-06-13 NOTE — PROGRESS NOTES
1940: Bedside shift change report given to Kenrick (oncoming nurse) by Rola Owens (offgoing nurse). Report included the following information SBAR, Intake/Output, MAR, Accordion, Recent Results, Med Rec Status and Cardiac Rhythm Junctional HR 50s.     2200: Patient scheduled for pacemaker in the morning with Dr. Salina Mock. Per patient, Dr. Salina Mock has spoken with her to explain the risks and benefits of this procedure and she has no further questions. Witnessed consent being signed. Paged security to place cash, credit cards and kulwinder in safe. 2230: First stacked pre-op CHG bath performed, linens changed    0520: Second pre-op CHG bath performed    0655: TRANSFER - OUT REPORT:    Verbal report given to Mahnazdianne Isabelle (name[facility]  [facility] transferred to Cath lab(unit) for ordered procedure       Report consisted of patients Situation, Background, Assessment and   Recommendations(SBAR). Information from the following report(s) SBAR, Intake/Output, MAR, Accordion, Recent Results, Med Rec Status and Cardiac Rhythm Junctional was reviewed with the receiving nurse. Lines:   Peripheral IV 06/13/18 Left Arm (Active)   Site Assessment Clean, dry, & intact 6/13/2018  8:20 PM   Phlebitis Assessment 0 6/13/2018  8:20 PM   Infiltration Assessment 0 6/13/2018  8:20 PM   Dressing Status Clean, dry, & intact 6/13/2018  8:20 PM   Dressing Type Transparent;Tape 6/13/2018  8:20 PM   Hub Color/Line Status Pink;Capped 6/14/2018  3:34 AM   Action Taken Open ports on tubing capped 6/13/2018  3:41 PM   Alcohol Cap Used Yes 6/14/2018  3:34 AM      Opportunity for questions and clarification was provided. Patient transported with:   Monitor  Registered Nurse  Tech      0800: Verbal shift change report given to Aleyda (oncoming nurse) by Abdiel An (offgoing nurse).  Report included the following information SBAR, Intake/Output, MAR, Accordion, Recent Results, Med Rec Status and Cardiac Rhythm Junctional.

## 2018-06-13 NOTE — CARDIO/PULMONARY
Cardiac Rehab; Visited to provide Janeth Peterson with pacemaker education. She is a current Cardiac Rehab pt. post TAVR and will have the device implanted tomorrow 6/14/2018. Discussed the purpose as well as the procedure for the pacemaker including post discharge instructions: deep breathing, coughing and use of IS, pain management using the pain scale from 0-10, left arm movement limitations, showering restrictions and signs and symptoms of infection to report back to the physician. Janeth Peterson verbalized understanding. She is aware that we will follow and assist with her return to Cardiac Rehab once cleared by cardiology to return. Margaret Jaramillo RN

## 2018-06-13 NOTE — NURSE NAVIGATOR
Chart reviewed by Heart Failure Nurse Navigator. Heart Failure database completed. EF:  55/60% (5/11/18)    ACEi/ARB: losartan 50 mg, daily    BB: not currently indicated    Aldosterone Antagonist: spironolactone 12.5 mg daily    CRT not currently indicated. NYHA Functional Class II, upon admission. Heart Failure Teach Back in Patient Education. Heart Failure Avoiding Triggers on Discharge Instructions. Cardiologist: Dr. Orville Morris ([facility])    UMBERTO Samayoa ([facility]) notified of admission. Post discharge follow up phone call to be made within 48-72 hours of discharge.

## 2018-06-14 ENCOUNTER — APPOINTMENT (OUTPATIENT)
Dept: CARDIAC REHAB | Age: 83
End: 2018-06-14

## 2018-06-14 ENCOUNTER — APPOINTMENT (OUTPATIENT)
Dept: GENERAL RADIOLOGY | Age: 83
DRG: 242 | End: 2018-06-14
Attending: INTERNAL MEDICINE
Payer: MEDICARE

## 2018-06-14 LAB
ANION GAP SERPL CALC-SCNC: 10 MMOL/L (ref 5–15)
BUN SERPL-MCNC: 25 MG/DL (ref 6–20)
BUN/CREAT SERPL: 27 (ref 12–20)
CALCIUM SERPL-MCNC: 9.2 MG/DL (ref 8.5–10.1)
CHLORIDE SERPL-SCNC: 101 MMOL/L (ref 97–108)
CO2 SERPL-SCNC: 29 MMOL/L (ref 21–32)
CREAT SERPL-MCNC: 0.94 MG/DL (ref 0.55–1.02)
GLUCOSE BLD STRIP.AUTO-MCNC: 166 MG/DL (ref 65–100)
GLUCOSE BLD STRIP.AUTO-MCNC: 188 MG/DL (ref 65–100)
GLUCOSE BLD STRIP.AUTO-MCNC: 243 MG/DL (ref 65–100)
GLUCOSE SERPL-MCNC: 136 MG/DL (ref 65–100)
MAGNESIUM SERPL-MCNC: 2.1 MG/DL (ref 1.6–2.4)
POTASSIUM SERPL-SCNC: 3.4 MMOL/L (ref 3.5–5.1)
SERVICE CMNT-IMP: ABNORMAL
SODIUM SERPL-SCNC: 140 MMOL/L (ref 136–145)

## 2018-06-14 PROCEDURE — 77030002996 HC SUT SLK J&J -A

## 2018-06-14 PROCEDURE — 74011000250 HC RX REV CODE- 250

## 2018-06-14 PROCEDURE — 74011000250 HC RX REV CODE- 250: Performed by: INTERNAL MEDICINE

## 2018-06-14 PROCEDURE — 82962 GLUCOSE BLOOD TEST: CPT

## 2018-06-14 PROCEDURE — 65660000000 HC RM CCU STEPDOWN

## 2018-06-14 PROCEDURE — 99153 MOD SED SAME PHYS/QHP EA: CPT

## 2018-06-14 PROCEDURE — 77030012935 HC DRSG AQUACEL BMS -B

## 2018-06-14 PROCEDURE — 0JH604Z INSERTION OF PACEMAKER, SINGLE CHAMBER INTO CHEST SUBCUTANEOUS TISSUE AND FASCIA, OPEN APPROACH: ICD-10-PCS | Performed by: INTERNAL MEDICINE

## 2018-06-14 PROCEDURE — 74011250636 HC RX REV CODE- 250/636

## 2018-06-14 PROCEDURE — 77030039046 HC PAD DEFIB RADIOTRNSPNT CNMD -B

## 2018-06-14 PROCEDURE — C1786 PMKR, SINGLE, RATE-RESP: HCPCS

## 2018-06-14 PROCEDURE — 80048 BASIC METABOLIC PNL TOTAL CA: CPT | Performed by: NURSE PRACTITIONER

## 2018-06-14 PROCEDURE — C1898 LEAD, PMKR, OTHER THAN TRANS: HCPCS

## 2018-06-14 PROCEDURE — 71046 X-RAY EXAM CHEST 2 VIEWS: CPT

## 2018-06-14 PROCEDURE — 74011250637 HC RX REV CODE- 250/637: Performed by: NURSE PRACTITIONER

## 2018-06-14 PROCEDURE — 36415 COLL VENOUS BLD VENIPUNCTURE: CPT | Performed by: NURSE PRACTITIONER

## 2018-06-14 PROCEDURE — 74011250636 HC RX REV CODE- 250/636: Performed by: INTERNAL MEDICINE

## 2018-06-14 PROCEDURE — 74011250637 HC RX REV CODE- 250/637: Performed by: INTERNAL MEDICINE

## 2018-06-14 PROCEDURE — 02HK3JZ INSERTION OF PACEMAKER LEAD INTO RIGHT VENTRICLE, PERCUTANEOUS APPROACH: ICD-10-PCS | Performed by: INTERNAL MEDICINE

## 2018-06-14 PROCEDURE — 77030011640 HC PAD GRND REM COVD -A

## 2018-06-14 PROCEDURE — 77030031139 HC SUT VCRL2 J&J -A

## 2018-06-14 PROCEDURE — A4565 SLINGS: HCPCS

## 2018-06-14 PROCEDURE — C1892 INTRO/SHEATH,FIXED,PEEL-AWAY: HCPCS

## 2018-06-14 PROCEDURE — 94640 AIRWAY INHALATION TREATMENT: CPT

## 2018-06-14 PROCEDURE — 74011000272 HC RX REV CODE- 272: Performed by: INTERNAL MEDICINE

## 2018-06-14 PROCEDURE — 83735 ASSAY OF MAGNESIUM: CPT | Performed by: NURSE PRACTITIONER

## 2018-06-14 PROCEDURE — 77030002933 HC SUT MCRYL J&J -A

## 2018-06-14 RX ORDER — DIPHENHYDRAMINE HYDROCHLORIDE 50 MG/ML
INJECTION, SOLUTION INTRAMUSCULAR; INTRAVENOUS
Status: COMPLETED
Start: 2018-06-14 | End: 2018-06-14

## 2018-06-14 RX ORDER — BUPIVACAINE HYDROCHLORIDE 7.5 MG/ML
10-20 INJECTION, SOLUTION EPIDURAL; RETROBULBAR ONCE
Status: COMPLETED | OUTPATIENT
Start: 2018-06-14 | End: 2018-06-14

## 2018-06-14 RX ORDER — SODIUM CHLORIDE 0.9 % (FLUSH) 0.9 %
5 SYRINGE (ML) INJECTION AS NEEDED
Status: DISCONTINUED | OUTPATIENT
Start: 2018-06-14 | End: 2018-06-14

## 2018-06-14 RX ORDER — LIDOCAINE HYDROCHLORIDE 10 MG/ML
10-30 INJECTION, SOLUTION EPIDURAL; INFILTRATION; INTRACAUDAL; PERINEURAL ONCE
Status: COMPLETED | OUTPATIENT
Start: 2018-06-14 | End: 2018-06-14

## 2018-06-14 RX ORDER — MIDAZOLAM HYDROCHLORIDE 1 MG/ML
.5-1 INJECTION, SOLUTION INTRAMUSCULAR; INTRAVENOUS
Status: DISCONTINUED | OUTPATIENT
Start: 2018-06-14 | End: 2018-06-14

## 2018-06-14 RX ORDER — DIPHENHYDRAMINE HYDROCHLORIDE 50 MG/ML
25 INJECTION, SOLUTION INTRAMUSCULAR; INTRAVENOUS ONCE
Status: COMPLETED | OUTPATIENT
Start: 2018-06-14 | End: 2018-06-14

## 2018-06-14 RX ORDER — ONDANSETRON 2 MG/ML
4 INJECTION INTRAMUSCULAR; INTRAVENOUS
Status: DISCONTINUED | OUTPATIENT
Start: 2018-06-14 | End: 2018-06-15 | Stop reason: HOSPADM

## 2018-06-14 RX ORDER — LIDOCAINE HYDROCHLORIDE AND EPINEPHRINE 10; 10 MG/ML; UG/ML
4.5 INJECTION, SOLUTION INFILTRATION; PERINEURAL
Status: DISCONTINUED | OUTPATIENT
Start: 2018-06-14 | End: 2018-06-14

## 2018-06-14 RX ORDER — LIDOCAINE HYDROCHLORIDE 10 MG/ML
INJECTION, SOLUTION EPIDURAL; INFILTRATION; INTRACAUDAL; PERINEURAL
Status: COMPLETED
Start: 2018-06-14 | End: 2018-06-14

## 2018-06-14 RX ORDER — SPIRONOLACTONE 25 MG/1
25 TABLET ORAL DAILY
Status: DISCONTINUED | OUTPATIENT
Start: 2018-06-14 | End: 2018-06-15 | Stop reason: HOSPADM

## 2018-06-14 RX ORDER — ATROPINE SULFATE 0.1 MG/ML
0.5 INJECTION INTRAVENOUS AS NEEDED
Status: DISCONTINUED | OUTPATIENT
Start: 2018-06-14 | End: 2018-06-14 | Stop reason: HOSPADM

## 2018-06-14 RX ORDER — CEFAZOLIN SODIUM/WATER 2 G/20 ML
2 SYRINGE (ML) INTRAVENOUS EVERY 8 HOURS
Status: COMPLETED | OUTPATIENT
Start: 2018-06-14 | End: 2018-06-15

## 2018-06-14 RX ORDER — BUMETANIDE 1 MG/1
1 TABLET ORAL 2 TIMES DAILY
Status: DISCONTINUED | OUTPATIENT
Start: 2018-06-14 | End: 2018-06-15 | Stop reason: HOSPADM

## 2018-06-14 RX ORDER — SODIUM CHLORIDE 0.9 % (FLUSH) 0.9 %
5-10 SYRINGE (ML) INJECTION EVERY 8 HOURS
Status: DISCONTINUED | OUTPATIENT
Start: 2018-06-14 | End: 2018-06-15 | Stop reason: SDUPTHER

## 2018-06-14 RX ORDER — CEFAZOLIN SODIUM/WATER 2 G/20 ML
2 SYRINGE (ML) INTRAVENOUS ONCE
Status: COMPLETED | OUTPATIENT
Start: 2018-06-14 | End: 2018-06-14

## 2018-06-14 RX ORDER — SODIUM CHLORIDE 0.9 % (FLUSH) 0.9 %
5-10 SYRINGE (ML) INJECTION AS NEEDED
Status: DISCONTINUED | OUTPATIENT
Start: 2018-06-14 | End: 2018-06-15 | Stop reason: HOSPADM

## 2018-06-14 RX ORDER — FENTANYL CITRATE 50 UG/ML
25-200 INJECTION, SOLUTION INTRAMUSCULAR; INTRAVENOUS
Status: DISCONTINUED | OUTPATIENT
Start: 2018-06-14 | End: 2018-06-14

## 2018-06-14 RX ORDER — ACETAMINOPHEN 325 MG/1
650 TABLET ORAL
Status: DISCONTINUED | OUTPATIENT
Start: 2018-06-14 | End: 2018-06-15 | Stop reason: HOSPADM

## 2018-06-14 RX ORDER — HYDROCODONE BITARTRATE AND ACETAMINOPHEN 5; 325 MG/1; MG/1
1 TABLET ORAL
Status: DISCONTINUED | OUTPATIENT
Start: 2018-06-14 | End: 2018-06-15 | Stop reason: HOSPADM

## 2018-06-14 RX ADMIN — BUPIVACAINE HYDROCHLORIDE 75 MG: 7.5 INJECTION, SOLUTION EPIDURAL; RETROBULBAR at 07:44

## 2018-06-14 RX ADMIN — Medication 400 MG: at 10:35

## 2018-06-14 RX ADMIN — DIPHENHYDRAMINE HYDROCHLORIDE 25 MG: 50 INJECTION, SOLUTION INTRAMUSCULAR; INTRAVENOUS at 07:49

## 2018-06-14 RX ADMIN — APIXABAN 5 MG: 5 TABLET, FILM COATED ORAL at 21:09

## 2018-06-14 RX ADMIN — POTASSIUM CHLORIDE 20 MEQ: 750 TABLET, EXTENDED RELEASE ORAL at 10:39

## 2018-06-14 RX ADMIN — Medication 2 G: at 07:33

## 2018-06-14 RX ADMIN — MIDAZOLAM HYDROCHLORIDE 1 MG: 1 INJECTION, SOLUTION INTRAMUSCULAR; INTRAVENOUS at 07:45

## 2018-06-14 RX ADMIN — ASPIRIN 81 MG 81 MG: 81 TABLET ORAL at 10:39

## 2018-06-14 RX ADMIN — Medication 2 G: at 23:36

## 2018-06-14 RX ADMIN — LOSARTAN POTASSIUM 50 MG: 50 TABLET ORAL at 21:08

## 2018-06-14 RX ADMIN — Medication 1500 MCG: at 10:34

## 2018-06-14 RX ADMIN — Medication 10 ML: at 21:10

## 2018-06-14 RX ADMIN — Medication 400 MG: at 18:37

## 2018-06-14 RX ADMIN — VITAMIN D, TAB 1000IU (100/BT) 2000 UNITS: 25 TAB at 15:34

## 2018-06-14 RX ADMIN — ARFORMOTEROL TARTRATE 15 MCG: 15 SOLUTION RESPIRATORY (INHALATION) at 19:28

## 2018-06-14 RX ADMIN — APIXABAN 5 MG: 5 TABLET, FILM COATED ORAL at 10:39

## 2018-06-14 RX ADMIN — BUMETANIDE 1 MG: 1 TABLET ORAL at 10:39

## 2018-06-14 RX ADMIN — SPIRONOLACTONE 25 MG: 25 TABLET, FILM COATED ORAL at 10:39

## 2018-06-14 RX ADMIN — LIDOCAINE HYDROCHLORIDE 30 ML: 10 INJECTION, SOLUTION EPIDURAL; INFILTRATION; INTRACAUDAL; PERINEURAL at 07:50

## 2018-06-14 RX ADMIN — Medication 10 ML: at 23:37

## 2018-06-14 RX ADMIN — BUMETANIDE 1 MG: 1 TABLET ORAL at 18:37

## 2018-06-14 RX ADMIN — POTASSIUM CHLORIDE 20 MEQ: 750 TABLET, EXTENDED RELEASE ORAL at 18:37

## 2018-06-14 RX ADMIN — Medication 10 ML: at 05:23

## 2018-06-14 RX ADMIN — Medication 10 ML: at 15:34

## 2018-06-14 RX ADMIN — ROSUVASTATIN CALCIUM 5 MG: 10 TABLET, FILM COATED ORAL at 10:36

## 2018-06-14 RX ADMIN — BUDESONIDE 500 MCG: 0.5 INHALANT RESPIRATORY (INHALATION) at 19:28

## 2018-06-14 RX ADMIN — SODIUM CHLORIDE 50000 UNITS: 900 IRRIGANT IRRIGATION at 08:01

## 2018-06-14 RX ADMIN — Medication 2 G: at 15:41

## 2018-06-14 RX ADMIN — Medication 10 ML: at 18:29

## 2018-06-14 RX ADMIN — MIDAZOLAM HYDROCHLORIDE 1 MG: 1 INJECTION, SOLUTION INTRAMUSCULAR; INTRAVENOUS at 07:44

## 2018-06-14 RX ADMIN — FENTANYL CITRATE 50 MCG: 50 INJECTION, SOLUTION INTRAMUSCULAR; INTRAVENOUS at 07:45

## 2018-06-14 NOTE — PROCEDURES
Cardiac Electrophysiology Report      PATIENT INFORMATION     Patient Name: Brisa Archibald   MRN: [id number]                  Study Date: 2018    YOB: 1930    Age: 80 y.o. Gender: female      Procedure:  Gina Vanegas    Referring Physician:  Ratna Guajardo MD and Dr. Didier Gayle     Duty Name   Electrophysiologist Brittany Smalls MD   Monitor Shaw De La Fuente RN       PATIENT Emilee Manriquez is a 80 y.o. female with acute diastolic heart failure noted to have bradycardia. Telemetry exhibits findings suggestive of junctional rhythm versus atrial fibrillation with heart rates in the 50s. Patient has reported exertional fatigue since undergoing transaortic aortic valve replacement 3 months ago. She now presents for implantation of a pacemaker for symptomatic bradycardia.           PROCEDURE     The patient was brought to the Cardiac Electrophysiology laboratory in a post-absorptive, fasting state. Informed consent was obtained. A peripheral IV was in place. Continuous electrocardiographic, blood pressure, O2 saturation and  CO2 monitoring was initiated. Intravenous antibiotics were administered pre-operatively. Self-adhesive cardioversion patches were positioned on the chest.  Conscious sedation was effectuated according to protocol. The patient was then prepped and draped in the usual sterile fashion. A left subclavian venogram was performed and demonstrated patency of the vein. A 50/50 mixture of lidocaine (1%) with epinephrine and bupivicaine (0.5%) was utilized for local anesthesia. An incision was performed medial to the left delto-pectoral groove. Sharp and blunt dissection was carried down to the level of the pre-pectoralis fascia. Hemostasis was maintained with electrocautery. Extra-thoracic, subclavian venous access was obtained and sheaths were placed using the modified Seldinger technique.  A permanent pace/sense lead was advanced under fluoroscopic guidance and positioned in the RA where the patient was noted to be in atrial fibrillation. The lead was then advanced into the right ventricle where stable pacing and sensing characteristics were encountered. The sheath was peeled away and the lead was anchored to the pre-pectoralis fascia using O-silk non-absorbable suture material. A device pocket was then fashioned and flushed copiously with antibiotic solution. A pacemaker generator was connected to the lead and the generator was placed into the pocket. The device was secured to the pocket using O-ethibond, non-absorbable suture material. The pocket was then closed in three layers using 2-O vicryl, 3-O vicryl, 4-O monocryl absorbable suture material and Dermabond. The skin was closed using a sub-cuticular technique. A bio-occlusive dressing were applied to the skin. The patient remained hemodynamically stable, tolerated the procedure well and was transferred in stable condition. There were no immediate complications encountered during the procedure. There was minimal blood loss and no specimen were removed. LEAD & GENERATOR DATA       Model # Serial #   Generator Medtronic [id number]   Ventricular Lead Medtronic [id number]       PACE/SENSE DATA      Sensed Wave (mV) Threshold (V) Impedance (Ohms)   Ventricle 8.6 0.9 745       FINAL PROGRAMMING     Mode Lower Rate (ppm) Upper Rate (ppm)   VVIR 60 130       MEDICATION SUMMARY     Medication Route Unit Total   Gentamycin IV mg 80   Ancef IV grams 2   Fentanyl IV micrograms 50   Versed IV grams 2       RADIOLOGY SUMMARY      Total   Fluoro Time (minutes) 1.2      Dose Area Product (mGy) 31.4       CONCLUSIONS     1. Successful implantation of a single-chamber pacemaker. 2. IV antibiotics x 24 hours for 3 doses followed by Keflex 500 mg po tid x 5 days. 3. Start anticoagulation with eliquis 5 mg bid  4. Monitor overnight on telemetry.   5. CXR (PA & lateral) and device interrogation in AM.  6. Possible discharge in AM.  7. Wound check in EP clinic in 7 days. 8. Follow up in EP clinic in 1 month or earlier if necessary. 9. Follow up with  Eros Bermudez MD and Dr. Fidel Cook as scheduled. Thank you, Eros Bermudez MD and Dr. Fidel Cook for involving me in the care of this extraordinarily pleasant female.        Heriberto Saravia MD  Cardiac Electrophysiology / Cardiology    [facility] [address], [address], [address]  [place], [address]        [facility], [facility]  [phone] / [phone] Fax      [phone] / [phone] Fax

## 2018-06-14 NOTE — PROGRESS NOTES
TRANSFER - OUT REPORT:    Verbal report given to Aleyda PUENTE(name) on Nurys Sender  being transferred to [facility](unit) for routine progression of care       Report consisted of patients Situation, Background, Assessment and   Recommendations(SBAR). Information from the following report(s) Procedure Summary, Intake/Output, MAR, Recent Results, Med Rec Status and Cardiac Rhythm Paced was reviewed with the receiving nurse. Lines:   Peripheral IV 06/13/18 Left Arm (Active)   Site Assessment Clean, dry, & intact 6/13/2018  8:20 PM   Phlebitis Assessment 0 6/13/2018  8:20 PM   Infiltration Assessment 0 6/13/2018  8:20 PM   Dressing Status Clean, dry, & intact 6/13/2018  8:20 PM   Dressing Type Transparent;Tape 6/13/2018  8:20 PM   Hub Color/Line Status Pink;Capped 6/14/2018  3:34 AM   Action Taken Open ports on tubing capped 6/13/2018  3:41 PM   Alcohol Cap Used Yes 6/14/2018  3:34 AM        Opportunity for questions and clarification was provided.       Patient transported with:   Monitor  Registered Nurse     950 transferred via stretcher to room 463 rec'd by [employer]

## 2018-06-14 NOTE — PROGRESS NOTES
TRANSFER - IN REPORT:    Verbal report received from Piero Fried on Praneeth Donrich  being received from procedure for routine progression of care. Report consisted of patients Situation, Background, Assessment and Recommendations(SBAR). Information from the following report(s) Procedure Summary, MAR, Recent Results and Cardiac Rhythm Paced was reviewed with the receiving clinician. Opportunity for questions and clarification was provided. Assessment completed upon patients arrival to [address] [place] [address] and care assumed. Cardiac Cath Lab Recovery Arrival Note:    Praneeth High arrived to [facility] recovery area. Patient procedure= PPI single chamber. Patient on cardiac monitor, non-invasive blood pressure, SPO2 monitor. On O2 @ 2 lpm via n/c. IV  of nacl on pump at 25 ml/hr. Patient status doing well without problems. Patient is A&Ox 4. Patient reports no complaints. PROCEDURE SITE CHECK:    Procedure site:without any bleeding and or hematoma, ice bag to left chest wall, no pain/discomfort reported at procedure site. No change in patient status. Continue to monitor patient and status.

## 2018-06-14 NOTE — PROGRESS NOTES
Cardiac Cath Lab Procedure Area Arrival Note:    Vick Lezama arrived to Cardiac Cath Lab, Procedure Area. Patient identifiers verified with NAME and DATE OF BIRTH. Procedure verified with patient. Consent forms verified. Allergies verified. Patient informed of procedure and plan of care. Questions answered with review. Patient voiced understanding of procedure and plan of care. Patient on cardiac monitor, non-invasive blood pressure, SPO2 monitor. On RA, placed on O2 @ 2 lpm via nc. IV of ns on pump at 25 ml/hr. Patient status doing well without problems. Patient is A&Ox 3. Patient reports no pain. Patient medicated during procedure with orders obtained and verified by Dr. Danica Cross. Refer to patients Cardiac Cath Lab PROCEDURE REPORT for vital signs, assessment, status, and response during procedure, printed at end of case. Printed report on chart or scanned into chart.

## 2018-06-15 ENCOUNTER — APPOINTMENT (OUTPATIENT)
Dept: CARDIAC REHAB | Age: 83
End: 2018-06-15

## 2018-06-15 ENCOUNTER — TELEPHONE (OUTPATIENT)
Dept: CARDIOLOGY CLINIC | Age: 83
End: 2018-06-15

## 2018-06-15 VITALS
BODY MASS INDEX: 30.82 KG/M2 | RESPIRATION RATE: 18 BRPM | DIASTOLIC BLOOD PRESSURE: 59 MMHG | SYSTOLIC BLOOD PRESSURE: 126 MMHG | HEIGHT: 66 IN | TEMPERATURE: 98.7 F | HEART RATE: 60 BPM | OXYGEN SATURATION: 95 % | WEIGHT: 191.8 LBS

## 2018-06-15 LAB
ANION GAP SERPL CALC-SCNC: 8 MMOL/L (ref 5–15)
BUN SERPL-MCNC: 25 MG/DL (ref 6–20)
BUN/CREAT SERPL: 26 (ref 12–20)
CALCIUM SERPL-MCNC: 8.8 MG/DL (ref 8.5–10.1)
CHLORIDE SERPL-SCNC: 103 MMOL/L (ref 97–108)
CO2 SERPL-SCNC: 32 MMOL/L (ref 21–32)
CREAT SERPL-MCNC: 0.97 MG/DL (ref 0.55–1.02)
GLUCOSE BLD STRIP.AUTO-MCNC: 130 MG/DL (ref 65–100)
GLUCOSE BLD STRIP.AUTO-MCNC: 137 MG/DL (ref 65–100)
GLUCOSE SERPL-MCNC: 152 MG/DL (ref 65–100)
MAGNESIUM SERPL-MCNC: 1.9 MG/DL (ref 1.6–2.4)
POTASSIUM SERPL-SCNC: 3.5 MMOL/L (ref 3.5–5.1)
SERVICE CMNT-IMP: ABNORMAL
SERVICE CMNT-IMP: ABNORMAL
SODIUM SERPL-SCNC: 143 MMOL/L (ref 136–145)

## 2018-06-15 PROCEDURE — 97530 THERAPEUTIC ACTIVITIES: CPT

## 2018-06-15 PROCEDURE — 74011250637 HC RX REV CODE- 250/637: Performed by: NURSE PRACTITIONER

## 2018-06-15 PROCEDURE — 80048 BASIC METABOLIC PNL TOTAL CA: CPT | Performed by: NURSE PRACTITIONER

## 2018-06-15 PROCEDURE — G8979 MOBILITY GOAL STATUS: HCPCS

## 2018-06-15 PROCEDURE — 83735 ASSAY OF MAGNESIUM: CPT | Performed by: NURSE PRACTITIONER

## 2018-06-15 PROCEDURE — 74011250637 HC RX REV CODE- 250/637: Performed by: INTERNAL MEDICINE

## 2018-06-15 PROCEDURE — 82962 GLUCOSE BLOOD TEST: CPT

## 2018-06-15 PROCEDURE — 36415 COLL VENOUS BLD VENIPUNCTURE: CPT | Performed by: NURSE PRACTITIONER

## 2018-06-15 PROCEDURE — G8978 MOBILITY CURRENT STATUS: HCPCS

## 2018-06-15 PROCEDURE — 97161 PT EVAL LOW COMPLEX 20 MIN: CPT

## 2018-06-15 PROCEDURE — 97116 GAIT TRAINING THERAPY: CPT

## 2018-06-15 PROCEDURE — 74011250636 HC RX REV CODE- 250/636: Performed by: INTERNAL MEDICINE

## 2018-06-15 RX ORDER — HYDROCODONE BITARTRATE AND ACETAMINOPHEN 5; 325 MG/1; MG/1
1 TABLET ORAL
Qty: 12 TAB | Refills: 0 | Status: SHIPPED | OUTPATIENT
Start: 2018-06-15 | End: 2018-06-25 | Stop reason: ALTCHOICE

## 2018-06-15 RX ORDER — CEPHALEXIN 500 MG/1
500 CAPSULE ORAL 2 TIMES DAILY
Qty: 10 CAP | Refills: 0 | Status: SHIPPED | OUTPATIENT
Start: 2018-06-15 | End: 2018-06-20

## 2018-06-15 RX ORDER — LANOLIN ALCOHOL/MO/W.PET/CERES
400 CREAM (GRAM) TOPICAL 2 TIMES DAILY
Qty: 180 TAB | Refills: 3 | Status: SHIPPED | OUTPATIENT
Start: 2018-06-15 | End: 2018-06-15

## 2018-06-15 RX ORDER — CEPHALEXIN 500 MG/1
500 CAPSULE ORAL 3 TIMES DAILY
Qty: 15 CAP | Refills: 0 | Status: SHIPPED | OUTPATIENT
Start: 2018-06-15 | End: 2018-06-15

## 2018-06-15 RX ORDER — BUMETANIDE 1 MG/1
1 TABLET ORAL 2 TIMES DAILY
Qty: 60 TAB | Refills: 0 | Status: SHIPPED | OUTPATIENT
Start: 2018-06-15 | End: 2018-12-21

## 2018-06-15 RX ORDER — GUAIFENESIN 100 MG/5ML
81 LIQUID (ML) ORAL DAILY
Qty: 90 TAB | Refills: 3 | Status: SHIPPED | OUTPATIENT
Start: 2018-06-15 | End: 2019-05-30 | Stop reason: ALTCHOICE

## 2018-06-15 RX ORDER — SPIRONOLACTONE 25 MG/1
25 TABLET ORAL DAILY
Qty: 30 TAB | Refills: 0 | Status: SHIPPED | OUTPATIENT
Start: 2018-06-15 | End: 2019-03-18 | Stop reason: SDUPTHER

## 2018-06-15 RX ORDER — POTASSIUM CHLORIDE 1500 MG/1
20 TABLET, FILM COATED, EXTENDED RELEASE ORAL 2 TIMES DAILY
Qty: 180 TAB | Refills: 3 | Status: SHIPPED | OUTPATIENT
Start: 2018-06-15 | End: 2018-06-15

## 2018-06-15 RX ORDER — LANOLIN ALCOHOL/MO/W.PET/CERES
400 CREAM (GRAM) TOPICAL 2 TIMES DAILY
Qty: 60 TAB | Refills: 0 | Status: SHIPPED | OUTPATIENT
Start: 2018-06-15 | End: 2018-07-15 | Stop reason: SDUPTHER

## 2018-06-15 RX ORDER — POTASSIUM CHLORIDE 1500 MG/1
20 TABLET, FILM COATED, EXTENDED RELEASE ORAL 2 TIMES DAILY
Qty: 60 TAB | Refills: 0 | Status: SHIPPED | OUTPATIENT
Start: 2018-06-15 | End: 2018-11-15 | Stop reason: SDUPTHER

## 2018-06-15 RX ORDER — BUMETANIDE 1 MG/1
1 TABLET ORAL 2 TIMES DAILY
Qty: 180 TAB | Refills: 3 | Status: SHIPPED | OUTPATIENT
Start: 2018-06-15 | End: 2018-06-15

## 2018-06-15 RX ORDER — SPIRONOLACTONE 25 MG/1
25 TABLET ORAL DAILY
Qty: 90 TAB | Refills: 3 | Status: SHIPPED | OUTPATIENT
Start: 2018-06-15 | End: 2018-06-15

## 2018-06-15 RX ADMIN — ACETAMINOPHEN 650 MG: 325 TABLET ORAL at 01:05

## 2018-06-15 RX ADMIN — ASPIRIN 81 MG 81 MG: 81 TABLET ORAL at 08:38

## 2018-06-15 RX ADMIN — Medication 1500 MCG: at 08:38

## 2018-06-15 RX ADMIN — ACETAMINOPHEN 650 MG: 325 TABLET ORAL at 06:55

## 2018-06-15 RX ADMIN — POTASSIUM CHLORIDE 20 MEQ: 750 TABLET, EXTENDED RELEASE ORAL at 08:38

## 2018-06-15 RX ADMIN — BUMETANIDE 1 MG: 1 TABLET ORAL at 08:39

## 2018-06-15 RX ADMIN — VITAMIN D, TAB 1000IU (100/BT) 2000 UNITS: 25 TAB at 12:00

## 2018-06-15 RX ADMIN — SPIRONOLACTONE 25 MG: 25 TABLET, FILM COATED ORAL at 08:39

## 2018-06-15 RX ADMIN — Medication 2 G: at 06:52

## 2018-06-15 RX ADMIN — ROSUVASTATIN CALCIUM 5 MG: 10 TABLET, FILM COATED ORAL at 08:39

## 2018-06-15 RX ADMIN — Medication 10 ML: at 06:53

## 2018-06-15 RX ADMIN — APIXABAN 5 MG: 5 TABLET, FILM COATED ORAL at 08:39

## 2018-06-15 RX ADMIN — Medication 400 MG: at 08:38

## 2018-06-15 NOTE — TELEPHONE ENCOUNTER
Paola with [facility] called in and needs clarification if the patient is ok to continue taking the potassium with the spironolactone. Patient is out of hospital and is waiting at the pharmacy.   Phone [phone]  [place]

## 2018-06-15 NOTE — DISCHARGE INSTRUCTIONS
Pacemaker  Discharge Instructions    Please make sure you have received your Temporary Pacemaker identification card with your discharge instructions      MEDICATIONS         Take only the medications prescribed to you at discharge. ACTIVITY         Return to your normal activity, except as noted below. o Do not lift anything heavier than 10 pounds for 4 weeks with the affected arm. This is how long it takes the muscles to heal, and the leads inside your heart to stabilize their position. o Do not reach above your head with the affected arm for 4 weeks, doing so increases the risk of lead dislodgement.    o It is, however, important to move the affected arm to prevent shoulder stiffness and locking. o Avoid tight clothes or unnecessary pressure over your incision (such as bra straps or seat belts). If it is tender or sensitive to clothing, cover the incision with a soft dressing or pad.  o Questions about driving are individualized and should be discussed with one of the EP Physicians prior to discharge. SHOWERING         Leave the bandage over your incision for 7 days after the Pacemaker implant. You bandage will be removed in clinic in 7 days.  It is important to keep the bandaged area clean and dry. You may shower around the site until the bandage is removed in clinic. Thereafter, you may shower after the bandage is removed, washing it gently with soap and water. Do not apply any lotions, powders, or perfumes to the incision line.  Avoid submerging your incision in water (tub baths, hot tubs, or swimming) for four weeks.  Underneath the dressing. o If you have white steri-strips over your incision (underneath the gauze dressing), they will curl up at the end and fall off, usually within 10 days. Do not pull them off.  - OR -   o You may have a different type of closure for the incision.   If Dermabond Adhesive was used to close your incision, you will receive a separate instruction sheet. DISCHARGE PRECAUTIONS         Record your temperature every day, at the same time, for 3 weeks after your implant. A temperature of 100.5 F, or higher, can be the first sign of infection. This should be reported to your Doctor immediately.  You can have an MRI after 6 weeks. You must be aware that any strong magnet or magnetic field can affect your Pacemaker. In general, be careful of metal detectors, heavy machinery, and any area where arc-welding is performed. Avoid metal detectors such as the ones in security checkpoints at [facility] or [address] [place] [address]. When approaching a security checkpoint show your Pacemaker ID Card to security personnel and ask to be hand searched.  Always tell your doctor or dentist that you have a Pacemaker. Antibiotics may be prescribed before certain procedures.  If you use a cell phone, hold it on the opposite side from where your Pacemaker is implanted.  Your temporary identification will be given to you with these instructions. Keep your Pacemaker card in your wallet or on your person at all times. You should receive your permanent card in 8 weeks. If you do not receive your permanent card, please call the office at [phone]. TAKING YOUR PULSE         Take your pulse the same time every day, preferably in the morning.  Sit down and rest for 5 minutes prior to taking your pulse.  Take your pulse for 1 full minute, use a clock or stop watch with a second hand.  To feel your pulse, use the first two fingers of one hand; place them on the thumb side of the wrist of the opposite hand. The pulse will be steady, regular and throbbing.  Call the EP Lab Doctors if your pulse is less than 40 beats per minute. SYMPTOMS THAT NEED TO BE REPORTED IMMEDIATELY         Temperature more than 100.4 F     Redness or warmth at the incision site, or pain for longer than the first 5 days after the implant.      Drainage from the incision site.  Swelling around the incision site.  Shortness of breath.  Rapid heart rate or palpitations.  Dizziness, lightheadedness, fainting.  Slow pulse below 40 beats per minute.  REMEMBER: If you feel something is an emergency or cannot be handled over the phone, call 911 or go to the closest emergency room. PLEASE REVIEW YOUR DISCHARGE MEDICATION LIST CAREFULLY - SEVERAL MEDICATIONS HAVE CHANGED    You will follow up with FRANCHESKA Murphy in Dr. Abiodun Peter office at [facility] on Monday to check on your fluid status. You will follow up with Mlaathi Anne NP in Dr. Markus Quintanilla' office at [facility] on Friday to check your pacemaker site.     Matteo Mireles MD  Cardiac Electrophysiology / Cardiology    [facility]  [address] [facility], [address] [facility], [address]  [address], [address]  [phone] / [phone] Fax       [phone] / [phone] Fax

## 2018-06-15 NOTE — PROGRESS NOTES
Per PT patient needs home health PT safety evaluation - CM spoke with nursing regarding needs for skilled home health and to obtain orders - CM spoke with patient during rounds this am and is agreeable to home care with [address] spoke with Arsen Aparicio with [facility] and made her aware of change from [facility] vs skilled home health - orders obtained for [facility] CHF and PT safety eval.  CM spoke with CM Supervisor regarding already scheduled dispatch health visit on Sunday and that insurance will not cover both services - CM supervisor advised CM to cancel Dispatch Health appointment for Sunday and removed it from AVS.  Nursing to be made aware.  TONI Velez

## 2018-06-15 NOTE — PROGRESS NOTES
physical Therapy EVALUATION/DISCHARGE  Patient: Valentin Cespedes (7 y.o. female)  Date: 6/15/2018  Primary Diagnosis: HF  (HFpEF) heart failure with preserved ejection fraction (HCC)  Precautions:   (L Pacemaker precautions)  ASSESSMENT :  Based on the objective data described below, the patient presents with near-baseline functional mobility following L pacemaker placement. Provided extensive education regarding mobility techniques for supine <> sit transfers, functional transfers to/from standing, and ambulation. Recommended purchase of a tub-transfer bench and re-arrangement of furniture in bedroom to facilitate home safety. Educated patient on pacemaker precautions with both verbal instruction and demonstration; patient both verbalized and demonstrated good understanding. Patient declined stair training as she has been using a chair lift for multiple years. Patient ambulated with a SPC x 150 ft with no overt LOB, appropriate management of SPC, and no path deviations even with visual distractions/head turns. Recommend a HHPT safety evaluation in order to ensure home safety and to maximize patient's functional independence. Patient attempted 6 MWT with Respiratory Therapist 6/13/18    Skilled physical therapy is not indicated at this time. PLAN :  Discharge Recommendations: Home Health Safety Evaluation  Further Equipment Recommendations for Discharge: Tub transfer bench - Patient's son to purchase for her     SUBJECTIVE:   Patient stated Thanks to you, I can be safe at home.     OBJECTIVE DATA SUMMARY:   HISTORY:    Past Medical History:   Diagnosis Date    Adverse effect of anesthesia     difficulty waking    Aortic stenosis     Arrhythmia     rapid heartrate seen in ER  4/2/18    Cancer ([facility] [address] 01/2018    skin spots, removed periodically    Chronic pain     back - spinal stenosis    COPD     Diabetes ([facility] [address]     Diabetic neuropathy (HCC)     knees down    Diverticulosis of colon     Hypercholesterolemia     Hypertension     CINDA (obstructive sleep apnea) 08-    AHI: 18.6 per hour - CPAP    Other ill-defined conditions(799.89)     heart murmur    Peripheral neuropathy     Renal artery stenosis (HCC)     Right    Unspecified adverse effect of anesthesia     delayed awakening - \"does not need as much\"     Past Surgical History:   Procedure Laterality Date    ABDOMEN SURGERY PROC UNLISTED  1977    R Renal Artery Stenosis bypassed    ABDOMEN SURGERY [address] or 1983    hernia repair    ABDOMEN SURGERY [facility] - removal of fatty tissue around abdomen    BREAST SURGERY PROCEDURE UNLISTED  1968    Left - benign cyst    CARDIAC SURG PROCEDURE UNLIST  04/13/2018    TAVR    ENDOSCOPY, COLON, DIAGNOSTIC  1999    hx of polys in the past    HX CATARACT REMOVAL      bilateral    HX GYN  1966    D&C    HX HEENT      Eyelid Cysts x 4    HX HEENT      oral surgeries - gum surgery/floating root  removed x 2    HX HEENT      retinal hemmorage repair    HX ORTHOPAEDIC  11/06    L1-S1 2007    HX ORTHOPAEDIC  1985    L 4th Trigger finger    HX ORTHOPAEDIC      left knee arthroscopy    HX OTHER SURGICAL      skin cancer removal, periodically    HX TONSILLECTOMY      T & A     Prior Level of Function/Home Situation: Mod I functional mobility with either rolling walker or SPC. Family friend purchases groceries. Denies recent falls. Chair lift. Independent ADLs (folding laundry, bathing, dressing, showering).   Personal factors and/or comorbidities impacting plan of care:  Extensive PMH, increased age    Home Situation  Home Environment: Private residence  # Steps to Enter:  (Utilizes chair lift)  One/Two Story Residence: Two story, live on 1st floor  Lift Chair Available: Yes  Living Alone: Yes  Support Systems: Child(doretha) (Live 1 hour away)  Patient Expects to be Discharged to[de-identified] Private residence  Current DME Used/Available at Home: CPAP, Cane, straight, Walker, rolling  Tub or Shower Type: Tub/Shower combination (Suggested purchase of a tub transfer bench)    EXAMINATION/PRESENTATION/DECISION MAKING:   Critical Behavior:  Neurologic State: Alert, Appropriate for age  Orientation Level: Appropriate for age, Oriented X4  Cognition: Appropriate decision making, Appropriate for age attention/concentration, Appropriate safety awareness, Follows commands  Safety/Judgement: Fall prevention, Awareness of environment, Insight into deficits  Hearing: Auditory  Auditory Impairment: Hard of hearing, bilateral  Skin:  Pacemaker dressing c/d/i  Edema: None noted  Range Of Motion:  AROM: Within functional limits (L shoulder flexion limited to 90 degrees)  Strength:    Strength: Generally decreased, functional (B LEs)  Tone & Sensation:   Tone: Normal  Sensation: Intact  Coordination:  Coordination: Generally decreased, functional  Functional Mobility:  Bed Mobility:  Rolling: Supervision;Modified independent (Initial verbal cuing )  Supine to Sit: Supervision;Modified independent (Initial verbal cuing for technique; utilized bed rail)  Sit to Supine: Modified independent; Additional time (Good adherence to pacemaker precautions)  Scooting: Modified independent; Additional time (Good adherence to pacemaker precautions)  Transfers:  Sit to Stand: Modified independent; Additional time; Adaptive equipment [facility])  Stand to Sit: Modified independent; Additional time; Adaptive equipment [facility])  Balance:   Sitting: Intact; Without support  Standing: Intact; With support  Ambulation/Gait Training:  Distance (ft): 150 Feet (ft)  Assistive Device: Gait belt;Cane, straight (In right hand)  Ambulation - Level of Assistance: Supervision;Modified independent  Gait Description (WDL): Exceptions to WDL  Gait Abnormalities: Decreased step clearance  Speed/Yvette: Slow (Cautious)     Functional Measure:  Tinetti test:    Sitting Balance: 1  Arises: 1  Attempts to Rise: 2  Immediate Standing Balance: 1  Standing Balance: 1  Nudged: 1  Eyes Closed: 1  Turn 360 Degrees - Continuous/Discontinuous: 0  Turn 360 Degrees - Steady/Unsteady: 1  Sitting Down: 1  Balance Score: 10  Indication of Gait: 1  R Step Length/Height: 1  L Step Length/Height: 1  R Foot Clearance: 1  L Foot Clearance: 1  Step Symmetry: 1  Step Continuity: 1  Path: 1  Trunk: 1  Walking Time: 1  Gait Score: 10  Total Score: 20       Tinetti Test and G-code impairment scale:  Percentage of Impairment CH    0%   CI    1-19% CJ    20-39% CK    40-59% CL    60-79% CM    80-99% CN     100%   Tinetti  Score 0-28 28 23-27 17-22 12-16 6-11 1-5 0       Tinetti Tool Score Risk of Falls  <19 = High Fall Risk  19-24 = Moderate Fall Risk  25-28 = Low Fall Risk  Tinetti ME. Performance-Oriented Assessment of Mobility Problems in Elderly Patients. [facility]; [id number]. (Scoring Description: PT Bulletin Feb. 10, 1993)    Older adults: Luci Peacock et al, 2009; n = 1000 [facility] elderly evaluated with ABC, MAJOR, ADL, and IADL)  · Mean MAJOR score for males aged 69-68 years = 26.21(3.40)  · Mean MAJOR score for females age 69-68 years = 25.16(4.30)  · Mean MAJOR score for males over 80 years = 23.29(6.02)  · Mean MAJOR score for females over 80 years = 17.20(8.32)       G codes: In compliance with CMSs Claims Based Outcome Reporting, the following G-code set was chosen for this patient based on their primary functional limitation being treated: The outcome measure chosen to determine the severity of the functional limitation was the Tinetti with a score of 20/28 which was correlated with the impairment scale.     ? Mobility - Walking and Moving Around:     - CURRENT STATUS: CJ - 20%-39% impaired, limited or restricted    - GOAL STATUS: CJ - 20%-39% impaired, limited or restricted    - D/C STATUS:  CJ - 20%-39% impaired, limited or restricted        Physical Therapy Evaluation Charge Determination   History Examination Presentation Decision-Making   HIGH Complexity :3+ comorbidities / personal factors will impact the outcome/ POC  LOW Complexity : 1-2 Standardized tests and measures addressing body structure, function, activity limitation and / or participation in recreation  LOW Complexity : Stable, uncomplicated  Other outcome measures Tinetti  LOW       Based on the above components, the patient evaluation is determined to be of the following complexity level: LOW     Pain:  Pain Scale 1: Numeric (0 - 10)  Pain Intensity 1: 0     Activity Tolerance:   Please refer to the flowsheet for vital signs taken during this treatment. After treatment:   []   Patient left in no apparent distress sitting up in chair  [x]   Patient left in no apparent distress in bed  [x]   Call bell left within reach  [x]   Nursing notified  []   Caregiver present  []   Bed alarm activated    COMMUNICATION/EDUCATION:   Communication/Collaboration:  [x]   Fall prevention education was provided and the patient/caregiver indicated understanding. [x]   Patient/family have participated as able and agree with findings and recommendations. []   Patient is unable to participate in plan of care at this time.   Findings and recommendations were discussed with: Registered Nurse and     Thank you for this referral.  Yin Zhang PT, DPT   Time Calculation: 32 mins

## 2018-06-15 NOTE — DISCHARGE SUMMARY
Cardiology Discharge Summary     Patient ID:  Valentin Cespedes  [id number]  89 y.o.  7/3/1930    Admit Date: 6/12/2018    Discharge Date: 6/15/2018    Admitting Physician: Rob Leon MD     Discharge Physician: Delroy Kidd MD    Admission Diagnoses:   HF  (HFpEF) heart failure with preserved ejection fraction [facility])    Discharge Diagnoses: Active Problems:    (HFpEF) heart failure with preserved ejection fraction (HCC) (6/12/2018)    Junctional rhythm/bradycardia  Atrial Fibrillation  Hypokalemia  Hypertension    Discharge Condition: Stable    Cardiology Procedures this Admission:  placement of single chamber pacemaker with Dr. Alejandro Odom: Electrophysiology     [facility] [place]     79 yo female with extensive medical history as below who was admitted to the hospital with c/o LE edema, dyspnea. 1. HTN - controlled on cozaar 50mg daily and spironolactone 12.5mg daily to help with HTN and hypokalemia  2. TRENTON- history of adhesions, stable post renal artery bypass, aspirin decreased to 81mg daily during admission, on a statin  -FMD in 1977, bypassed in 1977  3. DM type 2 - on metformin, has severe peripheral neuropathy, followed by Dr. Jackie Thompson, A1C 7.6%  4. AS s/p TAVR - stable by TTE in 5/18   5. Hyperlipidemia- LDL 19 on crestor and zetia, stopped zetia during admission      6. Vit D deficiency- at goal on supplementation   7. CINDA- uses CPAP nightly   8. Junctional rhythm/bradycardia- +fatigue, VR 50s in junctional rhythm, not on AV peggy blockers, s/p placement of single chamber Medtronic pacemaker (VVIR ) with Dr. Jonathon Leung on 6/14/18, discharged to home on keflex 500mg BID x 5 days, given Percocet PRN pain, she will follow up with EP YVETTE Romo on 6/22/18  9.  Hx of crushed vocal cords - stable hoarseness  10.  Acute diastolic heart failure - LVEF 55-60%, NYHA Class II on admission and today  -no DVT by duplex, diuresed with IV bumex and discharged to home on Bumex 1mg BID  -ordered one time home health visit at discharge, has follow up with Luz Babin on 6/18/18 at 3pm   11.  Hypokalemia - started on spironolactone and continued on KCL 20meq BID, will need BMP reassessed as OP   12.  Atrial Fib - new diagnosis during admission, [id number] and started on Eliquis 5mg BID  13. Hypomagnesemia - started on Mg Oxide 400gm BID during admission   14. Advanced directives discussed this admission and on file, full code        6/14/2018 - placement of single chamber Medtronic pacemaker by Dr. Ines Trejo   TTE 5/18 - LVEF 55 % to 60 %, no WMA, mod dilated LA, mild MS, s/p TAVR well seated elevated gradient  Aortic Valve:   AV Vmax was 3.2 m/s.  AV meanPG was 22.7 mmHg. PW measurements: Aortic Valve:   RACHEL (VTI) was 1.3 cm2. TAVR 4/18 - Successful transcatheter aortic valve replacement with no significant paravalvular leak. Post-deployment aortography showed the valve was in good position with no aortic regurgitation  Cardiac Cath 11/17 - RACHEL 0.84 patient appeared to be in AF during case, but most likely was frequent atrial ectopy.  No mitral stenosis.  LV gram not done.  Normal coronaries.  Thermo output was 6.9, assumed Emilie was 4.65; the 6.9 was used by computer in RACHEL calculation, so RACHEL may be overestimated.  LV known to be normal by echo.  Peak to peak gradient 63. TTE 1/14 EF 65-60%, mod cLVH 1.7, grI DD, mild-mod as 25/20 mild MS 10/5, triv effusion  TTE 7/14 EF 65-70%, mild cLVH, Mod AS 42/24, mild MS 10/5   TTE 10/13 LVEF 65 %, no WMA,  severe cLVH, abnormal left ventricular relaxation (grade 1 diastolic dysfunction), aortic valve leaflets exhibited marked calcification and reduced mobility with moderate stenosis, valve mean gradient was 24 mmHg.   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 Bowel sounds normal.   Extremities: trace LE edema, + PP bilaterally   Heart: regular rate and rhythm, S1, S2 normal, 2/6 KATHERINE   Lungs: clear to auscultation bilaterally  Neck: supple, symmetrical, trachea midline, no adenopathy, no JVD, no carotid bruits  Neurologic: Grossly normal  Pulses: 2+ and symmetrical    Labs:   Recent Labs      06/12/18   1432   WBC  8.0   HGB  11.2*   HCT  36.5   PLT  129*     Recent Labs      06/15/18   0413  06/14/18   0344  06/13/18   0325  06/12/18   1432   NA  143  140  143  145   K  3.5  3.4*  3.2*  3.4*   CL  103  101  104  107   CO2  32  29  28  26   GLU  152*  136*  120*  134*   BUN  25*  25*  19  17   CREA  0.97  0.94  0.87  1.03*   CA  8.8  9.2  8.6  8.8   MG  1.9  2.1  1.7  1.2*   ALB   --    --    --   3.1*   SGOT   --    --    --   33   ALT   --    --    --   59       No results for input(s): TROIQ, CPK, CKMB in the last 72 hours. Disposition: home    Patient Instructions:   Current Discharge Medication List      START taking these medications    Details   aspirin 81 mg chewable tablet Take 1 Tab by mouth daily. Qty: 90 Tab, Refills: 3      HYDROcodone-acetaminophen (NORCO) 5-325 mg per tablet Take 1 Tab by mouth every six (6) hours as needed. Max Daily Amount: 4 Tabs. Qty: 12 Tab, Refills: 0    Associated Diagnoses: Pain at surgical incision      bumetanide (BUMEX) 1 mg tablet Take 1 Tab by mouth two (2) times a day. Qty: 60 Tab, Refills: 0      apixaban (ELIQUIS) 5 mg tablet Take 1 Tab by mouth two (2) times a day. Qty: 60 Tab, Refills: 0      spironolactone (ALDACTONE) 25 mg tablet Take 1 Tab by mouth daily.   Qty: 30 Tab, Refills: 0      magnesium oxide (MAG-OX) 400 mg tablet Take 1 Tab by mouth two (2) times a day. Qty: 60 Tab, Refills: 0      cephALEXin (KEFLEX) 500 mg capsule Take 1 Cap by mouth two (2) times a day for 5 days. Qty: 10 Cap, Refills: 0    Comments: Replaces previous Rx         CONTINUE these medications which have CHANGED    Details   potassium chloride SR (K-TAB) 20 mEq tablet Take 1 Tab by mouth two (2) times a day. Qty: 60 Tab, Refills: 0         CONTINUE these medications which have NOT CHANGED    Details   rosuvastatin (CRESTOR) 5 mg tablet TAKE 1 TABLET BY MOUTH  DAILY  Qty: 90 Tab, Refills: 3    Associated Diagnoses: Tachycardia; PVC (premature ventricular contraction)      fluticasone-vilanterol (BREO ELLIPTA) 100-25 mcg/dose inhaler Take 1 Puff by inhalation daily. Qty: 1 Inhaler, Refills: 1      metFORMIN (GLUCOPHAGE) 1,000 mg tablet TAKE 1 TABLET BY MOUTH TWO  TIMES DAILY  Qty: 180 Tab, Refills: 3      losartan (COZAAR) 50 mg tablet Take 1 Tab by mouth daily. Indications: hypertension  Qty: 30 Tab, Refills: 1      cholecalciferol, vitamin D3, (VITAMIN D3) 2,000 unit tab Take 1 Tab by mouth daily (after lunch). CYANOCOBALAMIN, VITAMIN B-12, (VITAMIN B-12 PO) Take 1,500 mg by mouth daily. PSYLLIUM SEED, WITH DEXTROSE, (FIBER PO) Take  by mouth. 5 tabs daily      COD LIVER OIL PO Take 1,000 mg by mouth daily. azelastine (ASTELIN) 137 mcg (0.1 %) nasal spray 2 Sprays by Both Nostrils route two (2) times a day. Use in each nostril as directed  Qty: 3 Bottle, Refills: 3      glucose blood VI test strips (ACCU-CHEK ELBERT PLUS) strip Check bs daily.   Dx 250.00  Qty: 1 Package, Refills: 11         STOP taking these medications       ezetimibe (ZETIA) 10 mg tablet Comments:   Reason for Stopping:         predniSONE (DELTASONE) 10 mg tablet Comments:   Reason for Stopping:         furosemide (LASIX) 40 mg tablet Comments:   Reason for Stopping:         niacin (NIASPAN) 1,000 mg Tb24 tab Comments:   Reason for Stopping: aspirin 81 mg tablet Comments:   Reason for Stopping:               Reference discharge instructions provided by nursing for diet and activity. Follow-up with   Future Appointments  Date Time Provider Beau Driscoll   6/18/2018 3:00 PM Dotty Schuster PA-C [address]   6/22/2018 9:00 AM Patito Hernandez NP [facility] SCHED   6/25/2018 8:30 AM [facility] CARDIAC WELLNESS EXERCISE [facility]   6/25/2018 1:00 PM MD CLIFF Araujo SHIRA [address] [place] [address]   6/29/2018 8:30  [facility]   7/2/2018 8:30  [facility]   7/5/2018 9:20 AM Rob Leon  [address]    7/6/2018 8:30  [facility]   7/9/2018 8:30 AM [facility] CARDIAC WELLNESS EXERCISE [facility]   7/13/2018 8:30 AM [facility] CARDIAC WELLNESS EXERCISE [facility]   7/16/2018 8:30 AM [facility] CARDIAC WELLNESS EXERCISE [facility]   7/18/2018 11:30 AM PACEMAKER, STFRANCES [facility] SCHED   7/18/2018 11:40 AM Almas Enriquez MD [facility] SCHED   7/20/2018 8:30 AM [facility] CARDIAC WELLNESS EXERCISE [facility]   7/23/2018 8:30 AM [facility] CARDIAC WELLNESS EXERCISE [facility]   7/27/2018 8:30 AM [facility] CARDIAC WELLNESS EXERCISE [facility]   7/30/2018 8:30 AM [facility] CARDIAC WELLNESS EXERCISE [facility]   8/3/2018 8:30 AM [address] [place] [address] EXERC[address] [facility]   9/18/2018 9:40 AM [facility]   9/25/2018 9:20 AM Elvi Figueredo MD [facility] SHIRA SCHED       Signed:  Eliana Rios NP  Cardiology Attending:Patient seen and examined. I agree with NP assessment and plans. Pacer site OK.     Jesse Tavarez MD 6/15/2018 12:56 PM

## 2018-06-15 NOTE — TELEPHONE ENCOUNTER
I spoke to pharmacist and verified medications ordered from d/c summary today are correct per NP Ashley Kuo. Patient is to take Bumex, Spironolactone and potassium.    2 pt identifiers used

## 2018-06-15 NOTE — PROGRESS NOTES
Hospital follow-up 10-14 day PCP transitional care appointment has been scheduled with Dr. Nixon Olvera for Monday, 6/25/18 at 1:00 p.m. Pending patient discharge. [address] follow-up visit has been [facility] for Sunday, 6/17/18.   Office will contact patient prior to arrival.  Maylin Jones, Care Management Specialist.

## 2018-06-17 ENCOUNTER — HOME CARE VISIT (OUTPATIENT)
Dept: SCHEDULING | Facility: HOME HEALTH | Age: 83
End: 2018-06-17

## 2018-06-18 ENCOUNTER — TELEPHONE (OUTPATIENT)
Dept: CARDIOLOGY CLINIC | Age: 83
End: 2018-06-18

## 2018-06-18 ENCOUNTER — PATIENT OUTREACH (OUTPATIENT)
Dept: CARDIOLOGY CLINIC | Age: 83
End: 2018-06-18

## 2018-06-18 ENCOUNTER — APPOINTMENT (OUTPATIENT)
Dept: CARDIAC REHAB | Age: 83
End: 2018-06-18

## 2018-06-18 ENCOUNTER — OFFICE VISIT (OUTPATIENT)
Dept: CARDIOLOGY CLINIC | Age: 83
End: 2018-06-18

## 2018-06-18 VITALS
SYSTOLIC BLOOD PRESSURE: 140 MMHG | HEIGHT: 66 IN | DIASTOLIC BLOOD PRESSURE: 60 MMHG | OXYGEN SATURATION: 98 % | RESPIRATION RATE: 16 BRPM | WEIGHT: 196.4 LBS | HEART RATE: 60 BPM | BODY MASS INDEX: 31.57 KG/M2

## 2018-06-18 DIAGNOSIS — I50.30 (HFPEF) HEART FAILURE WITH PRESERVED EJECTION FRACTION (HCC): Primary | ICD-10-CM

## 2018-06-18 DIAGNOSIS — Z95.0 S/P PLACEMENT OF CARDIAC PACEMAKER: ICD-10-CM

## 2018-06-18 DIAGNOSIS — R00.1 JUNCTIONAL BRADYCARDIA: ICD-10-CM

## 2018-06-18 DIAGNOSIS — I48.91 ATRIAL FIBRILLATION, UNSPECIFIED TYPE (HCC): ICD-10-CM

## 2018-06-18 NOTE — TELEPHONE ENCOUNTER
Returned call. Informed patient she may drive 48 hours post procedure. She will come in tomorrow for wound check. Friday's appointment will be cancelled.

## 2018-06-18 NOTE — PROGRESS NOTES
Hospital Discharge Follow-Up      Date/Time:  2018 9:38 AM    Patient was admitted to  [facility] on  and discharged on 6/15 for : Discharge Diagnoses: Active Problems:    (HFpEF) heart failure with preserved ejection fraction ([address] (2018)     Junctional rhythm/bradycardia  Atrial Fibrillation  Hypokalemia  Hypertension      Cardiology Procedures this Admission:  placement of single chamber pacemaker with Dr. Skip Cunningham. The physician discharge summary was available at the time of outreach. Patient was contacted within 1 business days of discharge. (bundle patient)    Top Challenges reviewed with the provider   Pt is post TAVR spring 2018; Post single chamber PPI 2018. Hypokalemia/ with medication adjustments. Hypokalemia - started on spironolactone 12.5 mg and continued on KCL 20meq BID, will need BMP reassessed as OP /continue on 25 mg every day - per P.A. Neymar Dutton. ttk   Atrial Fib - new diagnosis during admission, [id number] and started on Eliquis 5mg BID       Method of communication with provider :chart routing, staff message    Inpatient RRAT score: 25  Was this a readmission? no   Patient stated reason for the readmission: n/a    Nurse Navigator (NN) contacted the patient by telephone to perform post hospital discharge assessment. Verified name and  with patient as identifiers. Provided introduction to self, and explanation of the Nurse Navigator role. Reviewed discharge instructions and red flags with patient who verbalized understanding. Patient given an opportunity to ask questions and does not have any further questions or concerns at this time. The patient agrees to contact the PCP office for questions related to their healthcare. NN provided contact information for future reference. Disease Specific:   CHF    Summary of patient's top problems:  1. Post single chamber PPI for junctional rhythm/ bradycardia  2. Post TAVR/ recent  3.  History of a fib/ chronic/ on Eliquis bid. Home Health orders at discharge: [address]: n/a  Date of initial visit: [address] ordered/company: none  Durable Medical Equipment received: n/a    Barriers to care? Pt is 87/ but is active and proactive in her care -    Advance Care Planning:   Does patient have an Advance Directive:  reviewed and current     Medication(s):     New Medications at Discharge: Eliquis 5 mg bid, asa 81 mg, every day, Bumex 1 mg bid, Keflex 500 mg bid, Mg 400 mg bid, Spironolactone 12.5 mg every day, K 20 meq bid. Changed Medications at Discharge: AstANDREIA lo  Discontinued Medications at Discharge: Zetia, Lasix, Niacin, Prednisone    Addendum 6/19 - per P.ALisa Ortega Ends - pt to remain on Spironolactone 25 mg every day - ttk    Medication reconciliation was performed with patient, who verbalizes understanding of administration of home medications. There were no barriers to obtaining medications identified at this time.     Referral to Pharm D needed: no     PCP/Specialist follow up: Future Appointments  Date Time Provider Beau Driscoll   6/18/2018 3:00  [facility]   6/19/2018 10:00 AM Almas Enriquez MD [facility]   6/25/2018 8:30 AM [facility] CARDIAC WELLNESS EXERCISE [facility]    6/25/2018 1:00 PM Rebeka Schmitt MD [address]   6/29/2018 8:30  [facility]   7/2/2018 8:30 AM [facility] CARDIAC WELLNESS EXERCISE [facility]   7/5/2018 9:20 AM Rob Leon  [address]   7/6/2018 8:30  [facility]   7/9/2018 8:30 AM [facility] CARDIAC WELLNESS EXERCISE [facility]   7/13/2018 8:30 AM [facility] CARDIAC WELLNESS EXERCISE [facility]   7/16/2018 8:30  [facility]   7/18/2018 11:30 AM PACEMAKER, STFRANCES [facility] SCHED   7/18/2018 11:40 AM Almas Enriquez MD [facility] SCHED   7/20/2018 8:30  [facility]   7/23/2018 8:30  [facility]   7/27/2018 8:30  [facility]   7/30/2018 8:30  [facility]   8/3/2018 8:30  [facility]   9/18/2018 9:40 AM Rebeka Hi MD [facility]   9/25/2018 9:20 AM Grant Grier MD [facility] [address] May Ramos RN , [address], [facility]   [facility]  [phone]

## 2018-06-18 NOTE — TELEPHONE ENCOUNTER
Route to [facility]    Patient would like to know if she will be getting stiches out at 6/22 appt with CARRIE Kee. Also she would like to know if she can drive after appt.       Please call her at [phone]

## 2018-06-18 NOTE — PROGRESS NOTES
Cardiology Office Progress Note            Patient: April Otto  Diagnosis:     ICD-10-CM ICD-9-CM    1. (HFpEF) heart failure with preserved ejection fraction (HCC) I50.30 428.9    2. Junctional bradycardia R00.1 427.89    3. S/P placement of cardiac pacemaker Z95.0 V45.01    4. Atrial fibrillation, unspecified type [facility]) I48.91 427.31      Date: 6/20/2018     Time: 4:48 PM     Assessment and Plan     1.  Diastolic heart failure - LVEF 55-60%, NYHA Class II today   - On Bumex 1mg BID   - Weight unchanged since discharge   - edema of legs continues to improve. 2. Junctional rhythm/bradycardia -    - s/p placement of single chamber Medtronic pacemaker (VVIR ) with Dr. Gonzalo Rocha on 6/14/18,    - discharged to home on keflex 500mg BID x 5 days    - follow up with EP NP Shannan Martinez on 6/22/18  3. HTN    - controlled on cozaar 50mg daily and spironolactone 25mg daily to help with HTN and hypokalemia  4. TRENTON - history of adhesions, stable post renal artery bypass   - aspirin 81mg daily    - on a Crestor 5 mg daily   - FMD in 1977, bypassed in 1977  5. DM type 2    - on metformin, has severe peripheral neuropathy, followed by Dr. Tracy Gaona, A1C 7.6%  6. AS s/p TAVR    - stable by TTE in 5/18   7. Hyperlipidemia   - LDL 19 on crestor and zetia, stopped zetia during last admission      8. Vit D deficiency   - at goal on supplementation   9. CINDA   - uses CPAP nightly   10. Hx of crushed vocal cords    - stable hoarseness   11.  Hypokalemia    - Spironolactone and continued on KCL 20meq BID   12.  Atrial Fib    - new on admission, [id number]    - on Eliquis 5mg BID  13. Hypomagnesemia    - started on Mg Oxide 400gm BID    14. Advanced directives on file, full code     Follow up from hospital admission for HFpEF, as well as symptomatic bradycardia, for which she received a PPM.  AFib is stable and rate controlled, also on Eliquis.   BP up a little, but she is \"rushing\" around. Continue on Bumex 1 mg BID and Aldactone 25 mg. Follow up BMP for Cr and K levels.   Seeing Jennifer Coates NP, at Dr. Светлана Paz office for follow up in regards to [facility].     current treatment plan is effective, no change in therapy    Future Appointments  Date Time Provider Beau Yamila   6/25/2018 8:30 AM [address]   6/25/2018 1:00 PM Rebeka Ruiz MD [facility]   6/29/2018 8:30  [facility]   7/2/2018 8:30  [facility]   7/5/2018 9:20 AM Rudolph Adamson  [address]   7/6/2018 8:30  [facility]   7/9/2018 8:30  [facility]   7/13/2018 8:30  [facility]   7/16/2018 8:30 AM [address]   7/18/2018 11:30 AM PACEMAKER, STFRANCES [facility] SCHED   7/18/2018 11:40 AM Paola Weinstein MD [facility] SCHED   7/20/2018 8:30  [address] [facility]   7/23/2018 8:30 AM [facility] CARDIAC WELLNESS EXERCISE [address] [facility]   7/27/2018 8:30 AM [facility] CARDIAC WELLNESS EXERCISE [address] [facility]   7/30/2018 8:30 AM [address]   8/3/2018 8:30  [address] [facility]   9/18/2018 9:40 AM Rebeka Ruiz MD [facility]   9/25/2018 9:20 AM Bia Santana MD [facility] [address]          Patient Active Problem List   Diagnosis Code    Tachycardia R00.0    Hypertension I10    PVC (premature ventricular contraction) I49.3    Aortic stenosis I35.0    Edema R60.9    Foreign body in pharynx T17.208A    Type II or unspecified type diabetes mellitus without mention of complication, uncontrolled E11.65    Pure hypercholesterolemia E78.00    CINDA (obstructive sleep apnea) G47.33    Other dyspnea and respiratory abnormality R06.09, R09.89    Mitral stenosis I05.0    COPD (chronic obstructive pulmonary disease) ([facility]) J44.9    Fibromuscular dysplasia ([facility]) I77.3    Controlled type 2 diabetes mellitus without complication, without long-term current use of insulin ([facility]) E11.9    Advance directive on file Z78.9    Chest tightness R07.89    Dyspnea and respiratory abnormalities R06.00, R06.89    S/P TAVR (transcatheter aortic valve replacement) Z95.2    Obesity (BMI 30.0-34. 9) E66.9    (HFpEF) heart failure with preserved ejection fraction ([facility]) I50.30        Subjective: Jose Marvin denies chest pain, chest pressure/discomfort, palpitations, syncope, lower extremity edema, dizziness. ROS:  A comprehensive review of systems was negative except for that written in the HPI. Objective:      Physical Exam:                Visit Vitals    /60 (BP 1 Location: Left arm, BP Patient Position: Sitting)    Pulse 60    Resp 16    Ht 5' 6\" (1.676 m)    Wt 196 lb 6.4 oz (89.1 kg)    SpO2 98%    BMI 31.7 kg/m2        General Appearance:   Well developed, well nourished,alert and oriented x 3, and   individual in no acute distress. Ears/Nose/Mouth/Throat:    Hearing grossly normal.         Neck:  Supple. Chest:    Lungs clear to auscultation bilaterally. Cardiovascular:   Irregular rate and rhythm, S1, S2 normal, no murmur. Abdomen:    Soft, non-tender, bowel sounds are active. Extremities:  No edema bilaterally. Skin:  Warm and dry. Data Review:    Labs:  No results found for this or any previous visit (from the past 24 hour(s)). Radiology:        Current Outpatient Prescriptions   Medication Sig    aspirin 81 mg chewable tablet Take 1 Tab by mouth daily.  bumetanide (BUMEX) 1 mg tablet Take 1 Tab by mouth two (2) times a day.  potassium chloride SR (K-TAB) 20 mEq tablet Take 1 Tab by mouth two (2) times a day.     apixaban (ELIQUIS) 5 mg tablet Take 1 Tab by mouth two (2) times a day.  spironolactone (ALDACTONE) 25 mg tablet Take 1 Tab by mouth daily.  magnesium oxide (MAG-OX) 400 mg tablet Take 1 Tab by mouth two (2) times a day.  cephALEXin (KEFLEX) 500 mg capsule Take 1 Cap by mouth two (2) times a day for 5 days.  rosuvastatin (CRESTOR) 5 mg tablet TAKE 1 TABLET BY MOUTH  DAILY    fluticasone-vilanterol (BREO ELLIPTA) 100-25 mcg/dose inhaler Take 1 Puff by inhalation daily.  metFORMIN (GLUCOPHAGE) 1,000 mg tablet TAKE 1 TABLET BY MOUTH TWO  TIMES DAILY    losartan (COZAAR) 50 mg tablet Take 1 Tab by mouth daily. Indications: hypertension    cholecalciferol, vitamin D3, (VITAMIN D3) 2,000 unit tab Take 1 Tab by mouth daily (after lunch).  CYANOCOBALAMIN, VITAMIN B-12, (VITAMIN B-12 PO) Take 1,500 mg by mouth daily.  COD LIVER OIL PO Take 1,000 mg by mouth daily.  azelastine (ASTELIN) 137 mcg (0.1 %) nasal spray 2 Sprays by Both Nostrils route two (2) times a day. Use in each nostril as directed (Patient taking differently: 2 Sprays by Both Nostrils route as needed. Use in each nostril as directed)    glucose blood VI test strips (ACCU-CHEK ELBERT PLUS) strip Check bs daily. Dx 250.00    HYDROcodone-acetaminophen (NORCO) 5-325 mg per tablet Take 1 Tab by mouth every six (6) hours as needed. Max Daily Amount: 4 Tabs.  PSYLLIUM SEED, WITH DEXTROSE, (FIBER PO) Take  by mouth. 5 tabs daily     No current facility-administered medications for this visit.            Grazyna Back PA-C     [facility] [address] [place] [address]   Richy Masterson   [phone]

## 2018-06-19 ENCOUNTER — OFFICE VISIT (OUTPATIENT)
Dept: CARDIOLOGY CLINIC | Age: 83
End: 2018-06-19

## 2018-06-19 VITALS
SYSTOLIC BLOOD PRESSURE: 130 MMHG | OXYGEN SATURATION: 95 % | HEIGHT: 66 IN | HEART RATE: 64 BPM | BODY MASS INDEX: 31.43 KG/M2 | RESPIRATION RATE: 20 BRPM | WEIGHT: 195.6 LBS | DIASTOLIC BLOOD PRESSURE: 64 MMHG

## 2018-06-19 DIAGNOSIS — Z51.89 VISIT FOR WOUND CHECK: Primary | ICD-10-CM

## 2018-06-19 NOTE — PROGRESS NOTES
Patient presents for wound check post-device implantation. The dressing was removed and the site was inspected. The site appeared to be well-healing without ecchymosis/tenderness/erythema. Denies pain, fevers, discharge. Plan:    Continue follow up in device clinic as planned.        Yumiko Anderson MD

## 2018-06-19 NOTE — PROGRESS NOTES
Visit Vitals    /64 (BP 1 Location: Right arm, BP Patient Position: Sitting)    Pulse 64    Resp 20    Ht 5' 6\" (1.676 m)    Wt 195 lb 9.6 oz (88.7 kg)    SpO2 95%    BMI 31.57 kg/m2

## 2018-06-21 ENCOUNTER — APPOINTMENT (OUTPATIENT)
Dept: CARDIAC REHAB | Age: 83
End: 2018-06-21

## 2018-06-22 ENCOUNTER — APPOINTMENT (OUTPATIENT)
Dept: CARDIAC REHAB | Age: 83
End: 2018-06-22

## 2018-06-25 ENCOUNTER — OFFICE VISIT (OUTPATIENT)
Dept: INTERNAL MEDICINE CLINIC | Age: 83
End: 2018-06-25

## 2018-06-25 ENCOUNTER — TELEPHONE (OUTPATIENT)
Dept: CARDIAC REHAB | Age: 83
End: 2018-06-25

## 2018-06-25 ENCOUNTER — APPOINTMENT (OUTPATIENT)
Dept: CARDIAC REHAB | Age: 83
End: 2018-06-25

## 2018-06-25 VITALS
HEART RATE: 64 BPM | SYSTOLIC BLOOD PRESSURE: 133 MMHG | DIASTOLIC BLOOD PRESSURE: 59 MMHG | HEIGHT: 66 IN | WEIGHT: 193 LBS | BODY MASS INDEX: 31.02 KG/M2 | OXYGEN SATURATION: 96 % | RESPIRATION RATE: 18 BRPM | TEMPERATURE: 98 F

## 2018-06-25 DIAGNOSIS — I50.30 (HFPEF) HEART FAILURE WITH PRESERVED EJECTION FRACTION (HCC): Primary | ICD-10-CM

## 2018-06-25 DIAGNOSIS — Z95.2 S/P TAVR (TRANSCATHETER AORTIC VALVE REPLACEMENT): ICD-10-CM

## 2018-06-25 RX ORDER — AZELASTINE 1 MG/ML
2 SPRAY, METERED NASAL 2 TIMES DAILY
Qty: 3 BOTTLE | Refills: 3 | Status: SHIPPED | OUTPATIENT
Start: 2018-06-25 | End: 2021-08-09 | Stop reason: ALTCHOICE

## 2018-06-25 NOTE — PROGRESS NOTES
HISTORY OF PRESENT ILLNESS  Ivania Call is a 80 y.o. female. HPI  Ms. Crys Flal is a 80y.o. year old female, she is seen today for Transition of Care services following a hospital discharge for CHF exacerbation on 6/15/2018. Our office Nurse Navigator performed an outreach to Ms. Simeon Pantoja on 6/18 (within 2 business days of discharge) to complete medication reconciliation and a telephonic assessment of her condition. Diuresed x 3 days and had pacemaker placed. Feels much better. Walking more. Saw Dr. Heather Bains last Tuesday who was pleased with her progress. Less shortness of breath. Does not want to go to rehab but walking more without a cane. Swelling resolved in hospital and has not returned. Feels pulling in pacemaker area. Pulse ox is reading HR of 60's often. Has f/u with Dr. Heather Bains 7/18 and Dr. Ally Savage 7/5. Wants to stop the [facility] we had initially started her on for the sob. Does not feel it has helped. Taking Astelin NS. Current Outpatient Prescriptions:     aspirin 81 mg chewable tablet, Take 1 Tab by mouth daily. , Disp: 90 Tab, Rfl: 3    bumetanide (BUMEX) 1 mg tablet, Take 1 Tab by mouth two (2) times a day., Disp: 60 Tab, Rfl: 0    potassium chloride SR (K-TAB) 20 mEq tablet, Take 1 Tab by mouth two (2) times a day., Disp: 60 Tab, Rfl: 0    apixaban (ELIQUIS) 5 mg tablet, Take 1 Tab by mouth two (2) times a day., Disp: 60 Tab, Rfl: 0    spironolactone (ALDACTONE) 25 mg tablet, Take 1 Tab by mouth daily. , Disp: 30 Tab, Rfl: 0    magnesium oxide (MAG-OX) 400 mg tablet, Take 1 Tab by mouth two (2) times a day., Disp: 60 Tab, Rfl: 0    rosuvastatin (CRESTOR) 5 mg tablet, TAKE 1 TABLET BY MOUTH  DAILY, Disp: 90 Tab, Rfl: 3    fluticasone-vilanterol (BREO ELLIPTA) 100-25 mcg/dose inhaler, Take 1 Puff by inhalation daily. , Disp: 1 Inhaler, Rfl: 1    metFORMIN (GLUCOPHAGE) 1,000 mg tablet, TAKE 1 TABLET BY MOUTH TWO  TIMES DAILY, Disp: 180 Tab, Rfl: 3    losartan (COZAAR) 50 mg tablet, Take 1 Tab by mouth daily. Indications: hypertension, Disp: 30 Tab, Rfl: 1    cholecalciferol, vitamin D3, (VITAMIN D3) 2,000 unit tab, Take 1 Tab by mouth daily (after lunch). , Disp: , Rfl:     CYANOCOBALAMIN, VITAMIN B-12, (VITAMIN B-12 PO), Take 1,500 mg by mouth daily. , Disp: , Rfl:     COD LIVER OIL PO, Take 1,000 mg by mouth daily. , Disp: , Rfl:     azelastine (ASTELIN) 137 mcg (0.1 %) nasal spray, 2 Sprays by Both Nostrils route two (2) times a day. Use in each nostril as directed (Patient taking differently: 2 Sprays by Both Nostrils route as needed. Use in each nostril as directed), Disp: 3 Bottle, Rfl: 3    glucose blood VI test strips (ACCU-CHEK ELBERT PLUS) strip, Check bs daily. Dx 250.00, Disp: 1 Package, Rfl: 11    HYDROcodone-acetaminophen (NORCO) 5-325 mg per tablet, Take 1 Tab by mouth every six (6) hours as needed. Max Daily Amount: 4 Tabs., Disp: 12 Tab, Rfl: 0    PSYLLIUM SEED, WITH DEXTROSE, (FIBER PO), Take  by mouth. 5 tabs daily, Disp: , Rfl:     Visit Vitals    /59 (BP 1 Location: Right arm, BP Patient Position: Sitting)    Pulse 64    Temp 98 °F (36.7 °C) (Oral)    Resp 18    Ht 5' 6\" (1.676 m)    Wt 193 lb (87.5 kg)    SpO2 96%    BMI 31.15 kg/m2       ROS  See above  Physical Exam   Constitutional: She appears well-developed and well-nourished. HENT:   Head: Normocephalic and atraumatic. Neck: Neck supple. Cardiovascular: Normal rate and regular rhythm. Murmur (2/6 fernando) heard. Pulmonary/Chest: Effort normal and breath sounds normal.   Abdominal: Soft. Bowel sounds are normal. She exhibits no distension and no mass. There is no tenderness. Musculoskeletal: She exhibits no edema. Lymphadenopathy:     She has no cervical adenopathy. Vitals reviewed. ASSESSMENT and PLAN  MIRIAN -   HF - improved post diuresis   Continue meds per cardiology. OK to stop Breo and symptoms were not secondary to copd.   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   Continue same  Orders Placed This Encounter    azelastine (ASTELIN) 137 mcg (0.1 %) nasal spray     Follow-up Disposition:  Return in about 2 months (around 8/25/2018) for chf.

## 2018-06-25 NOTE — PROGRESS NOTES
Chief Complaint   Patient presents with   [facility] Follow Up     1. Have you been to the ER, urgent care clinic since your last visit? Hospitalized since your last visit? Yes, [facility] Admitted 6/12/18 for heart failure    2. Have you seen or consulted any other health care providers outside of the [address] [place] [address] since your last visit? Include any pap smears or colon screening.  No

## 2018-06-25 NOTE — MR AVS SNAPSHOT
[address] [place] [address] [address] [facility] 
[phone] Patient: Hannah Oneil MRN: [id number] UGZ:1/0/9700 Visit Information Date & Time Provider Department Dept. Phone Encounter #  
 6/25/2018  1:00 PM Sabine Schwarz MD [facility] [phone] [id number] Follow-up Instructions Return in about 2 months (around 8/25/2018) for chf.  
  
Your Appointments 7/5/2018  9:20 AM  
ESTABLISHED PATIENT with Keo To MD  
[facility] ([facility] Appt Note: appt schd by pt per Giacomo for TAVR f/u [address] [facility] [address]  
[address] [place]  
  
    
 7/18/2018 11:30 AM  
PACEMAKER with PACEMAKERKIM  
[facility] ([facility] SCHEDULING) Appt Note: 5 wk hosp fu and device check sll [address] [facility] [address] [facility] [address]  
[phone]  
  
   
 [address] [facility] [address] [facility] [address]  
  
    
 7/18/2018 11:40 AM  
ESTABLISHED PATIENT with Tj Diop MD  
[facility] ([facility]) Appt Note: 5 wk hosp fu and device check sll [address] [facility] [address] [facility] [address] [address]  
[phone]  
  
   
 [address] [facility] [address] [address]  
  
    
 9/18/2018  9:40 AM  
ROUTINE CARE with Sabine Schwarz MD  
[facility] Appt Note: R F/U  
 [place] [address] [facility] [address]  
[address] [address]  
  
    
 9/25/2018  9:20 AM  
Any with William Tiwari MD  
[address] [place] [address] ([facility]) Appt Note: Follow up [phone]  
[phone] [address] [facility] [address] [place] [id number] Upcoming Health Maintenance Date Due DTaP/Tdap/Td series (1 - Tdap) 9/21/2011 Influenza Age 5 to Adult 8/1/2018 HEMOGLOBIN A1C Q6M 12/13/2018 FOOT EXAM Q1 3/22/2019 MEDICARE YEARLY EXAM 3/23/2019 MICROALBUMIN Q1 3/29/2019 EYE EXAM RETINAL OR DILATED Q1 3/29/2019 LIPID PANEL Q1 6/13/2019 GLAUCOMA SCREENING Q2Y 3/29/2020 Allergies as of 6/25/2018  Review Complete On: 6/25/2018 By: Fermín Rascon MD  
  
 Severity Noted Reaction Type Reactions Bees [Hymenoptera Allergenic Extract] High 07/23/2012   Systemic Anaphylaxis Lipitor [Atorvastatin] High 06/13/2011   Systemic Myalgia Betadine [Povidone-iodine]  08/17/2012    Other (comments) Abdominal swelling, one incident in 1977. Has had since without a reaction. Darvon [Propoxyphene]  02/01/2011   Systemic Nausea and Vomiting Griseofulvin  02/01/2011    Nausea and Vomiting Pollen Extracts  04/18/2018    Cough Current Immunizations  Reviewed on 4/27/2015 Name Date Influenza High Dose Vaccine PF 8/27/2017 Influenza Vaccine 8/28/2013 Pneumococcal Conjugate (PCV-13) 4/27/2015 TD Vaccine 9/20/2011  6:32 PM  
 ZZZ-RETIRED (DO NOT USE) Pneumococcal Vaccine (Unspecified Type) 10/1/2006 Zoster Vaccine, Live 6/30/2004 Not reviewed this visit You Were Diagnosed With   
  
 Codes Comments (HFpEF) heart failure with preserved ejection fraction ([facility]    -  Primary ICD-10-CM: I50.30 ICD-9-CM: 428.9 S/P TAVR (transcatheter aortic valve replacement)     ICD-10-CM: F99.1 ICD-9-CM: V43.3 Vitals BP Pulse Temp Resp Height(growth percentile) Weight(growth percentile) 133/59 (BP 1 Location: Right arm, BP Patient Position: Sitting) 64 98 °F (36.7 °C) (Oral) 18 5' 6\" (1.676 m) 193 lb (87.5 kg) SpO2 BMI OB Status Smoking Status 96% 31.15 kg/m2 Postmenopausal Former Smoker Vitals History BMI and BSA Data Body Mass Index Body Surface Area  
 31.15 kg/m 2 2.02 m 2 Preferred Pharmacy Pharmacy Name Phone [address] [facility], [address] [place] Your Updated Medication List  
  
   
This list is accurate as of 18  1:53 PM.  Always use your most recent med list.  
  
  
  
  
 apixaban 5 mg tablet Commonly known as:  Margarie Sd Take 1 Tab by mouth two (2) times a day. aspirin 81 mg chewable tablet Take 1 Tab by mouth daily. azelastine 137 mcg (0.1 %) nasal spray Commonly known as:  ASTELIN  
2 Sprays by Both Nostrils route two (2) times a day. Use in each nostril as directed  
  
 bumetanide 1 mg tablet Commonly known as:  Brett Pindall Take 1 Tab by mouth two (2) times a day. COD LIVER OIL PO Take 1,000 mg by mouth daily. glucose blood VI test strips strip Commonly known as:  ACCU-CHEK ELBERT PLUS TEST STRP Check bs daily. Dx 250.00  
  
 losartan 50 mg tablet Commonly known as:  COZAAR Take 1 Tab by mouth daily. Indications: hypertension  
  
 magnesium oxide 400 mg tablet Commonly known as:  MAG-OX Take 1 Tab by mouth two (2) times a day. metFORMIN 1,000 mg tablet Commonly known as:  GLUCOPHAGE  
TAKE 1 TABLET BY MOUTH TWO  TIMES DAILY potassium chloride SR 20 mEq tablet Commonly known as:  K-TAB Take 1 Tab by mouth two (2) times a day. rosuvastatin 5 mg tablet Commonly known as:  CRESTOR  
TAKE 1 TABLET BY MOUTH  DAILY  
  
 spironolactone 25 mg tablet Commonly known as:  ALDACTONE Take 1 Tab by mouth daily. VITAMIN B-12 PO Take 1,500 mg by mouth daily. VITAMIN D3 2,000 unit Tab Generic drug:  cholecalciferol (vitamin D3) Take 1 Tab by mouth daily (after lunch). Prescriptions Sent to Pharmacy Refills  
 azelastine (ASTELIN) 137 mcg (0.1 %) nasal spray 3 Si Sprays by Both Nostrils route two (2) times a day.  Use in each nostril as directed Class: Normal  
 Pharmacy: [address]  #: [phone] Route: Both Nostrils Follow-up Instructions Return in about 2 months (around 8/25/2018) for chf.  
  
To-Do List   
 07/09/2018 8:30 AM  
  Appointment with [address] at [address] [place] [address] ([phone])  
  
 07/13/2018 8:30 AM  
  Appointment with [address] at [address] [place] [address] ([phone])  
  
 07/16/2018 8:30 AM  
  Appointment with [address] at [address] [place] [address] ([phone])  
  
 07/20/2018 8:30 AM  
  Appointment with [address] at [address] [place] [address] ([phone])  
  
 07/23/2018 8:30 AM  
  Appointment with [address] at [address] [place] [address] ([phone])  
  
 07/27/2018 8:30 AM  
  Appointment with [address] at [address] [place] [address] ([phone])  
  
 07/30/2018 8:30 AM  
  Appointment with [address] at [address] [place] [address] ([phone])  
  
 08/03/2018 8:30 AM  
  Appointment with [address] at [address] [place] [address] ([phone]) [facility] SERVICES! Dear Blas Mcneal: 
Thank you for requesting a InfiKno account. Our records indicate that you already have an active InfiKno account. You can access your account anytime at [email or web address] [email or web address] Did you know that you can access your hospital and ER discharge instructions at any time in InfiKno? You can also review all of your test results from your hospital stay or ER visit. Additional Information If you have questions, please visit the Frequently Asked Questions section of the InfiKno website at [email or web address] on the [email or web address] Remember, InfiKno is NOT to be used for urgent needs. For medical emergencies, dial 911. Now available from your iPhone and Android! Please provide this summary of care documentation to your next provider. Your primary care clinician is listed as KEERTHI JENSEN. If you have any questions after today's visit, please call [phone].

## 2018-06-25 NOTE — TELEPHONE ENCOUNTER
6/25/2018 [facility]: Ms. Yousuf Garcia was in hospital June 12- [address] 15 with pacemaker placement June 14, 2018. I cancelled appointments for the next 2 weeks. Will return for next appointment.    Ben Eastman

## 2018-06-28 ENCOUNTER — APPOINTMENT (OUTPATIENT)
Dept: CARDIAC REHAB | Age: 83
End: 2018-06-28

## 2018-06-29 ENCOUNTER — APPOINTMENT (OUTPATIENT)
Dept: CARDIAC REHAB | Age: 83
End: 2018-06-29

## 2018-07-02 ENCOUNTER — APPOINTMENT (OUTPATIENT)
Dept: CARDIAC REHAB | Age: 83
End: 2018-07-02

## 2018-07-05 ENCOUNTER — APPOINTMENT (OUTPATIENT)
Dept: CARDIAC REHAB | Age: 83
End: 2018-07-05

## 2018-07-05 ENCOUNTER — OFFICE VISIT (OUTPATIENT)
Dept: CARDIOLOGY CLINIC | Age: 83
End: 2018-07-05

## 2018-07-05 VITALS
WEIGHT: 189.6 LBS | HEIGHT: 66 IN | SYSTOLIC BLOOD PRESSURE: 120 MMHG | RESPIRATION RATE: 16 BRPM | BODY MASS INDEX: 30.47 KG/M2 | HEART RATE: 60 BPM | DIASTOLIC BLOOD PRESSURE: 66 MMHG

## 2018-07-05 DIAGNOSIS — I35.0 NONRHEUMATIC AORTIC VALVE STENOSIS: ICD-10-CM

## 2018-07-05 DIAGNOSIS — I49.3 PVC (PREMATURE VENTRICULAR CONTRACTION): ICD-10-CM

## 2018-07-05 DIAGNOSIS — Z95.2 S/P TAVR (TRANSCATHETER AORTIC VALVE REPLACEMENT): ICD-10-CM

## 2018-07-05 DIAGNOSIS — E11.9 CONTROLLED TYPE 2 DIABETES MELLITUS WITHOUT COMPLICATION, WITHOUT LONG-TERM CURRENT USE OF INSULIN (HCC): ICD-10-CM

## 2018-07-05 DIAGNOSIS — J44.9 CHRONIC OBSTRUCTIVE PULMONARY DISEASE, UNSPECIFIED COPD TYPE (HCC): ICD-10-CM

## 2018-07-05 DIAGNOSIS — R06.89 DYSPNEA AND RESPIRATORY ABNORMALITIES: ICD-10-CM

## 2018-07-05 DIAGNOSIS — R06.00 DYSPNEA AND RESPIRATORY ABNORMALITIES: ICD-10-CM

## 2018-07-05 DIAGNOSIS — I77.3 FIBROMUSCULAR DYSPLASIA (HCC): ICD-10-CM

## 2018-07-05 DIAGNOSIS — E11.21 TYPE 2 DIABETES WITH NEPHROPATHY (HCC): ICD-10-CM

## 2018-07-05 DIAGNOSIS — I10 ESSENTIAL HYPERTENSION: ICD-10-CM

## 2018-07-05 DIAGNOSIS — I50.30 (HFPEF) HEART FAILURE WITH PRESERVED EJECTION FRACTION (HCC): Primary | ICD-10-CM

## 2018-07-05 NOTE — MR AVS SNAPSHOT
[address] [facility] [address] [place] 
[phone] Patient: Hernesto Burdick MRN: [id number] LQB:[birth year] Visit Information Date & Time Provider Department Dept. Phone Encounter #  
 7/5/2018  9:20 AM Adri Quintanilla MD [facility]ett [phone] [id number] Your Appointments 7/18/2018 11:30 AM  
PACEMAKER with PACEMAKERKIM  
[facility] ([facility]) Appt Note: 5 wk hosp fu and device check sll [address] [facility] [address] [place] [address] [facility] [address]  
[phone]  
  
   
 [address] [facility] [address] [facility] [address]  
  
    
 7/18/2018 11:40 AM  
ESTABLISHED PATIENT with Pierre Vazquez MD  
[facility] ([facility]) Appt Note: 5 wk hosp fu and device check sll [address] [facility] [address] [facility]  
[phone]  
  
   
 [address] [address]  
  
    
 8/14/2018  8:20 AM  
ESTABLISHED PATIENT with Adri Quintanilla MD  
[facility] ([address]) Appt Note: 6 wk f/u per Dr. Ivana Wilson [address] [place] [address]  [address] [facility]  
[address] [facility] [address]  
  
    
 8/30/2018 12:40 PM  
ROUTINE CARE with Brenda Cortes MD  
[facility] [address]) Appt Note: R F/U  
 [place] [address] [facility] [address] [facility] [address]  
[address] [address]  
  
    
 9/25/2018  9:20 AM  
Any with Tawny Snider MD  
[address] [facility] ([address]) Appt Note: Follow up [address] [facility] [address] [facility] [address]  
  
   
 [address] [facility] [address] [phone] Upcoming Health Maintenance  Date Due  
 DTaP/Tdap/Td series (1 - Tdap) 9/21/2011 Influenza Age 5 to Adult 8/1/2018 HEMOGLOBIN A1C Q6M 12/13/2018 FOOT EXAM Q1 3/22/2019 MEDICARE YEARLY EXAM 3/23/2019 MICROALBUMIN Q1 3/29/2019 EYE EXAM RETINAL OR DILATED Q1 3/29/2019 LIPID PANEL Q1 6/13/2019 GLAUCOMA SCREENING Q2Y 3/29/2020 Allergies as of 7/5/2018  Review Complete On: 7/5/2018 By: Viral Villegas Severity Noted Reaction Type Reactions Bees [Hymenoptera Allergenic Extract] High 07/23/2012   Systemic Anaphylaxis Lipitor [Atorvastatin] High 06/13/2011   Systemic Myalgia Betadine [Povidone-iodine]  08/17/2012    Other (comments) Abdominal swelling, one incident in 1977. Has had since without a reaction. Darvon [Propoxyphene]  02/01/2011   Systemic Nausea and Vomiting Griseofulvin  02/01/2011    Nausea and Vomiting Pollen Extracts  04/18/2018    Cough Current Immunizations  Reviewed on 4/27/2015 Name Date Influenza High Dose Vaccine PF 8/27/2017 Influenza Vaccine 8/28/2013 Pneumococcal Conjugate (PCV-13) 4/27/2015 TD Vaccine 9/20/2011  6:32 PM  
 ZZZ-RETIRED (DO NOT USE) Pneumococcal Vaccine (Unspecified Type) 10/1/2006 Zoster Vaccine, Live 6/30/2004 Not reviewed this visit You Were Diagnosed With   
  
 Codes Comments (HFpEF) heart failure with preserved ejection fraction ([facility] [address] [facility]    -  Primary ICD-10-CM: I50.30 ICD-9-CM: 428.9 PVC (premature ventricular contraction)     ICD-10-CM: I49.3 ICD-9-CM: 427.69 Essential hypertension     ICD-10-CM: I10 
ICD-9-CM: 401.9 Vitals BP Pulse Resp Height(growth percentile) Weight(growth percentile) BMI  
 120/66 (BP 1 Location: Right arm, BP Patient Position: Sitting) 60 16 5' 6\" (1.676 m) 189 lb 9.6 oz (86 kg) 30.6 kg/m2 OB Status Smoking Status Postmenopausal Former Smoker Vitals History BMI and BSA Data  Body Mass Index Body Surface Area  
 30.6 kg/m 2 2 m 2  
  
  
 Preferred Pharmacy Pharmacy Name Phone 305 [facility], [address] [place] Your Updated Medication List  
  
   
This list is accurate as of 7/5/18  9:47 AM.  Always use your most recent med list.  
  
  
  
  
 apixaban 5 mg tablet Commonly known as:  [place] Take 1 Tab by mouth two (2) times a day. aspirin 81 mg chewable tablet Take 1 Tab by mouth daily. azelastine 137 mcg (0.1 %) nasal spray Commonly known as:  ASTELIN  
2 Sprays by Both Nostrils route two (2) times a day. Use in each nostril as directed  
  
 bumetanide 1 mg tablet Commonly known as:  Lai Shaka Take 1 Tab by mouth two (2) times a day. COD LIVER OIL PO Take 1,000 mg by mouth daily. glucose blood VI test strips strip Commonly known as:  ACCU-CHEK ELBERT PLUS TEST STRP Check bs daily. Dx 250.00  
  
 losartan 50 mg tablet Commonly known as:  COZAAR Take 1 Tab by mouth daily. Indications: hypertension  
  
 magnesium oxide 400 mg tablet Commonly known as:  MAG-OX Take 1 Tab by mouth two (2) times a day. metFORMIN 1,000 mg tablet Commonly known as:  GLUCOPHAGE  
TAKE 1 TABLET BY MOUTH TWO  TIMES DAILY potassium chloride SR 20 mEq tablet Commonly known as:  K-TAB Take 1 Tab by mouth two (2) times a day. rosuvastatin 5 mg tablet Commonly known as:  CRESTOR  
TAKE 1 TABLET BY MOUTH  DAILY  
  
 spironolactone 25 mg tablet Commonly known as:  ALDACTONE Take 1 Tab by mouth daily. VITAMIN B-12 PO Take 1,500 mg by mouth daily. VITAMIN D3 2,000 unit Tab Generic drug:  cholecalciferol (vitamin D3) Take 1 Tab by mouth daily (after lunch).   
  
  
  
  
To-Do List   
 07/09/2018 8:30 AM  
  Appointment with [address] [place] [address] at [address] [place] [address] ([phone])  
  
 07/13/2018 8:30 AM  
  Appointment with [address] [place] [address] at [address] [place] [address] ([phone])  
  
 07/16/2018 8:30 AM  
 Appointment with [address] at [address] [place] [address] ([phone])  
  
 07/20/2018 8:30 AM  
  Appointment with [address] at [address] [place] [address] ([phone])  
  
 07/23/2018 8:30 AM  
  Appointment with [address] at [address] [place] [address] ([phone])  
  
 07/27/2018 8:30 AM  
  Appointment with [address] at [address] [place] [address] ([phone])  
  
 07/30/2018 8:30 AM  
  Appointment with [address] at [address] [place] [address] ([phone])  
  
 08/03/2018 8:30 AM  
  Appointment with [address] at [address] [place] [address] ([phone]) Introducing [facility]! Dear Valentin Keene: 
Thank you for requesting a Volley account. Our records indicate that you already have an active Volley account. You can access your account anytime at [email or web address] Did you know that you can access your hospital and ER discharge instructions at any time in Volley? You can also review all of your test results from your hospital stay or ER visit. Additional Information If you have questions, please visit the Frequently Asked Questions section of the Volley website at [email or web address] Remember, Volley is NOT to be used for urgent needs. For medical emergencies, dial 911. Now available from your iPhone and Android! Please provide this summary of care documentation to your next provider. Your primary care clinician is listed as KEERTHI JENSEN. If you have any questions after today's visit, please call [phone].

## 2018-07-05 NOTE — PROGRESS NOTES
[facility] Progress Note    7/5/2018 8:15 AM  Admit Date: (Not on file)  Admit Diagnosis: Presence of cardiac pacemaker [Z95.0]      Sats 91-85%, pulse 60. At home. Concerned about her sats going below 90% but I jump around and does not stay. She will bring her pulxoximeter with her to the next appt to double check its accuracy. Has a little phlegm. No fevers or chills. 95% on ours today. Doing well, edema is gone and wearing the compression stockings. Does not want to go through PT if it is going to aggravate her back. 4 weeks post pacer now. Thinks her cough is more persistent and her voice is changing. She is using her nasal spray again. Doing fine with her pacemaker. No dizziness unless changing position too fast.   Using her cane at times. Bp is better. Just got her license renewed last week. Assessment/Plan     1. HTN - slightly elevated on cozaar 50mg daily, cont spironolactone, at goal now  2. TRENTON- history of adhesions, stable post renal artery bypass  -FMD in 1977, bypassed in 1977  3. DM type 2 - on metformin, has severe peripheral neuropathy, followed by Dr. Maeve Packer, A1C 7.6%  4. AS s/p TAVR - stable by TTE in 5/18   5. Hyperlipidemia- LDL 19 on crestor    6. Vit D deficiency- at goal on supplementation   7. CINDA- uses CPAP nightly   8. Junctional rhythm/bradycardia- SSS s/p pacemaker   9. Hx of crushed vocal cords - stable hoarseness  10. Acute diastolic heart failure - LVEF 55-60%, NYHA Class II   -no DVT by duplex, no edema today, on bumex now  -ordered one time home health visit at discharge and 6 minute walk  11. Hypokalemia - stable now  12. Advanced directives -needs a copy of her adv directives. She has previously been DNR. Full Code now.    13. Hypoxia- probably multifactorial, COPD, etc. Will watch for now     TTE 5/18 - LVEF 55 % to 60 %, no WMA, mod dilated LA, mild MS, s/p TAVR well seated elevated gradient  Aortic Valve:   AV Vmax was 3.2 m/s.  AV meanPG was 22.7 mmHg. PW measurements: Aortic Valve:   RACHEL (VTI) was 1.3 cm2. TAVR 4/18 - Successful transcatheter aortic valve replacement with no significant paravalvular leak. Post-deployment aortography showed the valve was in good position with no aortic regurgitation  Cardiac Cath 11/17 - RACHEL 0.84 patient appeared to be in AF during case, but most likely was frequent atrial ectopy.  No mitral stenosis.  LV gram not done.  Normal coronaries.  Thermo output was 6.9, assumed Emilie was 4.65; the 6.9 was used by computer in RACHEL calculation, so RACHEL may be overestimated.  LV known to be normal by echo.  Peak to peak gradient 63. TTE 1/14 EF 65-60%, mod cLVH 1.7, grI DD, mild-mod as 25/20 mild MS 10/5, triv effusion  TTE 7/14 EF 65-70%, mild cLVH, Mod AS 42/24, mild MS 10/5   TTE 10/13 LVEF 65 %, no WMA,  severe cLVH, abnormal left ventricular relaxation (grade 1 diastolic dysfunction), aortic valve leaflets exhibited marked calcification and reduced mobility with moderate stenosis, valve mean gradient was 24 mmHg. 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 Grace Jefferson    Subjective: Dedra Mims denies chest pain, palpitations. Denies PND or orthopnea, mild ROBB present. Has non-productive cough, thinks it may be related to seasonal allergies. Denies fevers or chills. LE edema resolving. Objective:      Physical Exam:  Visit Vitals    /66 (BP 1 Location: Right arm, BP Patient Position: Sitting)    Pulse 60    Resp 16    Ht 5' 6\" (1.676 m)    Wt 189 lb 9.6 oz (86 kg)    BMI 30.6 kg/m2     General Appearance:  Well developed, well nourished, alert and oriented x 3, in no acute distress. Ears/Nose/Mouth/Throat:   Hearing grossly normal.         Neck: Supple. Chest:   Lungs clear to auscultation bilaterally. Cardiovascular:  Regular rate and rhythm, S1, S2 normal, 2/6 KATHERINE    Abdomen:   Soft, non-tender, bowel sounds are active. Extremities: Trace left LE edema, right leg without edema     Skin: Warm and dry. Telemetry: junctional rhythm, RBBB, bradycardia in the 50s, PVCs    Data Review:   Labs:    No results found for this or any previous visit (from the past 24 hour(s)). Current Outpatient Prescriptions   Medication Sig    azelastine (ASTELIN) 137 mcg (0.1 %) nasal spray 2 Sprays by Both Nostrils route two (2) times a day. Use in each nostril as directed    aspirin 81 mg chewable tablet Take 1 Tab by mouth daily.  bumetanide (BUMEX) 1 mg tablet Take 1 Tab by mouth two (2) times a day.  potassium chloride SR (K-TAB) 20 mEq tablet Take 1 Tab by mouth two (2) times a day.  apixaban (ELIQUIS) 5 mg tablet Take 1 Tab by mouth two (2) times a day.  spironolactone (ALDACTONE) 25 mg tablet Take 1 Tab by mouth daily.  magnesium oxide (MAG-OX) 400 mg tablet Take 1 Tab by mouth two (2) times a day.  rosuvastatin (CRESTOR) 5 mg tablet TAKE 1 TABLET BY MOUTH  DAILY    metFORMIN (GLUCOPHAGE) 1,000 mg tablet TAKE 1 TABLET BY MOUTH TWO  TIMES DAILY    losartan (COZAAR) 50 mg tablet Take 1 Tab by mouth daily. Indications: hypertension    cholecalciferol, vitamin D3, (VITAMIN D3) 2,000 unit tab Take 1 Tab by mouth daily (after lunch).  CYANOCOBALAMIN, VITAMIN B-12, (VITAMIN B-12 PO) Take 1,500 mg by mouth daily.  COD LIVER OIL PO Take 1,000 mg by mouth daily.  glucose blood VI test strips (ACCU-CHEK ELBERT PLUS) strip Check bs daily. Dx 250.00     No current facility-administered medications for this visit.         Bob Overton MD  [facility] [address] [place] [address] [address] [address] [place] [address]  [phone]

## 2018-07-06 ENCOUNTER — APPOINTMENT (OUTPATIENT)
Dept: CARDIAC REHAB | Age: 83
End: 2018-07-06

## 2018-07-09 ENCOUNTER — TELEPHONE (OUTPATIENT)
Dept: CARDIAC REHAB | Age: 83
End: 2018-07-09

## 2018-07-09 ENCOUNTER — APPOINTMENT (OUTPATIENT)
Dept: CARDIAC REHAB | Age: 83
End: 2018-07-09

## 2018-07-09 NOTE — TELEPHONE ENCOUNTER
7/9/2018 [facility]: Ms. Greg Shirley called to cancel all future appointment's d/t lower back pains and does not wish to continue with program at this time, until this pain is resolved so no further injury occurs.    Keyon Suarez

## 2018-07-12 ENCOUNTER — APPOINTMENT (OUTPATIENT)
Dept: CARDIAC REHAB | Age: 83
End: 2018-07-12

## 2018-07-13 ENCOUNTER — APPOINTMENT (OUTPATIENT)
Dept: CARDIAC REHAB | Age: 83
End: 2018-07-13

## 2018-07-15 RX ORDER — LANOLIN ALCOHOL/MO/W.PET/CERES
400 CREAM (GRAM) TOPICAL 2 TIMES DAILY
Qty: 180 TAB | Refills: 3 | Status: SHIPPED | OUTPATIENT
Start: 2018-07-15 | End: 2018-07-18 | Stop reason: SDUPTHER

## 2018-07-16 ENCOUNTER — APPOINTMENT (OUTPATIENT)
Dept: CARDIAC REHAB | Age: 83
End: 2018-07-16

## 2018-07-18 ENCOUNTER — OFFICE VISIT (OUTPATIENT)
Dept: CARDIOLOGY CLINIC | Age: 83
End: 2018-07-18

## 2018-07-18 ENCOUNTER — CLINICAL SUPPORT (OUTPATIENT)
Dept: CARDIOLOGY CLINIC | Age: 83
End: 2018-07-18

## 2018-07-18 VITALS
BODY MASS INDEX: 31.18 KG/M2 | OXYGEN SATURATION: 95 % | DIASTOLIC BLOOD PRESSURE: 62 MMHG | HEIGHT: 66 IN | HEART RATE: 68 BPM | WEIGHT: 194 LBS | SYSTOLIC BLOOD PRESSURE: 140 MMHG | RESPIRATION RATE: 20 BRPM

## 2018-07-18 DIAGNOSIS — Z95.0 CARDIAC PACEMAKER IN SITU: Primary | ICD-10-CM

## 2018-07-18 DIAGNOSIS — Z95.0 S/P PLACEMENT OF CARDIAC PACEMAKER: Primary | ICD-10-CM

## 2018-07-18 RX ORDER — FLUTICASONE FUROATE AND VILANTEROL TRIFENATATE 100; 25 UG/1; UG/1
POWDER RESPIRATORY (INHALATION)
COMMUNITY
Start: 2018-05-25 | End: 2018-07-18

## 2018-07-18 RX ORDER — LANOLIN ALCOHOL/MO/W.PET/CERES
400 CREAM (GRAM) TOPICAL 2 TIMES DAILY
Qty: 180 TAB | Refills: 3 | Status: SHIPPED | OUTPATIENT
Start: 2018-07-18 | End: 2018-08-30 | Stop reason: ALTCHOICE

## 2018-07-18 NOTE — PROGRESS NOTES
Visit Vitals    /62 (BP 1 Location: Left arm, BP Patient Position: Sitting)    Pulse 68    Resp 20    Ht 5' 6\" (1.676 m)    Wt 194 lb (88 kg)    SpO2 95%    BMI 31.31 kg/m2

## 2018-07-19 ENCOUNTER — APPOINTMENT (OUTPATIENT)
Dept: CARDIAC REHAB | Age: 83
End: 2018-07-19

## 2018-07-20 ENCOUNTER — APPOINTMENT (OUTPATIENT)
Dept: CARDIAC REHAB | Age: 83
End: 2018-07-20

## 2018-07-21 LAB
CREATININE, EXTERNAL: 1.06
HBA1C MFR BLD HPLC: 6.8 %
LDL-C, EXTERNAL: 75
MICROALBUMIN UR TEST STR-MCNC: 16 MG/DL

## 2018-07-23 ENCOUNTER — APPOINTMENT (OUTPATIENT)
Dept: CARDIAC REHAB | Age: 83
End: 2018-07-23

## 2018-07-25 NOTE — CARDIO/PULMONARY
Yousuf Garcia  80 y.o. With diagnosis of TAVR, 04/13/18, attended phase II cardiac rehab for 1 session on 05/16/18. Ms. Rachel Bryan does not wish to continue due to back pain.   She has been discharged from our program.    Ninfa Harrington RN  7/25/2018

## 2018-07-26 ENCOUNTER — APPOINTMENT (OUTPATIENT)
Dept: CARDIAC REHAB | Age: 83
End: 2018-07-26

## 2018-07-27 ENCOUNTER — APPOINTMENT (OUTPATIENT)
Dept: CARDIAC REHAB | Age: 83
End: 2018-07-27

## 2018-07-30 ENCOUNTER — APPOINTMENT (OUTPATIENT)
Dept: CARDIAC REHAB | Age: 83
End: 2018-07-30

## 2018-07-30 ENCOUNTER — TELEPHONE (OUTPATIENT)
Dept: CARDIOLOGY CLINIC | Age: 83
End: 2018-07-30

## 2018-07-30 RX ORDER — AMOXICILLIN 500 MG/1
2000 CAPSULE ORAL ONCE
Qty: 4 CAP | Refills: 0 | Status: SHIPPED | OUTPATIENT
Start: 2018-07-30 | End: 2018-07-30

## 2018-07-30 NOTE — TELEPHONE ENCOUNTER
Pt states she is going to the dentist tomorrow 9/15/18 for a cleaning and states that Dentist told her to check in with cardiologist because she needs to take an antibiotic before going to the appt.    Phone: [phone]

## 2018-07-30 NOTE — TELEPHONE ENCOUNTER
Returned call to patient. Verified identity. Informed her of the following medication to be taken 1 hour prior to her dental procedure. Requested Prescriptions     Signed Prescriptions Disp Refills    amoxicillin (AMOXIL) 500 mg capsule 4 Cap 0     Sig: Take 4 Caps by mouth once for 1 dose. Indications: Take 2000mg 1 hour prior to dental procedure.      Authorizing Provider: Shiva Jhaveri     Ordering User: Raissa Melendez

## 2018-08-02 ENCOUNTER — APPOINTMENT (OUTPATIENT)
Dept: CARDIAC REHAB | Age: 83
End: 2018-08-02

## 2018-08-03 ENCOUNTER — APPOINTMENT (OUTPATIENT)
Dept: CARDIAC REHAB | Age: 83
End: 2018-08-03

## 2018-08-09 ENCOUNTER — APPOINTMENT (OUTPATIENT)
Dept: CARDIAC REHAB | Age: 83
End: 2018-08-09

## 2018-08-14 ENCOUNTER — OFFICE VISIT (OUTPATIENT)
Dept: CARDIOLOGY CLINIC | Age: 83
End: 2018-08-14

## 2018-08-14 VITALS
SYSTOLIC BLOOD PRESSURE: 120 MMHG | WEIGHT: 194.6 LBS | RESPIRATION RATE: 16 BRPM | HEART RATE: 90 BPM | BODY MASS INDEX: 31.27 KG/M2 | DIASTOLIC BLOOD PRESSURE: 80 MMHG | HEIGHT: 66 IN

## 2018-08-14 DIAGNOSIS — Z95.2 S/P TAVR (TRANSCATHETER AORTIC VALVE REPLACEMENT): ICD-10-CM

## 2018-08-14 DIAGNOSIS — I35.0 NONRHEUMATIC AORTIC VALVE STENOSIS: ICD-10-CM

## 2018-08-14 DIAGNOSIS — E11.21 TYPE 2 DIABETES WITH NEPHROPATHY (HCC): ICD-10-CM

## 2018-08-14 DIAGNOSIS — E78.00 PURE HYPERCHOLESTEROLEMIA: ICD-10-CM

## 2018-08-14 DIAGNOSIS — I49.3 PVC (PREMATURE VENTRICULAR CONTRACTION): ICD-10-CM

## 2018-08-14 DIAGNOSIS — R00.0 TACHYCARDIA: ICD-10-CM

## 2018-08-14 DIAGNOSIS — I05.0 MITRAL VALVE STENOSIS, UNSPECIFIED ETIOLOGY: ICD-10-CM

## 2018-08-14 DIAGNOSIS — E66.9 OBESITY (BMI 30.0-34.9): ICD-10-CM

## 2018-08-14 DIAGNOSIS — J44.9 CHRONIC OBSTRUCTIVE PULMONARY DISEASE, UNSPECIFIED COPD TYPE (HCC): ICD-10-CM

## 2018-08-14 DIAGNOSIS — I50.30 (HFPEF) HEART FAILURE WITH PRESERVED EJECTION FRACTION (HCC): Primary | ICD-10-CM

## 2018-08-14 DIAGNOSIS — I10 ESSENTIAL HYPERTENSION: ICD-10-CM

## 2018-08-14 DIAGNOSIS — I77.3 FIBROMUSCULAR DYSPLASIA (HCC): ICD-10-CM

## 2018-08-14 DIAGNOSIS — R53.83 FATIGUE, UNSPECIFIED TYPE: ICD-10-CM

## 2018-08-14 NOTE — PROGRESS NOTES
[facility] Progress Note    8/14/2018 8:15 AM  Admit Date: (Not on file)  Admit Diagnosis: Presence of cardiac pacemaker [Z95.0]    Not recovering as well as she would like. Has an oximeter now and sats seem to be unreliable. Laying on her back her O2 goes down to 90s. HR goes down to 60 with laying down. Heart rate will go up to 100 or so just with getting up to go to the bathroom. Checked her oximeter and it was reading the same as Dr. Emilio Kohli. Will have remote checks until next year. Has been walking more recently, and not using her walker. Had a few days of leg swelling after eating some salty stuff but better now. Wearing her compression stockings. Seems to be doing fine, but with chronic fatigue. Not bouncing back as quickly as she had hoped after all these procedures. She definitely thinks the pacer has helped her. Discussed the accelerometer. No dizziness unless changing position too fast.   Using her cane at times. Bp is better. Just got her license renewed last week. Assessment/Plan     1. HTN - slightly elevated on cozaar 50mg daily, cont spironolactone, at goal now  2. TRENTON- history of adhesions, stable post renal artery bypass  -FMD in 1977, bypassed in 1977  3. DM type 2 - on metformin, has severe peripheral neuropathy, followed by Dr. Sue Johnson, A1C 7.6%  4. AS s/p TAVR - stable by TTE in 5/18   5. Hyperlipidemia- LDL 19 on crestor    6. Vit D deficiency- at goal on supplementation   7. CINDA- uses CPAP nightly   8. Junctional rhythm/bradycardia- SSS s/p pacemaker   9. Hx of crushed vocal cords - stable hoarseness  10. Acute diastolic heart failure - LVEF 55-60%, NYHA Class II   -no DVT by duplex, no edema today, on bumex now  11. Hypokalemia - stable now  12. Advanced directives -needs a copy of her adv directives. She has previously been DNR. Full Code now.    13. Hypoxia- probably multifactorial, COPD, etc. Will watch for now     TTE 5/18 - LVEF 55 % to 60 %, no WMA, mod dilated LA, mild MS, s/p TAVR well seated elevated gradient  Aortic Valve:   AV Vmax was 3.2 m/s.  AV meanPG was 22.7 mmHg. PW measurements: Aortic Valve:   RACHEL (VTI) was 1.3 cm2. TAVR 4/18 - Successful transcatheter aortic valve replacement with no significant paravalvular leak. Post-deployment aortography showed the valve was in good position with no aortic regurgitation  Cardiac Cath 11/17 - RACHEL 0.84 patient appeared to be in AF during case, but most likely was frequent atrial ectopy.  No mitral stenosis.  LV gram not done.  Normal coronaries.  Thermo output was 6.9, assumed Emilie was 4.65; the 6.9 was used by computer in RACHEL calculation, so RACHEL may be overestimated.  LV known to be normal by echo.  Peak to peak gradient 63. TTE 1/14 EF 65-60%, mod cLVH 1.7, grI DD, mild-mod as 25/20 mild MS 10/5, triv effusion  TTE 7/14 EF 65-70%, mild cLVH, Mod AS 42/24, mild MS 10/5   TTE 10/13 LVEF 65 %, no WMA,  severe cLVH, abnormal left ventricular relaxation (grade 1 diastolic dysfunction), aortic valve leaflets exhibited marked calcification and reduced mobility with moderate stenosis, valve mean gradient was 24 mmHg. 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 Milton Mckenzie    Subjective: Bernis Drown denies chest pain, palpitations. Denies PND or orthopnea, mild ROBB present. Has non-productive cough, thinks it may be related to seasonal allergies. Denies fevers or chills. LE edema resolving.       Objective:      Physical Exam:  Visit Vitals    /80 (BP 1 Location: Right arm, BP Patient Position: Sitting)    Pulse 90    Resp 16    Ht 5' 6\" (1.676 m)    Wt 194 lb 9.6 oz (88.3 kg)    BMI 31.41 kg/m2 General Appearance:  Well developed, well nourished, alert and oriented x 3, in no acute distress. Ears/Nose/Mouth/Throat:   Hearing grossly normal.         Neck: Supple. Chest:   Lungs clear to auscultation bilaterally. Cardiovascular:  Regular rate and rhythm, S1, S2 normal, 2/6 KATHERINE    Abdomen:   Soft, non-tender, bowel sounds are active. Extremities: Trace left LE edema, right leg without edema     Skin: Warm and dry. Telemetry: junctional rhythm, RBBB, bradycardia in the 50s, PVCs    Data Review:   Labs:    No results found for this or any previous visit (from the past 24 hour(s)). Current Outpatient Prescriptions   Medication Sig    magnesium oxide (MAG-OX) 400 mg tablet Take 1 Tab by mouth two (2) times a day.  azelastine (ASTELIN) 137 mcg (0.1 %) nasal spray 2 Sprays by Both Nostrils route two (2) times a day. Use in each nostril as directed    aspirin 81 mg chewable tablet Take 1 Tab by mouth daily.  bumetanide (BUMEX) 1 mg tablet Take 1 Tab by mouth two (2) times a day.  potassium chloride SR (K-TAB) 20 mEq tablet Take 1 Tab by mouth two (2) times a day.  apixaban (ELIQUIS) 5 mg tablet Take 1 Tab by mouth two (2) times a day.  spironolactone (ALDACTONE) 25 mg tablet Take 1 Tab by mouth daily.  rosuvastatin (CRESTOR) 5 mg tablet TAKE 1 TABLET BY MOUTH  DAILY    metFORMIN (GLUCOPHAGE) 1,000 mg tablet TAKE 1 TABLET BY MOUTH TWO  TIMES DAILY    losartan (COZAAR) 50 mg tablet Take 1 Tab by mouth daily. Indications: hypertension    cholecalciferol, vitamin D3, (VITAMIN D3) 2,000 unit tab Take 1 Tab by mouth daily (after lunch).  CYANOCOBALAMIN, VITAMIN B-12, (VITAMIN B-12 PO) Take 1,500 mg by mouth daily.  COD LIVER OIL PO Take 1,000 mg by mouth daily.  glucose blood VI test strips (ACCU-CHEK ELBERT PLUS) strip Check bs daily. Dx 250.00     No current facility-administered medications for this visit.         Marylen Irving, MD  [facility] [place] [facility], [address] [facility] [address]  [address] [facility], [address] [place] [address]  [phone]

## 2018-08-14 NOTE — MR AVS SNAPSHOT
[address] [facility] [address] [facility] 
[phone] Patient: Alejandra Petersen MRN: [id number] SDN:[id number] Visit Information Date & Time Provider Department Dept. Phone Encounter #  
 8/14/2018  8:20 AM Ruthie Tillman MD [facility] Lisa Gordon [phone] [id number] Your Appointments 8/30/2018 12:40 PM  
ROUTINE CARE with Neris Kline MD  
[facility]) Appt Note: R F/U  
 [place] [address] [facility] [address]  
[address] [address]  
  
    
 9/25/2018  9:20 AM  
Any with Shae Cabrera MD  
[address] [place] ([facility]) Appt Note: Follow up [address] [phone]  
[phone]  
  
   
 [address] [place] [phone]  
  
    
 12/12/2018  8:40 AM  
ESTABLISHED PATIENT with Ruthie Tillman MD  
[facility] ([facility]) Appt Note: 4 mo f/u per Dr. Joshua Duarte [address]  [address] [facility] [address] [facility] [address]  
[phone]  
  
   
 [address]  [address] [place]  
  
    
 7/24/2019 10:00 AM  
PACEMAKER with PACEMAKER, STFRMARIAELENA  
[facility] ([place] SCHEDULING) Appt Note: med/thres/stf/cl [address] [place] [address] [address]  
[phone]  
  
   
 [address] [place] [address] [address] [facility]  
  
    
  
 10/18/2018 11:45 AM  
REMOTE OFFICE VISIT with Elle Eaton [facility] ([facility] SCHEDULING) Appt Note: cl/pm/stf  
 [address] [facility] [address] [place] [address] [address]  
[phone]  
  
   
 [address] [facility] [place] [address] [place] [phone]  
  
    
 1/22/2019 10:30 AM  
REMOTE OFFICE VISIT with Elle Eaton  
 [facility] ([facility] SCHEDULING) Appt Note: cl/pm/stf  
 [address] [facility] [place] [address]  
[phone]  
  
    
 4/23/2019 10:15 AM  
REMOTE OFFICE VISIT with Bran Wu [facility] ([facility] SCHEDULING) Appt Note: cl/pm/stf  
 [address] [facility] [place] [address]  
[phone] Upcoming Health Maintenance Date Due DTaP/Tdap/Td series (1 - Tdap) 9/21/2011 Influenza Age 5 to Adult 8/1/2018 HEMOGLOBIN A1C Q6M 1/21/2019 FOOT EXAM Q1 3/22/2019 MEDICARE YEARLY EXAM 3/23/2019 EYE EXAM RETINAL OR DILATED Q1 3/29/2019 MICROALBUMIN Q1 7/21/2019 LIPID PANEL Q1 7/21/2019 GLAUCOMA SCREENING Q2Y 3/29/2020 Allergies as of 8/14/2018  Review Complete On: 7/18/2018 By: Elin Lagunas MD  
  
 Severity Noted Reaction Type Reactions Bees [Hymenoptera Allergenic Extract] High 07/23/2012   Systemic Anaphylaxis Lipitor [Atorvastatin] High 06/13/2011   Systemic Myalgia Betadine [Povidone-iodine]  08/17/2012    Other (comments) Abdominal swelling, one incident in 1977. Has had since without a reaction. Darvon [Propoxyphene]  02/01/2011   Systemic Nausea and Vomiting Griseofulvin  02/01/2011    Nausea and Vomiting Pollen Extracts  04/18/2018    Cough Current Immunizations  Reviewed on 4/27/2015 Name Date Influenza High Dose Vaccine PF 8/27/2017 Influenza Vaccine 8/28/2013 Pneumococcal Conjugate (PCV-13) 4/27/2015 TD Vaccine 9/20/2011  6:32 PM  
 ZZZ-RETIRED (DO NOT USE) Pneumococcal Vaccine (Unspecified Type) 10/1/2006 Zoster Vaccine, Live 6/30/2004 Not reviewed this visit Vitals BP Pulse Resp Height(growth percentile) Weight(growth percentile) BMI  
 120/80 (BP 1 Location: Right arm, BP Patient Position: Sitting) 90 16 5' 6\" (1.676 m) 194 lb 9.6 oz (88.3 kg) 31.41 kg/m2 OB Status Smoking Status Postmenopausal Former Smoker Vitals History BMI and BSA Data Body Mass Index Body Surface Area  
 31.41 kg/m 2 2.03 m 2 Preferred Pharmacy Pharmacy Name Phone [id number] [phone] Your Updated Medication List  
  
   
This list is accurate as of 8/14/18  8:45 AM.  Always use your most recent med list.  
  
  
  
  
 apixaban 5 mg tablet Commonly known as:  Angelica Josie Take 1 Tab by mouth two (2) times a day. aspirin 81 mg chewable tablet Take 1 Tab by mouth daily. azelastine 137 mcg (0.1 %) nasal spray Commonly known as:  ASTELIN  
2 Sprays by Both Nostrils route two (2) times a day. Use in each nostril as directed  
  
 bumetanide 1 mg tablet Commonly known as:  Allegra Peaches Take 1 Tab by mouth two (2) times a day. COD LIVER OIL PO Take 1,000 mg by mouth daily. glucose blood VI test strips strip Commonly known as:  ACCU-CHEK ELBERT PLUS TEST STRP Check bs daily. Dx 250.00  
  
 losartan 50 mg tablet Commonly known as:  COZAAR Take 1 Tab by mouth daily. Indications: hypertension  
  
 magnesium oxide 400 mg tablet Commonly known as:  MAG-OX Take 1 Tab by mouth two (2) times a day. metFORMIN 1,000 mg tablet Commonly known as:  GLUCOPHAGE  
TAKE 1 TABLET BY MOUTH TWO  TIMES DAILY potassium chloride SR 20 mEq tablet Commonly known as:  K-TAB Take 1 Tab by mouth two (2) times a day. rosuvastatin 5 mg tablet Commonly known as:  CRESTOR  
TAKE 1 TABLET BY MOUTH  DAILY  
  
 spironolactone 25 mg tablet Commonly known as:  ALDACTONE Take 1 Tab by mouth daily. VITAMIN B-12 PO Take 1,500 mg by mouth daily. VITAMIN D3 2,000 unit Tab Generic drug:  cholecalciferol (vitamin D3) Take 1 Tab by mouth daily (after lunch). Introducing [facility]! Dear Je Dent: 
Thank you for requesting a [employer] account.   Our records indicate that you already have an active [employer] account. You can access your account anytime at [email or web address]. [email or web address] Did you know that you can access your hospital and ER discharge instructions at any time in [employer]? You can also review all of your test results from your hospital stay or ER visit. Additional Information If you have questions, please visit the Frequently Asked Questions section of the [employer] website at [email or web address] Remember, [employer] is NOT to be used for urgent needs. For medical emergencies, dial 911. Now available from your iPhone and Android! Please provide this summary of care documentation to your next provider. Your primary care clinician is listed as KEERTHI JENSEN. If you have any questions after today's visit, please call [phone].

## 2018-08-30 ENCOUNTER — OFFICE VISIT (OUTPATIENT)
Dept: INTERNAL MEDICINE CLINIC | Age: 83
End: 2018-08-30

## 2018-08-30 ENCOUNTER — HOSPITAL ENCOUNTER (OUTPATIENT)
Dept: LAB | Age: 83
Discharge: HOME | End: 2018-08-30
Payer: MEDICARE

## 2018-08-30 VITALS
BODY MASS INDEX: 31.5 KG/M2 | HEIGHT: 66 IN | DIASTOLIC BLOOD PRESSURE: 54 MMHG | SYSTOLIC BLOOD PRESSURE: 122 MMHG | RESPIRATION RATE: 20 BRPM | TEMPERATURE: 98.1 F | OXYGEN SATURATION: 97 % | HEART RATE: 86 BPM | WEIGHT: 196 LBS

## 2018-08-30 DIAGNOSIS — I50.30 (HFPEF) HEART FAILURE WITH PRESERVED EJECTION FRACTION (HCC): ICD-10-CM

## 2018-08-30 DIAGNOSIS — R19.7 DIARRHEA, UNSPECIFIED TYPE: Primary | ICD-10-CM

## 2018-08-30 DIAGNOSIS — Z95.2 S/P TAVR (TRANSCATHETER AORTIC VALVE REPLACEMENT): ICD-10-CM

## 2018-08-30 PROCEDURE — 36415 COLL VENOUS BLD VENIPUNCTURE: CPT

## 2018-08-30 PROCEDURE — 85027 COMPLETE CBC AUTOMATED: CPT

## 2018-08-30 NOTE — PROGRESS NOTES
Chief Complaint Patient presents with  Follow Up Chronic Condition  
  adominal pain with diarrhea since this morning, follow up on pacemaker(valve replacement), discuss taking tylenol for her back Reviewed record in preparation for visit and have obtained necessary documentation. Identified pt with two pt identifiers(name and ). Health Maintenance Due Topic  DTaP/Tdap/Td series (1 - Tdap)  Influenza Age 5 to Adult Chief Complaint Patient presents with  Follow Up Chronic Condition  
  adominal pain with diarrhea since this morning, follow up on pacemaker(valve replacement), discuss taking tylenol for her back Wt Readings from Last 3 Encounters:  
18 196 lb (88.9 kg) 18 194 lb 9.6 oz (88.3 kg) 18 194 lb (88 kg) Temp Readings from Last 3 Encounters:  
18 98.1 °F (36.7 °C) (Oral) 18 98 °F (36.7 °C) (Oral) 06/15/18 98.7 °F (37.1 °C) BP Readings from Last 3 Encounters:  
18 122/54  
18 120/80  
18 140/62 Pulse Readings from Last 3 Encounters:  
18 86  
18 90  
18 68 Learning Assessment: 
:  
 
Learning Assessment 10/27/2014 2013 PRIMARY LEARNER Patient Patient HIGHEST LEVEL OF EDUCATION - PRIMARY LEARNER  > 4 YEARS OF COLLEGE -  
BARRIERS PRIMARY LEARNER NONE -  
CO-LEARNER CAREGIVER No - PRIMARY LANGUAGE ENGLISH ENGLISH  NEED No -  
LEARNER PREFERENCE PRIMARY READING READING  
  - DEMONSTRATION  
ANSWERED BY patient patient RELATIONSHIP SELF SELF Depression Screening: 
:  
 
PHQ over the last two weeks 2018 Little interest or pleasure in doing things Not at all Feeling down, depressed, irritable, or hopeless Not at all Total Score PHQ 2 0 Trouble falling or staying asleep, or sleeping too much - Feeling tired or having little energy - Poor appetite, weight loss, or overeating -  
 Feeling bad about yourself - or that you are a failure or have let yourself or your family down - Trouble concentrating on things such as school, work, reading, or watching TV - Moving or speaking so slowly that other people could have noticed; or the opposite being so fidgety that others notice - Thoughts of being better off dead, or hurting yourself in some way -  
PHQ 9 Score - How difficult have these problems made it for you to do your work, take care of your home and get along with others - Fall Risk Assessment: 
:  
 
Fall Risk Assessment, last 12 mths 8/30/2018 Able to walk? Yes Fall in past 12 months? No  
 
 
Abuse Screening: 
:  
 
Abuse Screening Questionnaire 8/30/2018 9/19/2017 4/24/2014 Do you ever feel afraid of your partner? N N N Are you in a relationship with someone who physically or mentally threatens you? N N N Is it safe for you to go home? Kandyyu Jackson Coordination of Care Questionnaire: 
:  
 
1) Have you been to an emergency room, urgent care clinic since your last visit? no  
Hospitalized since your last visit? no          
 
2) Have you seen or consulted any other health care providers outside of [address] [place] [address] since your last visit? yes  (Include any pap smears or colon screenings in this section.) 3) Do you have an Advance Directive on file? yes 4) Are you interested in receiving information on Advance Directives? No 
 
 
Patient is accompanied by self I have received verbal consent from Shanna Milan to discuss any/all medical information while they are present in the room.

## 2018-08-30 NOTE — PROGRESS NOTES
HISTORY OF PRESENT ILLNESS Patty Jara is a 80 y.o. female. HPI AVR followed by pacemaker placement earlier this year when readmitted for CHF. Weight is up but blames on eating too much. No LE edema. Mild intermittent sob but just when walking too fast.  Sleeps on baseline 1 pillow. Has not started cardiac rehab - worried because went in for 1 visit and had increased back pain. Some stretching 2-3 times a week but otherwise not moving a lot. Leaves house approx 1 day a week to play bridge. Rides cart around grocery store. Continued low back pain. Wonders if she can take Tylenol with her Eliquis. Son has a TENS unit. Got hot yesterday playing bridge yesterday. Started feeling poorly, played some hands poorly. Left early and went home. [place] dizzy when she got home and slept for 2 hours. Partner who is healthy and in her 76s felt similar. Awoke today and dizziness is gone but then had 1 episode of diarrhea mid morning with a couple smaller episodes since. Had diarrhea last week as well but then resolved. Denies red stools but they are darker brown (but not black). Mild abdominal cramping with mild gas. No fevers or chills. Current Outpatient Prescriptions:  
  azelastine (ASTELIN) 137 mcg (0.1 %) nasal spray, 2 Sprays by Both Nostrils route two (2) times a day. Use in each nostril as directed, Disp: 3 Bottle, Rfl: 3 
  aspirin 81 mg chewable tablet, Take 1 Tab by mouth daily. , Disp: 90 Tab, Rfl: 3 
  bumetanide (BUMEX) 1 mg tablet, Take 1 Tab by mouth two (2) times a day., Disp: 60 Tab, Rfl: 0 
  potassium chloride SR (K-TAB) 20 mEq tablet, Take 1 Tab by mouth two (2) times a day., Disp: 60 Tab, Rfl: 0 
  apixaban (ELIQUIS) 5 mg tablet, Take 1 Tab by mouth two (2) times a day., Disp: 60 Tab, Rfl: 0 
  spironolactone (ALDACTONE) 25 mg tablet, Take 1 Tab by mouth daily. , Disp: 30 Tab, Rfl: 0 
  rosuvastatin (CRESTOR) 5 mg tablet, TAKE 1 TABLET BY MOUTH  DAILY, Disp: 90 Tab, Rfl: 3 
  metFORMIN (GLUCOPHAGE) 1,000 mg tablet, TAKE 1 TABLET BY MOUTH TWO  TIMES DAILY, Disp: 180 Tab, Rfl: 3 
  losartan (COZAAR) 50 mg tablet, Take 1 Tab by mouth daily. Indications: hypertension, Disp: 30 Tab, Rfl: 1   cholecalciferol, vitamin D3, (VITAMIN D3) 2,000 unit tab, Take 1 Tab by mouth daily (after lunch). , Disp: , Rfl:  
  CYANOCOBALAMIN, VITAMIN B-12, (VITAMIN B-12 PO), Take 1,500 mg by mouth daily. , Disp: , Rfl:  
  COD LIVER OIL PO, Take 1,000 mg by mouth daily. , Disp: , Rfl:  
  glucose blood VI test strips (ACCU-CHEK ELBERT PLUS) strip, Check bs daily. Dx 250.00, Disp: 1 Package, Rfl: 11 Visit Vitals  /54  Pulse 86  Temp 98.1 °F (36.7 °C) (Oral)  Resp 20  
 Ht 5' 6\" (1.676 m)  Wt 196 lb (88.9 kg)  SpO2 97%  BMI 31.64 kg/m2 ROS See above Physical Exam  
Constitutional: She appears well-developed and well-nourished. HENT:  
Head: Normocephalic and atraumatic. Neck: Neck supple. No thyromegaly present. Cardiovascular: Normal rate and regular rhythm. Pulmonary/Chest: Effort normal and breath sounds normal.  
Abdominal: Soft. Bowel sounds are normal. She exhibits no distension and no mass. There is no tenderness. Musculoskeletal: She exhibits no edema. Lymphadenopathy:  
  She has no cervical adenopathy. Vitals reviewed. ASSESSMENT and PLAN 
CHF - controlled, cont same. Referred back to cardiac rehab. S/p AVR - controlled, cont same. Low back pain - Tylenol prn. Stretching exercises Diarrhea - stress from heat yesterday. ? Magnesium. Stop magnesium. [place]io diet.

## 2018-08-30 NOTE — MR AVS SNAPSHOT
[address] [place] [address] [address] [facility] 
[phone] Patient: Nkechi Quick MRN: [id number] TOE:[birth year] Visit Information Date & Time Provider Department Dept. Phone Encounter #  
 8/30/2018 12:40 PM Abdulaziz Nuñez MD [facility] [phone] [id number] Your Appointments 9/25/2018  9:20 AM  
Any with Ajay Garcia MD  
[address] [place] ([facility] Appt Note: Follow up [address] [address]  
[phone]  
  
   
 [address] [place] [id number]  
  
    
 12/12/2018  8:40 AM  
ESTABLISHED PATIENT with Spike Choe MD  
[facility] ([facility]) Appt Note: 4 mo f/u per Dr. Stevens Foil [address]  [address] [facility] [address]  
[phone]  
  
   
 [address]  [address]  
  
    
 7/24/2019 10:00 AM  
PACEMAKER with PACEMAKER, STNICOLASA  
[facility] ([facility] SCHEDULING) Appt Note: med/thres/stf/cl [address] [facility] [place] [address] [address]  
[phone]  
  
   
 700 [facility] [address] [address]  
  
    
  
 10/18/2018 11:45 AM  
REMOTE OFFICE VISIT with Grath Regalado [facility] ([facility] SCHEDULING) Appt Note: cl/pm/stf  
 700 [facility] [place] [address] [address]  
[phone]  
  
   
 700 [facility] [place] [address] [facility] [address]  
  
    
 1/22/2019 10:30 AM  
REMOTE OFFICE VISIT with Garth Regalado [facility] ([facility] SCHEDULING) Appt Note: cl/pm/stf  
 700 [facility] [place] [address] [facility]  
[phone]  
  
    
 4/23/2019 10:15 AM  
REMOTE OFFICE VISIT with Garth Regalado [facility] ([facility] SCHEDULING) Appt Note: cl/pm/stf 354 [facility] [address]  
[phone] Upcoming Health Maintenance Date Due DTaP/Tdap/Td series (1 - Tdap) 9/21/2011 Influenza Age 5 to Adult 8/1/2018 HEMOGLOBIN A1C Q6M 1/21/2019 FOOT EXAM Q1 3/22/2019 MEDICARE YEARLY EXAM 3/23/2019 EYE EXAM RETINAL OR DILATED Q1 3/29/2019 MICROALBUMIN Q1 7/21/2019 LIPID PANEL Q1 7/21/2019 GLAUCOMA SCREENING Q2Y 3/29/2020 Allergies as of 8/30/2018  Review Complete On: 8/30/2018 By: Alexandre Brownlee MD  
  
 Severity Noted Reaction Type Reactions Bees [Hymenoptera Allergenic Extract] High 07/23/2012   Systemic Anaphylaxis Lipitor [Atorvastatin] High 06/13/2011   Systemic Myalgia Betadine [Povidone-iodine]  08/17/2012    Other (comments) Abdominal swelling, one incident in 1977. Has had since without a reaction. Darvon [Propoxyphene]  02/01/2011   Systemic Nausea and Vomiting Griseofulvin  02/01/2011    Nausea and Vomiting Pollen Extracts  04/18/2018    Cough Current Immunizations  Reviewed on 4/27/2015 Name Date Influenza High Dose Vaccine PF 8/27/2017 Influenza Vaccine 8/28/2013 Pneumococcal Conjugate (PCV-13) 4/27/2015 TD Vaccine 9/20/2011  6:32 PM  
 ZZZ-RETIRED (DO NOT USE) Pneumococcal Vaccine (Unspecified Type) 10/1/2006 Zoster Vaccine, Live 6/30/2004 Not reviewed this visit You Were Diagnosed With   
  
 Codes Comments Diarrhea, unspecified type    -  Primary ICD-10-CM: R19.7 ICD-9-CM: 787.91 (HFpEF) heart failure with preserved ejection fraction (HCC)     ICD-10-CM: I50.30 ICD-9-CM: 428.9 Vitals BP Pulse Temp Resp Height(growth percentile) Weight(growth percentile) 122/54 86 98.1 °F (36.7 °C) (Oral) 20 5' 6\" (1.676 m) 196 lb (88.9 kg) SpO2 BMI OB Status Smoking Status 97% 31.64 kg/m2 Postmenopausal Former Smoker BMI and BSA Data  Body Mass Index Body Surface Area  
 31.64 kg/m 2 2.03 m 2  
  
  
 Preferred Pharmacy Pharmacy Name Phone [id number] [phone] Your Updated Medication List  
  
   
This list is accurate as of 8/30/18  1:22 PM.  Always use your most recent med list.  
  
  
  
  
 apixaban 5 mg tablet Commonly known as:  It[place] Scripture Take 1 Tab by mouth two (2) times a day. aspirin 81 mg chewable tablet Take 1 Tab by mouth daily. azelastine 137 mcg (0.1 %) nasal spray Commonly known as:  ASTELIN  
2 Sprays by Both Nostrils route two (2) times a day. Use in each nostril as directed  
  
 bumetanide 1 mg tablet Commonly known as:  Klarissa Eric Take 1 Tab by mouth two (2) times a day. COD LIVER OIL PO Take 1,000 mg by mouth daily. glucose blood VI test strips strip Commonly known as:  ACCU-CHEK ELBERT PLUS TEST STRP Check bs daily. Dx 250.00  
  
 losartan 50 mg tablet Commonly known as:  COZAAR Take 1 Tab by mouth daily. Indications: hypertension  
  
 metFORMIN 1,000 mg tablet Commonly known as:  GLUCOPHAGE  
TAKE 1 TABLET BY MOUTH TWO  TIMES DAILY potassium chloride SR 20 mEq tablet Commonly known as:  K-TAB Take 1 Tab by mouth two (2) times a day. rosuvastatin 5 mg tablet Commonly known as:  CRESTOR  
TAKE 1 TABLET BY MOUTH  DAILY  
  
 spironolactone 25 mg tablet Commonly known as:  ALDACTONE Take 1 Tab by mouth daily. VITAMIN B-12 PO Take 1,500 mg by mouth daily. VITAMIN D3 2,000 unit Tab Generic drug:  cholecalciferol (vitamin D3) Take 1 Tab by mouth daily (after lunch). We Performed the Following CBC W/O DIFF [75511 CPT(R)] Introducing [facility]! Dear Steffany Longo: 
Thank you for requesting a [employer] account. Our records indicate that you already have an active [employer] account. You can access your account anytime at [email or web address] Did you know that you can access your hospital and ER discharge instructions at any time in CodeSe[employer]? You can also review all of your test results from your hospital stay or ER visit. Additional Information If you have questions, please visit the Frequently Asked Questions section of the [employer] website at [email or web address] Remember, [employer] is NOT to be used for urgent needs. For medical emergencies, dial 911. Now available from your iPhone and Android! Please provide this summary of care documentation to your next provider. Your primary care clinician is listed as KEERTHI JENSEN. If you have any questions after today's visit, please call [phone].

## 2018-08-31 LAB
ERYTHROCYTE [DISTWIDTH] IN BLOOD BY AUTOMATED COUNT: 18.5 % (ref 12.3–15.4)
HCT VFR BLD AUTO: 40 % (ref 34–46.6)
HGB BLD-MCNC: 12.6 G/DL (ref 11.1–15.9)
MCH RBC QN AUTO: 26.9 PG (ref 26.6–33)
MCHC RBC AUTO-ENTMCNC: 31.5 G/DL (ref 31.5–35.7)
MCV RBC AUTO: 85 FL (ref 79–97)
PLATELET # BLD AUTO: 274 X10E3/UL (ref 150–379)
RBC # BLD AUTO: 4.69 X10E6/UL (ref 3.77–5.28)
WBC # BLD AUTO: 10 X10E3/UL (ref 3.4–10.8)

## 2018-09-25 ENCOUNTER — HOSPITAL ENCOUNTER (OUTPATIENT)
Dept: SLEEP MEDICINE | Age: 83
Discharge: HOME | End: 2018-09-25
Payer: MEDICARE

## 2018-09-25 ENCOUNTER — OFFICE VISIT (OUTPATIENT)
Dept: SLEEP MEDICINE | Age: 83
End: 2018-09-25

## 2018-09-25 VITALS
HEIGHT: 66 IN | HEART RATE: 83 BPM | TEMPERATURE: 98.1 F | SYSTOLIC BLOOD PRESSURE: 135 MMHG | DIASTOLIC BLOOD PRESSURE: 84 MMHG | WEIGHT: 197.8 LBS | BODY MASS INDEX: 31.79 KG/M2 | OXYGEN SATURATION: 97 %

## 2018-09-25 VITALS
BODY MASS INDEX: 31.5 KG/M2 | HEIGHT: 66 IN | HEART RATE: 83 BPM | WEIGHT: 196 LBS | OXYGEN SATURATION: 95 % | SYSTOLIC BLOOD PRESSURE: 102 MMHG | DIASTOLIC BLOOD PRESSURE: 66 MMHG

## 2018-09-25 DIAGNOSIS — G47.33 OSA (OBSTRUCTIVE SLEEP APNEA): Primary | ICD-10-CM

## 2018-09-25 DIAGNOSIS — J44.9 CHRONIC OBSTRUCTIVE PULMONARY DISEASE, UNSPECIFIED COPD TYPE (HCC): ICD-10-CM

## 2018-09-25 DIAGNOSIS — G47.33 OSA (OBSTRUCTIVE SLEEP APNEA): ICD-10-CM

## 2018-09-25 PROCEDURE — 95811 POLYSOM 6/>YRS CPAP 4/> PARM: CPT | Performed by: INTERNAL MEDICINE

## 2018-09-25 NOTE — PROGRESS NOTES
[address] [facility] [place]. [place], [address]  Tel.  [phone]  Fax. 100 [facility] 60  [place], [address]  Tel.  [phone]  Fax. [phone] [address]  Tel.  [phone]  Fax. [phone]     Juan Pablo Edwards is a 80 y.o. female seen for a positive airway pressure follow-up. She reports no problems using the device. She is 93% compliant over the past 90 days. The following problems are identified:    Drowsiness no Problems exhaling no   Snoring no Forget to put on no   Mask Comfortable yes Can't fall asleep no   Dry Mouth no Mask falls off no   Air Leaking no Frequent awakenings no         She admits that her sleep has improved. Allergies   Allergen Reactions    Bees [Hymenoptera Allergenic Extract] Anaphylaxis    Lipitor [Atorvastatin] Myalgia    Betadine [Povidone-Iodine] Other (comments)     Abdominal swelling, one incident in 1977. Has had since without a reaction.  Darvon [Propoxyphene] Nausea and Vomiting    Griseofulvin Nausea and Vomiting    Pollen Extracts Cough       She has a current medication list which includes the following prescription(s): azelastine, aspirin, bumetanide, potassium chloride sr, apixaban, spironolactone, rosuvastatin, metformin, losartan, cholecalciferol (vitamin d3), cyanocobalamin (vitamin b-12), cod liver oil, and glucose blood vi test strips. .      She  has a past medical history of Adverse effect of anesthesia; Aortic stenosis; Arrhythmia; Cancer ([address] (01/2018); Chronic pain; COPD; Diabetes ([address] Diabetic neuropathy ([facility] Diverticulosis of colon; Hypercholesterolemia; Hypertension; CINDA (obstructive sleep apnea) (08-); Other ill-defined conditions(799.89); Peripheral neuropathy; Renal artery stenosis ([address] and Unspecified adverse effect of anesthesia.     [employer] Sleepiness Score: 7   and Modified F.O.S.Q. Score Total / 2: 14.5   which reflect improved sleep quality over therapy time. O>    Visit Vitals    /66    Pulse 83    Ht 5' 6\" (1.676 m)    Wt 196 lb (88.9 kg)    SpO2 95%    BMI 31.64 kg/m2         General:   Not in acute distress   Eyes:  Anicteric sclerae, no obvious strabismus   Nose:  No obvious nasal septum deviation    Oropharynx:   Class 4 oropharyngeal outlet, thick tongue base, uvula not seen due to low-lying soft palate, narrow tonsilo-pharyngeal pilars   Tonsils:   tonsils are not visualized due to low-lying soft palate   Neck:   midline trachea   Chest/Lungs:  Equal lung expansion, clear on auscultation    CVS:  Normal rate, regular rhythm; no JVD   Skin:  Warm to touch; no obvious rashes   Neuro:  No focal deficits ; no obvious tremor    Psych:  Normal affect,  normal countenance;           A>    ICD-10-CM ICD-9-CM    1. CINDA (obstructive sleep apnea) G47.33 327.23 SLEEP LAB (PAP TITRATION)   2. Chronic obstructive pulmonary disease, unspecified COPD type ([facility] J44.9 496    3. BMI 31.0-31.9,adult Z68.31 V85.31      AHI = 18.6. On Bi - Level :  14/07 cmH2O. Compliant:      yes    Therapeutic Response:  Positive    P>    * Titration ordered due to elevated AHI on download. * We have recommended a dedicated weight loss through appropriate diet and an exercise regiment as significant weight reduction has been shown to reduce severity of obstructive sleep apnea. * Follow-up Disposition:  Return if symptoms worsen or fail to improve. * She was asked to contact our office for any problems regarding PAP therapy. * Counseling was provided regarding the importance of regular PAP use and on proper sleep hygiene and safe driving. * Re-enforced proper and regular cleaning for the device. Thank you for allowing us to participate in your patient's medical care. Jeniffer Youssef MD, FAASM  Electronically signed.  09/25/18

## 2018-09-25 NOTE — PATIENT INSTRUCTIONS
[address] [facility] [place]. [place], [address]  Tel.  [phone]  Fax. 100 [facility] 60  [place], [address] [facility]  Tel.  [phone]  Fax. [phone] [address]  Tel.  [phone]  Fax. [phone]     Learning About CPAP for Sleep Apnea  What is CPAP? CPAP is a small machine that you use at home every night while you sleep. It increases air pressure in your throat to keep your airway open. When you have sleep apnea, this can help you sleep better so you feel much better. CPAP stands for \"continuous positive airway pressure. \"  The CPAP machine will have one of the following:  · A mask that covers your nose and mouth  · Prongs that fit into your nose  · A mask that covers your nose only, the most common type. This type is called NCPAP. The N stands for \"nasal.\"  Why is it done? CPAP is usually the best treatment for obstructive sleep apnea. It is the first treatment choice and the most widely used. Your doctor may suggest CPAP if you have:  · Moderate to severe sleep apnea. · Sleep apnea and coronary artery disease (CAD) or heart failure. How does it help? · CPAP can help you have more normal sleep, so you feel less sleepy and more alert during the daytime. · CPAP may help keep heart failure or other heart problems from getting worse. · NCPAP may help lower your blood pressure. · If you use CPAP, your bed partner may also sleep better because you are not snoring or restless. What are the side effects? Some people who use CPAP have:  · A dry or stuffy nose and a sore throat. · Irritated skin on the face. · Sore eyes. · Bloating. If you have any of these problems, work with your doctor to fix them. Here are some things you can try:  · Be sure the mask or nasal prongs fit well. · See if your doctor can adjust the pressure of your CPAP. · If your nose is dry, try a humidifier.   · If your nose is runny or stuffy, try decongestant medicine or a steroid nasal spray. If these things do not help, you might try a different type of machine. Some machines have air pressure that adjusts on its own. Others have air pressures that are different when you breathe in than when you breathe out. This may reduce discomfort caused by too much pressure in your nose. Where can you learn more? Go to Dapper.be  Enter Dahiana Echevarria in the search box to learn more about \"Learning About CPAP for Sleep Apnea. \"   © 3985-6689 Healthwise, Incorporated. Care instructions adapted under license by [facility] [employer] (which disclaims liability or warranty for this information). This care instruction is for use with your licensed healthcare professional. If you have questions about a medical condition or this instruction, always ask your healthcare professional. [address] any warranty or liability for your use of this information. Content Version: 3.6.85639; Last Revised: January 11, 2010  PROPER SLEEP HYGIENE    What to avoid  · Do not have drinks with caffeine, such as coffee or black tea, for 8 hours before bed. · Do not smoke or use other types of tobacco near bedtime. Nicotine is a stimulant and can keep you awake. · Avoid drinking alcohol late in the evening, because it can cause you to wake in the middle of the night. · Do not eat a big meal close to bedtime. If you are hungry, eat a light snack. · Do not drink a lot of water close to bedtime, because the need to urinate may wake you up during the night. · Do not read or watch TV in bed. Use the bed only for sleeping and sexual activity. What to try  · Go to bed at the same time every night, and wake up at the same time every morning. Do not take naps during the day. · Keep your bedroom quiet, dark, and cool. · Get regular exercise, but not within 3 to 4 hours of your bedtime. .  · Sleep on a comfortable pillow and mattress.   · If watching the clock makes you anxious, turn it facing away from you so you cannot see the time. · If you worry when you lie down, start a worry book. Well before bedtime, write down your worries, and then set the book and your concerns aside. · Try meditation or other relaxation techniques before you go to bed. · If you cannot fall asleep, get up and go to another room until you feel sleepy. Do something relaxing. Repeat your bedtime routine before you go to bed again. · Make your house quiet and calm about an hour before bedtime. Turn down the lights, turn off the TV, log off the computer, and turn down the volume on music. This can help you relax after a busy day. Drowsy Driving: The [employer] cites drowsiness as a causing factor in more than 614,009 police reported crashes annually, resulting in 76,000 injuries and 1,500 deaths. Other surveys suggest 55% of people polled have driven while drowsy in the past year, 23% had fallen asleep but not crashed, 3% crashed, and 2% had and accident due to drowsy driving. Who is at risk? Young Drivers: One study of drowsy driving accidents states that 55% of the drivers were under 25 years. Of those, 75% were male. Shift Workers and Travelers: People who work overnight or travel across time zones frequently are at higher risk of experiencing Circadian Rhythm Disorders. They are trying to work and function when their body is programed to sleep. Sleep Deprived: Lack of sleep has a serious impact on your ability to pay attention or focus on a task. Consistently getting less than the average of 8 hours your body needs creates partial or cumulative sleep deprivation. Untreated Sleep Disorders: Sleep Apnea, Narcolepsy, R.L.S., and other sleep disorders (untreated) prevent a person from getting enough restful sleep. This leads to excessive daytime sleepiness and increases the risk for drowsy driving accidents by up to 7 times.   Medications / Alcohol: Even over the counter medications can cause drowsiness. Medications that impair a drivers attention should have a warning label. Alcohol naturally makes you sleepy and on its own can cause accidents. Combined with excessive drowsiness its effects are amplified. Signs of Drowsy Driving:   * You don't remember driving the last few miles   * You may drift out of your jeny   * You are unable to focus and your thoughts wander   * You may yawn more often than normal   * You have difficulty keeping your eyes open / nodding off   * Missing traffic signs, speeding, or tailgating  Prevention-   Good sleep hygiene, lifestyle and behavioral choices have the most impact on drowsy driving. There is no substitute for sleep and the average person requires 8 hours nightly. If you find yourself driving drowsy, stop and sleep. Consider the sleep hygiene tips provided during your visit as well. Medication Refill Policy: Refills for all medications require 1 week advance notice. Please have your pharmacy fax a refill request. We are unable to fax, or call in \"controled substance\" medications and you will need to pick these prescriptions up from our office. Grid Mobile Activation    Thank you for requesting access to [employer] Mobile. Please follow the instructions below to securely access and download your online medical record. [employer] Mobile allows you to send messages to your doctor, view your test results, renew your prescriptions, schedule appointments, and more. How Do I Sign Up? 1. In your internet browser, go to [email or web address]. 2. Click on the First Time User? Click Here link in the Sign In box. You will see the New Member Sign Up page. 3. Enter your Grid Mobile Access Code exactly as it appears below. You will not need to use this code after youve completed the sign-up process. If you do not sign up before the expiration date, you must request a new code. Grid Mobile Access Code:  Activation code not generated  Current [employer] Status: Active (This is the date your [employer] access code will )    4. Enter the last four digits of your Social Security Number (xxxx) and Date of Birth (mm/dd/yyyy) as indicated and click Submit. You will be taken to the next sign-up page. 5. Create a Wizelinet ID. This will be your W[employer]elinet login ID and cannot be changed, so think of one that is secure and easy to remember. 6. Create a [employer] password. You can change your password at any time. 7. Enter your Password Reset Question and Answer. This can be used at a later time if you forget your password. 8. Enter your e-mail address. You will receive e-mail notification when new information is available in [address] 9. Click Sign Up. You can now view and download portions of your medical record. 10. Click the Download Summary menu link to download a portable copy of your medical information. Additional Information    If you have questions, please call [phone]. Remember, [employer] is NOT to be used for urgent needs. For medical emergencies, dial 911.

## 2018-09-27 ENCOUNTER — TELEPHONE (OUTPATIENT)
Dept: SLEEP MEDICINE | Age: 83
End: 2018-09-27

## 2018-09-27 DIAGNOSIS — G47.33 OSA (OBSTRUCTIVE SLEEP APNEA): Primary | ICD-10-CM

## 2018-09-27 NOTE — TELEPHONE ENCOUNTER
Titration study interpreted. Patient noted to have poor quality sleep on CPAP therapy. Recommend patient return for a trial of Bi-Level therapy. Encounter Diagnosis   Name Primary?  CINDA (obstructive sleep apnea) Yes     Orders Placed This Encounter    SLEEP LAB (PAP TITRATION)     Standing Status:   Future     Standing Expiration Date:   3/28/2019     Scheduling Instructions:      Perform Bi-level Titration.      Order Specific Question:   Reason for Exam     Answer:   CINDA

## 2018-10-04 ENCOUNTER — TELEPHONE (OUTPATIENT)
Dept: SLEEP MEDICINE | Age: 83
End: 2018-10-04

## 2018-10-09 ENCOUNTER — HOSPITAL ENCOUNTER (OUTPATIENT)
Dept: SLEEP MEDICINE | Age: 83
Discharge: HOME | End: 2018-10-09
Payer: MEDICARE

## 2018-10-09 DIAGNOSIS — G47.33 OSA (OBSTRUCTIVE SLEEP APNEA): ICD-10-CM

## 2018-10-09 PROCEDURE — 95811 POLYSOM 6/>YRS CPAP 4/> PARM: CPT | Performed by: INTERNAL MEDICINE

## 2018-10-11 ENCOUNTER — TELEPHONE (OUTPATIENT)
Dept: SLEEP MEDICINE | Age: 83
End: 2018-10-11

## 2018-10-12 NOTE — TELEPHONE ENCOUNTER
Titration study interpreted. * Device pressure change to Bi - Level  Auto Bi-Level Mode: Max 20; Min EPAP 10; PS 4 cmH2O by lead technologist either remotely or at PAP clinic (notify patient).     * PAP card download in 4 weeks.     * Office visit in 12 months or as needed.

## 2018-10-19 ENCOUNTER — TELEPHONE (OUTPATIENT)
Dept: CARDIOLOGY CLINIC | Age: 83
End: 2018-10-19

## 2018-12-19 ENCOUNTER — OFFICE VISIT (OUTPATIENT)
Dept: CARDIOLOGY CLINIC | Age: 83
End: 2018-12-19

## 2018-12-19 VITALS
HEART RATE: 64 BPM | WEIGHT: 200.4 LBS | SYSTOLIC BLOOD PRESSURE: 130 MMHG | DIASTOLIC BLOOD PRESSURE: 80 MMHG | BODY MASS INDEX: 32.21 KG/M2 | RESPIRATION RATE: 16 BRPM | HEIGHT: 66 IN

## 2018-12-19 DIAGNOSIS — I50.30 (HFPEF) HEART FAILURE WITH PRESERVED EJECTION FRACTION (HCC): Primary | ICD-10-CM

## 2018-12-19 DIAGNOSIS — Z95.2 S/P TAVR (TRANSCATHETER AORTIC VALVE REPLACEMENT): ICD-10-CM

## 2018-12-19 DIAGNOSIS — I48.0 PAROXYSMAL ATRIAL FIBRILLATION (HCC): ICD-10-CM

## 2018-12-19 DIAGNOSIS — I10 ESSENTIAL HYPERTENSION: ICD-10-CM

## 2018-12-19 DIAGNOSIS — E83.42 HYPOMAGNESEMIA: ICD-10-CM

## 2018-12-19 DIAGNOSIS — G47.33 OSA (OBSTRUCTIVE SLEEP APNEA): ICD-10-CM

## 2018-12-19 DIAGNOSIS — E87.6 HYPOKALEMIA: ICD-10-CM

## 2018-12-19 NOTE — PROGRESS NOTES
[facility] Office Note    12/20/2018     Subjective:     She is here today to follow up on her HTN, hx of TAVR, diastolic heart failure. Says she stays tired all the time, that is unchanged  Does feel that the pacemaker made a difference in her energy level though   Having more dyspnea with exertion  No PND or orthopnea  No chest pain or palpitations  Wears compressing stockings once/week when she plays bridge but doesn't have much LE edema today on exam   Stopped doing cardiac rehab due to back pain   Her weight is up 6 lbs since her last visit and she admits that she is not exercising  Loses balance easily, only one fall several months ago when she had not eaten   Uses her cane and sometimes her walker  Having some nosebleeds - using saline spray some and puts vaseline in her nares at night  Wears CPAP  We talked about seeing ENT if her nosebleeds persist  O2 Sats 93% on room with ambulation today    Assessment/Plan   1. HTN - well controlled on cozaar 50mg daily, spironolactone 25mg daily  2. TR[place]- history of adhesions, stable post renal artery bypass  -FMD in 1977, bypassed in 1977  3. DM type 2 - on metformin, has severe peripheral neuropathy, followed by Dr. Steven Mcnamara, A1C 7.6% in 6/18  4. AS s/p TAVR - stable by TTE in 5/18, she will follow up with Dr. Delane Hammans in May 2019 and will have a TTE at that time to reassess  5. Hyperlipidemia- LDL 75 in 7/18 on crestor 5mg daily   6. Vit D deficiency- on supplementation   7. CINDA- uses CPAP nightly   8. Junctional rhythm/bradycardia- SSS s/p pacemaker, last device check 7/18 although patient reports sending transmission in 10/18, will send a message to Melissa Luque to see if transmission was received  9. Hx of crushed vocal cords - stable hoarseness  10.   Acute diastolic heart failure - LVEF 55-60% by TTE in 5/18, NYHA Class II   -on bumex 1mg BID, BP well controlled  -well compensated today, she will follow up with Dr. Delane Hammans in May 2019 11.  Hypokalemia - K 3.5 on labs in 6/18, on KCL 20meq BID, will check BMP now   12. Advanced directives on file, now full code    13. Hypoxia - O2 Sats 93% with ambulation today, probably multifactorial, COPD, etc, will continue to watch   14. Atrial Fib - [id number] on Eliquis 5mg BID  13. Hypomagnesemia - no longer on Mg Oxide, will check Mg level today      6/14/2018: [employer] Single-Chamber Pacemaker per Dr. Isidoro Guzman  TTE 5/18 - LVEF 55 % to 60 %, no WMA, mod dilated LA, mild MS, s/p TAVR well seated elevated gradient  Aortic Valve:   AV Vmax was 3.2 m/s.  AV meanPG was 22.7 mmHg. PW measurements: Aortic Valve:   RACHEL (VTI) was 1.3 cm2. TAVR 4/18 - Successful transcatheter aortic valve replacement with no significant paravalvular leak. Post-deployment aortography showed the valve was in good position with no aortic regurgitation  Cardiac Cath 11/17 - RACHEL 0.84 patient appeared to be in AF during case, but most likely was frequent atrial ectopy.  No mitral stenosis.  LV gram not done.  Normal coronaries.  Thermo output was 6.9, assumed Emilie was 4.65; the 6.9 was used by computer in RACHEL calculation, so RACHEL may be overestimated.  LV known to be normal by echo.  Peak to peak gradient 63. TTE 1/14 EF 65-60%, mod cLVH 1.7, grI DD, mild-mod as 25/20 mild MS 10/5, triv effusion  TTE 7/14 EF 65-70%, mild cLVH, Mod AS 42/24, mild MS 10/5   TTE 10/13 LVEF 65 %, no WMA,  severe cLVH, abnormal left ventricular relaxation (grade 1 diastolic dysfunction), aortic valve leaflets exhibited marked calcification and reduced mobility with moderate stenosis, valve mean gradient was 24 mmHg.   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 Ears/Nose/Mouth/Throat:   Hearing grossly normal.         Neck: Supple. Chest:   Lungs clear to auscultation bilaterally. Cardiovascular:  Regular rate and rhythm, S1, S2 normal, 2/6 KATHERINE    Abdomen:   Soft, non-tender, bowel sounds are active. Extremities: No LE edema     Skin: Warm and dry. Data Review:   Labs:    No results found for this or any previous visit (from the past 24 hour(s)). Current Outpatient Medications   Medication Sig    FIBER-CAPS, PSYLLIUM HUSK, PO Take 5 Caps by mouth daily.  potassium chloride SR (K-TAB) 20 mEq tablet Take 1 Tab by mouth two (2) times a day.  azelastine (ASTELIN) 137 mcg (0.1 %) nasal spray 2 Sprays by Both Nostrils route two (2) times a day. Use in each nostril as directed    aspirin 81 mg chewable tablet Take 1 Tab by mouth daily.  bumetanide (BUMEX) 1 mg tablet Take 1 Tab by mouth two (2) times a day.  apixaban (ELIQUIS) 5 mg tablet Take 1 Tab by mouth two (2) times a day.  spironolactone (ALDACTONE) 25 mg tablet Take 1 Tab by mouth daily.  rosuvastatin (CRESTOR) 5 mg tablet TAKE 1 TABLET BY MOUTH  DAILY    metFORMIN (GLUCOPHAGE) 1,000 mg tablet TAKE 1 TABLET BY MOUTH TWO  TIMES DAILY    losartan (COZAAR) 50 mg tablet Take 1 Tab by mouth daily. Indications: hypertension    cholecalciferol, vitamin D3, (VITAMIN D3) 2,000 unit tab Take 1 Tab by mouth daily (after lunch).  CYANOCOBALAMIN, VITAMIN B-12, (VITAMIN B-12 PO) Take 1,500 mg by mouth daily.  COD LIVER OIL PO Take 1,000 mg by mouth daily.  glucose blood VI test strips (ACCU-CHEK ELBERT PLUS) strip Check bs daily. Dx 250.00     No current facility-administered medications for this visit.         Brandi Brantley NP  [facility] [address] [facility] [address] [facility] [address], [address]  [phone]

## 2018-12-20 ENCOUNTER — DOCUMENTATION ONLY (OUTPATIENT)
Dept: CARDIOLOGY CLINIC | Age: 83
End: 2018-12-20

## 2018-12-20 ENCOUNTER — HOSPITAL ENCOUNTER (OUTPATIENT)
Dept: LAB | Age: 83
Discharge: HOME | End: 2018-12-20
Payer: MEDICARE

## 2018-12-20 ENCOUNTER — TELEPHONE (OUTPATIENT)
Dept: CARDIOLOGY CLINIC | Age: 83
End: 2018-12-20

## 2018-12-20 PROCEDURE — 80048 BASIC METABOLIC PNL TOTAL CA: CPT

## 2018-12-20 PROCEDURE — 83735 ASSAY OF MAGNESIUM: CPT

## 2018-12-20 PROCEDURE — 36415 COLL VENOUS BLD VENIPUNCTURE: CPT

## 2018-12-20 NOTE — TELEPHONE ENCOUNTER
Verified patient with two types of identifiers. Called patient and let her know that Ruth Montague NP got her pacemaker transmission and there was an episode of NSVT, therefore she should get her electrolytes checked. Patient reminded I gave her lab slip for this yesterday and directed to nearest [employer]. Patient will get these completed next week. Patient verbalized understanding and will call with any other questions.

## 2018-12-20 NOTE — PROGRESS NOTES
Please call and let her know that I did get a copy of her pacemaker transmission from 10/18. It was not scanned into her chart but pacemaker nurse gave me results and will scan it into chart. She had a short run of NSVT in 7/18, nothing to be concerned about but I do want her to go ahead and get the labs I ordered drawn so we can check her potassium and magnesium level. Low K and Mg can precipitate NSVT. No further changes needed.

## 2018-12-21 ENCOUNTER — TELEPHONE (OUTPATIENT)
Dept: CARDIOLOGY CLINIC | Age: 83
End: 2018-12-21

## 2018-12-21 DIAGNOSIS — I10 ESSENTIAL HYPERTENSION: Primary | ICD-10-CM

## 2018-12-21 LAB
BUN SERPL-MCNC: 39 MG/DL (ref 8–27)
BUN/CREAT SERPL: 24 (ref 12–28)
CALCIUM SERPL-MCNC: 9.7 MG/DL (ref 8.7–10.3)
CHLORIDE SERPL-SCNC: 102 MMOL/L (ref 96–106)
CO2 SERPL-SCNC: 23 MMOL/L (ref 20–29)
CREAT SERPL-MCNC: 1.61 MG/DL (ref 0.57–1)
GLUCOSE SERPL-MCNC: 133 MG/DL (ref 65–99)
INTERPRETATION: NORMAL
MAGNESIUM SERPL-MCNC: 1.9 MG/DL (ref 1.6–2.3)
POTASSIUM SERPL-SCNC: 5.1 MMOL/L (ref 3.5–5.2)
SODIUM SERPL-SCNC: 142 MMOL/L (ref 134–144)

## 2018-12-21 RX ORDER — POTASSIUM CHLORIDE 1500 MG/1
20 TABLET, FILM COATED, EXTENDED RELEASE ORAL DAILY
Qty: 90 TAB | Refills: 0
Start: 2018-12-21 | End: 2020-02-27

## 2018-12-21 RX ORDER — BUMETANIDE 1 MG/1
1 TABLET ORAL DAILY
Qty: 90 TAB | Refills: 0
Start: 2018-12-21 | End: 2019-06-17 | Stop reason: SDUPTHER

## 2018-12-21 NOTE — TELEPHONE ENCOUNTER
----- Message from Son Valencia NP sent at 12/21/2018  9:30 AM EST -----  Has some kidney dysfunction - creatinine up to 1.6 from normal.  Taking too much diuretic. Please advise her to hold diuretic x 2 days and then resume at bumex 1mg daily. Please ask her to reduce her potassium to 20meq daily as well and have BMP rechecked in 2 weeks.

## 2018-12-21 NOTE — PROGRESS NOTES
Has some kidney dysfunction - creatinine up to 1.6 from normal.  Taking too much diuretic. Please advise her to hold diuretic x 2 days and then resume at bumex 1mg daily. Please ask her to reduce her potassium to 20meq daily as well and have BMP rechecked in 2 weeks.

## 2018-12-21 NOTE — TELEPHONE ENCOUNTER
Identifiers x 2. Informed of medication changes and lab request per [facility]. Verbalized understanding. Letter and lab requisition mailed to patient. Medication profile updated. Verbal order per MONICA Casper NP.

## 2018-12-21 NOTE — LETTER
12/21/2018 Ms. Urvashi Chahal [facility] [place] [id number] Dear Ms. Rodriguezse Calderon, As we discussed during our phone conversation. Your recent lab results revealed some kidney dysfunction meaning that you may be taking too much of the diuretic (bumex). Susan Butler would like for you to hold diuretic for 2 days, then reduce to 1 mg daily. She would also like for you to reduce potassium to 20meq daily. Enclosed is a lab requisition for you to have labs obtained in 2 weeks (approximatley 1-4-19. If you have any questions or concerns, feel free to call our office at [phone].   xs as well and have BMP rechecked in 2 weeks. Sincerely, Tanmay Valera RN

## 2018-12-28 ENCOUNTER — TELEPHONE (OUTPATIENT)
Dept: CARDIOLOGY CLINIC | Age: 83
End: 2018-12-28

## 2018-12-28 NOTE — TELEPHONE ENCOUNTER
MD Ilan Tinajero RN             Let her stay on eliquis      Per Dr. Sanam Ramirez patient is aware its okay to stay on Eliquis and she does not need to change to Coumadin.

## 2019-01-04 ENCOUNTER — HOSPITAL ENCOUNTER (OUTPATIENT)
Dept: LAB | Age: 84
Discharge: HOME | End: 2019-01-04
Payer: MEDICARE

## 2019-01-04 PROCEDURE — 36415 COLL VENOUS BLD VENIPUNCTURE: CPT

## 2019-01-04 PROCEDURE — 80048 BASIC METABOLIC PNL TOTAL CA: CPT

## 2019-01-05 LAB
BUN SERPL-MCNC: 22 MG/DL (ref 8–27)
BUN/CREAT SERPL: 19 (ref 12–28)
CALCIUM SERPL-MCNC: 10.1 MG/DL (ref 8.7–10.3)
CHLORIDE SERPL-SCNC: 100 MMOL/L (ref 96–106)
CO2 SERPL-SCNC: 22 MMOL/L (ref 20–29)
CREAT SERPL-MCNC: 1.13 MG/DL (ref 0.57–1)
GLUCOSE SERPL-MCNC: 122 MG/DL (ref 65–99)
INTERPRETATION: NORMAL
POTASSIUM SERPL-SCNC: 4.8 MMOL/L (ref 3.5–5.2)
SODIUM SERPL-SCNC: 144 MMOL/L (ref 134–144)

## 2019-01-07 NOTE — TELEPHONE ENCOUNTER
Labs look better, no need to call, I emailed her with instructions and results. Please mail her a copy of her lab results.

## 2019-01-17 ENCOUNTER — TELEPHONE (OUTPATIENT)
Dept: INTERNAL MEDICINE CLINIC | Age: 84
End: 2019-01-17

## 2019-01-17 NOTE — TELEPHONE ENCOUNTER
Pt called and wanted to know why Dr. CYNDY Culp took her off of the magnesium tablets. She doesn't know why she was taken off of them or when the last time she was seen. Her cardiologist, Dr. Tia Freeman ([facility]), is asking.      Please call back or leave a message at [phone]

## 2019-01-22 ENCOUNTER — OFFICE VISIT (OUTPATIENT)
Dept: CARDIOLOGY CLINIC | Age: 84
End: 2019-01-22

## 2019-01-22 DIAGNOSIS — Z95.0 CARDIAC PACEMAKER IN SITU: Primary | ICD-10-CM

## 2019-01-28 ENCOUNTER — TELEPHONE (OUTPATIENT)
Dept: CARDIOLOGY CLINIC | Age: 84
End: 2019-01-28

## 2019-01-28 NOTE — TELEPHONE ENCOUNTER
Informed pt her remote came over fine. We send pt letters to inform pt that they are received & when next appt is but mail is late sometimes. Pt stated she understands.

## 2019-01-28 NOTE — TELEPHONE ENCOUNTER
Identifiers x 2. Patient scheduled to get labs consisting of Mg at Dr. Regino Lu office on 1-31-19. To request that labs be faxed to our office. If Mg level low, request to take Mg Glycinate to decrease side effects that she has read can effect patients of increase age. Sent remote to Dr. Markus Quintanilla' office last week. Request call back regarding any concerns.

## 2019-02-05 ENCOUNTER — TELEPHONE (OUTPATIENT)
Dept: CARDIOLOGY CLINIC | Age: 84
End: 2019-02-05

## 2019-02-05 NOTE — TELEPHONE ENCOUNTER
Returned [employer], 2 pt identifiers used  The following message given per NP Hannah:    Yes she still needs antibiotics prior to dental appointments indefinitely due to TAVR and pacemaker

## 2019-02-05 NOTE — TELEPHONE ENCOUNTER
Yes she still needs antibiotics prior to dental appointments indefinitely due to TAVR and pacemaker.

## 2019-02-05 NOTE — TELEPHONE ENCOUNTER
Paty called from Dr. January Givens office stating that they need to find out if the pt still needs antibiotic medication before dental appointments. She also stated they would need to know how long.   Phone #[phone]  Thanks

## 2019-02-06 ENCOUNTER — DOCUMENTATION ONLY (OUTPATIENT)
Dept: CARDIOLOGY CLINIC | Age: 84
End: 2019-02-06

## 2019-02-06 NOTE — PROGRESS NOTES
Lab results from 1/25/19 reviewed  CBC ok, BUN 29, Cr 1.2, K 5.2, LFTs ok  TSH 1.1, A1c 6.4%, Mg 2.0    Please call to see if she had another set of labs done on 1/31/19 as mentioned in her previous phone message. If she did not have any further labs then she can stay off magnesium for now because her level was ok at 2.0. If she had repeat labs on 1/31/19 please request a copy of those results.

## 2019-02-07 ENCOUNTER — TELEPHONE (OUTPATIENT)
Dept: CARDIOLOGY CLINIC | Age: 84
End: 2019-02-07

## 2019-02-07 NOTE — PROGRESS NOTES
Identifiers x 2. Patient states that she has had no labs more recent than 1/25/19. States Dr. Luz iWtt changed bumex to 0.5 mg (1/2 of 1 mg tablet) every other night in addition to the bumex 1 mg (1 tablet) daily. May want to obtain 0.5 mg tablets of bumex at next refill due to difficulty splitting tablets. Will let our office know. Continues potassium 20 meq daily. Informed patient that magnesium level WNL, does not need to restart magnesium. Verbalized understanding. Taking amoxicillin prior to dentist appt. Medication profile updated. Verbal order per MONICA Casper NP.

## 2019-03-15 DIAGNOSIS — I10 ESSENTIAL HYPERTENSION: ICD-10-CM

## 2019-03-15 DIAGNOSIS — E66.9 OBESITY (BMI 30.0-34.9): ICD-10-CM

## 2019-03-15 DIAGNOSIS — Z95.2 S/P TAVR (TRANSCATHETER AORTIC VALVE REPLACEMENT): Primary | ICD-10-CM

## 2019-03-15 DIAGNOSIS — I05.0 MITRAL VALVE STENOSIS, UNSPECIFIED ETIOLOGY: ICD-10-CM

## 2019-03-15 DIAGNOSIS — Z95.0 CARDIAC PACEMAKER IN SITU: ICD-10-CM

## 2019-03-15 DIAGNOSIS — I48.0 PAROXYSMAL ATRIAL FIBRILLATION (HCC): ICD-10-CM

## 2019-03-15 DIAGNOSIS — I50.30 (HFPEF) HEART FAILURE WITH PRESERVED EJECTION FRACTION (HCC): ICD-10-CM

## 2019-03-15 DIAGNOSIS — J44.9 CHRONIC OBSTRUCTIVE PULMONARY DISEASE, UNSPECIFIED COPD TYPE (HCC): ICD-10-CM

## 2019-03-18 RX ORDER — SPIRONOLACTONE 25 MG/1
TABLET ORAL
Qty: 90 TAB | Refills: 3 | Status: SHIPPED | OUTPATIENT
Start: 2019-03-18 | End: 2020-03-20

## 2019-03-21 ENCOUNTER — OFFICE VISIT (OUTPATIENT)
Dept: INTERNAL MEDICINE CLINIC | Age: 84
End: 2019-03-21

## 2019-03-21 VITALS
HEIGHT: 66 IN | OXYGEN SATURATION: 97 % | SYSTOLIC BLOOD PRESSURE: 145 MMHG | DIASTOLIC BLOOD PRESSURE: 60 MMHG | TEMPERATURE: 98.5 F | HEART RATE: 89 BPM | WEIGHT: 198 LBS | BODY MASS INDEX: 31.82 KG/M2

## 2019-03-21 DIAGNOSIS — Z71.89 ADVANCE DIRECTIVE DISCUSSED WITH PATIENT: ICD-10-CM

## 2019-03-21 DIAGNOSIS — I10 ESSENTIAL HYPERTENSION: ICD-10-CM

## 2019-03-21 DIAGNOSIS — E78.00 PURE HYPERCHOLESTEROLEMIA: ICD-10-CM

## 2019-03-21 DIAGNOSIS — I50.30 (HFPEF) HEART FAILURE WITH PRESERVED EJECTION FRACTION (HCC): ICD-10-CM

## 2019-03-21 DIAGNOSIS — J31.0 NONALLERGIC RHINITIS: ICD-10-CM

## 2019-03-21 DIAGNOSIS — E66.9 OBESITY (BMI 30.0-34.9): ICD-10-CM

## 2019-03-21 DIAGNOSIS — E11.9 CONTROLLED TYPE 2 DIABETES MELLITUS WITHOUT COMPLICATION, WITHOUT LONG-TERM CURRENT USE OF INSULIN (HCC): Primary | ICD-10-CM

## 2019-03-21 DIAGNOSIS — Z00.00 MEDICARE ANNUAL WELLNESS VISIT, SUBSEQUENT: ICD-10-CM

## 2019-03-21 DIAGNOSIS — Z95.2 S/P TAVR (TRANSCATHETER AORTIC VALVE REPLACEMENT): ICD-10-CM

## 2019-03-21 NOTE — PROGRESS NOTES
HISTORY OF PRESENT ILLNESS Hector William is a 80 y.o. female. Here for follow up. CHF has been well controlled on 1mg Bumex daily with 0.5mg qonight. Denies chest pain, palpatations or le edema. Baseline sob which she blames more on allergies. Doing well s/p AVR. UTD with Dr. Janie Schilling office. Diabetes - continues on Metformin twice a day. Seeing Dr. Lisa Blackmon - last seen a couple of weeks ago. A1C was in the 6 range (had been as low as 5.5%). Had been eating rather badly this winter. Fecal incontinence reoccured this week - small amt of leakage. Has an episode every 1-2 months. Taking fiber pills every day. Does back strengthening exercises which include kegels but hasn't performed recently. Has noticed issues remembering names. Son's feel her memory is fine and has commented that it was better than his. Some nose bleeds recently. Using her CPAP regularly. Also with chronic rhinorrhea. Has not been using her astelin regularly. Current Outpatient Medications:  
  spironolactone (ALDACTONE) 25 mg tablet, TAKE 1 TABLET BY MOUTH  DAILY, Disp: 90 Tab, Rfl: 3 
  AMOXICILLIN PO, Take  by mouth. Prior to dental appts, per Dr. Lisa Blackmon. , Disp: , Rfl:  
  bumetanide (BUMEX) 1 mg tablet, Take 1 Tab by mouth daily. (Patient taking differently: Take 1 mg by mouth daily. As of 2-7-19, patient states that  Dr. Lisa Blackmon ordered 0.5 mg (1/2 tablet) every other night in addition to 1 mg  (1 tablet) daily due to increase swelling.), Disp: 90 Tab, Rfl: 0 
  potassium chloride SR (K-TAB) 20 mEq tablet, Take 1 Tab by mouth daily. , Disp: 90 Tab, Rfl: 0 
  FIBER-CAPS, PSYLLIUM HUSK, PO, Take 5 Caps by mouth daily. , Disp: , Rfl:  
  azelastine (ASTELIN) 137 mcg (0.1 %) nasal spray, 2 Sprays by Both Nostrils route two (2) times a day. Use in each nostril as directed, Disp: 3 Bottle, Rfl: 3 
  aspirin 81 mg chewable tablet, Take 1 Tab by mouth daily. , Disp: 90 Tab, Rfl: 3   apixaban (ELIQUIS) 5 mg tablet, Take 1 Tab by mouth two (2) times a day., Disp: 60 Tab, Rfl: 0 
  rosuvastatin (CRESTOR) 5 mg tablet, TAKE 1 TABLET BY MOUTH  DAILY, Disp: 90 Tab, Rfl: 3 
  metFORMIN (GLUCOPHAGE) 1,000 mg tablet, TAKE 1 TABLET BY MOUTH TWO  TIMES DAILY, Disp: 180 Tab, Rfl: 3 
  losartan (COZAAR) 50 mg tablet, Take 1 Tab by mouth daily. Indications: hypertension, Disp: 30 Tab, Rfl: 1   cholecalciferol, vitamin D3, (VITAMIN D3) 2,000 unit tab, Take 1 Tab by mouth daily (after lunch). , Disp: , Rfl:  
  CYANOCOBALAMIN, VITAMIN B-12, (VITAMIN B-12 PO), Take 1,500 mg by mouth daily. , Disp: , Rfl:  
  COD LIVER OIL PO, Take 1,000 mg by mouth daily. , Disp: , Rfl:  
  glucose blood VI test strips (ACCU-CHEK ELBERT PLUS) strip, Check bs daily. Dx 250.00, Disp: 1 Package, Rfl: 11 
 
/60   Pulse 89   Temp 98.5 °F (36.9 °C) (Oral)   Ht 5' 6\" (1.676 m)   Wt 198 lb (89.8 kg)   SpO2 97%   BMI 31.96 kg/m² ROS See above Physical Exam  
Constitutional: She appears well-developed and well-nourished. HENT:  
Head: Normocephalic and atraumatic. Clear rhinorrhea Neck: Neck supple. Cardiovascular: Normal rate, regular rhythm and normal heart sounds. Exam reveals no gallop and no friction rub. No murmur heard. Pulmonary/Chest: Effort normal and breath sounds normal.  
Abdominal: Soft. Bowel sounds are normal. She exhibits no distension and no mass. There is no tenderness. Musculoskeletal: She exhibits no edema. Lymphadenopathy:  
  She has no cervical adenopathy. Vitals reviewed. ASSESSMENT and PLAN 
CHF - controlled, cont same DM - well controlled. We will request records from Dr. Gopal Johnson office Hyperlipidemia- controlled, cont sameS/p AVR - controlled, cont same 
htn - reasonably well controlled, cont same Nonallergic rhinitis - restart astelin Fecal incontinence - continue fiber. Restart exercises. Discussed PT if needed. Obesity - discussed weight loss through diet and exercise No orders of the defined types were placed in this encounter. Follow-up and Dispositions · Return in about 6 months (around 9/21/2019) for diabetes, chf. 
  
  
 
This is the Subsequent Medicare Annual Wellness Exam, performed 12 months or more after the Initial AWV or the last Subsequent AWV I have reviewed the patient's medical history in detail and updated the computerized patient record. History Past Medical History:  
Diagnosis Date  Adverse effect of anesthesia   
 difficulty waking  Aortic stenosis  Arrhythmia   
 rapid heartrate seen in ER  4/2/18  Cancer ([facility] [address] 01/2018  
 skin spots, removed periodically  Chronic pain   
 back - spinal stenosis  COPD  Diabetes ([facility] [address]  Diabetic neuropathy (HCC)   
 knees down  Diverticulosis of colon  Hypercholesterolemia  Hypertension  CINDA (obstructive sleep apnea) 08- AHI: 18.6 per hour - CPAP  
 Other ill-defined conditions(799.89)   
 heart murmur  Peripheral neuropathy  Renal artery stenosis (HCC) Right  Unspecified adverse effect of anesthesia   
 delayed awakening - \"does not need as much\" Past Surgical History:  
Procedure Laterality Date [address] Renal Artery Stenosis bypassed  ABDOMEN SURGERY PROC UNLISTED  [id number] or 1983  
 hernia repair [address] Lipectomy - removal of fatty tissue around abdomen [address] Left - benign cyst  
 CARDIAC SURG PROCEDURE UNLIST  04/13/2018 TAVR  
 ENDOSCOPY, COLON, DIAGNOSTIC  1999  
 hx of polys in the past  
 HX CATARACT REMOVAL    
 bilateral  
 HX GYN  1966 D&C  
 HX HEENT Eyelid Cysts x 4  
 HX HEENT    
 oral surgeries - gum surgery/floating root  removed x 2  
 HX HEENT    
 retinal hemmorage repair  HX ORTHOPAEDIC  11/06 L1-S1 2007 [address] Trigger finger  HX ORTHOPAEDIC    
 left knee arthroscopy  HX OTHER SURGICAL    
 skin cancer removal, periodically  HX TONSILLECTOMY T & A Current Outpatient Medications Medication Sig Dispense Refill  spironolactone (ALDACTONE) 25 mg tablet TAKE 1 TABLET BY MOUTH  DAILY 90 Tab 3  
 AMOXICILLIN PO Take  by mouth. Prior to dental appts, per Dr. Anamika Arguello.  bumetanide (BUMEX) 1 mg tablet Take 1 Tab by mouth daily. (Patient taking differently: Take 1 mg by mouth daily. As of 2-7-19, patient states that  Dr. Anamika Arguello ordered 0.5 mg (1/2 tablet) every other night in addition to 1 mg  (1 tablet) daily due to increase swelling.) 90 Tab 0  
 potassium chloride SR (K-TAB) 20 mEq tablet Take 1 Tab by mouth daily. 90 Tab 0  
 FIBER-CAPS, PSYLLIUM HUSK, PO Take 5 Caps by mouth daily.  azelastine (ASTELIN) 137 mcg (0.1 %) nasal spray 2 Sprays by Both Nostrils route two (2) times a day. Use in each nostril as directed 3 Bottle 3  
 aspirin 81 mg chewable tablet Take 1 Tab by mouth daily. 90 Tab 3  
 apixaban (ELIQUIS) 5 mg tablet Take 1 Tab by mouth two (2) times a day. 60 Tab 0  
 rosuvastatin (CRESTOR) 5 mg tablet TAKE 1 TABLET BY MOUTH  DAILY 90 Tab 3  
 metFORMIN (GLUCOPHAGE) 1,000 mg tablet TAKE 1 TABLET BY MOUTH TWO  TIMES DAILY 180 Tab 3  
 losartan (COZAAR) 50 mg tablet Take 1 Tab by mouth daily. Indications: hypertension 30 Tab 1  cholecalciferol, vitamin D3, (VITAMIN D3) 2,000 unit tab Take 1 Tab by mouth daily (after lunch).  CYANOCOBALAMIN, VITAMIN B-12, (VITAMIN B-12 PO) Take 1,500 mg by mouth daily.  COD LIVER OIL PO Take 1,000 mg by mouth daily.  glucose blood VI test strips (ACCU-CHEK ELBERT PLUS) strip Check bs daily. Dx 250.00 1 Package 11 Allergies Allergen Reactions  Bees [Hymenoptera Allergenic Extract] Anaphylaxis  Lipitor [Atorvastatin] Myalgia  Betadine [Povidone-Iodine] Other (comments) Abdominal swelling, one incident in . Has had since without a reaction.  Darvon [Propoxyphene] Nausea and Vomiting  Griseofulvin Nausea and Vomiting  Pollen Extracts Cough Family History Problem Relation Age of Onset  Stroke Mother 3  
 Cancer Mother   
     ovarian  Cancer Father   
     lung/throat  Alcohol abuse Father  Lung Disease Father  Pacemaker Brother  Heart Attack Brother  Heart Disease Brother  Other Brother   
     als  Stroke Maternal Grandfather  Heart Disease Paternal Uncle  Heart Attack Paternal Uncle Social History Tobacco Use  Smoking status: Former Smoker Packs/day: 1.50 Years: 13.00 Pack years: 19.50 Types: Cigarettes Last attempt to quit: 1974 Years since quittin.8  Smokeless tobacco: Never Used  Tobacco comment: former cigarette smoker Substance Use Topics  Alcohol use: No  
  Alcohol/week: 0.0 - 0.6 oz Patient Active Problem List  
Diagnosis Code  Tachycardia R00.0  Hypertension I10  
 PVC (premature ventricular contraction) I49.3  Aortic stenosis I35.0  Edema R60.9  Foreign body in pharynx T17.208A  Type II or unspecified type diabetes mellitus without mention of complication, uncontrolled E11.65  Pure hypercholesterolemia E78.00  
 CINDA (obstructive sleep apnea) G47.33  
 Other dyspnea and respiratory abnormality R06.09, R09.89  
 Mitral stenosis I05.0  
 COPD (chronic obstructive pulmonary disease) ([facility]) J44.9  Fibromuscular dysplasia ([facility]) I77.3  Controlled type 2 diabetes mellitus without complication, without long-term current use of insulin ([facility]) E11.9  Advance directive on file L81.3  Chest tightness R07.89  Dyspnea and respiratory abnormalities R06.00, R06.89  
 S/P TAVR (transcatheter aortic valve replacement) Z95.2  Obesity (BMI 30.0-34. 9) E66.9  (HFpEF) heart failure with preserved ejection fraction (HCC) I50.30  Type 2 diabetes with nephropathy (HCC) E11.21 Depression Risk Factor Screening:  
 
3 most recent PHQ Screens 8/30/2018 Little interest or pleasure in doing things Not at all Feeling down, depressed, irritable, or hopeless Not at all Total Score PHQ 2 0 Trouble falling or staying asleep, or sleeping too much - Feeling tired or having little energy - Poor appetite, weight loss, or overeating - Feeling bad about yourself - or that you are a failure or have let yourself or your family down - Trouble concentrating on things such as school, work, reading, or watching TV - Moving or speaking so slowly that other people could have noticed; or the opposite being so fidgety that others notice - Thoughts of being better off dead, or hurting yourself in some way -  
PHQ 9 Score - How difficult have these problems made it for you to do your work, take care of your home and get along with others - Alcohol Risk Factor Screening: You do not drink alcohol or very rarely. Functional Ability and Level of Safety:  
Hearing Loss Hearing is good. Activities of Daily Living The home contains: handrails and grab bars Patient does total self care Fall Risk Fall Risk Assessment, last 12 mths 3/21/2019 Able to walk? Yes Fall in past 12 months? No  
 
 
Abuse Screen Patient is not abused Cognitive Screening Evaluation of Cognitive Function: 
Has your family/caregiver stated any concerns about your memory: no 
Normal 
 
Patient Care Team  
Patient Care Team: Mat Vergara MD as PCP - General (Internal Medicine) Car Singh MD (Endocrinology) Yolanda Field MD as Physician (Ophthalmology) Magdy Suazo MD as Physician (Dermatology) Yanick Liu MD as Physician (Dermatology) Crystal Shaver MD as Physician (Cardiology) An Talavera MD as Physician (Sleep Medicine) Alpa Miller MD (Cardiothoracic Surgery) Saulo Acuña MD (Cardiology) Assessment/Plan Education and counseling provided: 
Are appropriate based on today's review and evaluation Diagnoses and all orders for this visit: 1. Controlled type 2 diabetes mellitus without complication, without long-term current use of insulin ([facility] [address] 2. (HFpEF) heart failure with preserved ejection fraction ([address] 3. Essential hypertension 4. Pure hypercholesterolemia 5. S/P TAVR (transcatheter aortic valve replacement) 6. Nonallergic rhinitis 7. Obesity (BMI 30.0-34.9) 8. Advance directive discussed with patient Health Maintenance Due Topic Date Due  Shingrix Vaccine Age 50> (1 of 2) 07/03/1980  
 DTaP/Tdap/Td series (1 - Tdap) 09/21/2011  Pneumococcal 65+ years (2 of 2 - PPSV23) 04/27/2016  
 HEMOGLOBIN A1C Q6M  01/21/2019  
 FOOT EXAM Q1  03/22/2019  MEDICARE YEARLY EXAM  03/23/2019

## 2019-03-21 NOTE — TELEPHONE ENCOUNTER
Patient would like to know if you got her remote check?    Phone: [phone]
Spoke to pt about her [employer] remote. Explained to her when to send, how it works & about the schedules. She missed remote yesterday so she will send today.
DISPLAY PLAN FREE TEXT

## 2019-03-21 NOTE — PATIENT INSTRUCTIONS
Medicare Wellness Visit, Female The best way to live healthy is to have a lifestyle where you eat a well-balanced diet, exercise regularly, limit alcohol use, and quit all forms of tobacco/nicotine, if applicable. Regular preventive services are another way to keep healthy. Preventive services (vaccines, screening tests, monitoring & exams) can help personalize your care plan, which helps you manage your own care. Screening tests can find health problems at the earliest stages, when they are easiest to treat. Rafi Oliveira follows the current, evidence-based guidelines published by the [facility] ([facility]) when recommending preventive services for our patients. Because we follow these guidelines, sometimes recommendations change over time as research supports it. (For example, mammograms used to be recommended annually. Even though Medicare will still pay for an annual mammogram, the newer guidelines recommend a mammogram every two years for women of average risk.) Of course, you and your doctor may decide to screen more often for some diseases, based on your risk and your health status. Preventive services for you include: - Medicare offers their members a free annual wellness visit, which is time for you and your primary care provider to discuss and plan for your preventive service needs. Take advantage of this benefit every year! 
-All adults over the age of 72 should receive the recommended pneumonia vaccines. Current USPSTF guidelines recommend a series of two vaccines for the best pneumonia protection.  
-All adults should have a flu vaccine yearly and a tetanus vaccine every 10 years. All adults age 61 and older should receive a shingles vaccine once in their lifetime.   
-A bone mass density test is recommended when a woman turns 65 to screen for osteoporosis. This test is only recommended one time, as a screening. Some providers will use this same test as a disease monitoring tool if you already have osteoporosis. -All adults age 38-68 who are overweight should have a diabetes screening test once every three years.  
-Other screening tests and preventive services for persons with diabetes include: an eye exam to screen for diabetic retinopathy, a kidney function test, a foot exam, and stricter control over your cholesterol.  
-Cardiovascular screening for adults with routine risk involves an electrocardiogram (ECG) at intervals determined by your doctor.  
-Colorectal cancer screenings should be done for adults age 54-65 with no increased risk factors for colorectal cancer. There are a number of acceptable methods of screening for this type of cancer. Each test has its own benefits and drawbacks. Discuss with your doctor what is most appropriate for you during your annual wellness visit. The different tests include: colonoscopy (considered the best screening method), a fecal occult blood test, a fecal DNA test, and sigmoidoscopy. -Breast cancer screenings are recommended every other year for women of normal risk, age 54-69. 
-Cervical cancer screenings for women over age 72 are only recommended with certain risk factors.  
-All adults born between [facility] should be screened once for Hepatitis C. Here is a list of your current Health Maintenance items (your personalized list of preventive services) with a due date: 
Health Maintenance Due Topic Date Due  Shingles Vaccine (1 of 2) 07/03/1980  
 DTaP/Tdap/Td  (1 - Tdap) 09/21/2011  Pneumococcal Vaccine (2 of 2 - PPSV23) 04/27/2016  Hemoglobin A1C    01/21/2019 [facility] Diabetic Foot Care  03/22/2019 [facility] Annual Well Visit  03/23/2019 Medicare Wellness Visit, Female The best way to live healthy is to have a lifestyle where you eat a well-balanced diet, exercise regularly, limit alcohol use, and quit all forms of tobacco/nicotine, if applicable. Regular preventive services are another way to keep healthy. Preventive services (vaccines, screening tests, monitoring & exams) can help personalize your care plan, which helps you manage your own care. Screening tests can find health problems at the earliest stages, when they are easiest to treat. Rafi Oliveira follows the current, evidence-based guidelines published by the [facility] (USPSTF) when recommending preventive services for our patients. Because we follow these guidelines, sometimes recommendations change over time as research supports it. (For example, mammograms used to be recommended annually. Even though Medicare will still pay for an annual mammogram, the newer guidelines recommend a mammogram every two years for women of average risk.) Of course, you and your doctor may decide to screen more often for some diseases, based on your risk and your health status. Preventive services for you include: - Medicare offers their members a free annual wellness visit, which is time for you and your primary care provider to discuss and plan for your preventive service needs. Take advantage of this benefit every year! 
-All adults over the age of 72 should receive the recommended pneumonia vaccines. Current USPSTF guidelines recommend a series of two vaccines for the best pneumonia protection.  
-All adults should have a flu vaccine yearly and a tetanus vaccine every 10 years. All adults age 61 and older should receive a shingles vaccine once in their lifetime.   
-A bone mass density test is recommended when a woman turns 65 to screen for osteoporosis. This test is only recommended one time, as a screening. Some providers will use this same test as a disease monitoring tool if you already have osteoporosis. -All adults age 38-68 who are overweight should have a diabetes screening test once every three years. -Other screening tests and preventive services for persons with diabetes include: an eye exam to screen for diabetic retinopathy, a kidney function test, a foot exam, and stricter control over your cholesterol.  
-Cardiovascular screening for adults with routine risk involves an electrocardiogram (ECG) at intervals determined by your doctor.  
-Colorectal cancer screenings should be done for adults age 54-65 with no increased risk factors for colorectal cancer. There are a number of acceptable methods of screening for this type of cancer. Each test has its own benefits and drawbacks. Discuss with your doctor what is most appropriate for you during your annual wellness visit. The different tests include: colonoscopy (considered the best screening method), a fecal occult blood test, a fecal DNA test, and sigmoidoscopy. -Breast cancer screenings are recommended every other year for women of normal risk, age 54-69. 
-Cervical cancer screenings for women over age 72 are only recommended with certain risk factors.  
-All adults born between [facility] should be screened once for Hepatitis C. Here is a list of your current Health Maintenance items (your personalized list of preventive services) with a due date: 
Health Maintenance Due Topic Date Due  Shingles Vaccine (1 of 2) 07[birth year]03/1980  
 DTaP/Tdap/Td  (1 - Tdap) 09/21/2011  Pneumococcal Vaccine (2 of 2 - PPSV23) 04/27/2016  Hemoglobin A1C    01/21/2019 [facility]  03/22/2019 [facility] Annual Well Visit  03/23/2019 Medicare Wellness Visit, Female The best way to live healthy is to have a lifestyle where you eat a well-balanced diet, exercise regularly, limit alcohol use, and quit all forms of tobacco/nicotine, if applicable. Regular preventive services are another way to keep healthy. Preventive services (vaccines, screening tests, monitoring & exams) can help personalize your care plan, which helps you manage your own care. Screening tests can find health problems at the earliest stages, when they are easiest to treat. Rafi Oliveira follows the current, evidence-based guidelines published by the [facility] (USPSTF) when recommending preventive services for our patients. Because we follow these guidelines, sometimes recommendations change over time as research supports it. (For example, mammograms used to be recommended annually. Even though Medicare will still pay for an annual mammogram, the newer guidelines recommend a mammogram every two years for women of average risk.) Of course, you and your doctor may decide to screen more often for some diseases, based on your risk and your health status. Preventive services for you include: - Medicare offers their members a free annual wellness visit, which is time for you and your primary care provider to discuss and plan for your preventive service needs. Take advantage of this benefit every year! 
-All adults over the age of 72 should receive the recommended pneumonia vaccines. Current USPSTF guidelines recommend a series of two vaccines for the best pneumonia protection.  
-All adults should have a flu vaccine yearly and a tetanus vaccine every 10 years. All adults age 61 and older should receive a shingles vaccine once in their lifetime.   
-A bone mass density test is recommended when a woman turns 65 to screen for osteoporosis. This test is only recommended one time, as a screening. Some providers will use this same test as a disease monitoring tool if you already have osteoporosis. -All adults age 38-68 who are overweight should have a diabetes screening test once every three years.  
-Other screening tests and preventive services for persons with diabetes include: an eye exam to screen for diabetic retinopathy, a kidney function test, a foot exam, and stricter control over your cholesterol. -Cardiovascular screening for adults with routine risk involves an electrocardiogram (ECG) at intervals determined by your doctor.  
-Colorectal cancer screenings should be done for adults age 54-65 with no increased risk factors for colorectal cancer. There are a number of acceptable methods of screening for this type of cancer. Each test has its own benefits and drawbacks. Discuss with your doctor what is most appropriate for you during your annual wellness visit. The different tests include: colonoscopy (considered the best screening method), a fecal occult blood test, a fecal DNA test, and sigmoidoscopy. -Breast cancer screenings are recommended every other year for women of normal risk, age 54-69. 
-Cervical cancer screenings for women over age 72 are only recommended with certain risk factors.  
-All adults born between [facility] should be screened once for Hepatitis C. Here is a list of your current Health Maintenance items (your personalized list of preventive services) with a due date: 
Health Maintenance Due Topic Date Due  Shingles Vaccine (1 of 2) 07/03/1980  
 DTaP/Tdap/Td  (1 - Tdap) 09/21/2011  Pneumococcal Vaccine (2 of 2 - PPSV23) 04/27/2016  Hemoglobin A1C    01/21/2019 [facility] Diabetic Foot Care  03/22/2019 [facility] Annual Well Visit  03/23/2019

## 2019-03-26 ENCOUNTER — OFFICE VISIT (OUTPATIENT)
Dept: CARDIOLOGY CLINIC | Age: 84
End: 2019-03-26

## 2019-03-26 ENCOUNTER — HOSPITAL ENCOUNTER (OUTPATIENT)
Dept: NON INVASIVE DIAGNOSTICS | Age: 84
Discharge: HOME | End: 2019-03-26
Attending: NURSE PRACTITIONER
Payer: MEDICARE

## 2019-03-26 VITALS
WEIGHT: 194 LBS | BODY MASS INDEX: 31.18 KG/M2 | HEIGHT: 66 IN | DIASTOLIC BLOOD PRESSURE: 82 MMHG | SYSTOLIC BLOOD PRESSURE: 117 MMHG

## 2019-03-26 VITALS
WEIGHT: 196 LBS | TEMPERATURE: 98.1 F | RESPIRATION RATE: 16 BRPM | HEART RATE: 62 BPM | SYSTOLIC BLOOD PRESSURE: 128 MMHG | HEIGHT: 66 IN | DIASTOLIC BLOOD PRESSURE: 66 MMHG | OXYGEN SATURATION: 96 % | BODY MASS INDEX: 31.5 KG/M2

## 2019-03-26 DIAGNOSIS — E78.00 PURE HYPERCHOLESTEROLEMIA: ICD-10-CM

## 2019-03-26 DIAGNOSIS — Z95.2 S/P TAVR (TRANSCATHETER AORTIC VALVE REPLACEMENT): Primary | ICD-10-CM

## 2019-03-26 DIAGNOSIS — I10 ESSENTIAL HYPERTENSION: ICD-10-CM

## 2019-03-26 DIAGNOSIS — I48.0 PAROXYSMAL ATRIAL FIBRILLATION (HCC): ICD-10-CM

## 2019-03-26 DIAGNOSIS — I05.0 MITRAL VALVE STENOSIS, UNSPECIFIED ETIOLOGY: ICD-10-CM

## 2019-03-26 DIAGNOSIS — J44.9 CHRONIC OBSTRUCTIVE PULMONARY DISEASE, UNSPECIFIED COPD TYPE (HCC): ICD-10-CM

## 2019-03-26 DIAGNOSIS — I50.30 (HFPEF) HEART FAILURE WITH PRESERVED EJECTION FRACTION (HCC): ICD-10-CM

## 2019-03-26 DIAGNOSIS — Z95.0 CARDIAC PACEMAKER IN SITU: ICD-10-CM

## 2019-03-26 DIAGNOSIS — Z95.2 S/P TAVR (TRANSCATHETER AORTIC VALVE REPLACEMENT): ICD-10-CM

## 2019-03-26 DIAGNOSIS — G47.33 OSA (OBSTRUCTIVE SLEEP APNEA): ICD-10-CM

## 2019-03-26 DIAGNOSIS — E66.9 OBESITY (BMI 30.0-34.9): ICD-10-CM

## 2019-03-26 LAB
ECHO AO ROOT DIAM: 2.61 CM
ECHO AV AREA PEAK VELOCITY: 1.6 CM2
ECHO AV AREA VTI: 1.9 CM2
ECHO AV AREA/BSA PEAK VELOCITY: 0.8 CM2/M2
ECHO AV AREA/BSA VTI: 1 CM2/M2
ECHO AV MEAN GRADIENT: 11.2 MMHG
ECHO AV PEAK GRADIENT: 18.5 MMHG
ECHO AV PEAK VELOCITY: 215.26 CM/S
ECHO AV VTI: 38.44 CM
ECHO EST RA PRESSURE: 3 MMHG
ECHO LA AREA 4C: 28.4 CM2
ECHO LA MAJOR AXIS: 5.37 CM
ECHO LA TO AORTIC ROOT RATIO: 2.06
ECHO LA VOL 2C: 104.99 ML (ref 22–52)
ECHO LA VOL 4C: 96.3 ML (ref 22–52)
ECHO LA VOL BP: 108.05 ML (ref 22–52)
ECHO LA VOL/BSA BIPLANE: 54.73 ML/M2 (ref 16–28)
ECHO LA VOLUME INDEX A2C: 53.18 ML/M2 (ref 16–28)
ECHO LA VOLUME INDEX A4C: 48.77 ML/M2 (ref 16–28)
ECHO LV E' LATERAL VELOCITY: 7.57 CM/S
ECHO LV E' SEPTAL VELOCITY: 7 CM/S
ECHO LV INTERNAL DIMENSION DIASTOLIC: 4.2 CM (ref 3.9–5.3)
ECHO LV INTERNAL DIMENSION SYSTOLIC: 2.81 CM
ECHO LV IVSD: 1.45 CM (ref 0.6–0.9)
ECHO LV MASS 2D: 269.5 G (ref 67–162)
ECHO LV MASS INDEX 2D: 136.5 G/M2 (ref 43–95)
ECHO LV POSTERIOR WALL DIASTOLIC: 1.37 CM (ref 0.6–0.9)
ECHO LVOT DIAM: 2.28 CM
ECHO LVOT PEAK GRADIENT: 2.7 MMHG
ECHO LVOT PEAK VELOCITY: 82.44 CM/S
ECHO LVOT SV: 74.1 ML
ECHO LVOT VTI: 18.23 CM
ECHO MV A VELOCITY: 37.2 CM/S
ECHO MV AREA PHT: 2.6 CM2
ECHO MV AREA VTI: 2.3 CM2
ECHO MV E DECELERATION TIME (DT): 294.6 MS
ECHO MV E VELOCITY: 134.72 CM/S
ECHO MV E/A RATIO: 3.62
ECHO MV E/E' LATERAL: 17.8
ECHO MV E/E' RATIO (AVERAGED): 18.52
ECHO MV E/E' SEPTAL: 19.25
ECHO MV MAX VELOCITY: 125.76 CM/S
ECHO MV MEAN GRADIENT: 1.8 MMHG
ECHO MV PEAK GRADIENT: 6.3 MMHG
ECHO MV PRESSURE HALF TIME (PHT): 85.4 MS
ECHO MV VTI: 32.51 CM
ECHO PULMONARY ARTERY SYSTOLIC PRESSURE (PASP): 29.8 MMHG
ECHO PV MAX VELOCITY: 97.66 CM/S
ECHO PV PEAK GRADIENT: 3.8 MMHG
ECHO RIGHT VENTRICULAR SYSTOLIC PRESSURE (RVSP): 29.8 MMHG
ECHO RV INTERNAL DIMENSION: 4.71 CM
ECHO RV TAPSE: 1.62 CM (ref 1.5–2)
ECHO TV REGURGITANT MAX VELOCITY: 258.78 CM/S
ECHO TV REGURGITANT PEAK GRADIENT: 26.8 MMHG

## 2019-03-26 PROCEDURE — 93306 TTE W/DOPPLER COMPLETE: CPT

## 2019-03-26 NOTE — PROGRESS NOTES
Patient: Yuri Beltrán   Age: 80 y.o. Patient Care Team:  Tiffanie Walton MD as PCP - General (Internal Medicine)  Samantha Huang MD (Endocrinology)  Dianne Walton MD as Physician (Ophthalmology)  Peter Ceron MD as Physician (Dermatology)  Reynaldo Suarez MD as Physician (Dermatology)  Lee Vaughn MD as Physician (Cardiology)  Garfield Lu MD as Physician (Sleep Medicine)  Batsheva Jeffrey MD (Cardiothoracic Surgery)  Jacobo Gutiérrez MD (Cardiology)    PCP: Tiffanie Walton MD    Cardiologist: Dr. Alona Negrete    Diagnosis/Reason for Consultation: The primary encounter diagnosis was S/P TAVR (transcatheter aortic valve replacement). Diagnoses of (HFpEF) heart failure with preserved ejection fraction ([address] Mitral valve stenosis, unspecified etiology, Cardiac pacemaker in situ, Essential hypertension, CINDA (obstructive sleep apnea), Paroxysmal atrial fibrillation ([address] Pure hypercholesterolemia, and Chronic obstructive pulmonary disease, unspecified COPD type ([address] were also pertinent to this visit.     Problem List:   Patient Active Problem List   Diagnosis Code    Tachycardia R00.0    Hypertension I10    PVC (premature ventricular contraction) I49.3    Aortic stenosis I35.0    Edema R60.9    Foreign body in pharynx T17.208A    Type II or unspecified type diabetes mellitus without mention of complication, uncontrolled E11.65    Pure hypercholesterolemia E78.00    CINDA (obstructive sleep apnea) G47.33    Other dyspnea and respiratory abnormality R06.09, R09.89    Mitral stenosis I05.0    COPD (chronic obstructive pulmonary disease) ([facility]) J44.9    Fibromuscular dysplasia ([facility]) I77.3    Controlled type 2 diabetes mellitus without complication, without long-term current use of insulin ([facility]) E11.9    Advance directive on file Z78.9    Chest tightness R07.89    Dyspnea and respiratory abnormalities R06.00, R06.89    S/P TAVR (transcatheter aortic valve replacement) Z95.2    Obesity (BMI 30.0-34. 9) E66.9    (HFpEF) heart failure with preserved ejection fraction (HCC) I50.30    Type 2 diabetes with nephropathy ([facility] E11.21       HPI: 80 y.o.  female POD# 6 s/p TAVR (26 S3 aortic valve was then deployed with rapid ventricular pacing. Post-deployment balloon inflation was performed via R femoral artery) on 4/12/2018 for severe and symptomatic AS. Post-op course notable for new LAFB and RBBB that was evaluated by EP and did not warrant PPM. She was discharged to home on POD#3 w/ services. She is here today for her one year follow up. She had a placement of a single chamber pacemaker by Dr. Hanna Herrera in June of 2018 after continuing to feel fatigued, weak and dizzy and experiencing ROBB. She was in a junctional rhythm/Sinus Bradycardia and was admitted to the hospital for heat failure exacerbation after her one month follow up.      She has occasional SOB with exertion and minimal chest tightness once or twice per year. She has not experienced any lower extremity edema since her PPM. She never completed cardiac rehab She went twice and could not complete. She is exercising with Dr. Acacia Rudolph and with a PT with his office. She is having back pain and arthritic complaints. She lives independently and her son helps her from time to time. She attributes her inactivity to \"being lazy\" and arthritis and not fatigue. 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 [id number] (1848), spinal stenosis w/ peripheral neuropathy, CINDA (CPAP), Vit D deficiency, vocal cords injury with intubation in 1985           NYHA Classification: Class I   Class I (Mild): No limitation of physical activity. Ordinary physical activity does not cause undue fatigue, palpitation, or dyspnea.      Angina Classification: Class 0   Class 0: No symptoms      Past Medical History:   Diagnosis Date    Adverse effect of anesthesia     difficulty waking    Aortic stenosis     Arrhythmia     rapid heartrate seen in ER  18    Cancer ([facility] 2018    skin spots, removed periodically    Chronic pain     back - spinal stenosis    COPD     Diabetes ([facility]     Diabetic neuropathy ([facility]     knees down    Diverticulosis of colon     Hypercholesterolemia     Hypertension     ICNDA (obstructive sleep apnea) 2012    AHI: 18.6 per hour - CPAP    Other ill-defined conditions(799.89)     heart murmur    Peripheral neuropathy     Renal artery stenosis (HCC)     Right    Unspecified adverse effect of anesthesia     delayed awakening - \"does not need as much\"       Last Dental Visit:  3-2019   Any dental pain/concerns: no understands need for ABX proph    Past Surgical History:   Procedure Laterality Date    ABDOMEN SURGERY PROC UNLISTED      R Renal Artery Stenosis bypassed    ABDOMEN SURGERY [address] or     hernia repair    ABDOMEN SURGERY [address] [place] [address] - removal of fatty tissue around abdomen    BREAST SURGERY PROCEDURE UNLISTED      Left - benign cyst    CARDIAC SURG PROCEDURE UNLIST  2018    TAVR    ENDOSCOPY, COLON, DIAGNOSTIC      hx of polys in the past    HX CATARACT REMOVAL      bilateral    HX GYN  1966    D&C    HX HEENT      Eyelid Cysts x 4    HX HEENT      oral surgeries - gum surgery/floating root  removed x 2    HX HEENT      retinal hemmorage repair    HX ORTHOPAEDIC      L1-S1 2007    HX ORTHOPAEDIC  1985    L 4th Trigger finger    HX ORTHOPAEDIC      left knee arthroscopy    HX OTHER SURGICAL      skin cancer removal, periodically    HX TONSILLECTOMY      T & A      Social History     Tobacco Use    Smoking status: Former Smoker     Packs/day: 1.50     Years: 13.00     Pack years: 19.50     Types: Cigarettes     Last attempt to quit: 1974     Years since quittin.8    Smokeless tobacco: Never Used    Tobacco comment: former cigarette smoker   Substance Use Topics    Alcohol use:  No Alcohol/week: 0.0 - 0.6 oz      Family History   Problem Relation Age of Onset   [facility] Stroke Mother         1    Cancer Mother         ovarian    Cancer Father         lung/throat    Alcohol abuse Father     Lung Disease Father     Pacemaker Brother     Heart Attack Brother     Heart Disease Brother     Other Brother         als    Stroke Maternal Grandfather     Heart Disease Paternal Uncle     Heart Attack Paternal Uncle      Prior to Admission medications    Medication Sig Start Date End Date Taking? Authorizing Provider   spironolactone (ALDACTONE) 25 mg tablet TAKE 1 TABLET BY MOUTH  DAILY 3/18/19  Yes Helene Casper Covert, YVETTE   bumetanide (BUMEX) 1 mg tablet Take 1 Tab by mouth daily. Patient taking differently: Take 1 mg by mouth daily. As of 2-7-19, patient states that  Dr. Luz Witt ordered 0.5 mg (1/2 tablet) every other night in addition to 1 mg  (1 tablet) daily due to increase swelling. 12/21/18  Yes Darby Ramos NP   potassium chloride SR (K-TAB) 20 mEq tablet Take 1 Tab by mouth daily. 12/21/18  Yes Darby Ramos NP   FIBER-CAPS, PSYLLIUM HUSK, PO Take 5 Caps by mouth daily. Yes Provider, Historical   azelastine (ASTELIN) 137 mcg (0.1 %) nasal spray 2 Sprays by Both Nostrils route two (2) times a day. Use in each nostril as directed 6/25/18  Yes Rebeka Olea MD   aspirin 81 mg chewable tablet Take 1 Tab by mouth daily. 6/15/18  Yes Darby Ramos NP   apixaban (ELIQUIS) 5 mg tablet Take 1 Tab by mouth two (2) times a day. 6/15/18  Yes Darby Ramos NP   rosuvastatin (CRESTOR) 5 mg tablet TAKE 1 TABLET BY MOUTH  DAILY 6/8/18  Yes Rebeka Olea MD   metFORMIN (GLUCOPHAGE) 1,000 mg tablet TAKE 1 TABLET BY MOUTH TWO  TIMES DAILY 5/18/18  Yes Rebeka Olea MD   losartan (COZAAR) 50 mg tablet Take 1 Tab by mouth daily.  Indications: hypertension 4/15/18  Yes Sobia Del Valle PA-C   cholecalciferol, vitamin D3, (VITAMIN D3) 2,000 unit tab Take 1 Tab by mouth daily (after lunch). Yes Provider, Historical   CYANOCOBALAMIN, VITAMIN B-12, (VITAMIN B-12 PO) Take 1,500 mg by mouth daily. Yes Provider, Historical   COD LIVER OIL PO Take 1,000 mg by mouth daily. Yes Provider, Historical   glucose blood VI test strips (ACCU-CHEK ELBERT PLUS) strip Check bs daily. Dx 250.00 8/1/13  Yes Rebeka Olea MD   AMOXICILLIN PO Take  by mouth. Prior to dental appts, per Dr. Greta Valero. Provider, Historical       Allergies   Allergen Reactions    Bees [Hymenoptera Allergenic Extract] Anaphylaxis    Lipitor [Atorvastatin] Myalgia    Betadine [Povidone-Iodine] Other (comments)     Abdominal swelling, one incident in 1977. Has had since without a reaction.  Darvon [Propoxyphene] Nausea and Vomiting    Griseofulvin Nausea and Vomiting    Pollen Extracts Cough       Current Medications:   Current Outpatient Medications   Medication Sig Dispense Refill    spironolactone (ALDACTONE) 25 mg tablet TAKE 1 TABLET BY MOUTH  DAILY 90 Tab 3    bumetanide (BUMEX) 1 mg tablet Take 1 Tab by mouth daily. (Patient taking differently: Take 1 mg by mouth daily. As of 2-7-19, patient states that  Dr. Greta Valero ordered 0.5 mg (1/2 tablet) every other night in addition to 1 mg  (1 tablet) daily due to increase swelling.) 90 Tab 0    potassium chloride SR (K-TAB) 20 mEq tablet Take 1 Tab by mouth daily. 90 Tab 0    FIBER-CAPS, PSYLLIUM HUSK, PO Take 5 Caps by mouth daily.  azelastine (ASTELIN) 137 mcg (0.1 %) nasal spray 2 Sprays by Both Nostrils route two (2) times a day. Use in each nostril as directed 3 Bottle 3    aspirin 81 mg chewable tablet Take 1 Tab by mouth daily. 90 Tab 3    apixaban (ELIQUIS) 5 mg tablet Take 1 Tab by mouth two (2) times a day.  60 Tab 0    rosuvastatin (CRESTOR) 5 mg tablet TAKE 1 TABLET BY MOUTH  DAILY 90 Tab 3    metFORMIN (GLUCOPHAGE) 1,000 mg tablet TAKE 1 TABLET BY MOUTH TWO  TIMES DAILY 180 Tab 3    losartan (COZAAR) 50 mg tablet Take 1 Tab by mouth daily. Indications: hypertension 30 Tab 1    cholecalciferol, vitamin D3, (VITAMIN D3) 2,000 unit tab Take 1 Tab by mouth daily (after lunch).  CYANOCOBALAMIN, VITAMIN B-12, (VITAMIN B-12 PO) Take 1,500 mg by mouth daily.  COD LIVER OIL PO Take 1,000 mg by mouth daily.  glucose blood VI test strips (ACCU-CHEK ELBERT PLUS) strip Check bs daily. Dx 250.00 1 Package 11    AMOXICILLIN PO Take  by mouth. Prior to dental appts, per Dr. Karin Gilmore. Vitals: Blood pressure 128/66, pulse 62, temperature 98.1 °F (36.7 °C), temperature source Oral, resp. rate 16, height 5' 6\" (1.676 m), weight 196 lb (88.9 kg), SpO2 96 %. Allergies: is allergic to bees [hymenoptera allergenic extract]; lipitor [atorvastatin]; betadine [povidone-iodine]; darvon [propoxyphene]; griseofulvin; and pollen extracts.     Review of Systems: Pertinent Positives per HPI   [x]Total of 13 systems reviewed as follows:  Constitutional: Negative fever, negative chills  Eyes:   Negative for amauroses fugax  ENT:   Negative sore throat,oral absecess  Endocrine Negative for thyroid replacement Rx; goiter; DM  Respiratory:  Negative chronic cough,sputum production  Cards:   Positive for minimal chest tightness on rare occasion,minimal ROBB,Negative for palpitations, lower extremity edema, varicosities, claudication  GI:   Negative for dysphagia, bleeding, nausea, vomiting, diarrhea, and abdominal pain  Genitourinary: Negative for frequency, dysuria, BPH   Integument:  Negative for rash and pruritus  Hematologic:  Negative for easy bruising; bleeding dyscarsia  Musculoskel: Negative for muscle weakness inhibiting ambulation  Neurological:  Negative for stroke, TIA, syncope, dizziness  Behavl/Psych: Negative for feelings of anxiety, depression     Cardiovascular Testing:   EKG: PPM    TTE:  Performed prior to this visit, not resulted    Physical Exam:  General: Well nourished well groomed female appearing younger than stated age  Neuro: A&OX3. ARORA. PERRL. Steady walker assisted gait  Head:Normocephalic. Atraumatic. Symmetrical  Neck: Trachea Midline  Resp: CTA B. No Adv BS/cough/sputum/tachypnea with seated conversation  CV: S1S2 RRR. No MRGs No JVD/carotid bruits. Pink/warm/dry extremities. No LE peripheral edema  GI:Benign ab. Soft. NT/ND. Active BS  : Voids  Integ: No obvious s/s of infection or breakdown  Musculo/Skeletal: FROM in all major joints. Age appropraite muscle tone    Clinic Evaluation:   KCCQ-12: scanned into EMR (completed)    5 meter gait:  06.61 sec      Assessment/Plan:   1. AS s/p TAVR - TTE performed this AM, will fu and call patient with results. ASA 81mg daily for valve patency. . She had PPM with Dr. Nayak Said in June. Thank you for the opportunity to see your patient. She will continue follow up with Dr. Suhail Caballero in May. 2. PPM insertion - had junctional, SB and single chamber pacemaker placed by Dr. Nayak Said in 6/2018  3.  HTN- Well controlled on current loop diuretic/ARB dosing. 4. TRENTON-  bypassed in 1977   5. DM- metformin/glimeperide per endocrine  6. HLD- well controlled on current statin dose per cards  7. Vit D deficiency- at goal on supplement per cards  8. CINDA- CPAP for past 6+ yrs. Pt unaware of setting   9. Hx vocal cord injury w/ intubation in 1985 - chronic horse voice. No swallowing issues. Throat a little sore but improving    10. Diagnosis of A Fib??? - from hospitalization last Summer.  Dr. Jhony Holder notes suggest unsure if not more sinus that AF with RBBB/ Will yield to Dr. Suhail Caballero and team to sort out need for Eliquis

## 2019-04-02 DIAGNOSIS — R00.0 TACHYCARDIA: ICD-10-CM

## 2019-04-02 DIAGNOSIS — I49.3 PVC (PREMATURE VENTRICULAR CONTRACTION): ICD-10-CM

## 2019-04-02 RX ORDER — ROSUVASTATIN CALCIUM 5 MG/1
TABLET, COATED ORAL
Qty: 90 TAB | Refills: 3 | Status: SHIPPED | OUTPATIENT
Start: 2019-04-02 | End: 2019-08-05 | Stop reason: SDUPTHER

## 2019-04-03 RX ORDER — APIXABAN 5 MG/1
TABLET, FILM COATED ORAL
Qty: 180 TAB | Refills: 3 | Status: SHIPPED | OUTPATIENT
Start: 2019-04-03 | End: 2019-07-15 | Stop reason: SDUPTHER

## 2019-04-23 ENCOUNTER — OFFICE VISIT (OUTPATIENT)
Dept: CARDIOLOGY CLINIC | Age: 84
End: 2019-04-23

## 2019-04-23 DIAGNOSIS — Z95.0 CARDIAC PACEMAKER IN SITU: Primary | ICD-10-CM

## 2019-05-07 ENCOUNTER — TELEPHONE (OUTPATIENT)
Dept: CARDIOLOGY CLINIC | Age: 84
End: 2019-05-07

## 2019-05-07 NOTE — TELEPHONE ENCOUNTER
Patient states that she received information regarding pacemakers failing due to premature battery depletion and would like some clarification on whether or not hers will be affected. Please advise.     Phone #: [phone]  Thanks

## 2019-05-09 NOTE — TELEPHONE ENCOUNTER
Spoke to pt & informed her that she does not have the Medtronic pacer that has an alert. Pt has an Advisa pacer. Pt stated she understands.

## 2019-05-30 ENCOUNTER — OFFICE VISIT (OUTPATIENT)
Dept: CARDIOLOGY CLINIC | Age: 84
End: 2019-05-30

## 2019-05-30 VITALS
DIASTOLIC BLOOD PRESSURE: 84 MMHG | WEIGHT: 197.2 LBS | RESPIRATION RATE: 16 BRPM | HEIGHT: 66 IN | OXYGEN SATURATION: 95 % | SYSTOLIC BLOOD PRESSURE: 124 MMHG | HEART RATE: 65 BPM | BODY MASS INDEX: 31.69 KG/M2

## 2019-05-30 DIAGNOSIS — I48.91 ATRIAL FIBRILLATION, UNSPECIFIED TYPE (HCC): ICD-10-CM

## 2019-05-30 DIAGNOSIS — I49.3 PVC (PREMATURE VENTRICULAR CONTRACTION): ICD-10-CM

## 2019-05-30 DIAGNOSIS — R06.09 DOE (DYSPNEA ON EXERTION): ICD-10-CM

## 2019-05-30 DIAGNOSIS — I77.3 FIBROMUSCULAR DYSPLASIA (HCC): ICD-10-CM

## 2019-05-30 DIAGNOSIS — I10 ESSENTIAL HYPERTENSION: ICD-10-CM

## 2019-05-30 DIAGNOSIS — I50.32 CHRONIC HEART FAILURE WITH PRESERVED EJECTION FRACTION (HCC): Primary | ICD-10-CM

## 2019-05-30 DIAGNOSIS — E11.21 TYPE 2 DIABETES WITH NEPHROPATHY (HCC): ICD-10-CM

## 2019-05-30 DIAGNOSIS — R00.0 TACHYCARDIA: ICD-10-CM

## 2019-05-30 DIAGNOSIS — E66.9 OBESITY (BMI 30.0-34.9): ICD-10-CM

## 2019-05-30 DIAGNOSIS — Z95.2 S/P TAVR (TRANSCATHETER AORTIC VALVE REPLACEMENT): ICD-10-CM

## 2019-05-30 DIAGNOSIS — R53.83 FATIGUE, UNSPECIFIED TYPE: ICD-10-CM

## 2019-05-30 DIAGNOSIS — E11.9 CONTROLLED TYPE 2 DIABETES MELLITUS WITHOUT COMPLICATION, WITHOUT LONG-TERM CURRENT USE OF INSULIN (HCC): ICD-10-CM

## 2019-05-30 NOTE — PROGRESS NOTES
[facility] Office Note    5/30/2019     Subjective:     She is here today to follow up on her HTN, hx of TAVR, diastolic heart failure. She is on eliquis for the afib and that is going ok. Will stop aspirin. Having some nosebleeds, out of the right nostril. Using her CPAP about 4 hours a night, bothers  her a lot. Not playing bridge due to walker use, being a pain. Playing some but not as much as she was doing. Got an apple watch. But getting an iphone today. Does feel that the pacemaker made a difference in her energy level though   Having more dyspnea with exertion  No PND or orthopnea  No chest pain or palpitations    Stopped doing cardiac rehab due to back pain   Her weight is up 6 lbs since her last visit and she admits that she is not exercising  Loses balance easily, only one fall several months ago when she had not eaten   Uses her cane and sometimes her walker  Having some nosebleeds - using saline spray some and puts vaseline in her nares at night  Wears CPAP  We talked about seeing ENT if her nosebleeds persist  O2 Sats 93% on room with ambulation today    Granddaughter is graduating from [employer] in [place]! Has had some fecal incontinence and had diarrhea today,     Assessment/Plan   1. HTN - well controlled on cozaar 50mg daily, spironolactone 25mg daily  2. TRENTON- history of adhesions, stable post renal artery bypass  -FMD in 1977, bypassed in 1977  3. DM type 2 - on metformin, has severe peripheral neuropathy, followed by Dr. Connie Garcia, A1C 7.6% in 6/18  4. AS s/p TAVR - stable by TTE in 3/19, doing well  5. Hyperlipidemia- LDL 75 at goal on crestor 5mg daily   6. Vit D deficiency- on supplementation   7. CINDA- uses CPAP nightly   8. Junctional rhythm/bradycardia- SSS s/p pacemaker, doing fine now. 9. Hx of crushed vocal cords - stable hoarseness  10.   Acute diastolic heart failure - LVEF 55-60% by TTE in 5/18, NYHA Class II   -on bumex 1mg BID, BP well controlled  11. Hypokalemia - K 3.5 on labs in 6/18, on KCL 20meq BID   12. Advanced directives on file, now full code    13. Hypoxia - O2 Sats 93% with ambulation today, probably multifactorial, COPD, etc, will continue to watch   14. Atrial Fib - [id number] on Eliquis 5mg BID  13. Hypomagnesemia - stable now     6/14/2018: Medtronic Single-Chamber Pacemaker per Dr. Pricila Lei  TTE 5/18 - LVEF 55 % to 60 %, no WMA, mod dilated LA, mild MS, s/p TAVR well seated elevated gradient  Aortic Valve:   AV Vmax was 3.2 m/s.  AV meanPG was 22.7 mmHg. PW measurements: Aortic Valve:   RACHEL (VTI) was 1.3 cm2. TAVR 4/18 - Successful transcatheter aortic valve replacement with no significant paravalvular leak. Post-deployment aortography showed the valve was in good position with no aortic regurgitation  Cardiac Cath 11/17 - RACHEL 0.84 patient appeared to be in AF during case, but most likely was frequent atrial ectopy.  No mitral stenosis.  LV gram not done.  Normal coronaries.  Thermo output was 6.9, assumed Emilie was 4.65; the 6.9 was used by computer in RACHEL calculation, so RACHEL may be overestimated.  LV known to be normal by echo.  Peak to peak gradient 63. TTE 1/14 EF 65-60%, mod cLVH 1.7, grI DD, mild-mod as 25/20 mild MS 10/5, triv effusion  TTE 7/14 EF 65-70%, mild cLVH, Mod AS 42/24, mild MS 10/5   TTE 10/13 LVEF 65 %, no WMA,  severe cLVH, abnormal left ventricular relaxation (grade 1 diastolic dysfunction), aortic valve leaflets exhibited marked calcification and reduced mobility with moderate stenosis, valve mean gradient was 24 mmHg.   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 Ears/Nose/Mouth/Throat:   Hearing grossly normal.         Neck: Supple. Chest:   Lungs clear to auscultation bilaterally. Cardiovascular:  Regular rate and rhythm, S1, S2 normal, 2/6 KATHERINE    Abdomen:   Soft, non-tender, bowel sounds are active. Extremities: No LE edema     Skin: Warm and dry. Data Review:   Labs:    No results found for this or any previous visit (from the past 24 hour(s)). Current Outpatient Medications   Medication Sig    ELIQUIS 5 mg tablet TAKE 1 TABLET BY MOUTH TWO  TIMES DAILY    rosuvastatin (CRESTOR) 5 mg tablet TAKE 1 TABLET BY MOUTH  DAILY    spironolactone (ALDACTONE) 25 mg tablet TAKE 1 TABLET BY MOUTH  DAILY    AMOXICILLIN PO Take  by mouth. Prior to dental appts, per Dr. Lucita Pedro.  bumetanide (BUMEX) 1 mg tablet Take 1 Tab by mouth daily. (Patient taking differently: Take 1 mg by mouth daily. As of 2-7-19, patient states that  Dr. Lucita Pedro ordered 0.5 mg (1/2 tablet) every other night in addition to 1 mg  (1 tablet) daily due to increase swelling.)    potassium chloride SR (K-TAB) 20 mEq tablet Take 1 Tab by mouth daily.  FIBER-CAPS, PSYLLIUM HUSK, PO Take 5 Caps by mouth daily.  azelastine (ASTELIN) 137 mcg (0.1 %) nasal spray 2 Sprays by Both Nostrils route two (2) times a day. Use in each nostril as directed    aspirin 81 mg chewable tablet Take 1 Tab by mouth daily.  metFORMIN (GLUCOPHAGE) 1,000 mg tablet TAKE 1 TABLET BY MOUTH TWO  TIMES DAILY    losartan (COZAAR) 50 mg tablet Take 1 Tab by mouth daily. Indications: hypertension    cholecalciferol, vitamin D3, (VITAMIN D3) 2,000 unit tab Take 1 Tab by mouth daily (after lunch).  CYANOCOBALAMIN, VITAMIN B-12, (VITAMIN B-12 PO) Take 1,500 mg by mouth daily.     COD LIVER OIL PO Take 1,000 mg by mouth daily.  glucose blood VI test strips (ACCU-CHEK ELBERT PLUS) strip Check bs daily. Dx 250.00     No current facility-administered medications for this visit.         Lisa Long MD  [facility] [address] [place] [address] 13, [address] [place] [address],[address]  [facility]  [phone]

## 2019-06-14 RX ORDER — METFORMIN HYDROCHLORIDE 1000 MG/1
TABLET ORAL
Qty: 180 TAB | Refills: 3 | Status: SHIPPED | OUTPATIENT
Start: 2019-06-14 | End: 2020-03-20

## 2019-06-17 ENCOUNTER — DOCUMENTATION ONLY (OUTPATIENT)
Dept: CARDIOLOGY CLINIC | Age: 84
End: 2019-06-17

## 2019-06-17 RX ORDER — BUMETANIDE 1 MG/1
TABLET ORAL
Qty: 135 TAB | Refills: 1 | Status: SHIPPED | OUTPATIENT
Start: 2019-06-17 | End: 2019-09-26 | Stop reason: DRUGHIGH

## 2019-06-17 NOTE — TELEPHONE ENCOUNTER
Requested Prescriptions     Signed Prescriptions Disp Refills    bumetanide (BUMEX) 1 mg tablet 135 Tab 1     Si tab every am and additional 1/2 tab every other afternoon.      Authorizing Provider: Nena Jeff     Ordering User: Rylan Gibson     Per verbal orders

## 2019-06-17 NOTE — PROGRESS NOTES
Called patient and verified her Bumex dose. Patient does take 1 mg every morning but only takes 1/2 tab along with the morning tablet every other evening. Bumex 1 mg BID was too much for patient. She has not had to take any extra doses lately. Will send in script to her mail order pharmacy.    2 pt identifiers used

## 2019-06-17 NOTE — PROGRESS NOTES
Please call patient to confirm bumex dose and then fill it   Discrepancy in dose on office note versus comments in RX

## 2019-06-18 ENCOUNTER — TELEPHONE (OUTPATIENT)
Dept: CARDIOLOGY CLINIC | Age: 84
End: 2019-06-18

## 2019-06-18 NOTE — TELEPHONE ENCOUNTER
[facility] needs to speak with someone regarding a drug interaction for the patient as soon as possible.   Ref # [id number]  [facility] [phone]

## 2019-06-18 NOTE — TELEPHONE ENCOUNTER
Returned call to pharmacy. 2 pt identifiers used    He wanted to verify that patient is to be taking both Losartan and Spironolactone. Per Dr Deana Garcia last note patient taking both and also Bumex.  No changes

## 2019-07-15 ENCOUNTER — TELEPHONE (OUTPATIENT)
Dept: CARDIOLOGY CLINIC | Age: 84
End: 2019-07-15

## 2019-07-15 NOTE — TELEPHONE ENCOUNTER
Patient has run out of Eliquis. She states that she called it in to her Mail order pharmacy but she would like something sent to last her about a week until the mail order can send out the medication. [address].  Patient would like a phone call when the medication is sent do that she knows when she can go pick it up.      Phone: [phone]

## 2019-07-15 NOTE — TELEPHONE ENCOUNTER
Requested Prescriptions     Signed Prescriptions Disp Refills    apixaban (ELIQUIS) 5 mg tablet 30 Tab 0     Sig: TAKE 1 TABLET BY MOUTH TWO  TIMES DAILY     Authorizing Provider: Costa aCrranza     Ordering User: Dianna Golden     Per verbal orders

## 2019-07-24 ENCOUNTER — OFFICE VISIT (OUTPATIENT)
Dept: CARDIOLOGY CLINIC | Age: 84
End: 2019-07-24

## 2019-07-24 ENCOUNTER — CLINICAL SUPPORT (OUTPATIENT)
Dept: CARDIOLOGY CLINIC | Age: 84
End: 2019-07-24

## 2019-07-24 VITALS
SYSTOLIC BLOOD PRESSURE: 118 MMHG | WEIGHT: 194 LBS | OXYGEN SATURATION: 96 % | HEIGHT: 66 IN | BODY MASS INDEX: 31.18 KG/M2 | DIASTOLIC BLOOD PRESSURE: 62 MMHG | RESPIRATION RATE: 18 BRPM | HEART RATE: 64 BPM

## 2019-07-24 DIAGNOSIS — E11.9 CONTROLLED TYPE 2 DIABETES MELLITUS WITHOUT COMPLICATION, WITHOUT LONG-TERM CURRENT USE OF INSULIN (HCC): ICD-10-CM

## 2019-07-24 DIAGNOSIS — R53.83 FATIGUE, UNSPECIFIED TYPE: ICD-10-CM

## 2019-07-24 DIAGNOSIS — I10 ESSENTIAL HYPERTENSION: ICD-10-CM

## 2019-07-24 DIAGNOSIS — Z95.0 CARDIAC PACEMAKER IN SITU: Primary | ICD-10-CM

## 2019-07-24 DIAGNOSIS — Z95.0 PACEMAKER: ICD-10-CM

## 2019-07-24 DIAGNOSIS — R00.1 JUNCTIONAL BRADYCARDIA: Primary | ICD-10-CM

## 2019-07-24 NOTE — PROGRESS NOTES
Room # 5    Denies any cardiac complaints at this time.     Visit Vitals  /62 (BP 1 Location: Left arm, BP Patient Position: Sitting)   Pulse 64   Resp 18   Ht 5' 6\" (1.676 m)   Wt 194 lb (88 kg)   SpO2 96%   BMI 31.31 kg/m²

## 2019-07-24 NOTE — PROGRESS NOTES
HISTORY OF PRESENTING ILLNESS      Abbey Cuello is a 80 y.o. female with acute diastolic heart failure noted to have bradycardia.  Telemetry exhibits findings suggestive of junctional rhythm versus atrial fibrillation with heart rates in the 46s.  Patient has reported exertional fatigue since undergoing transaortic aortic valve replacement 3 months ago. She underwent single chamber pacemaker. Device interrogation shows normal functioning. Last visit, her heart rates were pinned in the 60s and she reported some exertional fatigue. She reports occasional fatigue, denies additional complaints. Echocardiogram 3/2019 demonstrated preserved LV function.         ACTIVE PROBLEM LIST     Patient Active Problem List    Diagnosis Date Noted    Type 2 diabetes with nephropathy ([address] 07/05/2018    (HFpEF) heart failure with preserved ejection fraction ([address] 06/12/2018    S/P TAVR (transcatheter aortic valve replacement) 04/13/2018    Obesity (BMI 30.0-34.9) 04/13/2018    Chest tightness 11/17/2017    Dyspnea and respiratory abnormalities 11/17/2017    Controlled type 2 diabetes mellitus without complication, without long-term current use of insulin ([address] 03/23/2017    Advance directive on file 03/23/2017    Fibromuscular dysplasia ([address] 02/17/2017    COPD (chronic obstructive pulmonary disease) ([facility] 10/27/2014    Mitral stenosis 07/03/2014    Other dyspnea and respiratory abnormality 10/02/2013    CINDA (obstructive sleep apnea) 10/18/2012    Type II or unspecified type diabetes mellitus without mention of complication, uncontrolled 05/26/2012    Pure hypercholesterolemia 05/26/2012    Foreign body in pharynx 12/18/2011    Tachycardia 12/01/2011    Hypertension 12/01/2011    PVC (premature ventricular contraction) 12/01/2011    Aortic stenosis 12/01/2011    Edema 12/01/2011           PAST MEDICAL HISTORY     Past Medical History:   Diagnosis Date    Adverse effect of anesthesia     difficulty waking    Aortic stenosis     Arrhythmia     rapid heartrate seen in ER  4/2/18    Cancer ([facility] 01/2018    skin spots, removed periodically    Chronic pain     back - spinal stenosis    COPD     Diabetes ([facility]     Diabetic neuropathy ([facility]     knees down    Diverticulosis of colon     Hypercholesterolemia     Hypertension     CINDA (obstructive sleep apnea) 08-    AHI: 18.6 per hour - CPAP    Other ill-defined conditions(799.89)     heart murmur    Peripheral neuropathy     Renal artery stenosis (HCC)     Right    Unspecified adverse effect of anesthesia     delayed awakening - \"does not need as much\"           PAST SURGICAL HISTORY     Past Surgical History:   Procedure Laterality Date    ABDOMEN SURGERY PROC UNLISTED  1977    R Renal Artery Stenosis bypassed    ABDOMEN SURGERY [address] or 1983    hernia repair    ABDOMEN SURGERY [address] [place] [address] - removal of fatty tissue around abdomen    BREAST SURGERY PROCEDURE UNLISTED  1968    Left - benign cyst    CARDIAC SURG PROCEDURE UNLIST  04/13/2018    TAVR    ENDOSCOPY, COLON, DIAGNOSTIC  1999    hx of polys in the past    HX CATARACT REMOVAL      bilateral    HX GYN  1966    D&C    HX HEENT      Eyelid Cysts x 4    HX HEENT      oral surgeries - gum surgery/floating root  removed x 2    HX HEENT      retinal hemmorage repair    HX ORTHOPAEDIC  11/06    L1-S1 2007    HX ORTHOPAEDIC  1985    L 4th Trigger finger    HX ORTHOPAEDIC      left knee arthroscopy    HX OTHER SURGICAL      skin cancer removal, periodically    HX TONSILLECTOMY      T & A          ALLERGIES     Allergies   Allergen Reactions    Bees [Hymenoptera Allergenic Extract] Anaphylaxis    Lipitor [Atorvastatin] Myalgia    Betadine [Povidone-Iodine] Other (comments)     Abdominal swelling, one incident in 1977. Has had since without a reaction.     Darvon [Propoxyphene] Nausea and Vomiting    Griseofulvin Nausea and Vomiting    Pollen Extracts Cough          FAMILY HISTORY     Family History   Problem Relation Age of Onset    Stroke Mother         1    Cancer Mother         ovarian    Cancer Father         lung/throat    Alcohol abuse Father     Lung Disease Father     Pacemaker Brother     Heart Attack Brother     Heart Disease Brother     Other Brother         als    Stroke Maternal Grandfather     Heart Disease Paternal Uncle     Heart Attack Paternal Uncle     negative for cardiac disease       SOCIAL HISTORY     Social History     Socioeconomic History    Marital status:      Spouse name: Not on file    Number of children: Not on file    Years of education: Not on file    Highest education level: Not on file   Tobacco Use    Smoking status: Former Smoker     Packs/day: 1.50     Years: 13.00     Pack years: 19.50     Types: Cigarettes     Last attempt to quit: 1974     Years since quittin.1    Smokeless tobacco: Never Used    Tobacco comment: former cigarette smoker   Substance and Sexual Activity    Alcohol use: Yes     Alcohol/week: 0.0 - 1.0 standard drinks     Comment: special occasions    Drug use: No     Types: Prescription    Sexual activity: Never         MEDICATIONS     Current Outpatient Medications   Medication Sig    apixaban (ELIQUIS) 5 mg tablet TAKE 1 TABLET BY MOUTH TWO  TIMES DAILY    bumetanide (BUMEX) 1 mg tablet 1 tab every am and additional 1/2 tab every other afternoon.  metFORMIN (GLUCOPHAGE) 1,000 mg tablet TAKE 1 TABLET BY MOUTH TWO  TIMES DAILY    rosuvastatin (CRESTOR) 5 mg tablet TAKE 1 TABLET BY MOUTH  DAILY    spironolactone (ALDACTONE) 25 mg tablet TAKE 1 TABLET BY MOUTH  DAILY    AMOXICILLIN PO Take  by mouth. Prior to dental appts, per Dr. Dai Perez.  potassium chloride SR (K-TAB) 20 mEq tablet Take 1 Tab by mouth daily.  FIBER-CAPS, PSYLLIUM HUSK, PO Take 5 Caps by mouth daily.     azelastine (ASTELIN) 137 mcg (0.1 %) nasal spray 2 Sprays by Both Nostrils route two (2) times a day. Use in each nostril as directed    losartan (COZAAR) 50 mg tablet Take 1 Tab by mouth daily. Indications: hypertension    cholecalciferol, vitamin D3, (VITAMIN D3) 2,000 unit tab Take 1 Tab by mouth daily (after lunch).  CYANOCOBALAMIN, VITAMIN B-12, (VITAMIN B-12 PO) Take 1,500 mg by mouth daily.  COD LIVER OIL PO Take 1,000 mg by mouth daily.  glucose blood VI test strips (ACCU-CHEK ELBERT PLUS) strip Check bs daily. Dx 250.00     No current facility-administered medications for this visit. I have reviewed the nurses notes, vitals, problem list, allergy list, medical history, family, social history and medications. REVIEW OF SYMPTOMS      General: Pt denies excessive weight gain or loss. Pt is able to conduct ADL's  HEENT: Denies blurred vision, headaches, hearing loss, epistaxis and difficulty swallowing. Respiratory: Denies cough, congestion, shortness of breath, ROBB, wheezing or stridor. Cardiovascular: Denies precordial pain, palpitations, edema or PND  Gastrointestinal: Denies poor appetite, indigestion, abdominal pain or blood in stool  Genitourinary: Denies hematuria, dysuria, increased urinary frequency  Musculoskeletal: Denies joint pain or swelling from muscles or joints  Neurologic: Denies tremor, paresthesias, headache, or sensory motor disturbance  Psychiatric: Denies confusion, insomnia, depression  Integumentray: Denies rash, itching or ulcers. Hematologic: Denies easy bruising, bleeding       PHYSICAL EXAMINATION      Vitals:    07/24/19 1011   BP: 118/62   Pulse: 64   Resp: 18   SpO2: 96%   Weight: 194 lb (88 kg)   Height: 5' 6\" (1.676 m)     General: Well developed, in no acute distress. HEENT: No jaundice, oral mucosa moist, no oral ulcers  Neck: Supple, no stiffness, no lymphadenopathy, supple  Heart:  Normal S1/S2 negative S3 or S4.  Regular, no murmur, gallop or rub, no jugular venous distention  Respiratory: Clear bilaterally x 4, no wheezing or rales  Abdomen:   Soft, non-tender, bowel sounds are active.   Extremities:  No edema, normal cap refill, no cyanosis. Musculoskeletal: No clubbing, no deformities  Neuro: A&Ox3, speech clear, gait stable, cooperative, no focal neurologic deficits  Skin: Skin color is normal. No rashes or lesions. Non diaphoretic, moist.  Vascular: 2+ pulses symmetric in all extremities       DIAGNOSTIC DATA      EKG:        LABORATORY DATA      Lab Results   Component Value Date/Time    WBC 10.0 08/30/2018 01:42 PM    HGB 12.6 08/30/2018 01:42 PM    HCT 40.0 08/30/2018 01:42 PM    PLATELET 250 90/94/7305 01:42 PM    MCV 85 08/30/2018 01:42 PM      Lab Results   Component Value Date/Time    Sodium 144 01/04/2019 11:06 AM    Potassium 4.8 01/04/2019 11:06 AM    Chloride 100 01/04/2019 11:06 AM    CO2 22 01/04/2019 11:06 AM    Anion gap 8 06/15/2018 04:13 AM    Glucose 122 (H) 01/04/2019 11:06 AM    BUN 22 01/04/2019 11:06 AM    Creatinine 1.13 (H) 01/04/2019 11:06 AM    BUN/Creatinine ratio 19 01/04/2019 11:06 AM    GFR est AA 50 (L) 01/04/2019 11:06 AM    GFR est non-AA 43 (L) 01/04/2019 11:06 AM    Calcium 10.1 01/04/2019 11:06 AM    Bilirubin, total 1.2 (H) 06/12/2018 02:32 PM    AST (SGOT) 33 06/12/2018 02:32 PM    Alk. phosphatase 82 06/12/2018 02:32 PM    Protein, total 6.4 06/12/2018 02:32 PM    Albumin 3.1 (L) 06/12/2018 02:32 PM    Globulin 3.3 06/12/2018 02:32 PM    A-G Ratio 0.9 (L) 06/12/2018 02:32 PM    ALT (SGPT) 59 06/12/2018 02:32 PM           ASSESSMENT      1.  Bradycardia (junctional vs AF)  2.  Exertional fatigue  3.  Aortic stenosis status post TAVR  4.  Hypertension  5.  Diabetes mellitus  6.  CINDA on CPAP  7.  Renal artery stenosis   8.  Peripheral neuropathy  9.  Diastolic dysfunction  10. Pacemaker              A. Single chamber              B. Left sided              C. [employer]        PLAN     Continue to monitor per device clinic.         FOLLOW-UP     1 year      Thank you, Rebeka Olea, MD and Dr. Whit Patterson for allowing me to participate in the care of this extraordinarily pleasant female. Please do not hesitate to contact me for further questions/concerns. Lexy Leblanc NP    Patient seen and examined by me with nurse practitioner. I personally performed all components of the history, physical, and medical decision making and agree with the assessment and plan with minor modifications as noted.        Nola Vicente MD  Cardiac Electrophysiology / Cardiology    [address]  [address] [place] [address]  [phone] / [phone] Fax   [phone] / [phone] Fax

## 2019-07-24 NOTE — PATIENT INSTRUCTIONS

## 2019-09-26 ENCOUNTER — OFFICE VISIT (OUTPATIENT)
Dept: INTERNAL MEDICINE CLINIC | Age: 84
End: 2019-09-26

## 2019-09-26 ENCOUNTER — TELEPHONE (OUTPATIENT)
Dept: INTERNAL MEDICINE CLINIC | Age: 84
End: 2019-09-26

## 2019-09-26 VITALS
WEIGHT: 195 LBS | BODY MASS INDEX: 31.34 KG/M2 | HEART RATE: 89 BPM | TEMPERATURE: 98.1 F | HEIGHT: 66 IN | DIASTOLIC BLOOD PRESSURE: 74 MMHG | OXYGEN SATURATION: 97 % | SYSTOLIC BLOOD PRESSURE: 127 MMHG

## 2019-09-26 DIAGNOSIS — Z95.2 S/P TAVR (TRANSCATHETER AORTIC VALVE REPLACEMENT): ICD-10-CM

## 2019-09-26 DIAGNOSIS — E78.00 PURE HYPERCHOLESTEROLEMIA: ICD-10-CM

## 2019-09-26 DIAGNOSIS — E66.9 OBESITY (BMI 30.0-34.9): ICD-10-CM

## 2019-09-26 DIAGNOSIS — J44.9 CHRONIC OBSTRUCTIVE PULMONARY DISEASE, UNSPECIFIED COPD TYPE (HCC): ICD-10-CM

## 2019-09-26 DIAGNOSIS — E11.21 TYPE 2 DIABETES WITH NEPHROPATHY (HCC): Primary | ICD-10-CM

## 2019-09-26 DIAGNOSIS — I50.32 CHRONIC HEART FAILURE WITH PRESERVED EJECTION FRACTION (HCC): ICD-10-CM

## 2019-09-26 DIAGNOSIS — I10 ESSENTIAL HYPERTENSION: ICD-10-CM

## 2019-09-26 RX ORDER — LOSARTAN POTASSIUM 50 MG/1
50 TABLET ORAL DAILY
Qty: 90 TAB | Refills: 3 | Status: SHIPPED | OUTPATIENT
Start: 2019-09-26 | End: 2020-05-21

## 2019-09-26 RX ORDER — BUMETANIDE 0.5 MG/1
TABLET ORAL
Qty: 225 TAB | Refills: 3 | Status: SHIPPED | OUTPATIENT
Start: 2019-09-26 | End: 2020-08-26

## 2019-09-26 RX ORDER — TRIAMCINOLONE ACETONIDE 1 MG/G
PASTE DENTAL 2 TIMES DAILY
Qty: 5 G | Refills: 0 | Status: SHIPPED | OUTPATIENT
Start: 2019-09-26 | End: 2020-07-23

## 2019-09-26 NOTE — TELEPHONE ENCOUNTER
Pt is requesting to know if you could mail her a Rx for Losartan. Pts number is [phone].      Silvia Yusuf

## 2019-09-26 NOTE — TELEPHONE ENCOUNTER
Patient informed prescription was already put in to be mailed to her per Dr. Jerry Garrison. Patient understood.

## 2019-09-26 NOTE — PROGRESS NOTES
HISTORY OF PRESENT ILLNESS  George Valdez is a 80 y.o. female. HPI  She is here for follow up. Diabetes - followed by Dr. Mira Chávez. A1C was 6.3% in August.  .  microalbumin was nml at 17. Cholesterol was well controlled. See scanned copies of labs. Denies increased thirst or urination. UTD with eye exam.    CHF, htn, pacemaker, post TAVR  - recent visits with Dr. Srinivasan Modi for pacemaker interogation in July. No changes made. Also saw Dr. Zehra Grant in March. She is taking 100mg of Losartan and cutting it in half. Continues on the Bumex 1 in am and 1/2 in the afternoon. Does not check her blood pressure at home. No swelling in feet. Energy level has improved. Denies chest pain. ROBB is stable. Itching at night - legs, knees and hands. Uses rubbing alcohol at night and can go to sleep. CRI stage 3 - GFR 36 with creatinine of 1.32 in August   Fecal incontinence improved with fiber supplements. Current Outpatient Medications:     rosuvastatin (CRESTOR) 5 mg tablet, Take 1 Tab by mouth daily. , Disp: 10 Tab, Rfl: 0    apixaban (ELIQUIS) 5 mg tablet, TAKE 1 TABLET BY MOUTH TWO  TIMES DAILY, Disp: 30 Tab, Rfl: 0    bumetanide (BUMEX) 1 mg tablet, 1 tab every am and additional 1/2 tab every other afternoon. , Disp: 135 Tab, Rfl: 1    metFORMIN (GLUCOPHAGE) 1,000 mg tablet, TAKE 1 TABLET BY MOUTH TWO  TIMES DAILY, Disp: 180 Tab, Rfl: 3    spironolactone (ALDACTONE) 25 mg tablet, TAKE 1 TABLET BY MOUTH  DAILY, Disp: 90 Tab, Rfl: 3    AMOXICILLIN PO, Take  by mouth. Prior to dental appts, per Dr. Mira Chávez. , Disp: , Rfl:     potassium chloride SR (K-TAB) 20 mEq tablet, Take 1 Tab by mouth daily. , Disp: 90 Tab, Rfl: 0    FIBER-CAPS, PSYLLIUM HUSK, PO, Take 5 Caps by mouth daily. , Disp: , Rfl:     azelastine (ASTELIN) 137 mcg (0.1 %) nasal spray, 2 Sprays by Both Nostrils route two (2) times a day.  Use in each nostril as directed, Disp: 3 Bottle, Rfl: 3    losartan (COZAAR) 50 mg tablet, Take 1 Tab by mouth daily. Indications: hypertension, Disp: 30 Tab, Rfl: 1    cholecalciferol, vitamin D3, (VITAMIN D3) 2,000 unit tab, Take 1 Tab by mouth daily (after lunch). , Disp: , Rfl:     CYANOCOBALAMIN, VITAMIN B-12, (VITAMIN B-12 PO), Take 1,500 mg by mouth daily. , Disp: , Rfl:     COD LIVER OIL PO, Take 1,000 mg by mouth daily. , Disp: , Rfl:     glucose blood VI test strips (ACCU-CHEK ELBERT PLUS) strip, Check bs daily. Dx 250.00, Disp: 1 Package, Rfl: 11    Visit Vitals  /74   Pulse 89   Temp 98.1 °F (36.7 °C) (Oral)   Ht 5' 6\" (1.676 m)   Wt 195 lb (88.5 kg)   SpO2 97%   BMI 31.47 kg/m²       ROS  See above  Physical Exam   Constitutional: She appears well-developed and well-nourished. HENT:   Head: Normocephalic and atraumatic. Neck: Neck supple. No thyromegaly present. Cardiovascular: Normal rate and regular rhythm. Exam reveals no gallop and no friction rub. Murmur heard. Pulmonary/Chest: Effort normal and breath sounds normal.   Abdominal: Soft. Bowel sounds are normal. She exhibits no distension and no mass. There is no tenderness. Musculoskeletal: She exhibits no edema. Lymphadenopathy:     She has no cervical adenopathy. Vitals reviewed. ASSESSMENT and PLAN  Diagnoses and all orders for this visit:    1. Type 2 diabetes with nephropathy ([address] - followed by Dr. Audrey Hernandez. Brings in her recent labs. Well controlled, cont same    2. Chronic heart failure with preserved ejection fraction (HCC) - controlled, cont same. Reviewed Dr. Gifty Rojo notes, felt pt was doing well, no medicatoin changes made    3. Chronic obstructive pulmonary disease, unspecified COPD type ([address] - controlled, cont same    4. Obesity (BMI 30.0-34.9) - discussed diet for weight loss    5. S/P TAVR (transcatheter aortic valve replacement) - doing well, cont same    6. Essential hypertension- controlled, cont same    7. Pure hypercholesterolemia - controlled, cont same. Labs from DR. Audrey Hernandez reviewed.       Other orders  -     losartan (COZAAR) 50 mg tablet; Take 1 Tab by mouth daily. Indications: high blood pressure  -     bumetanide (BUMEX) 0.5 mg tablet; Take 2 tabs po every day and 1 tab po in afternoon every other day. -     triamcinolone acetonide (KENALOG) 0.1 % dental paste; by Dental route two (2) times a day. Follow-up and Dispositions    · Return for diabetes, htn, mwe.

## 2019-10-11 ENCOUNTER — PATIENT OUTREACH (OUTPATIENT)
Dept: INTERNAL MEDICINE CLINIC | Age: 84
End: 2019-10-11

## 2019-10-11 NOTE — PROGRESS NOTES
Ambulatory Care Management Note      Date/Time:  10/11/2019 3:47 PM    This patient was received as a referral from [address] [place] [address] reviewed with the provider   N/A             Ambulatory  contacted patient for discussion and case management of HFpEF and minimizing fall risk. Patient does not believe she has a true dx of COPD. Many years ago she followed up with pulmonologist Dr. Phylicia Amaro, who diagnosed her, but she has not had any issues with flare-ups and is not on any inhalers. She does note non-productive coughing and sneezing past few months & reports having allergies. Her T2DM is managed by Dr. Antonio Lopez and last A1C was 6.3. She feels her nutrition is decent, though she doesn't cook as much as she did in the past. She eats frozen dinners, fresh fruit, salads with roasted chicken. She leaves the house to play bridge with friends 1-2x/week. Summary of patients top problems:   1. HFpEF/PPM/s/p TAVR: Pt has been experiencing increased ROBB and fatigue. More sedentary and not performing her home exercises this week. Reports very seldom she has minimal THELMA, which is resolved after applying compression hose. Wears Apple watch & monitors HR, which remains 60-61.  2. Fall Risk: [employer] once this year & believes she cracked ribs. Decreased balance and notes \"leaning into walls\". Declined home PT at this time, but will consider it. Plans to resume home exercises today. Ambulates w/ a rollator. Has a medical alert button. Son and neighbor check on her.     Patient's challenges to self management identified:   possible depression, functional physical ability, level of motivation, medical condition, transportation, utilization of services      Medication Management:  good adherence, good understanding    Advance Care Planning:   Does patient have an Advance Directive:  reviewed and current    [employer], Referrals, and Durable Medical Equipment: TBD-Patient will consider home PT.    PCP/Specialist follow up:   Future Appointments   Date Time Provider Beau Driscoll   10/29/2019 11:45 AM Ines Qureshi [facility]   11/20/2019  9:40 AM Ema Givens  [address]   1/30/2020  8:45 AM Jeniffer Horner [place] [facility]   3/26/2020 11:40 AM Adelbert Phalen, MD [facility]   4/30/2020  8:45 AM Jeniffer [place], [place] [facility]   7/31/2020  9:00 AM Osorio Sanchez NP [facility] SCH[facility]   8/5/2020  8:30 AM PACEMAKER, [facility]          Goals      Improve Balance/Strength (pt-stated)      10/11/19  Patient has fallen once this past year and felt she cracked ribs. She did not seek medical help and eventually reported this to her providers. Also notes bumping into walls more lately. She ambulates with a rollator and has a medical alert button. Her son lives nearby and checks on her and she has a neighbor who is a radiologist who also is very supportive. Patient typically performs home exercises for 30 min to improve back pain and mobility. She has been more sedentary lately with some ROBB/fatigue, as noted in recent providers' notes. She attended cardiac rehab in the past, but felt it was too strenuous & hurt her back. Discussed the option of ordering home PT, and she will consider this option. Plan: Patient will work on home exercises today. She will consider home PT and notify ACM if she changes her mind. ACM will contact her within the next 2 weeks. -SRW       Understand/Monitor CHF Red Flags (pt-stated)      10/11/19  ECHO in March 2019 with EF 56-60%. S/P TAVR 4/2018 and PPM 6/2018. Patient is not aware of HF dx, but has some understanding about TAVR and PPM. Increased ROBB and fatigue recently. Very seldom experiences THELMA, mainly after sitting for long periods of time, and resolves after use of thigh high compression hose. HR 60-61, monitored by Apple watch.  Patient very knowledgeable about diuretics and reports compliance. Plan: Complete med rec during next call and provide education if indicated. Patient is able to afford all medications, including Eliquis. -SRW             Patient verbalized understanding of all information discussed. Patient has this Nurse Navigators contact information for any further questions, concerns, or needs.

## 2019-10-18 ENCOUNTER — PATIENT OUTREACH (OUTPATIENT)
Dept: INTERNAL MEDICINE CLINIC | Age: 84
End: 2019-10-18

## 2019-10-18 NOTE — PROGRESS NOTES
Contacted patient for CCM follow up. She is doing well. She is willing to complete an eval by [facility] PT and requested a visit after next week. She declined further CCM services at this time. Goals Addressed                 This Visit's Progress     Improve Balance/Strength (pt-stated)        10/18/19  Patient is willing to have a home eval by [facility] PT to work on balance. She requested a call end of next week to schedule a visit. ACM will place referral next week. Patient declined a follow up call from [facility], but states a plan to call if she has any further needs. -SRW    10/11/19  Patient has fallen once this past year and felt she cracked ribs. She did not seek medical help and eventually reported this to her providers. Also notes bumping into walls more lately. She ambulates with a rollator and has a medical alert button. Her son lives nearby and checks on her and she has a neighbor who is a radiologist who also is very supportive. Patient typically performs home exercises for 30 min to improve back pain and mobility. She has been more sedentary lately with some ROBB/fatigue, as noted in recent providers' notes. She attended cardiac rehab in the past, but felt it was too strenuous & hurt her back. Discussed the option of ordering home PT, and she will consider this option. Plan: Patient will work on home exercises today. She will consider home PT and notify ACM if she changes her mind. ACM will contact her within the next 2 weeks. -SRW       COMPLETED: Understand/Monitor CHF Red Flags (pt-stated)        10/18/19  Unable to discuss CHF further or completed med rec, as patient was ready to end the call. She declined a follow up call and stated a plan to contact this ACM if she has any further needs. -SRW    10/11/19  ECHO in March 2019 with EF 56-60%. S/P TAVR 4/2018 and PPM 6/2018. Patient is not aware of HF dx, but has some understanding about TAVR and PPM. Increased ROBB and fatigue recently.  Very seldom experiences THELMA, mainly after sitting for long periods of time, and resolves after use of thigh high compression hose. HR 60-61, monitored by Apple watch. Patient very knowledgeable about diuretics and reports compliance. Plan: Complete med rec during next call and provide education if indicated. Patient is able to afford all medications, including Eliquis.  -SRW

## 2019-10-22 ENCOUNTER — PATIENT OUTREACH (OUTPATIENT)
Dept: INTERNAL MEDICINE CLINIC | Age: 84
End: 2019-10-22

## 2019-10-22 DIAGNOSIS — Z91.81 HISTORY OF RECENT FALL: ICD-10-CM

## 2019-10-22 DIAGNOSIS — J44.9 CHRONIC OBSTRUCTIVE PULMONARY DISEASE, UNSPECIFIED COPD TYPE (HCC): Primary | ICD-10-CM

## 2019-10-22 DIAGNOSIS — I50.32 CHRONIC HEART FAILURE WITH PRESERVED EJECTION FRACTION (HCC): ICD-10-CM

## 2019-10-22 DIAGNOSIS — R06.00 DYSPNEA AND RESPIRATORY ABNORMALITIES: ICD-10-CM

## 2019-10-22 DIAGNOSIS — R06.89 DYSPNEA AND RESPIRATORY ABNORMALITIES: ICD-10-CM

## 2019-10-22 DIAGNOSIS — R26.89 BALANCE PROBLEM: ICD-10-CM

## 2019-10-23 NOTE — PROGRESS NOTES
Goals      Improve Balance/Strength (pt-stated)      10/23/19  Ordered referral to [facility] PT and faxed to referral line 10/22/19. Received sent fax confirmation. Spoke with [facility] and verified that they have received the order & someone will reach out to the patient today to schedule a visit. -SRW    10/18/19  Patient is willing to have a home eval by [facility] PT to work on balance. She requested a call end of next week to schedule a visit. ACM will place referral next week. Patient declined a follow up call from [facility], but states a plan to call if she has any further needs. -SRW    10/11/19  Patient has fallen once this past year and felt she cracked ribs. She did not seek medical help and eventually reported this to her providers. Also notes bumping into walls more lately. She ambulates with a rollator and has a medical alert button. Her son lives nearby and checks on her and she has a neighbor who is a radiologist who also is very supportive. Patient typically performs home exercises for 30 min to improve back pain and mobility. She has been more sedentary lately with some ROBB/fatigue, as noted in recent providers' notes. She attended cardiac rehab in the past, but felt it was too strenuous & hurt her back. Discussed the option of ordering home PT, and she will consider this option. Plan: Patient will work on home exercises today. She will consider home PT and notify ACM if she changes her mind. ACM will contact her within the next 2 weeks.  -SRW

## 2019-10-29 ENCOUNTER — TELEPHONE (OUTPATIENT)
Dept: CARDIOLOGY CLINIC | Age: 84
End: 2019-10-29

## 2019-10-29 NOTE — TELEPHONE ENCOUNTER
Patient states she tried sending transmission but kept getting error alert. She spoke with [employer]. They are sending her another monitor because they could not fix the problem. She would like to reschedule her remote check.  Please call back at [phone]

## 2019-11-06 ENCOUNTER — PATIENT OUTREACH (OUTPATIENT)
Dept: INTERNAL MEDICINE CLINIC | Age: 84
End: 2019-11-06

## 2019-11-06 NOTE — PROGRESS NOTES
Patient has graduated from the Complex Case Management  program on 11/06/19. Patient declined follow up from [facility] and stated a plan to call if she needs anything further. Patient/family has the ability to self-manage at this time Care management goals have been completed. No further Ambulatory Care Manager follow up scheduled. Goals Addressed                 This Visit's Progress     COMPLETED: Improve Balance/Strength (pt-stated)        11/6/19  Confirmed with Lakshmi Delgado that patient is actively being followed by PT, Mariana Pelaez. -SRW    10/23/19  Ordered referral to Lakshmi Delgado PT and faxed to referral line 10/22/19. Received sent fax confirmation. Spoke with Lakshmi Delgado and verified that they have received the order & someone will reach out to the patient today to schedule a visit. -SRW    10/18/19  Patient is willing to have a home eval by Nita Montes PT to work on balance. She requested a call end of next week to schedule a visit. ACM will place referral next week. Patient declined a follow up call from [email or web address], but states a plan to call if she has any further needs. -SRW    10/11/19  Patient has fallen once this past year and felt she cracked ribs. She did not seek medical help and eventually reported this to her providers. Also notes bumping into walls more lately. She ambulates with a rollator and has a medical alert button. Her son lives nearby and checks on her and she has a neighbor who is a radiologist who also is very supportive. Patient typically performs home exercises for 30 min to improve back pain and mobility. She has been more sedentary lately with some ROBB/fatigue, as noted in recent providers' notes. She attended cardiac rehab in the past, but felt it was too strenuous & hurt her back. Discussed the option of ordering home PT, and she will consider this option. Plan: Patient will work on home exercises today. She will consider home PT and notify ACM if she changes her mind.  ACM will contact her within the next 2 weeks. -SRW            Patient has Ambulatory Care Manager's contact information for any further questions, concerns, or needs.   Patients upcoming visits:    Future Appointments   Date Time Provider [place]   11/20/2019  9:40 AM Murray Homans,  [address]   1/30/2020  8:45 AM Leo Stokes [facility] SCHED   3/26/2020 11:40 AM Jacquelin Liu MD [facility] [address]   4/30/2020  8:45 AM MARISA Desai [facility] SCHED   7/31/2020  9:00 AM Cristóbal Riggs NP [facility] SCHED   8/5/2020  8:30 AM KIM GRACE [facility] SCHED

## 2019-11-15 ENCOUNTER — TELEPHONE (OUTPATIENT)
Dept: CARDIOLOGY CLINIC | Age: 84
End: 2019-11-15

## 2019-11-15 NOTE — TELEPHONE ENCOUNTER
Patient states she will be sending in transmission next week to make sure the new monitor that [employer] sent her is working.      Phone: [phone]

## 2019-11-20 ENCOUNTER — OFFICE VISIT (OUTPATIENT)
Dept: CARDIOLOGY CLINIC | Age: 84
End: 2019-11-20

## 2019-11-20 VITALS
SYSTOLIC BLOOD PRESSURE: 128 MMHG | DIASTOLIC BLOOD PRESSURE: 62 MMHG | HEIGHT: 66 IN | OXYGEN SATURATION: 97 % | RESPIRATION RATE: 17 BRPM | WEIGHT: 196 LBS | HEART RATE: 73 BPM | BODY MASS INDEX: 31.5 KG/M2

## 2019-11-20 DIAGNOSIS — I49.3 PVC (PREMATURE VENTRICULAR CONTRACTION): ICD-10-CM

## 2019-11-20 DIAGNOSIS — I48.91 ATRIAL FIBRILLATION, UNSPECIFIED TYPE (HCC): ICD-10-CM

## 2019-11-20 DIAGNOSIS — Z95.2 S/P TAVR (TRANSCATHETER AORTIC VALVE REPLACEMENT): ICD-10-CM

## 2019-11-20 DIAGNOSIS — E11.9 CONTROLLED TYPE 2 DIABETES MELLITUS WITHOUT COMPLICATION, WITHOUT LONG-TERM CURRENT USE OF INSULIN (HCC): ICD-10-CM

## 2019-11-20 DIAGNOSIS — R00.1 JUNCTIONAL BRADYCARDIA: ICD-10-CM

## 2019-11-20 DIAGNOSIS — I77.3 FIBROMUSCULAR DYSPLASIA (HCC): ICD-10-CM

## 2019-11-20 DIAGNOSIS — R06.09 DOE (DYSPNEA ON EXERTION): ICD-10-CM

## 2019-11-20 DIAGNOSIS — R53.83 FATIGUE, UNSPECIFIED TYPE: ICD-10-CM

## 2019-11-20 DIAGNOSIS — E66.9 OBESITY (BMI 30.0-34.9): Primary | ICD-10-CM

## 2019-11-20 DIAGNOSIS — Z95.0 PACEMAKER: ICD-10-CM

## 2019-11-20 DIAGNOSIS — I50.32 CHRONIC HEART FAILURE WITH PRESERVED EJECTION FRACTION (HCC): ICD-10-CM

## 2019-11-20 DIAGNOSIS — E11.21 TYPE 2 DIABETES WITH NEPHROPATHY (HCC): ICD-10-CM

## 2019-11-20 DIAGNOSIS — I10 ESSENTIAL HYPERTENSION: ICD-10-CM

## 2019-11-20 NOTE — PROGRESS NOTES
Chief Complaint   Patient presents with    Hypertension    Irregular Heart Beat    Follow-up     1. Have you been to the ER, urgent care clinic since your last visit? Hospitalized since your last visit? No    2. Have you seen or consulted any other health care providers outside of the [facility] since your last visit? Include any pap smears or colon screening. No    3) Do you have an Advance Directive on file?  yes

## 2019-11-20 NOTE — PROGRESS NOTES
[facility] Office Note    11/20/2019     Subjective:     She is here today to follow up on her HTN, hx of TAVR, diastolic heart failure. She is on eliquis for the afib and that is going ok. Will stop aspirin. Having some nosebleeds, out of the right nostril. Using her CPAP about 4 hours a night, bothers  her a lot. Not playing bridge anymore really. Playing some but not as much as she was doing. Got an apple watch. But getting an iphone today. Does feel that the pacemaker made a difference in her energy level though   Having more dyspnea with exertion  No PND or orthopnea  No chest pain or palpitations    Stopped doing cardiac rehab due to back pain   Her weight is up 6 lbs since her last visit and she admits that she is not exercising  Loses balance easily, only one fall several months ago when she had not eaten   Uses her cane and sometimes her walker  Having some nosebleeds - using saline spray some and puts vaseline in her nares at night  Wears CPAP  We talked about seeing ENT if her nosebleeds persist  O2 Sats 93% on room with ambulation today    Granddaughter is graduating from [employer] in [place]! She continues to have diarrhea. Wonders if it started with drinking a bottle of pomegranate  Juice and whether it is a fungal infection of some kind. Will stop cod liver oil today. Will refer back to see Dr. Pennington Falling if it persists. Physical coming up in January. Labs reviewed and luci fine, LDL at 74, scanned in from Dr. Markus Patel. A1C 6.3    Assessment/Plan   1. HTN - well controlled on cozaar 50mg daily, spironolactone 25mg daily  2. TR[place]ON- history of adhesions, stable post renal artery bypass  -FMD in 1977, bypassed in 1977  3. DM type 2 - on metformin, has severe peripheral neuropathy, followed by Dr. Markus Patel at goal A1C 7.6% in 6/18  4. AS s/p TAVR - stable by TTE in 3/19   5. Hyperlipidemia- LDL 75 at goal on crestor 5mg daily   6.  Vit D deficiency- on supplementation   7. CINDA- uses CPAP nightly   8. SSS? Junctional rhythm/bradycardia-  s/p pacemaker, Followed by Dr. Coty Price, stable  9. Hx of crushed vocal cords - stable hoarseness  10. Acute diastolic heart failure - LVEF 55-60% by TTE in 5/18, NYHA Class II   -on bumex 1mg BID, BP well controlled  11. Hypokalemia - cont on KCL 20meq BID   12. Advanced directives on file, now full code    13. Hypoxia - O2 Sats 93% with ambulation today, probably multifactorial, COPD, etc, will continue to watch   14. Atrial Fib - [id number]=5 on Eliquis 5mg BID  13. Hypomagnesemia - stable now     6/14/2018: [employer]tronic Single-Chamber Pacemaker per Dr. Geoff Seo  TTE 5/18 - LVEF 55 % to 60 %, no WMA, mod dilated LA, mild MS, s/p TAVR well seated elevated gradient  Aortic Valve:   AV Vmax was 3.2 m/s.  AV meanPG was 22.7 mmHg. PW measurements: Aortic Valve:   RACHEL (VTI) was 1.3 cm2. TAVR 4/18 - Successful transcatheter aortic valve replacement with no significant paravalvular leak. Post-deployment aortography showed the valve was in good position with no aortic regurgitation  Cardiac Cath 11/17 - RACHEL 0.84 patient appeared to be in AF during case, but most likely was frequent atrial ectopy.  No mitral stenosis.  LV gram not done.  Normal coronaries.  Thermo output was 6.9, assumed Emilie was 4.65; the 6.9 was used by computer in RACHEL calculation, so RACHEL may be overestimated.  LV known to be normal by echo.  Peak to peak gradient 63. TTE 1/14 EF 65-60%, mod cLVH 1.7, grI DD, mild-mod as 25/20 mild MS 10/5, triv effusion  TTE 7/14 EF 65-70%, mild cLVH, Mod AS 42/24, mild MS 10/5   TTE 10/13 LVEF 65 %, no WMA,  severe cLVH, abnormal left ventricular relaxation (grade 1 diastolic dysfunction), aortic valve leaflets exhibited marked calcification and reduced mobility with moderate stenosis, valve mean gradient was 24 mmHg.   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 Ears/Nose/Mouth/Throat:   Hearing grossly normal.         Neck: Supple. Chest:   Lungs clear to auscultation bilaterally. Cardiovascular:  Regular rate and rhythm, S1, S2 normal, 2/6 KATHERINE    Abdomen:   Soft, non-tender, bowel sounds are active. Extremities: No LE edema     Skin: Warm and dry. Data Review:   Labs:    No results found for this or any previous visit (from the past 24 hour(s)). Current Outpatient Medications   Medication Sig    cpap machine kit by Does Not Apply route. Use nightly    losartan (COZAAR) 50 mg tablet Take 1 Tab by mouth daily. Indications: high blood pressure    bumetanide (BUMEX) 0.5 mg tablet Take 2 tabs po every day and 1 tab po in afternoon every other day. (Patient taking differently: 0.5 mg. Take 1 tab po every day and 1/2 tab po in afternoon every other day.)    rosuvastatin (CRESTOR) 5 mg tablet Take 1 Tab by mouth daily.  apixaban (ELIQUIS) 5 mg tablet TAKE 1 TABLET BY MOUTH TWO  TIMES DAILY    metFORMIN (GLUCOPHAGE) 1,000 mg tablet TAKE 1 TABLET BY MOUTH TWO  TIMES DAILY    spironolactone (ALDACTONE) 25 mg tablet TAKE 1 TABLET BY MOUTH  DAILY    AMOXICILLIN PO Take  by mouth. Prior to dental appts, per Dr. Jennifer Morrison.  potassium chloride SR (K-TAB) 20 mEq tablet Take 1 Tab by mouth daily.  FIBER-CAPS, PSYLLIUM HUSK, PO Take 5 Caps by mouth daily.  azelastine (ASTELIN) 137 mcg (0.1 %) nasal spray 2 Sprays by Both Nostrils route two (2) times a day.  Use in each nostril as directed    cholecalciferol, vitamin D3, (VITAMIN D3) 2,000 unit tab Take 1 Tab by mouth daily (after lunch).  CYANOCOBALAMIN, VITAMIN B-12, (VITAMIN B-12 PO) Take 2,000 mg by mouth daily.  COD LIVER OIL PO Take 1,000 mg by mouth daily.  glucose blood VI test strips (ACCU-CHEK ELBERT PLUS) strip Check bs daily. Dx 250.00    triamcinolone acetonide (KENALOG) 0.1 % dental paste by Dental route two (2) times a day. No current facility-administered medications for this visit.         Salomón Bartholomew MD  [facility] [address] [place] [address] 13, [address] [place] [address],[address] [place] [address]  [phone]

## 2019-11-20 NOTE — PATIENT INSTRUCTIONS
Stop taking Cod Liver Oil Stop your vitamin D for 2 weeks then restart. Stop your metformin for a week then restart

## 2020-01-30 ENCOUNTER — OFFICE VISIT (OUTPATIENT)
Dept: CARDIOLOGY CLINIC | Age: 85
End: 2020-01-30

## 2020-01-30 DIAGNOSIS — Z95.0 CARDIAC PACEMAKER IN SITU: Primary | ICD-10-CM

## 2020-02-04 LAB
CREATININE, EXTERNAL: 1.36
HBA1C MFR BLD HPLC: 6.7 %
LDL-C, EXTERNAL: 88

## 2020-02-17 PROBLEM — E55.9 VITAMIN D DEFICIENCY: Status: ACTIVE | Noted: 2020-02-17

## 2020-02-17 PROBLEM — R12 HEARTBURN: Status: ACTIVE | Noted: 2020-02-17

## 2020-02-17 PROBLEM — E78.2 MIXED HYPERLIPIDEMIA: Status: ACTIVE | Noted: 2020-02-17

## 2020-02-17 PROBLEM — R19.7 DIARRHEA: Status: ACTIVE | Noted: 2020-02-17

## 2020-02-17 PROBLEM — B35.3 TINEA PEDIS: Status: ACTIVE | Noted: 2020-02-17

## 2020-02-17 PROBLEM — E11.42 DIABETIC PERIPHERAL NEUROPATHY ASSOCIATED WITH TYPE 2 DIABETES MELLITUS (HCC): Status: ACTIVE | Noted: 2020-02-17

## 2020-02-17 PROBLEM — I38 VALVULAR HEART DISEASE: Status: ACTIVE | Noted: 2020-02-17

## 2020-02-17 PROBLEM — J30.9 ALLERGIC RHINITIS: Status: ACTIVE | Noted: 2020-02-17

## 2020-02-17 PROBLEM — R49.0 DYSPHONIA: Status: ACTIVE | Noted: 2020-02-17

## 2020-02-17 PROBLEM — R11.0 NAUSEA: Status: ACTIVE | Noted: 2020-02-17

## 2020-02-17 PROBLEM — J20.9 ACUTE BRONCHITIS: Status: ACTIVE | Noted: 2020-02-17

## 2020-02-17 PROBLEM — R42 DIZZINESS AND GIDDINESS: Status: ACTIVE | Noted: 2020-02-17

## 2020-02-17 PROBLEM — G57.90 MONONEUROPATHY OF LOWER EXTREMITY: Status: ACTIVE | Noted: 2020-02-17

## 2020-02-17 RX ORDER — LANOLIN ALCOHOL/MO/W.PET/CERES
CREAM (GRAM) TOPICAL
COMMUNITY
End: 2020-07-23

## 2020-02-17 RX ORDER — GABAPENTIN 100 MG/1
100 CAPSULE ORAL
COMMUNITY
Start: 2020-02-06 | End: 2020-07-23

## 2020-02-27 RX ORDER — POTASSIUM CHLORIDE 1500 MG/1
TABLET, FILM COATED, EXTENDED RELEASE ORAL
Qty: 180 TAB | Refills: 3 | Status: SHIPPED | OUTPATIENT
Start: 2020-02-27 | End: 2021-06-11 | Stop reason: ALTCHOICE

## 2020-03-20 RX ORDER — SPIRONOLACTONE 25 MG/1
TABLET ORAL
Qty: 90 TAB | Refills: 3 | Status: SHIPPED | OUTPATIENT
Start: 2020-03-20 | End: 2021-04-11

## 2020-03-20 RX ORDER — METFORMIN HYDROCHLORIDE 1000 MG/1
TABLET ORAL
Qty: 180 TAB | Refills: 3 | Status: SHIPPED | OUTPATIENT
Start: 2020-03-20

## 2020-04-30 ENCOUNTER — OFFICE VISIT (OUTPATIENT)
Dept: CARDIOLOGY CLINIC | Age: 85
End: 2020-04-30

## 2020-04-30 DIAGNOSIS — Z95.0 CARDIAC PACEMAKER IN SITU: Primary | ICD-10-CM

## 2020-05-21 ENCOUNTER — VIRTUAL VISIT (OUTPATIENT)
Dept: CARDIOLOGY CLINIC | Age: 85
End: 2020-05-21

## 2020-05-21 VITALS
DIASTOLIC BLOOD PRESSURE: 85 MMHG | WEIGHT: 192 LBS | TEMPERATURE: 97.5 F | SYSTOLIC BLOOD PRESSURE: 103 MMHG | OXYGEN SATURATION: 97 % | BODY MASS INDEX: 30.99 KG/M2 | HEART RATE: 65 BPM

## 2020-05-21 DIAGNOSIS — Z95.0 PACEMAKER: ICD-10-CM

## 2020-05-21 DIAGNOSIS — E66.9 OBESITY (BMI 30.0-34.9): ICD-10-CM

## 2020-05-21 DIAGNOSIS — Z95.2 S/P TAVR (TRANSCATHETER AORTIC VALVE REPLACEMENT): ICD-10-CM

## 2020-05-21 DIAGNOSIS — I48.91 ATRIAL FIBRILLATION, UNSPECIFIED TYPE (HCC): Primary | ICD-10-CM

## 2020-05-21 DIAGNOSIS — I10 ESSENTIAL HYPERTENSION: ICD-10-CM

## 2020-05-21 DIAGNOSIS — Z95.0 CARDIAC PACEMAKER IN SITU: ICD-10-CM

## 2020-05-21 DIAGNOSIS — R53.83 FATIGUE, UNSPECIFIED TYPE: ICD-10-CM

## 2020-05-21 DIAGNOSIS — E11.9 CONTROLLED TYPE 2 DIABETES MELLITUS WITHOUT COMPLICATION, WITHOUT LONG-TERM CURRENT USE OF INSULIN (HCC): ICD-10-CM

## 2020-05-21 RX ORDER — LOSARTAN POTASSIUM 25 MG/1
25 TABLET ORAL DAILY
Qty: 90 TAB | Refills: 3 | Status: SHIPPED | OUTPATIENT
Start: 2020-05-21 | End: 2020-12-10 | Stop reason: SDUPTHER

## 2020-05-21 NOTE — PATIENT INSTRUCTIONS
Emilie Dustman, MD Lazarus Dew  
  
   
  
fuv with echo in 6 mos tu or thur Future Appointments Date Time Provider [facility] 7/23/2020 11:00 AM Radha Sullivan NP [facility] SCHED  
8/5/2020  8:30 AM PACEMAKER, STNICOLASA [facility] SCHED  
11/24/2020  8:00 AM ECHOTWOMARISA [facility] SCHED  
11/24/2020  8:40 AM Susna Coy  [address]

## 2020-05-21 NOTE — PROGRESS NOTES
VIRTUAL VISIT DOCUMENTATION     Pursuant to the emergency declaration under the [facility], [facility] waiver authority and the [employer] Act, this Virtual  Visit was conducted, with patient's consent, to reduce the patient's risk of exposure to COVID-19 and provide continuity of care for an established patient. Services were provided through a  synchronous discussion virtually to substitute for in-person clinic visit. Jc Arzola is a 80 y.o. female who was seen by synchronous (real-time) audio-video technology on 5/21/2020. Patient is being seen today for TAVr doing fine at home but not stretching or exercising like she should be. Some staggering that she blames on her neuropathy. She did try gabapentin for that and it didn't help first but now it does help with the pills that she has from a new . Left leg has swollen a bit but she is blaming pretzels and salt. But she is very sedentary. Has an apt with Dr. Luis Alberto Walton but she doesn't want to come in for the pacemaker check. BP is running a bit on the low side, bu  over three times. ASSESSMENT      1. Dyslipidemia- cont statin  2. HTN at goal.but low, will cut losartan to 25mg daily. 3. SSS s/p tavr- pacer checks going ok, declines in person appt for check t this time  4. TAVR- does need fu echo, no eval since it was done in 2018, so will schedule for echo and fuv  5. Edema- mild not progressive, not bothering her will cont current diuretics. PLAN       We discussed the expected course, resolution and complications of the diagnosis(es) in detail. Medication risks, benefits, costs, interactions, and alternatives were discussed as indicated. I advised her to contact the office if her condition worsens, changes or fails to improve as anticipated.  She expressed understanding with the diagnosis(es) and plan    HISTORY OF PRESENTING ILLNESS      Carmel Nelson is a 80 y.o. female        [address]     Patient Active Problem List    Diagnosis Date Noted    Acute bronchitis 02/17/2020    Allergic rhinitis 02/17/2020    Diabetic peripheral neuropathy associated with type 2 diabetes mellitus ([address] 02/17/2020    Diarrhea 02/17/2020    Dizziness and giddiness 02/17/2020    Dysphonia 02/17/2020    Heartburn 02/17/2020    Mixed hyperlipidemia 02/17/2020    Mononeuropathy of lower extremity 02/17/2020    Nausea 02/17/2020    Tinea pedis 02/17/2020    Valvular heart disease 02/17/2020    Vitamin D deficiency 02/17/2020    Type 2 diabetes with nephropathy ([address] 07/05/2018    (HFpEF) heart failure with preserved ejection fraction ([address] 06/12/2018    S/P TAVR (transcatheter aortic valve replacement) 04/13/2018    Obesity (BMI 30.0-34.9) 04/13/2018    Chest tightness 11/17/2017    Dyspnea and respiratory abnormalities 11/17/2017    Controlled type 2 diabetes mellitus without complication, without long-term current use of insulin ([address] 03/23/2017    Advance directive on file 03/23/2017    Fibromuscular dysplasia ([address] 02/17/2017    COPD (chronic obstructive pulmonary disease) ([facility] 10/27/2014    Mitral stenosis 07/03/2014    Other dyspnea and respiratory abnormality 10/02/2013    CINDA (obstructive sleep apnea) 10/18/2012    Type II or unspecified type diabetes mellitus without mention of complication, uncontrolled 05/26/2012    Pure hypercholesterolemia 05/26/2012    Foreign body in pharynx 12/18/2011    Tachycardia 12/01/2011    Hypertension 12/01/2011    PVC (premature ventricular contraction) 12/01/2011    Aortic stenosis 12/01/2011    Edema 12/01/2011           PAST MEDICAL HISTORY     Past Medical History:   Diagnosis Date    Adverse effect of anesthesia     difficulty waking    Aortic stenosis     Arrhythmia     rapid heartrate seen in ER  4/2/18    Cancer ([address] 01/2018    skin spots, removed periodically    Chronic pain     back - spinal stenosis    COPD     Diabetes ([address]     Diabetic neuropathy ([address]     knees down    Diverticulosis of colon     Hypercholesterolemia     Hypertension     CINDA (obstructive sleep apnea) 08-    AHI: 18.6 per hour - CPAP    Other ill-defined conditions(799.89)     heart murmur    Peripheral neuropathy     Renal artery stenosis (HCC)     Right    Unspecified adverse effect of anesthesia     delayed awakening - \"does not need as much\"           PAST SURGICAL HISTORY     Past Surgical History:   Procedure Laterality Date    ABDOMEN SURGERY PROC UNLISTED  1977    R Renal Artery Stenosis bypassed    ABDOMEN SURGERY [address] or 1983    hernia repair    ABDOMEN SURGERY [facility] - removal of fatty tissue around abdomen    BREAST SURGERY PROCEDURE UNLISTED  1968    Left - benign cyst    CARDIAC SURG PROCEDURE UNLIST  04/13/2018    TAVR    ENDOSCOPY, COLON, DIAGNOSTIC  1999    hx of polys in the past    HX CATARACT REMOVAL      bilateral    HX GYN  1966    D&C    HX HEENT      Eyelid Cysts x 4    HX HEENT      oral surgeries - gum surgery/floating root  removed x 2    HX HEENT      retinal hemmorage repair    HX ORTHOPAEDIC  11/06    L1-S1 2007    HX ORTHOPAEDIC  1985    L 4th Trigger finger    HX ORTHOPAEDIC      left knee arthroscopy    HX OTHER SURGICAL      skin cancer removal, periodically    HX TONSILLECTOMY      T & A          ALLERGIES     Allergies   Allergen Reactions    Bees [Hymenoptera Allergenic Extract] Anaphylaxis    Lipitor [Atorvastatin] Myalgia    Betadine [Povidone-Iodine] Other (comments)     Abdominal swelling, one incident in 1977. Has had since without a reaction.     Darvon [Propoxyphene] Nausea and Vomiting    Griseofulvin Nausea and Vomiting    Pollen Extracts Cough          FAMILY HISTORY     Family History   Problem Relation Age of Onset    Stroke Mother         3    Cancer Mother         ovarian    Cancer Father         lung/throat    Alcohol abuse Father     Lung Disease Father     Pacemaker Brother     Heart Attack Brother     Heart Disease Brother     Other Brother         als    Stroke Maternal Grandfather     Heart Disease Paternal Uncle     Heart Attack Paternal Uncle     negative for cardiac disease       SOCIAL HISTORY     Social History     Socioeconomic History    Marital status:      Spouse name: Not on file    Number of children: Not on file    Years of education: Not on file    Highest education level: Not on file   Tobacco Use    Smoking status: Former Smoker     Packs/day: 1.50     Years: 13.00     Pack years: 19.50     Types: Cigarettes     Last attempt to quit: 1974     Years since quittin.9    Smokeless tobacco: Never Used    Tobacco comment: former cigarette smoker   Substance and Sexual Activity    Alcohol use: Yes     Alcohol/week: 0.0 - 1.0 standard drinks     Comment: special occasions    Drug use: No     Types: Prescription    Sexual activity: Never         MEDICATIONS     Current Outpatient Medications   Medication Sig    rosuvastatin (CRESTOR) 5 mg tablet TAKE 1 TABLET BY MOUTH  DAILY    spironolactone (ALDACTONE) 25 mg tablet TAKE 1 TABLET BY MOUTH  DAILY    metFORMIN (GLUCOPHAGE) 1,000 mg tablet TAKE 1 TABLET BY MOUTH TWO  TIMES DAILY    potassium chloride SR (K-TAB) 20 mEq tablet TAKE 1 TABLET BY MOUTH TWO  TIMES DAILY    cpap machine kit by Does Not Apply route. Use nightly    losartan (COZAAR) 50 mg tablet Take 1 Tab by mouth daily. Indications: high blood pressure    bumetanide (BUMEX) 0.5 mg tablet Take 2 tabs po every day and 1 tab po in afternoon every other day. (Patient taking differently: 0.5 mg two (2) times a day. Take 1 tab po every day and 1/2 tab po in afternoon every other day.)    apixaban (ELIQUIS) 5 mg tablet TAKE 1 TABLET BY MOUTH TWO  TIMES DAILY    AMOXICILLIN PO Take  by mouth.  Prior to dental appts, per Dr. Brenda Saravia.  FIBER-CAPS, PSYLLIUM HUSK, PO Take 5 Caps by mouth daily.  azelastine (ASTELIN) 137 mcg (0.1 %) nasal spray 2 Sprays by Both Nostrils route two (2) times a day. Use in each nostril as directed    CYANOCOBALAMIN, VITAMIN B-12, (VITAMIN B-12 PO) Take 2,000 mg by mouth daily.  glucose blood VI test strips (ACCU-CHEK ELBERT PLUS) strip Check bs daily. Dx 250.00    magnesium oxide (MAGOX) 400 mg tablet 1 tablet with food    gabapentin (NEURONTIN) 100 mg capsule Take 100 mg by mouth nightly.  triamcinolone acetonide (KENALOG) 0.1 % dental paste by Dental route two (2) times a day.  cholecalciferol, vitamin D3, (VITAMIN D3) 2,000 unit tab Take 1 Tab by mouth daily (after lunch). No current facility-administered medications for this visit. I have reviewed the nurses notes, vitals, problem list, allergy list, medical history, family, social history and medications. REVIEW OF SYMPTOMS     Constitutional: Negative for fever, chills, malaise/fatigue and diaphoresis. Respiratory: Negative for cough, hemoptysis, sputum production, shortness of breath and wheezing. Cardiovascular: Negative for chest pain, palpitations, orthopnea, claudication, leg swelling and PND. Gastrointestinal: Negative for heartburn, nausea, vomiting, blood in stool and melena. Genitourinary: Negative for dysuria and flank pain. Musculoskeletal: Negative for joint pain and back pain. Skin: Negative for rash. Neurological: Negative for focal weakness, seizures, loss of consciousness, weakness and headaches. Endo/Heme/Allergies: Negative for abnormal bleeding. Psychiatric/Behavioral: Negative for memory loss. PHYSICAL EXAMINATION      Due to this being a TeleHealth evaluation, many elements of the physical examination are unable to be assessed.      General: , in no acute distress, cooperative and alert  Breathing nonlabored, no dyspnea no wheezing  Speech pattern normal, articulate  Mental status normal not anxious or depressed  Thought process logical       DIAGNOSTIC DATA      6/14/2018: Medtronic Single-Chamber Pacemaker per Dr. Marin Epsom Results   Component Value Date/Time    WBC 10.0 08/30/2018 01:42 PM    HGB 12.6 08/30/2018 01:42 PM    HCT 40.0 08/30/2018 01:42 PM    PLATELET 070 45/77/5765 01:42 PM    MCV 85 08/30/2018 01:42 PM      Lab Results   Component Value Date/Time    Sodium 144 01/04/2019 11:06 AM    Potassium 4.8 01/04/2019 11:06 AM    Chloride 100 01/04/2019 11:06 AM    CO2 22 01/04/2019 11:06 AM    Anion gap 8 06/15/2018 04:13 AM    Glucose 122 (H) 01/04/2019 11:06 AM    BUN 22 01/04/2019 11:06 AM    Creatinine 1.13 (H) 01/04/2019 11:06 AM    BUN/Creatinine ratio 19 01/04/2019 11:06 AM    GFR est AA 50 (L) 01/04/2019 11:06 AM    GFR est non-AA 43 (L) 01/04/2019 11:06 AM    Calcium 10.1 01/04/2019 11:06 AM    Bilirubin, total 1.2 (H) 06/12/2018 02:32 PM    AST (SGOT) 33 06/12/2018 02:32 PM    Alk. phosphatase 82 06/12/2018 02:32 PM    Protein, total 6.4 06/12/2018 02:32 PM    Albumin 3.1 (L) 06/12/2018 02:32 PM    Globulin 3.3 06/12/2018 02:32 PM    A-G Ratio 0.9 (L) 06/12/2018 02:32 PM    ALT (SGPT) 59 06/12/2018 02:32 PM             FOLLOW-UP            Patient was made aware and verbalized understanding that an appointment will be scheduled for them for a virtual visit and/or office visit within the above time frame. Patient understanding his/her responsibility to call and change time/date if he/she so chooses. Thank you, Natty Lomeli MD for allowing me to participate in the care of Mary White. Please do not hesitate to contact me for further questions/concerns. Greater than 20 minutes was spent in direct patient care, planning and chart review. This visit was conducted using [employer] telemedicine services.        Myrtle Perkins MD    [place] [id number] [place] [address], [address] [place] [address]  [address]        [phone] / [phone] Fax       Sherif Agrawal [address], [address] [place] [address],[address]  [place], [facility] [address]  [phone] / [phone] Fax

## 2020-07-17 ENCOUNTER — TELEPHONE (OUTPATIENT)
Dept: CARDIOLOGY CLINIC | Age: 85
End: 2020-07-17

## 2020-07-17 NOTE — TELEPHONE ENCOUNTER
Patient states she felt some discomfort in her chest that she believes may be because of her pacemaker. Please advise.      Phone: [phone]

## 2020-07-17 NOTE — TELEPHONE ENCOUNTER
Returned pt phone she states she was trying to reach Dr. Gael George ns. Pt wanted to report to nurse that on Tuesday night she had indigestion/acid reflux for most of the night which resolved by the morning. She states she has felt fine since that night and is w/o complaints. Will forward to Dr. Gael George nurse.

## 2020-07-20 RX ORDER — PANTOPRAZOLE SODIUM 40 MG/1
40 TABLET, DELAYED RELEASE ORAL DAILY
Qty: 90 TAB | Refills: 0 | Status: SHIPPED | OUTPATIENT
Start: 2020-07-20 | End: 2020-09-08

## 2020-07-20 NOTE — TELEPHONE ENCOUNTER
Spoke with patient and advised her to begin protonix 40mg daily per Dr. Otto Pettit. She felt like it was indigestion as well. Advised her to call the office in 3-4 weeks if her symptoms did not improve and she verbalized understanding. Said she would latanya her calendar and call back in 3-4 weeks with an update. Requested Rx be sent to [facility] so she did not have to go to her pharmacy due to concerns over acquiring COVID 19.

## 2020-07-22 NOTE — PROGRESS NOTES
VIRTUAL VISIT DOCUMENTATION     Pursuant to the emergency declaration under the [facility], [facility] waiver authority and the [employer] Act, this Virtual  Visit was conducted, with patient's consent, to reduce the patient's risk of exposure to COVID-19 and provide continuity of care for an established patient. Services were provided through a video synchronous discussion virtually to substitute for in-person clinic visit. HISTORY OF PRESENTING ILLNESS      Xavier House is a 80 y.o. female who was seen by synchronous (real-time) audio-video technology on 7/23/2020. She has hx of acute diastolic heart failure and bradycardia, junctional rhythm/atrial fibrillation with heart rates in the 46s. Patient reported exertional fatigue since undergoing transaortic aortic valve replacement. She underwent single chamber pacemaker. RR has been adjusted, still has baseline mild fatigue. Echocardiogram 3/2019 demonstrated preserved LV function. Device interrogation from April shows normal functioning, brief 2 second ATR. She reports an episode of discomfort near her pacemaker 3 weeks ago and that has happened two other times since then.       ACTIVE PROBLEM LIST     Patient Active Problem List    Diagnosis Date Noted    Acute bronchitis 02/17/2020    Allergic rhinitis 02/17/2020    Diabetic peripheral neuropathy associated with type 2 diabetes mellitus ([facility] 02/17/2020    Diarrhea 02/17/2020    Dizziness and giddiness 02/17/2020    Dysphonia 02/17/2020    Heartburn 02/17/2020    Mixed hyperlipidemia 02/17/2020    Mononeuropathy of lower extremity 02/17/2020    Nausea 02/17/2020    Tinea pedis 02/17/2020    Valvular heart disease 02/17/2020    Vitamin D deficiency 02/17/2020    Type 2 diabetes with nephropathy ([facility] 07/05/2018    (HFpEF) heart failure with preserved ejection fraction ([facility] 06/12/2018    S/P TAVR (transcatheter aortic valve replacement) 04/13/2018    Obesity (BMI 30.0-34.9) 04/13/2018    Chest tightness 11/17/2017    Dyspnea and respiratory abnormalities 11/17/2017    Controlled type 2 diabetes mellitus without complication, without long-term current use of insulin ([address] 03/23/2017    Advance directive on file 03/23/2017    Fibromuscular dysplasia ([address] 02/17/2017    COPD (chronic obstructive pulmonary disease) ([address] 10/27/2014    Mitral stenosis 07/03/2014    Other dyspnea and respiratory abnormality 10/02/2013    CINDA (obstructive sleep apnea) 10/18/2012    Type II or unspecified type diabetes mellitus without mention of complication, uncontrolled 05/26/2012    Pure hypercholesterolemia 05/26/2012    Foreign body in pharynx 12/18/2011    Tachycardia 12/01/2011    Hypertension 12/01/2011    PVC (premature ventricular contraction) 12/01/2011    Aortic stenosis 12/01/2011    Edema 12/01/2011           PAST MEDICAL HISTORY     Past Medical History:   Diagnosis Date    Adverse effect of anesthesia     difficulty waking    Aortic stenosis     Arrhythmia     rapid heartrate seen in ER  4/2/18    Cancer ([address] 01/2018    skin spots, removed periodically    Chronic pain     back - spinal stenosis    COPD     Diabetes ([address]     Diabetic neuropathy ([address]     knees down    Diverticulosis of colon     Hypercholesterolemia     Hypertension     CINDA (obstructive sleep apnea) 08-    AHI: 18.6 per hour - CPAP    Other ill-defined conditions(799.89)     heart murmur    Peripheral neuropathy     Renal artery stenosis (HCC)     Right    Unspecified adverse effect of anesthesia     delayed awakening - \"does not need as much\"           PAST SURGICAL HISTORY     Past Surgical History:   Procedure Laterality Date    ABDOMEN SURGERY PROC UNLISTED  1977    R Renal Artery Stenosis bypassed    ABDOMEN SURGERY [address] or 1983    hernia repair   [address] Lipectomy - removal of fatty tissue around abdomen    BREAST SURGERY PROCEDURE UNLISTED  1968    Left - benign cyst    CARDIAC SURG PROCEDURE UNLIST  04/13/2018    TAVR    ENDOSCOPY, COLON, DIAGNOSTIC  1999    hx of polys in the past    HX CATARACT REMOVAL      bilateral    HX GYN  1966    D&C    HX HEENT      Eyelid Cysts x 4    HX HEENT      oral surgeries - gum surgery/floating root  removed x 2    HX HEENT      retinal hemmorage repair    HX ORTHOPAEDIC  11/06    L1-S1 2007    HX ORTHOPAEDIC  1985    L 4th Trigger finger    HX ORTHOPAEDIC      left knee arthroscopy    HX OTHER SURGICAL      skin cancer removal, periodically    HX TONSILLECTOMY      T & A          ALLERGIES     Allergies   Allergen Reactions    Bees [Hymenoptera Allergenic Extract] Anaphylaxis    Lipitor [Atorvastatin] Myalgia    Betadine [Povidone-Iodine] Other (comments)     Abdominal swelling, one incident in 1977. Has had since without a reaction.     Darvon [Propoxyphene] Nausea and Vomiting    Griseofulvin Nausea and Vomiting    Pollen Extracts Cough          FAMILY HISTORY     Family History   Problem Relation Age of Onset    Stroke Mother         1    Cancer Mother         ovarian    Cancer Father         lung/throat    Alcohol abuse Father     Lung Disease Father     Pacemaker Brother     Heart Attack Brother     Heart Disease Brother     Other Brother         als    Stroke Maternal Grandfather     Heart Disease Paternal Uncle     Heart Attack Paternal Uncle     negative for cardiac disease       SOCIAL HISTORY     Social History     Socioeconomic History    Marital status:      Spouse name: Not on file    Number of children: Not on file    Years of education: Not on file    Highest education level: Not on file   Tobacco Use    Smoking status: Former Smoker     Packs/day: 1.50     Years: 13.00     Pack years: 19.50     Types: Cigarettes     Last attempt to quit: 6/6/1974     Years since quittin.1    Smokeless tobacco: Never Used    Tobacco comment: former cigarette smoker   Substance and Sexual Activity    Alcohol use: Yes     Alcohol/week: 0.0 - 1.0 standard drinks     Comment: special occasions    Drug use: No     Types: Prescription    Sexual activity: Never         MEDICATIONS     Current Outpatient Medications   Medication Sig    pantoprazole (PROTONIX) 40 mg tablet Take 1 Tab by mouth daily.  apixaban (Eliquis) 5 mg tablet TAKE 1 TABLET BY MOUTH TWO  TIMES DAILY    losartan (COZAAR) 25 mg tablet Take 1 Tab by mouth daily. Indications: high blood pressure    rosuvastatin (CRESTOR) 5 mg tablet TAKE 1 TABLET BY MOUTH  DAILY    spironolactone (ALDACTONE) 25 mg tablet TAKE 1 TABLET BY MOUTH  DAILY    metFORMIN (GLUCOPHAGE) 1,000 mg tablet TAKE 1 TABLET BY MOUTH TWO  TIMES DAILY    potassium chloride SR (K-TAB) 20 mEq tablet TAKE 1 TABLET BY MOUTH TWO  TIMES DAILY (Patient taking differently: Take 20 mEq by mouth daily. Do not crush, break or chew. Swallow whole.)    cpap machine kit by Does Not Apply route. Use nightly    bumetanide (BUMEX) 0.5 mg tablet Take 2 tabs po every day and 1 tab po in afternoon every other day. (Patient taking differently: 0.5 mg two (2) times a day. Take 1 tab po every day and 1/2 tab po in afternoon every other day.)    AMOXICILLIN PO Take  by mouth. Prior to dental appts, per Dr. Nabil Leon.  FIBER-CAPS, PSYLLIUM HUSK, PO Take 5 Caps by mouth daily.  azelastine (ASTELIN) 137 mcg (0.1 %) nasal spray 2 Sprays by Both Nostrils route two (2) times a day. Use in each nostril as directed (Patient taking differently: 2 Sprays by Both Nostrils route two (2) times daily as needed. Use in each nostril as directed)    CYANOCOBALAMIN, VITAMIN B-12, (VITAMIN B-12 PO) Take 2,000 mg by mouth daily.  glucose blood VI test strips (ACCU-CHEK ELBERT PLUS) strip Check bs daily. Dx 250.00     No current facility-administered medications for this visit. I have reviewed the nurses notes, vitals, problem list, allergy list, medical history, family, social history and medications. REVIEW OF SYMPTOMS      General: Pt denies excessive weight gain or loss. Pt is able to conduct ADL's  HEENT: Denies blurred vision, headaches, hearing loss, epistaxis and difficulty swallowing. Respiratory: Denies cough, congestion, shortness of breath, ROBB, wheezing or stridor. Cardiovascular: Denies precordial pain, palpitations, edema or PND  Gastrointestinal: Denies poor appetite, indigestion, abdominal pain or blood in stool  Genitourinary: Denies hematuria, dysuria, increased urinary frequency  Musculoskeletal: Denies joint pain or swelling from muscles or joints  Neurologic: Denies tremor, paresthesias, headache, or sensory motor disturbance  Psychiatric: Denies confusion, insomnia, depression  Integumentray: Denies rash, itching or ulcers. Hematologic: Denies easy bruising, bleeding       PHYSICAL EXAMINATION      Due to this being a TeleHealth evaluation, many elements of the physical examination are unable to be assessed. General: Well developed, in no acute distress, cooperative and alert  HEENT: Pupils equal/round. No marked JVD visible on video. Respiratory: No audible wheezing, no signs of respiratory distress, lips non cyanotic  Extremities:  No edema  Neuro: A&Ox3, speech clear, no facial droop, answering questions appropriately  Skin: Skin color is normal. No rashes or lesions.  Non diaphoretic on visible skin during exam       DIAGNOSTIC DATA      EKG:        LABORATORY DATA      Lab Results   Component Value Date/Time    WBC 10.0 08/30/2018 01:42 PM    HGB 12.6 08/30/2018 01:42 PM    HCT 40.0 08/30/2018 01:42 PM    PLATELET 094 45/80/3121 01:42 PM    MCV 85 08/30/2018 01:42 PM      Lab Results   Component Value Date/Time    Sodium 144 01/04/2019 11:06 AM    Potassium 4.8 01/04/2019 11:06 AM    Chloride 100 01/04/2019 11:06 AM    CO2 22 01/04/2019 11:06 AM    Anion gap 8 06/15/2018 04:13 AM    Glucose 122 (H) 01/04/2019 11:06 AM    BUN 22 01/04/2019 11:06 AM    Creatinine 1.13 (H) 01/04/2019 11:06 AM    BUN/Creatinine ratio 19 01/04/2019 11:06 AM    GFR est AA 50 (L) 01/04/2019 11:06 AM    GFR est non-AA 43 (L) 01/04/2019 11:06 AM    Calcium 10.1 01/04/2019 11:06 AM    Bilirubin, total 1.2 (H) 06/12/2018 02:32 PM    Alk. phosphatase 82 06/12/2018 02:32 PM    Protein, total 6.4 06/12/2018 02:32 PM    Albumin 3.1 (L) 06/12/2018 02:32 PM    Globulin 3.3 06/12/2018 02:32 PM    A-G Ratio 0.9 (L) 06/12/2018 02:32 PM    ALT (SGPT) 59 06/12/2018 02:32 PM           ASSESSMENT      1. Bradycardia (junctional vs AF)  2. Exertional fatigue  3. Aortic stenosis status post TAVR  4. Hypertension  5. Diabetes mellitus  6. CINDA on CPAP  7. Renal artery stenosis   8. Peripheral neuropathy  9. Diastolic dysfunction  10. Pacemaker              A. Single chamber              B. Left sided              C. Medtronic        ICD-10-CM ICD-9-CM    1. Atrial fibrillation, unspecified type ([address]  I48.91 427.31    2. Pacemaker  Z95.0 V45.01    3. Junctional bradycardia  R00.1 427.89    4. PVC (premature ventricular contraction)  I49.3 427.69    5. CINDA (obstructive sleep apnea)  G47.33 327.23      No orders of the defined types were placed in this encounter. PLAN     She will send transmission through [employer] for evaluation today and we will contact her for abnormals. Will arrange for remote schedule and annual with me. We discussed the expected course, resolution and complications of the diagnosis(es) in detail. Medication risks, benefits, costs, interactions, and alternatives were discussed as indicated. I advised her to contact the office if her condition worsens, changes or fails to improve as anticipated.  She expressed understanding with the diagnosis(es) and plan     FOLLOW-UP   1 year    Patient was made aware and verbalized understanding that an appointment will be scheduled for them for a virtual visit and/or office visit within the above time frame. Patient understanding his/her responsibility to call and change time/date if he/she so chooses. Thank you, Cleo Youssef MD and Dr. Lam Aragon for allowing me to participate in the care of this extraordinarily pleasant female. Please do not hesitate to contact me for further questions/concerns. YVETTE Frank [facility] [address].  [address] [place] [address]    Richy Masterson  [phone] / [phone] Fax   [phone] / [phone] Fax        Greater than 25 minutes was spent in direct video patient care, planning and chart review. This visit was conducted using [employer] telemedicine services.

## 2020-07-23 ENCOUNTER — VIRTUAL VISIT (OUTPATIENT)
Dept: CARDIOLOGY CLINIC | Age: 85
End: 2020-07-23

## 2020-07-23 DIAGNOSIS — Z95.0 PACEMAKER: ICD-10-CM

## 2020-07-23 DIAGNOSIS — I49.3 PVC (PREMATURE VENTRICULAR CONTRACTION): ICD-10-CM

## 2020-07-23 DIAGNOSIS — G47.33 OSA (OBSTRUCTIVE SLEEP APNEA): ICD-10-CM

## 2020-07-23 DIAGNOSIS — I48.91 ATRIAL FIBRILLATION, UNSPECIFIED TYPE (HCC): Primary | ICD-10-CM

## 2020-07-23 DIAGNOSIS — R00.1 JUNCTIONAL BRADYCARDIA: ICD-10-CM

## 2020-08-26 ENCOUNTER — PATIENT MESSAGE (OUTPATIENT)
Dept: CARDIOLOGY CLINIC | Age: 85
End: 2020-08-26

## 2020-08-26 RX ORDER — BUMETANIDE 0.5 MG/1
TABLET ORAL
Qty: 225 TAB | Refills: 3 | Status: SHIPPED | OUTPATIENT
Start: 2020-08-26 | End: 2021-06-11 | Stop reason: ALTCHOICE

## 2020-09-08 RX ORDER — PANTOPRAZOLE SODIUM 40 MG/1
TABLET, DELAYED RELEASE ORAL
Qty: 90 TAB | Refills: 3 | OUTPATIENT
Start: 2020-09-08

## 2020-09-08 NOTE — TELEPHONE ENCOUNTER
Requested Prescriptions     Refused Prescriptions Disp Refills    pantoprazole (PROTONIX) 40 mg tablet [Pharmacy Med Name: PANTOPRAZOLE SOD 40MG EC TABLET] 90 Tab 3     Sig: TAKE 1 TABLET BY MOUTH  DAILY     Refused By: Jena Bowden     Reason for Refusal: Medication Discontinued     See my chart messaging for 8/26/20.  Medication d/c'd by provider

## 2020-10-05 ENCOUNTER — TELEPHONE (OUTPATIENT)
Dept: INTERNAL MEDICINE CLINIC | Age: 85
End: 2020-10-05

## 2020-10-05 RX ORDER — LOSARTAN POTASSIUM 50 MG/1
TABLET ORAL
Qty: 90 TAB | Refills: 3 | Status: SHIPPED | OUTPATIENT
Start: 2020-10-05 | End: 2020-10-05 | Stop reason: DRUGHIGH

## 2020-10-05 RX ORDER — LOSARTAN POTASSIUM 25 MG/1
25 TABLET ORAL DAILY
Qty: 90 TAB | Refills: 3 | Status: SHIPPED | OUTPATIENT
Start: 2020-10-05 | End: 2020-12-10 | Stop reason: SDUPTHER

## 2020-11-18 ENCOUNTER — TELEPHONE (OUTPATIENT)
Dept: INTERNAL MEDICINE CLINIC | Age: 85
End: 2020-11-18

## 2020-11-18 RX ORDER — KETOCONAZOLE 20 MG/ML
SHAMPOO TOPICAL
Qty: 120 ML | Refills: 1 | Status: SHIPPED | OUTPATIENT
Start: 2020-11-18 | End: 2021-06-15 | Stop reason: ALTCHOICE

## 2020-11-18 NOTE — TELEPHONE ENCOUNTER
Patient pharmacy is requesting refill for ketoconazole shampoo, not on patient medication list. Please advise

## 2020-11-24 ENCOUNTER — OFFICE VISIT (OUTPATIENT)
Dept: CARDIOLOGY CLINIC | Age: 85
End: 2020-11-24
Payer: MEDICARE

## 2020-11-24 ENCOUNTER — ANCILLARY PROCEDURE (OUTPATIENT)
Dept: CARDIOLOGY CLINIC | Age: 85
End: 2020-11-24
Payer: MEDICARE

## 2020-11-24 VITALS
HEART RATE: 70 BPM | RESPIRATION RATE: 14 BRPM | HEIGHT: 66 IN | WEIGHT: 199 LBS | DIASTOLIC BLOOD PRESSURE: 70 MMHG | SYSTOLIC BLOOD PRESSURE: 130 MMHG | BODY MASS INDEX: 31.98 KG/M2 | OXYGEN SATURATION: 97 %

## 2020-11-24 VITALS — HEIGHT: 66 IN | WEIGHT: 199 LBS | BODY MASS INDEX: 31.98 KG/M2

## 2020-11-24 DIAGNOSIS — E66.9 OBESITY (BMI 30.0-34.9): ICD-10-CM

## 2020-11-24 DIAGNOSIS — I10 ESSENTIAL HYPERTENSION: ICD-10-CM

## 2020-11-24 DIAGNOSIS — Z95.2 S/P TAVR (TRANSCATHETER AORTIC VALVE REPLACEMENT): Primary | ICD-10-CM

## 2020-11-24 DIAGNOSIS — I77.3 FIBROMUSCULAR DYSPLASIA (HCC): ICD-10-CM

## 2020-11-24 DIAGNOSIS — I48.91 ATRIAL FIBRILLATION, UNSPECIFIED TYPE (HCC): ICD-10-CM

## 2020-11-24 DIAGNOSIS — G47.33 OSA (OBSTRUCTIVE SLEEP APNEA): ICD-10-CM

## 2020-11-24 DIAGNOSIS — Z95.2 S/P TAVR (TRANSCATHETER AORTIC VALVE REPLACEMENT): ICD-10-CM

## 2020-11-24 DIAGNOSIS — I49.3 PVC (PREMATURE VENTRICULAR CONTRACTION): ICD-10-CM

## 2020-11-24 DIAGNOSIS — Z95.0 PACEMAKER: ICD-10-CM

## 2020-11-24 DIAGNOSIS — R00.1 JUNCTIONAL BRADYCARDIA: ICD-10-CM

## 2020-11-24 DIAGNOSIS — I50.32 CHRONIC HEART FAILURE WITH PRESERVED EJECTION FRACTION (HCC): ICD-10-CM

## 2020-11-24 LAB
ECHO AO ASC DIAM: 3.1 CM
ECHO AV AREA PEAK VELOCITY: 1.14 CM2
ECHO AV AREA VTI: 1.26 CM2
ECHO AV AREA/BSA PEAK VELOCITY: 0.6 CM2/M2
ECHO AV AREA/BSA VTI: 0.6 CM2/M2
ECHO AV MEAN GRADIENT: 10.94 MMHG
ECHO AV PEAK GRADIENT: 18.49 MMHG
ECHO AV PEAK VELOCITY: 215.02 CM/S
ECHO AV VTI: 40.28 CM
ECHO IVC PROX: 2.09 CM
ECHO LA AREA 4C: 30.16 CM2
ECHO LA MAJOR AXIS: 5.72 CM
ECHO LA MINOR AXIS: 2.87 CM
ECHO LA VOL 2C: 91.46 ML (ref 22–52)
ECHO LA VOL 4C: 105.3 ML (ref 22–52)
ECHO LA VOL BP: 108.3 ML (ref 22–52)
ECHO LA VOL/BSA BIPLANE: 54.26 ML/M2 (ref 16–28)
ECHO LA VOLUME INDEX A2C: 45.82 ML/M2 (ref 16–28)
ECHO LA VOLUME INDEX A4C: 52.76 ML/M2 (ref 16–28)
ECHO LV EDV A2C: 36.29 ML
ECHO LV EDV A4C: 38.47 ML
ECHO LV EDV BP: 39.15 ML (ref 56–104)
ECHO LV EDV INDEX A4C: 19.3 ML/M2
ECHO LV EDV INDEX BP: 19.6 ML/M2
ECHO LV EDV NDEX A2C: 18.2 ML/M2
ECHO LV EJECTION FRACTION A2C: 48 PERCENT
ECHO LV EJECTION FRACTION A4C: 54 PERCENT
ECHO LV EJECTION FRACTION BIPLANE: 50.6 PERCENT (ref 55–100)
ECHO LV ESV A2C: 18.84 ML
ECHO LV ESV A4C: 17.54 ML
ECHO LV ESV BP: 19.36 ML (ref 19–49)
ECHO LV ESV INDEX A2C: 9.4 ML/M2
ECHO LV ESV INDEX A4C: 8.8 ML/M2
ECHO LV ESV INDEX BP: 9.7 ML/M2
ECHO LV INTERNAL DIMENSION DIASTOLIC: 4.61 CM (ref 3.9–5.3)
ECHO LV INTERNAL DIMENSION SYSTOLIC: 2.88 CM
ECHO LV IVSD: 1.36 CM (ref 0.6–0.9)
ECHO LV MASS 2D: 288.7 G (ref 67–162)
ECHO LV MASS INDEX 2D: 144.6 G/M2 (ref 43–95)
ECHO LV POSTERIOR WALL DIASTOLIC: 1.66 CM (ref 0.6–0.9)
ECHO LVOT DIAM: 2.07 CM
ECHO LVOT PEAK GRADIENT: 2.12 MMHG
ECHO LVOT PEAK VELOCITY: 72.85 CM/S
ECHO LVOT SV: 50.8 ML
ECHO LVOT VTI: 15.07 CM
ECHO MV AREA VTI: 1.78 CM2
ECHO MV MAX VELOCITY: 129.3 CM/S
ECHO MV MEAN GRADIENT: 2.38 MMHG
ECHO MV PEAK GRADIENT: 6.69 MMHG
ECHO MV VTI: 28.54 CM
ECHO PV MAX VELOCITY: 97.65 CM/S
ECHO PV PEAK INSTANTANEOUS GRADIENT SYSTOLIC: 3.81 MMHG
ECHO PV REGURGITANT MAX VELOCITY: 146.36 CM/S
ECHO RV TAPSE: 1.54 CM (ref 1.5–2)
ECHO TV REGURGITANT MAX VELOCITY: 293.48 CM/S
ECHO TV REGURGITANT PEAK GRADIENT: 34.45 MMHG
LA VOL DISK BP: 103.1 ML (ref 22–52)

## 2020-11-24 PROCEDURE — 93306 TTE W/DOPPLER COMPLETE: CPT | Performed by: INTERNAL MEDICINE

## 2020-11-24 PROCEDURE — 3288F FALL RISK ASSESSMENT DOCD: CPT | Performed by: INTERNAL MEDICINE

## 2020-11-24 PROCEDURE — 1090F PRES/ABSN URINE INCON ASSESS: CPT | Performed by: INTERNAL MEDICINE

## 2020-11-24 PROCEDURE — G8427 DOCREV CUR MEDS BY ELIG CLIN: HCPCS | Performed by: INTERNAL MEDICINE

## 2020-11-24 PROCEDURE — G8432 DEP SCR NOT DOC, RNG: HCPCS | Performed by: INTERNAL MEDICINE

## 2020-11-24 PROCEDURE — 1100F PTFALLS ASSESS-DOCD GE2>/YR: CPT | Performed by: INTERNAL MEDICINE

## 2020-11-24 PROCEDURE — G8417 CALC BMI ABV UP PARAM F/U: HCPCS | Performed by: INTERNAL MEDICINE

## 2020-11-24 PROCEDURE — G8536 NO DOC ELDER MAL SCRN: HCPCS | Performed by: INTERNAL MEDICINE

## 2020-11-24 PROCEDURE — 99214 OFFICE O/P EST MOD 30 MIN: CPT | Performed by: INTERNAL MEDICINE

## 2020-11-24 PROCEDURE — G0463 HOSPITAL OUTPT CLINIC VISIT: HCPCS | Performed by: INTERNAL MEDICINE

## 2020-11-24 RX ORDER — BUMETANIDE 1 MG/1
2 TABLET ORAL DAILY
COMMUNITY
End: 2021-06-15 | Stop reason: ALTCHOICE

## 2020-11-24 NOTE — PROGRESS NOTES
[facility] Office Note    2020     Subjective:     She is here today to follow up on her HTN, hx of TAVR, diastolic heart failure. She is on eliquis for the afib and that is going ok. Less bleeding off aspirin. Using her CPAP about 4 hours a night, bothers  her a lot. Not playing bridge anymore really. Got an apple watch and an iphone. Does feel that the pacemaker made a difference in her energy level though   Having more dyspnea with exertion  No PND or orthopnea  No chest pain or palpitations    Stopped doing cardiac rehab due to back pain   Her weight is up 8 lbs since her last visit and she admits that she is not exercising  Loses balance easily, only one fall several months ago when she had not eaten   Uses her cane and sometimes her walker  Having some nosebleeds - using saline spray some and puts vaseline in her nares at night  Wears CPAP  We talked about seeing ENT if her nosebleeds persist  O2 Sats 93% on room with ambulation today    Granddaughter is graduating from [employer] in astrophysics! LDL at 74, scanned in from Dr. Carol Peterson. A1C 6.3    She stopped playing bridge and a lot of her friends have , strokes etc. So that is stressful and depressing. Reading a lot. Some trash- watching the [employer]. Weight is up 8 pounds and snacking more  BP is good, no chest pain no dyspnea. Going [place] outdoors. Home testing before they visit. Taking 2mg in the morning and one every other night at the bumex. Has a little left leg swelling and will take an extra bumex. Assessment/Plan   1. HTN - well controlled on cozaar 50mg daily, spironolactone 25mg daily  2. [place]- history of adhesions, stable post renal artery bypass  -FMD in , bypassed in   3. DM type 2 - on metformin, has severe peripheral neuropathy, followed by Dr. Carol Peterson   4. AS s/p TAVR - stable by TTE    5. Hyperlipidemia- LDL at goal on crestor low dose  6.  Vit D deficiency- on supplementation   7. CINDA- uses CPAP nightly   8. SSS? Junctional rhythm/bradycardia-  s/p pacemaker, Followed by Dr. Ajit Leahy, stable  9. Hx of crushed vocal cords - stable hoarseness  10. Acute diastolic heart failure - LVEF 55-60% by TTE in 5/18, NYHA Class II   -on bumex 1mg BID, BP well controlled  11. Hypokalemia - cont on KCL 20meq BID   12. Advanced directives on file, now full code    13. Hypoxia - O2 Sats 93% with ambulation today, probably multifactorial, COPD, etc, will continue to watch   14. Atrial Fib - [id number]=2 on Eliquis 5mg BID  13. Hypomagnesemia - stable now     6/14/2018: Medtronic Single-Chamber Pacemaker per Dr. Valentino Silver  TTE 5/18 - LVEF 55 % to 60 %, no WMA, mod dilated LA, mild MS, s/p TAVR well seated elevated gradient  Aortic Valve:   AV Vmax was 3.2 m/s.  AV meanPG was 22.7 mmHg. PW measurements: Aortic Valve:   RACHEL (VTI) was 1.3 cm2. TAVR 4/18 - Successful transcatheter aortic valve replacement with no significant paravalvular leak. Post-deployment aortography showed the valve was in good position with no aortic regurgitation  Cardiac Cath 11/17 - RACHEL 0.84 patient appeared to be in AF during case, but most likely was frequent atrial ectopy.  No mitral stenosis.  LV gram not done.  Normal coronaries.  Thermo output was 6.9, assumed Emilie was 4.65; the 6.9 was used by computer in RACHEL calculation, so RACHEL may be overestimated.  LV known to be normal by echo.  Peak to peak gradient 63. TTE 1/14 EF 65-60%, mod cLVH 1.7, grI DD, mild-mod as 25/20 mild MS 10/5, triv effusion  TTE 7/14 EF 65-70%, mild cLVH, Mod AS 42/24, mild MS 10/5   TTE 10/13 LVEF 65 %, no WMA,  severe cLVH, abnormal left ventricular relaxation (grade 1 diastolic dysfunction), aortic valve leaflets exhibited marked calcification and reduced mobility with moderate stenosis, valve mean gradient was 24 mmHg.   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 Ears/Nose/Mouth/Throat:   Hearing grossly normal.         Neck: Supple. Chest:   Lungs clear to auscultation bilaterally. Cardiovascular:  Regular rate and rhythm, S1, S2 normal, 2/6 KATHERINE    Abdomen:   Soft, non-tender, bowel sounds are active. Extremities: No LE edema     Skin: Warm and dry.        Data Review:   Labs:    Recent Results (from the past 24 hour(s))   ECHO ADULT COMPLETE    Collection Time: 11/24/20  8:41 AM   Result Value Ref Range    IVSd 1.36 (A) 0.6 - 0.9 cm    LVIDd 4.61 3.9 - 5.3 cm    LVIDs 2.88 cm    LVOT d 2.07 cm    LVPWd 1.66 (A) 0.6 - 0.9 cm    BP EF 50.6 (A) 55 - 100 percent    LV Ejection Fraction MOD 2C 48 percent    LV Ejection Fraction MOD 4C 54 percent    LV ED Vol A2C 36.29 mL    LV ED Vol A4C 38.47 mL    LV ED Vol BP 39.15 (A) 56 - 104 mL    LV ES Vol A2C 18.84 mL    LV ES Vol A4C 17.54 mL    LV ES Vol BP 19.36 19 - 49 mL    LVOT Peak Gradient 2.12 mmHg    LVOT SV 50.8 mL    LVOT Peak Velocity 72.85 cm/s    LVOT VTI 15.07 cm    Left Atrium Major Axis 5.72 cm    LA Volume 108.30 22 - 52 mL    LA Area 4C 30.16 cm2    LA Vol 2C 91.46 (A) 22 - 52 mL    LA Vol 4C 105.30 (A) 22 - 52 mL    LA Volume DISK .10 22 - 52 mL    Aortic Valve Area by Continuity of Peak Velocity 1.14 cm2    Aortic Valve Area by Continuity of VTI 1.26 cm2    AoV PG 18.49 mmHg    Aortic Valve Systolic Mean Gradient 53.86 mmHg    Aortic Valve Systolic Peak Velocity 304.54 cm/s    AoV VTI 40.28 cm    MVA VTI 1.78 cm2 MV Peak Gradient 6.69 mmHg    MV Mean Gradient 2.38 mmHg    Mitral Valve Max Velocity 129.30 cm/s    Mitral Valve Annulus Velocity Time Integral 28.54 cm    Pulmonic Regurgitant End Max Velocity 146.36 cm/s    Pulmonic Valve Systolic Peak Instantaneous Gradient 3.81 mmHg    Pulmonic Valve Max Velocity 97.65 cm/s    Tapse 1.54 1.5 - 2.0 cm    Triscuspid Valve Regurgitation Peak Gradient 34.45 mmHg    TR Max Velocity 293.48 cm/s    AO ASC D 3.10 cm    IVC proximal 2.09 cm    LV Mass .7 67 - 162 g    LV Mass AL Index 144.6 43 - 95 g/m2    LVES Vol Index BP 9.7 mL/m2    LVED Vol Index BP 19.6 mL/m2    Left Atrium Minor Axis 2.87 cm    LA Vol Index 54.26 16 - 28 ml/m2    LA Vol Index 45.82 16 - 28 ml/m2    LA Vol Index 52.76 16 - 28 ml/m2    LVED Vol Index A4C 19.3 mL/m2    LVED Vol Index A2C 18.2 mL/m2    LVES Vol Index A4C 8.8 mL/m2    LVES Vol Index A2C 9.4 mL/m2    RACHEL/BSA [address] 0.6 cm2/m2    RACHEL/BSA VTI 0.6 cm2/m2         Current Outpatient Medications   Medication Sig    bumetanide (BUMEX) 1 mg tablet Take 2 mg by mouth daily.  ketoconazole (NIZORAL) 2 % shampoo Apply 2x per week or as needed    losartan (COZAAR) 25 mg tablet Take 1 Tab by mouth daily.  apixaban (Eliquis) 5 mg tablet TAKE 1 TABLET BY MOUTH TWO  TIMES DAILY    rosuvastatin (CRESTOR) 5 mg tablet TAKE 1 TABLET BY MOUTH  DAILY    spironolactone (ALDACTONE) 25 mg tablet TAKE 1 TABLET BY MOUTH  DAILY    metFORMIN (GLUCOPHAGE) 1,000 mg tablet TAKE 1 TABLET BY MOUTH TWO  TIMES DAILY    potassium chloride SR (K-TAB) 20 mEq tablet TAKE 1 TABLET BY MOUTH TWO  TIMES DAILY (Patient taking differently: Take 20 mEq by mouth daily. Do not crush, break or chew. Swallow whole.)    cpap machine kit by Does Not Apply route. Use nightly    AMOXICILLIN PO Take  by mouth. Prior to dental appts, per Dr. Layne Hernandez.  FIBER-CAPS, PSYLLIUM HUSK, PO Take 5 Caps by mouth daily.     azelastine (ASTELIN) 137 mcg (0.1 %) nasal spray 2 Sprays by Both Nostrils route two (2) times a day. Use in each nostril as directed (Patient taking differently: 2 Sprays by Both Nostrils route two (2) times daily as needed. Use in each nostril as directed)    CYANOCOBALAMIN, VITAMIN B-12, (VITAMIN B-12 PO) Take 2,000 mg by mouth daily.  glucose blood VI test strips (ACCU-CHEK ELBERT PLUS) strip Check bs daily. Dx 250.00    bumetanide (BUMEX) 0.5 mg tablet TAKE 2 TABLETS BY MOUTH  EVERY DAY AND 1 TABLET IN  THE AFTERNOON EVERY OTHER  DAY.  losartan (COZAAR) 25 mg tablet Take 1 Tab by mouth daily. Indications: high blood pressure     No current facility-administered medications for this visit.         Diya Gee MD  [facility] [address] [place] [address], [address] [place] [address],[address]  [facility]  [phone]

## 2020-12-21 ENCOUNTER — TELEPHONE (OUTPATIENT)
Dept: INTERNAL MEDICINE CLINIC | Age: 85
End: 2020-12-21

## 2020-12-21 NOTE — TELEPHONE ENCOUNTER
Returned [employer] call       Details to clarify the request: Patient does not know what the call is for.  Patient asked for a detailed message be left if patient is unable to answer the call.       envera

## 2021-02-09 ENCOUNTER — OFFICE VISIT (OUTPATIENT)
Dept: CARDIOLOGY CLINIC | Age: 86
End: 2021-02-09
Payer: MEDICARE

## 2021-02-09 DIAGNOSIS — Z95.0 CARDIAC PACEMAKER IN SITU: Primary | ICD-10-CM

## 2021-02-09 PROCEDURE — 93294 REM INTERROG EVL PM/LDLS PM: CPT | Performed by: INTERNAL MEDICINE

## 2021-02-09 NOTE — LETTER
2/9/2021 6:26 PM 
 
Ms. Mary White [address] [place] [id number] Dear Patient, We have received your recent remote monitor check of your implanted device scheduled on  2/9/2021. Your remaining estimated battery life is  7 years and your device is working normally & appropriately. Your next remote monitor check is scheduled for  5/11/2021. If you have any questions, please call the Pacemaker/ICD clinic at the [facility] location at [phone]. Sincerely, 
 
Denver Betters BSN, RN Cardiac Device Clinic Coordinator [facility] [place] [id number] [facility]. [address] [place], [address] [place] [address] 
[phone]

## 2021-03-01 DIAGNOSIS — I49.3 PVC (PREMATURE VENTRICULAR CONTRACTION): ICD-10-CM

## 2021-03-01 DIAGNOSIS — R00.0 TACHYCARDIA: ICD-10-CM

## 2021-03-01 RX ORDER — ROSUVASTATIN CALCIUM 5 MG/1
TABLET, COATED ORAL
Qty: 90 TAB | Refills: 3 | Status: SHIPPED | OUTPATIENT
Start: 2021-03-01 | End: 2022-03-23

## 2021-03-29 ENCOUNTER — TELEPHONE (OUTPATIENT)
Dept: INTERNAL MEDICINE CLINIC | Age: 86
End: 2021-03-29

## 2021-03-29 RX ORDER — CEPHALEXIN 250 MG/1
250 CAPSULE ORAL 4 TIMES DAILY
Qty: 28 CAP | Refills: 0 | Status: SHIPPED | OUTPATIENT
Start: 2021-03-29 | End: 2021-06-15 | Stop reason: ALTCHOICE

## 2021-03-29 RX ORDER — SILVER SULFADIAZINE 10 G/1000G
CREAM TOPICAL DAILY
Qty: 20 G | Refills: 0 | Status: SHIPPED | OUTPATIENT
Start: 2021-03-29 | End: 2021-06-15 | Stop reason: ALTCHOICE

## 2021-03-29 NOTE — TELEPHONE ENCOUNTER
See mychart note. Had 1 area of pus drain earlier today but still second area. Will rx Keflex and silvadene cream.  If no improvement will need ov. Pt in agreement and will mychart update later this week.

## 2021-03-29 NOTE — TELEPHONE ENCOUNTER
----- Message from Janice Ruffin LPN sent at 3/22/0819  9:02 AM EDT -----  Regarding: FW: Non-Urgent Medical Question  Contact: [phone]    ----- Message -----  From: Margot Chao  Sent: 3/29/2021   8:41 AM EDT  To: Amada Fernández Pool  Subject: Non-Urgent Medical Question                      I burned my arm last week and the small site appears to be getting infected (slightly swollen, discolored, small pocket of pus). Is there a prescription for a substance I can apply to lessen the possibility of sepsis? My attempts to ask you by phone were unsuccessful so I would appreciate an answer through [address] If you can recommend an Rx please call the [facility] at the [facility] on [employer] [address] [employer] ([phone]). Thank you.

## 2021-04-29 ENCOUNTER — TELEPHONE (OUTPATIENT)
Dept: CARDIOLOGY CLINIC | Age: 86
End: 2021-04-29

## 2021-05-07 ENCOUNTER — TELEPHONE (OUTPATIENT)
Dept: CARDIOLOGY CLINIC | Age: 86
End: 2021-05-07

## 2021-05-07 NOTE — TELEPHONE ENCOUNTER
5/7/2021 at 1:14 left message call to reschedule 5/25 8:00 with Dr. Eveline Campa - [facility] coverage AM on 5/25

## 2021-05-26 ENCOUNTER — TRANSCRIBE ORDER (OUTPATIENT)
Dept: SCHEDULING | Age: 86
End: 2021-05-26

## 2021-05-26 DIAGNOSIS — R93.7 MUSCULOSKELETAL SYSTEM IMAGING ABNORMALITY: Primary | ICD-10-CM

## 2021-05-27 ENCOUNTER — TRANSCRIBE ORDER (OUTPATIENT)
Dept: SCHEDULING | Age: 86
End: 2021-05-27

## 2021-05-27 ENCOUNTER — HOSPITAL ENCOUNTER (OUTPATIENT)
Dept: CT IMAGING | Age: 86
Discharge: HOME | End: 2021-05-27
Attending: PHYSICAL MEDICINE & REHABILITATION
Payer: COMMERCIAL

## 2021-05-27 DIAGNOSIS — R93.7 MUSCULOSKELETAL SYSTEM IMAGING ABNORMALITY: ICD-10-CM

## 2021-05-27 DIAGNOSIS — I62.00 SUBDURAL HEMORRHAGE (HCC): Primary | ICD-10-CM

## 2021-05-27 PROCEDURE — 72125 CT NECK SPINE W/O DYE: CPT

## 2021-06-01 ENCOUNTER — HOSPITAL ENCOUNTER (OUTPATIENT)
Dept: CT IMAGING | Age: 86
Discharge: HOME | End: 2021-06-01
Attending: PHYSICAL MEDICINE & REHABILITATION
Payer: COMMERCIAL

## 2021-06-01 DIAGNOSIS — I62.00 SUBDURAL HEMORRHAGE (HCC): ICD-10-CM

## 2021-06-01 PROCEDURE — 70450 CT HEAD/BRAIN W/O DYE: CPT

## 2021-06-07 ENCOUNTER — TELEPHONE (OUTPATIENT)
Dept: CARDIOLOGY CLINIC | Age: 86
End: 2021-06-07

## 2021-06-07 NOTE — TELEPHONE ENCOUNTER
Patient's son Lev Adames called to schedule an appointment, there are no immediate established patient appointment available.         Filomena Robledo

## 2021-06-11 ENCOUNTER — TELEPHONE (OUTPATIENT)
Dept: INTERNAL MEDICINE CLINIC | Age: 86
End: 2021-06-11
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
## 2021-06-11 NOTE — TELEPHONE ENCOUNTER
Kalyan Hunt, Physical Therapist       Reason for call: Wanting to alert provider regarding pt retaining fluid. Pt has also complained of SOB. O2 sat is 95%. And pt has a non-productive cough and ankle swelling.        Callback required yes/no and why: Yes       Best contact number(s): [phone]        amor

## 2021-07-02 ENCOUNTER — VIRTUAL VISIT (OUTPATIENT)
Dept: CARDIOLOGY CLINIC | Age: 86
End: 2021-07-02
Payer: MEDICARE

## 2021-07-02 ENCOUNTER — DOCUMENTATION ONLY (OUTPATIENT)
Dept: CARDIOLOGY CLINIC | Age: 86
End: 2021-07-02

## 2021-07-02 DIAGNOSIS — I50.32 CHRONIC HEART FAILURE WITH PRESERVED EJECTION FRACTION (HCC): Primary | ICD-10-CM

## 2021-07-02 DIAGNOSIS — I10 ESSENTIAL HYPERTENSION: ICD-10-CM

## 2021-07-02 DIAGNOSIS — I48.0 PAROXYSMAL ATRIAL FIBRILLATION (HCC): ICD-10-CM

## 2021-07-02 DIAGNOSIS — I89.0 SECONDARY LYMPHEDEMA: ICD-10-CM

## 2021-07-02 DIAGNOSIS — Z95.2 S/P TAVR (TRANSCATHETER AORTIC VALVE REPLACEMENT): ICD-10-CM

## 2021-07-02 DIAGNOSIS — G47.33 OSA (OBSTRUCTIVE SLEEP APNEA): ICD-10-CM

## 2021-07-02 PROCEDURE — G8427 DOCREV CUR MEDS BY ELIG CLIN: HCPCS | Performed by: NURSE PRACTITIONER

## 2021-07-02 PROCEDURE — G0463 HOSPITAL OUTPT CLINIC VISIT: HCPCS | Performed by: NURSE PRACTITIONER

## 2021-07-02 PROCEDURE — 1100F PTFALLS ASSESS-DOCD GE2>/YR: CPT | Performed by: NURSE PRACTITIONER

## 2021-07-02 PROCEDURE — G8432 DEP SCR NOT DOC, RNG: HCPCS | Performed by: NURSE PRACTITIONER

## 2021-07-02 PROCEDURE — 3288F FALL RISK ASSESSMENT DOCD: CPT | Performed by: NURSE PRACTITIONER

## 2021-07-02 PROCEDURE — 1090F PRES/ABSN URINE INCON ASSESS: CPT | Performed by: NURSE PRACTITIONER

## 2021-07-02 PROCEDURE — 99213 OFFICE O/P EST LOW 20 MIN: CPT | Performed by: NURSE PRACTITIONER

## 2021-07-02 NOTE — PROGRESS NOTES
Jane Thomas - please call to schedule her to see me in late August    Nicole - Please mail her a lab order for BMP and Mg level, diagnosis diastolic HF

## 2021-07-02 NOTE — PROGRESS NOTES
7/2/2021 at 11:31 left message home voicemail - call to schedule in office late August 2021 appointment with Esdras Rahman, NP
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
## 2021-07-07 NOTE — PROGRESS NOTES
7/7/2021 at 11:54 in office appointment scheduled with patient to see William Ram NP as follows:     Future Appointments   Date Time Provider Beau Yamila   8/12/2021  9:00 AM PACEMAKER, STNICOLASA DUBOSE BS AMB   8[facility]12[facility]2021  [facility]   8/23/2021  1:20 PM Nasir Casper NP CAVREY BS AMB   11/16/2021  9:30 AM [address] SEDRICK BS AMB   11/24/2021  8:00 AM MARISA ARDON BS AMB   11/24/2021  9:00 AM MD LIZBETH Cade BS AMB

## 2021-08-06 ENCOUNTER — OFFICE VISIT (OUTPATIENT)
Dept: CARDIOLOGY CLINIC | Age: 86
End: 2021-08-06

## 2021-08-06 DIAGNOSIS — Z95.0 CARDIAC PACEMAKER IN SITU: Primary | ICD-10-CM

## 2021-08-06 NOTE — LETTER
8/10/2021 10:41 AM    Ms. Sanders 13        Dear Ms. Thanh Larose,    We have received your recent remote monitor check of your implanted device scheduled on  8/6/2021. Your remaining estimated battery life is 7 years and your device is working normally & appropriately. Your next remote monitor check is scheduled for  11/16/2021. You do not need to report to the office as this occurs during the night from your home. Please make sure your home monitor is plugged in for this transmission & make a note on your calender. If you have any questions, please call the Pacemaker/ICD clinic at the [facility] location at [phone]. We appreciate you staying remotely connected. Sincerely,    Efrain FERNANDEZN, RN  Cardiac Device Clinic Coordinator  [facility] [address]  [address] [address] [facility]  [phone]

## 2021-08-09 ENCOUNTER — DOCUMENTATION ONLY (OUTPATIENT)
Dept: INTERNAL MEDICINE CLINIC | Age: 86
End: 2021-08-09

## 2021-08-09 ENCOUNTER — VIRTUAL VISIT (OUTPATIENT)
Dept: CARDIOLOGY CLINIC | Age: 86
End: 2021-08-09
Payer: MEDICARE

## 2021-08-09 DIAGNOSIS — Z95.0 CARDIAC PACEMAKER IN SITU: Primary | ICD-10-CM

## 2021-08-09 PROCEDURE — 99214 OFFICE O/P EST MOD 30 MIN: CPT | Performed by: NURSE PRACTITIONER

## 2021-08-09 PROCEDURE — G8432 DEP SCR NOT DOC, RNG: HCPCS | Performed by: NURSE PRACTITIONER

## 2021-08-09 PROCEDURE — 3288F FALL RISK ASSESSMENT DOCD: CPT | Performed by: NURSE PRACTITIONER

## 2021-08-09 PROCEDURE — G0463 HOSPITAL OUTPT CLINIC VISIT: HCPCS | Performed by: NURSE PRACTITIONER

## 2021-08-09 PROCEDURE — 1100F PTFALLS ASSESS-DOCD GE2>/YR: CPT | Performed by: NURSE PRACTITIONER

## 2021-08-09 PROCEDURE — G8427 DOCREV CUR MEDS BY ELIG CLIN: HCPCS | Performed by: NURSE PRACTITIONER

## 2021-08-09 PROCEDURE — 1090F PRES/ABSN URINE INCON ASSESS: CPT | Performed by: NURSE PRACTITIONER

## 2021-08-09 NOTE — PROGRESS NOTES
Writer spoke with patient in reference to My Chart message after inquiring with Connie Campos as to the patient's concern of sudden lumps on back of her legs. Patient has been advised to call [facility] with the concern of possible DVT, patient verbalized understanding and if [facility] is unable to come to patient's house she will have her son take her to ER to get assessed.

## 2021-08-09 NOTE — PROGRESS NOTES
VIRTUAL VISIT DOCUMENTATION     Pursuant to the emergency declaration under the [facility], [facility] waiver authority and the [employer] Act, this Virtual  Visit was conducted, with patient's consent, to reduce the patient's risk of exposure to COVID-19 and provide continuity of care for an established patient. Services were provided through a video synchronous discussion virtually to substitute for in-person clinic visit. HISTORY OF PRESENTING ILLNESS      Gregory Chen is a 80 y.o. female who was seen by synchronous (real-time) audio-video technology on 8/9/2021. She has hx of acute diastolic heart failure and bradycardia, junctional rhythm/atrial fibrillation with heart rates in the 46s, s/p TAVR, single chamber pacemaker   Echocardiogram 11/2020 showed preserved LV function. She denies cardiac complaints during today's visit, has no issues with her pacemaker.      ACTIVE PROBLEM LIST     Patient Active Problem List    Diagnosis Date Noted    Acute bronchitis 02/17/2020    Allergic rhinitis 02/17/2020    Diabetic peripheral neuropathy associated with type 2 diabetes mellitus ([facility] [address] 02/17/2020    Diarrhea 02/17/2020    Dizziness and giddiness 02/17/2020    Dysphonia 02/17/2020    Heartburn 02/17/2020    Mixed hyperlipidemia 02/17/2020    Mononeuropathy of lower extremity 02/17/2020    Nausea 02/17/2020    Tinea pedis 02/17/2020    Valvular heart disease 02/17/2020    Vitamin D deficiency 02/17/2020    Type 2 diabetes with nephropathy ([facility] [address] 07/05/2018    (HFpEF) heart failure with preserved ejection fraction ([facility] [address] 06/12/2018    S/P TAVR (transcatheter aortic valve replacement) 04/13/2018    Obesity (BMI 30.0-34.9) 04/13/2018    Chest tightness 11/17/2017    Dyspnea and respiratory abnormalities 11/17/2017    Controlled type 2 diabetes mellitus without complication, without long-term current use of insulin ([facility] [address] 03/23/2017    Advance directive on file 03/23/2017    Fibromuscular dysplasia ([facility] [address] 02/17/2017    COPD (chronic obstructive pulmonary disease) ([facility] [address] 10/27/2014    Mitral stenosis 07/03/2014    Other dyspnea and respiratory abnormality 10/02/2013    CINDA (obstructive sleep apnea) 10/18/2012    Type II or unspecified type diabetes mellitus without mention of complication, uncontrolled 05/26/2012    Pure hypercholesterolemia 05/26/2012    Foreign body in pharynx 12/18/2011    Tachycardia 12/01/2011    Hypertension 12/01/2011    PVC (premature ventricular contraction) 12/01/2011    Aortic stenosis 12/01/2011    Edema 12/01/2011           PAST MEDICAL HISTORY     Past Medical History:   Diagnosis Date    Adverse effect of anesthesia     difficulty waking    Aortic stenosis     Arrhythmia     rapid heartrate seen in ER  4/2/18    Cancer ([address] 01/2018    skin spots, removed periodically    Chronic pain     back - spinal stenosis    COPD     Diabetes ([facility] [address]     Diabetic neuropathy ([address]     knees down    Diverticulosis of colon     Hypercholesterolemia     Hypertension     CINDA (obstructive sleep apnea) 08-    AHI: 18.6 per hour - CPAP    Other ill-defined conditions(799.89)     heart murmur    Peripheral neuropathy     Renal artery stenosis (HCC)     Right    Unspecified adverse effect of anesthesia     delayed awakening - \"does not need as much\"           PAST SURGICAL HISTORY     Past Surgical History:   Procedure Laterality Date    ENDOSCOPY, COLON, DIAGNOSTIC  1999    hx of polys in the past    HX CATARACT REMOVAL      bilateral    HX GYN  1966    D&C    HX HEENT      Eyelid Cysts x 4    HX HEENT      oral surgeries - gum surgery/floating root  removed x 2    HX HEENT      retinal hemmorage repair    HX ORTHOPAEDIC  11/06    L1-S1 2007    HX ORTHOPAEDIC  1985    L 4th Trigger finger    HX ORTHOPAEDIC      left knee arthroscopy    HX OTHER SURGICAL      skin cancer removal, periodically    HX TONSILLECTOMY      T & A    MS ABDOMEN SURGERY PROC UNLISTED      R Renal Artery Stenosis bypassed    MS ABDOMEN SURGERY PROC UNLISTED   or     hernia repair    MS ABDOMEN SURGERY [facility] - removal of fatty tissue around abdomen    MS BREAST SURGERY PROCEDURE UNLISTED      Left - benign cyst    MS CARDIAC SURG PROCEDURE UNLIST  2018    TAVR          ALLERGIES     Allergies   Allergen Reactions    Bees [Hymenoptera Allergenic Extract] Anaphylaxis    Lipitor [Atorvastatin] Myalgia    Betadine [Povidone-Iodine] Other (comments)     Abdominal swelling, one incident in . Has had since without a reaction.  Darvon [Propoxyphene] Nausea and Vomiting    Griseofulvin Nausea and Vomiting    Pollen Extracts Cough    Sting, Bee Other (comments)          FAMILY HISTORY     Family History   Problem Relation Age of Onset    Stroke Mother         1    Cancer Mother         ovarian    Cancer Father         lung/throat    Alcohol abuse Father     Lung Disease Father     Pacemaker Brother     Heart Attack Brother     Heart Disease Brother     Other Brother         als    Stroke Maternal Grandfather     Heart Disease Paternal Uncle     Heart Attack Paternal Uncle     negative for cardiac disease       SOCIAL HISTORY     Social History     Socioeconomic History    Marital status:      Spouse name: Not on file    Number of children: Not on file    Years of education: Not on file    Highest education level: Not on file   Tobacco Use    Smoking status: Former Smoker     Packs/day: 1.50     Years: 13.00     Pack years: 19.50     Types: Cigarettes     Quit date: 1974     Years since quittin.3    Smokeless tobacco: Never Used    Tobacco comment: former cigarette smoker   Substance and Sexual Activity    Alcohol use:  Yes     Alcohol/week: 0.0 - 1.0 standard drinks     Comment: special occasions    Drug use: No     Types: Prescription    Sexual activity: Never     Social Determinants of Health     Financial Resource Strain:     Difficulty of Paying Living Expenses:    Food Insecurity:     Worried About Running Out of Food in the Last Year:     [address] in the Last Year:    Transportation Needs:     Lack of Transportation (Medical):  Lack of Transportation (Non-Medical):    Physical Activity:     Days of Exercise per Week:     Minutes of Exercise per Session:    Stress:     Feeling of Stress :    Social Connections:     Frequency of Communication with Friends and Family:     Frequency of Social Gatherings with Friends and Family:     Attends Muslim Services:     Active Member of Clubs or Organizations:     Attends Club or Organization Meetings:     Marital Status:          MEDICATIONS     Current Outpatient Medications   Medication Sig    apixaban (ELIQUIS) 5 mg tablet Take 1 Tablet by mouth two (2) times a day.  albuterol (PROVENTIL HFA, VENTOLIN HFA, PROAIR HFA) 90 mcg/actuation inhaler     bumetanide (BUMEX) 2 mg tablet Take 2 mg by mouth daily.  spironolactone (ALDACTONE) 25 mg tablet TAKE 1 TABLET BY MOUTH  DAILY    rosuvastatin (CRESTOR) 5 mg tablet TAKE 1 TABLET BY MOUTH  DAILY    mometasone (ELOCON) 0.1 % topical cream Apply  to affected area daily.  losartan (COZAAR) 25 mg tablet Take 1 Tab by mouth daily. Indications: high blood pressure    metFORMIN (GLUCOPHAGE) 1,000 mg tablet TAKE 1 TABLET BY MOUTH TWO  TIMES DAILY (Patient taking differently: Take 500 mg by mouth two (2) times daily (with meals). )    AMOXICILLIN PO Take  by mouth. Prior to dental appts, per Dr. Hansa Leonard.  FIBER-CAPS, PSYLLIUM HUSK, PO Take 5 Capsules by mouth two (2) times a day.  glucose blood VI test strips (ACCU-CHEK ELBERT PLUS) strip Check bs daily.   Dx 250.00    levETIRAcetam 1,000 mg tablet 1,000 mg = 1 tab each dose, PO, every 12 hours, # 60 tab, 0 Refills, other (Patient not taking: Reported on 8/9/2021)    metFORMIN (GLUCOPHAGE) 500 mg tablet  (Patient not taking: Reported on 8/9/2021)    cpap machine kit by Does Not Apply route. Use nightly (Patient not taking: Reported on 6/15/2021)    azelastine (ASTELIN) 137 mcg (0.1 %) nasal spray 2 Sprays by Both Nostrils route two (2) times a day. Use in each nostril as directed (Patient not taking: Reported on 8/9/2021)     No current facility-administered medications for this visit. I have reviewed the nurses notes, vitals, problem list, allergy list, medical history, family, social history and medications. REVIEW OF SYMPTOMS      General: Pt denies excessive weight gain or loss. Pt is able to conduct ADL's  HEENT: Denies blurred vision, headaches, hearing loss, epistaxis and difficulty swallowing. Respiratory: Denies cough, congestion, shortness of breath, ROBB, wheezing or stridor. Cardiovascular: Denies precordial pain, palpitations, edema or PND  Gastrointestinal: Denies poor appetite, indigestion, abdominal pain or blood in stool  Genitourinary: Denies hematuria, dysuria, increased urinary frequency  Musculoskeletal: Denies joint pain or swelling from muscles or joints  Neurologic: Denies tremor, paresthesias, headache, or sensory motor disturbance  Psychiatric: Denies confusion, insomnia, depression  Integumentray: Denies rash, itching or ulcers. Hematologic: Denies easy bruising, bleeding       PHYSICAL EXAMINATION      Due to this being a TeleHealth evaluation, many elements of the physical examination are unable to be assessed. General: Well developed, in no acute distress, cooperative and alert  HEENT: Pupils equal/round. No marked JVD visible on video. Respiratory: No audible wheezing, no signs of respiratory distress, lips non cyanotic  Extremities:  No edema  Neuro: A&Ox3, speech clear, no facial droop, answering questions appropriately  Skin: Skin color is normal. No rashes or lesions.  Non diaphoretic on visible skin during exam       DIAGNOSTIC DATA           LABORATORY DATA      Lab Results   Component Value Date/Time    WBC 10.0 08/30/2018 01:42 PM    HGB 12.6 08/30/2018 01:42 PM    HCT 40.0 08/30/2018 01:42 PM    PLATELET 941 73/68/0179 01:42 PM    MCV 85 08/30/2018 01:42 PM      Lab Results   Component Value Date/Time    Sodium 144 01/04/2019 11:06 AM    Potassium 4.8 01/04/2019 11:06 AM    Chloride 100 01/04/2019 11:06 AM    CO2 22 01/04/2019 11:06 AM    Anion gap 8 06/15/2018 04:13 AM    Glucose 122 (H) 01/04/2019 11:06 AM    BUN 22 01/04/2019 11:06 AM    Creatinine 1.13 (H) 01/04/2019 11:06 AM    BUN/Creatinine ratio 19 01/04/2019 11:06 AM    GFR est AA 50 (L) 01/04/2019 11:06 AM    GFR est non-AA 43 (L) 01/04/2019 11:06 AM    Calcium 10.1 01/04/2019 11:06 AM    Bilirubin, total 1.2 (H) 06/12/2018 02:32 PM    Alk. phosphatase 82 06/12/2018 02:32 PM    Protein, total 6.4 06/12/2018 02:32 PM    Albumin 3.1 (L) 06/12/2018 02:32 PM    Globulin 3.3 06/12/2018 02:32 PM    A-G Ratio 0.9 (L) 06/12/2018 02:32 PM    ALT (SGPT) 59 06/12/2018 02:32 PM           ASSESSMENT      1. Bradycardia (junctional vs AF)  2. Exertional fatigue  3. Aortic stenosis status post TAVR  4. Hypertension  5. Diabetes mellitus  6. CINDA on CPAP  7. Renal artery stenosis   8. Peripheral neuropathy  9. Diastolic dysfunction  10. Pacemaker              A. Single chamber              B. Left sided              C. [employer]       PLAN     Reviewed her transmission today which shows normal functioning with 99% RVP. Continue to monitor every 3 months remotely and follow up in one year. She has upcoming follow up with Naif Salazar NP for cardiology. Continue current medical therapy. We discussed the expected course, resolution and complications of the diagnosis(es) in detail. Medication risks, benefits, costs, interactions, and alternatives were discussed as indicated.   I advised her to contact the office if her condition worsens, changes or fails to improve as anticipated. She expressed understanding with the diagnosis(es) and plan     FOLLOW-UP   1 year    Patient was made aware and verbalized understanding that an appointment will be scheduled for them for a virtual visit and/or office visit within the above time frame. Patient understanding his/her responsibility to call and change time/date if he/she so chooses. Thank you, Dilshad Cuenca MD and Dr. Wilfredo Felix for allowing me to participate in the care of this extraordinarily pleasant female. Please do not hesitate to contact me for further questions/concerns. [facility].  [address] [place] [address], [facility]    Aliza Masterson  [phone] / [phone] Fax   [phone] / [phone] Fax        Greater than 25 minutes was spent in direct video patient care, planning and chart review. This visit was conducted using [employer] services.
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
## 2021-08-26 ENCOUNTER — VIRTUAL VISIT (OUTPATIENT)
Dept: CARDIOLOGY CLINIC | Age: 86
End: 2021-08-26
Payer: MEDICARE

## 2021-08-26 DIAGNOSIS — Z95.2 S/P TAVR (TRANSCATHETER AORTIC VALVE REPLACEMENT): Primary | ICD-10-CM

## 2021-08-26 DIAGNOSIS — I48.0 PAROXYSMAL ATRIAL FIBRILLATION (HCC): ICD-10-CM

## 2021-08-26 DIAGNOSIS — I50.32 CHRONIC HEART FAILURE WITH PRESERVED EJECTION FRACTION (HCC): ICD-10-CM

## 2021-08-26 DIAGNOSIS — I89.0 SECONDARY LYMPHEDEMA: ICD-10-CM

## 2021-08-26 PROCEDURE — 1090F PRES/ABSN URINE INCON ASSESS: CPT | Performed by: NURSE PRACTITIONER

## 2021-08-26 PROCEDURE — G8432 DEP SCR NOT DOC, RNG: HCPCS | Performed by: NURSE PRACTITIONER

## 2021-08-26 PROCEDURE — G0463 HOSPITAL OUTPT CLINIC VISIT: HCPCS | Performed by: NURSE PRACTITIONER

## 2021-08-26 PROCEDURE — G8427 DOCREV CUR MEDS BY ELIG CLIN: HCPCS | Performed by: NURSE PRACTITIONER

## 2021-08-26 PROCEDURE — 99213 OFFICE O/P EST LOW 20 MIN: CPT | Performed by: NURSE PRACTITIONER

## 2021-08-26 PROCEDURE — 3288F FALL RISK ASSESSMENT DOCD: CPT | Performed by: NURSE PRACTITIONER

## 2021-08-26 PROCEDURE — 1100F PTFALLS ASSESS-DOCD GE2>/YR: CPT | Performed by: NURSE PRACTITIONER

## 2021-09-09 RX ORDER — BUMETANIDE 0.5 MG/1
TABLET ORAL
Qty: 225 TABLET | Refills: 3 | Status: SHIPPED | OUTPATIENT
Start: 2021-09-09 | End: 2022-01-10 | Stop reason: SDUPTHER

## 2021-11-09 ENCOUNTER — OFFICE VISIT (OUTPATIENT)
Dept: INTERNAL MEDICINE CLINIC | Age: 86
End: 2021-11-09
Payer: MEDICARE

## 2021-11-09 VITALS
OXYGEN SATURATION: 95 % | SYSTOLIC BLOOD PRESSURE: 143 MMHG | DIASTOLIC BLOOD PRESSURE: 74 MMHG | TEMPERATURE: 98.8 F | BODY MASS INDEX: 30.05 KG/M2 | RESPIRATION RATE: 14 BRPM | HEIGHT: 66 IN | WEIGHT: 187 LBS | HEART RATE: 92 BPM

## 2021-11-09 DIAGNOSIS — L30.9 ECZEMA, UNSPECIFIED TYPE: ICD-10-CM

## 2021-11-09 DIAGNOSIS — L29.9 PRURITUS: ICD-10-CM

## 2021-11-09 DIAGNOSIS — I10 ESSENTIAL HYPERTENSION: ICD-10-CM

## 2021-11-09 DIAGNOSIS — E11.21 TYPE 2 DIABETES WITH NEPHROPATHY (HCC): Primary | ICD-10-CM

## 2021-11-09 DIAGNOSIS — I50.32 CHRONIC HEART FAILURE WITH PRESERVED EJECTION FRACTION (HCC): ICD-10-CM

## 2021-11-09 DIAGNOSIS — J44.9 CHRONIC OBSTRUCTIVE PULMONARY DISEASE, UNSPECIFIED COPD TYPE (HCC): ICD-10-CM

## 2021-11-09 PROCEDURE — G0463 HOSPITAL OUTPT CLINIC VISIT: HCPCS | Performed by: INTERNAL MEDICINE

## 2021-11-09 PROCEDURE — G8536 NO DOC ELDER MAL SCRN: HCPCS | Performed by: INTERNAL MEDICINE

## 2021-11-09 PROCEDURE — G8510 SCR DEP NEG, NO PLAN REQD: HCPCS | Performed by: INTERNAL MEDICINE

## 2021-11-09 PROCEDURE — G8417 CALC BMI ABV UP PARAM F/U: HCPCS | Performed by: INTERNAL MEDICINE

## 2021-11-09 PROCEDURE — 1090F PRES/ABSN URINE INCON ASSESS: CPT | Performed by: INTERNAL MEDICINE

## 2021-11-09 PROCEDURE — 1100F PTFALLS ASSESS-DOCD GE2>/YR: CPT | Performed by: INTERNAL MEDICINE

## 2021-11-09 PROCEDURE — G8427 DOCREV CUR MEDS BY ELIG CLIN: HCPCS | Performed by: INTERNAL MEDICINE

## 2021-11-09 PROCEDURE — 99214 OFFICE O/P EST MOD 30 MIN: CPT | Performed by: INTERNAL MEDICINE

## 2021-11-09 PROCEDURE — 3288F FALL RISK ASSESSMENT DOCD: CPT | Performed by: INTERNAL MEDICINE

## 2021-11-09 RX ORDER — BETAMETHASONE DIPROPIONATE 0.5 MG/ML
LOTION, AUGMENTED TOPICAL 2 TIMES DAILY
Qty: 30 ML | Refills: 0 | Status: SHIPPED | OUTPATIENT
Start: 2021-11-09

## 2021-11-09 RX ORDER — DULOXETIN HYDROCHLORIDE 30 MG/1
30 CAPSULE, DELAYED RELEASE ORAL DAILY
Qty: 30 CAPSULE | Refills: 3 | Status: SHIPPED | OUTPATIENT
Start: 2021-11-09 | End: 2022-02-08 | Stop reason: SDUPTHER

## 2021-11-09 NOTE — PATIENT INSTRUCTIONS
For itching trial Claritin (Loratidine) 10mg daily. For your neuropathy - trial Cymbalta 30mg every day.   For hands - Betamethasone cream.
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
## 2021-11-09 NOTE — PROGRESS NOTES
Reviewed record in preparation for visit and have obtained necessary documentation. Identified pt with two pt identifiers(name and ). Chief Complaint   Patient presents with    Follow-up     Blood pressure (!) 143/74, pulse 92, temperature 98.8 °F (37.1 °C), temperature source Temporal, resp. rate 14, height 5' 6\" (1.676 m), weight 187 lb (84.8 kg), SpO2 95 %. Health Maintenance Due   Topic Date Due    Diabetic Foot Care  2019    Albumin Urine Test  2020    Cholesterol Test   2021    COVID-19 Vaccine (3 - Booster for Moderna series) 2021    Yearly Flu Vaccine (1) 2021       Ms. Rosmery Doe has a reminder for a \"due or due soon\" health maintenance. I have asked that she discuss this further with her primary care provider for follow-up on this health maintenance. Coordination of Care Questionnaire:  :     1) Have you been to an emergency room, urgent care clinic since your last visit? no   Hospitalized since your last visit? no             2) Have you seen or consulted any other health care providers outside of [address] [place] [address] since your last visit? no  (Include any pap smears or colon screenings in this section.)    3) In the event something were to happen to you and you were unable to speak on your behalf, do you have an Advance Directive/ Living Will in place stating your wishes?  YES

## 2021-11-27 ENCOUNTER — DOCUMENTATION ONLY (OUTPATIENT)
Dept: CARDIOLOGY | Age: 86
End: 2021-11-27

## 2021-12-17 RX ORDER — KETOCONAZOLE 20 MG/ML
SHAMPOO TOPICAL
Qty: 240 ML | Refills: 0 | Status: SHIPPED | OUTPATIENT
Start: 2021-12-17 | End: 2022-05-18

## 2021-12-23 ENCOUNTER — DOCUMENTATION ONLY (OUTPATIENT)
Dept: SLEEP MEDICINE | Age: 86
End: 2021-12-23

## 2022-01-10 ENCOUNTER — PATIENT MESSAGE (OUTPATIENT)
Dept: INTERNAL MEDICINE CLINIC | Age: 87
End: 2022-01-10

## 2022-01-10 ENCOUNTER — TELEPHONE (OUTPATIENT)
Dept: INTERNAL MEDICINE CLINIC | Age: 87
End: 2022-01-10

## 2022-01-10 RX ORDER — LOSARTAN POTASSIUM 25 MG/1
TABLET ORAL
Qty: 90 TABLET | Refills: 3 | Status: SHIPPED | OUTPATIENT
Start: 2022-01-10

## 2022-01-10 RX ORDER — BUMETANIDE 0.5 MG/1
2 TABLET ORAL DAILY
Qty: 360 TABLET | Refills: 3 | Status: SHIPPED | OUTPATIENT
Start: 2022-01-10
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
## 2022-01-10 NOTE — TELEPHONE ENCOUNTER
----- Message from Leah Phelan LPN sent at 1/41/8567  9:21 AM EST -----  Regarding: FW: Bumetanide Rx    ----- Message -----  From: Marlys Falk  Sent: 1/10/2022   9:08 AM EST  To: Duncan Fernández Jackson  Subject: Bumetanide Rx                                    I need to order more Bumetanide on 1/22/22 but Rx order on current container is inaccurate as I take 4 of these each morning. Could you please inform [facility] that my new 3-month order needs to be for 360 pills so that I don't run out before the next order? Thanks.

## 2022-01-21 ENCOUNTER — TELEPHONE (OUTPATIENT)
Dept: INTERNAL MEDICINE CLINIC | Age: 87
End: 2022-01-21

## 2022-01-21 NOTE — TELEPHONE ENCOUNTER
----- Message from Shorty Yusuf LPN sent at 6/09/3289  8:58 AM EST -----  Regarding: FW: Eliquis Rx    ----- Message -----  From: Camryn Poole  Sent: 1/21/2022   6:52 AM EST  To: Lorre Salter Nurse [place]  Subject: Eliquis Rx                                       upon renewal of Eliquis by [facility]  I need for you to change the label directions, thus the amount, to 1/2  pill twice a day rather than 2 per day.

## 2022-02-04 ENCOUNTER — NURSE TRIAGE (OUTPATIENT)
Dept: OTHER | Facility: CLINIC | Age: 87
End: 2022-02-04

## 2022-02-04 NOTE — TELEPHONE ENCOUNTER
Received call from [address] at [facility] with Red Flag Complaint. Subjective: Caller states \"I have been having problems with itching in my hands, arms, and legs, and knees. When I scratch they swell up pretty badly. \"     Current Symptoms: Itching    Onset: several years ago; worsening    Associated Symptoms: reduced activity related to previous accident, but not due to itching    Pain Severity: 5/10; itching is 10/10; intermittent    Temperature: None     What has been tried: [employer], claritin BID    LMP: NA Pregnant: NA    Recommended disposition: See PCP in office today, since it is Friday, recommended within 3 days. Advised patient to seek care in UC if s/s worsen or any signs of infection. Care advice provided, patient verbalizes understanding; denies any other questions or concerns; instructed to call back for any new or worsening symptoms. Writer provided warm transfer to [place] at [facility] for appointment scheduling    Attention Provider: Thank you for allowing me to participate in the care of your patient. The patient was connected to triage in response to information provided to the ECC. Please do not respond through this encounter as the response is not directed to a shared pool.       Reason for Disposition   Patient wants to be seen    Protocols used: ITCHING [facility], [facility]

## 2022-02-07 ENCOUNTER — OFFICE VISIT (OUTPATIENT)
Dept: INTERNAL MEDICINE CLINIC | Age: 87
End: 2022-02-07
Payer: MEDICARE

## 2022-02-07 VITALS
RESPIRATION RATE: 16 BRPM | BODY MASS INDEX: 31.34 KG/M2 | TEMPERATURE: 98.5 F | OXYGEN SATURATION: 98 % | WEIGHT: 195 LBS | DIASTOLIC BLOOD PRESSURE: 45 MMHG | SYSTOLIC BLOOD PRESSURE: 138 MMHG | HEART RATE: 68 BPM | HEIGHT: 66 IN

## 2022-02-07 DIAGNOSIS — L29.9 ITCHING: Primary | ICD-10-CM

## 2022-02-07 PROCEDURE — G8536 NO DOC ELDER MAL SCRN: HCPCS | Performed by: INTERNAL MEDICINE

## 2022-02-07 PROCEDURE — G8510 SCR DEP NEG, NO PLAN REQD: HCPCS | Performed by: INTERNAL MEDICINE

## 2022-02-07 PROCEDURE — 99213 OFFICE O/P EST LOW 20 MIN: CPT | Performed by: INTERNAL MEDICINE

## 2022-02-07 PROCEDURE — 1090F PRES/ABSN URINE INCON ASSESS: CPT | Performed by: INTERNAL MEDICINE

## 2022-02-07 PROCEDURE — G8417 CALC BMI ABV UP PARAM F/U: HCPCS | Performed by: INTERNAL MEDICINE

## 2022-02-07 PROCEDURE — 1101F PT FALLS ASSESS-DOCD LE1/YR: CPT | Performed by: INTERNAL MEDICINE

## 2022-02-07 PROCEDURE — G0463 HOSPITAL OUTPT CLINIC VISIT: HCPCS | Performed by: INTERNAL MEDICINE

## 2022-02-07 PROCEDURE — G8427 DOCREV CUR MEDS BY ELIG CLIN: HCPCS | Performed by: INTERNAL MEDICINE

## 2022-02-07 RX ORDER — PERMETHRIN 50 MG/G
CREAM TOPICAL
Qty: 60 G | Refills: 0 | Status: SHIPPED | OUTPATIENT
Start: 2022-02-07 | End: 2022-02-07

## 2022-02-07 RX ORDER — BETAMETHASONE DIPROPIONATE 0.5 MG/G
LOTION TOPICAL 2 TIMES DAILY
Qty: 60 ML | Refills: 0 | Status: SHIPPED | OUTPATIENT
Start: 2022-02-07 | End: 2022-05-09 | Stop reason: ALTCHOICE

## 2022-02-07 NOTE — PROGRESS NOTES
Karolyn Giron is a 80 y.o. female  Chief Complaint   Patient presents with    Other     itchy all over body       Visit Vitals  BP (!) 138/45   Pulse 68   Temp 98.5 °F (36.9 °C) (Temporal)   Resp 16   Ht 5' 6\" (1.676 m)   Wt 195 lb (88.5 kg)   SpO2 98%   BMI 31.47 kg/m²          HPI  Ms. Bee Trejo presents to clinic due to intense itching through out her body since she had accident a few months ago. since then she had to scratch it and sometimes with excoriation/bleeding. It is more pronounced in her hand interdigital spaces and noticed a swelling on dorsum of her hand at one time that resolved on presentation. She denies any new medications. .  Past Medical History:   Diagnosis Date    Adverse effect of anesthesia     difficulty waking    Aortic stenosis     Arrhythmia     rapid heartrate seen in ER  4/2/18    Cancer ([address] 01/2018    skin spots, removed periodically    Chronic pain     back - spinal stenosis    COPD     Diabetes [facility]     Diabetic neuropathy ([facility]     knees down    Diverticulosis of colon     Hypercholesterolemia     Hypertension     CINDA (obstructive sleep apnea) 08-    AHI: 18.6 per hour - CPAP    Other ill-defined conditions(799.89)     heart murmur    Peripheral neuropathy     Renal artery stenosis (HCC)     Right    Unspecified adverse effect of anesthesia     delayed awakening - \"does not need as much\"      Allergies   Allergen Reactions    Bees [Hymenoptera Allergenic Extract] Anaphylaxis    Lipitor [Atorvastatin] Myalgia    Betadine [Povidone-Iodine] Other (comments)     Abdominal swelling, one incident in 1977. Has had since without a reaction.  Darvon [Propoxyphene] Nausea and Vomiting    Griseofulvin Nausea and Vomiting    Pollen Extracts Cough    Sting, Bee Other (comments)          ROS  Review of Systems   All other systems reviewed and are negative. EXAM  Physical Exam  Constitutional:       Appearance: Normal appearance.    HENT:      Head: Normocephalic and atraumatic. Pulmonary:      Effort: No respiratory distress. Skin:            Comments: Excoriation, exfoliations. Neurological:      Mental Status: She is alert. Health Maintenance Due   Topic Date Due    Foot Exam Q1  03/22/2019    MICROALBUMIN Q1  08/03/2020    Lipid Screen  02/06/2021    Medicare Yearly Exam  12/11/2021     ASSESSMENT/PLAN    Diagnoses and all orders for this visit:    1. Itching    Other orders  -     betamethasone dipropionate (DIPROLENE) 0.05 % topical lotion; Apply  to affected area two (2) times a day. -     permethrin (ACTICIN) 5 % topical cream; Apply  to affected area now for 1 dose.  apply sparingly as directed    -suspect ectoparasites such as scabies, will try permethrin and continue diprolene     Gianna Persaud MD

## 2022-02-07 NOTE — PROGRESS NOTES
1. Have you been to the ER, urgent care clinic since your last visit? Hospitalized since your last visit? No    2. Have you seen or consulted any other health care providers outside of the [address] [place] [address] since your last visit? Include any pap smears or colon screening. yes    Chief Complaint   Patient presents with    Other     itchy all over body

## 2022-02-08 RX ORDER — DULOXETIN HYDROCHLORIDE 30 MG/1
30 CAPSULE, DELAYED RELEASE ORAL DAILY
Qty: 30 CAPSULE | Refills: 3 | Status: SHIPPED | OUTPATIENT
Start: 2022-02-08 | End: 2022-03-03 | Stop reason: SDUPTHER

## 2022-02-21 ENCOUNTER — PATIENT MESSAGE (OUTPATIENT)
Dept: CARDIOLOGY CLINIC | Age: 87
End: 2022-02-21

## 2022-02-21 NOTE — TELEPHONE ENCOUNTER
Good Morning Ms. Jarrett Colin see that [facility] has requested to follow your pacemaker for remote transmissions through [address]. I just want to confirm that this is ok with you since only one clinic at a time may see your pacer transmissions. Will you please send me a message back letting me know this is ok? Thank you!     Carmel Garcia RN, BSN  [id number] [facility] [address]

## 2022-03-18 PROBLEM — I50.30 (HFPEF) HEART FAILURE WITH PRESERVED EJECTION FRACTION (HCC): Status: ACTIVE | Noted: 2018-06-12

## 2022-03-18 PROBLEM — B35.3 TINEA PEDIS: Status: ACTIVE | Noted: 2020-02-17

## 2022-03-18 PROBLEM — R19.7 DIARRHEA: Status: ACTIVE | Noted: 2020-02-17

## 2022-03-18 PROBLEM — E55.9 VITAMIN D DEFICIENCY: Status: ACTIVE | Noted: 2020-02-17

## 2022-03-18 PROBLEM — R07.89 CHEST TIGHTNESS: Status: ACTIVE | Noted: 2017-11-17

## 2022-03-19 PROBLEM — R12 HEARTBURN: Status: ACTIVE | Noted: 2020-02-17

## 2022-03-19 PROBLEM — I77.3 FIBROMUSCULAR DYSPLASIA (HCC): Status: ACTIVE | Noted: 2017-02-17

## 2022-03-19 PROBLEM — Z78.9 ADVANCE DIRECTIVE ON FILE: Status: ACTIVE | Noted: 2017-03-23

## 2022-03-19 PROBLEM — E11.9 CONTROLLED TYPE 2 DIABETES MELLITUS WITHOUT COMPLICATION, WITHOUT LONG-TERM CURRENT USE OF INSULIN (HCC): Status: ACTIVE | Noted: 2017-03-23

## 2022-03-19 PROBLEM — R06.00 DYSPNEA AND RESPIRATORY ABNORMALITIES: Status: ACTIVE | Noted: 2017-11-17

## 2022-03-19 PROBLEM — G57.90 MONONEUROPATHY OF LOWER EXTREMITY: Status: ACTIVE | Noted: 2020-02-17

## 2022-03-19 PROBLEM — R06.89 DYSPNEA AND RESPIRATORY ABNORMALITIES: Status: ACTIVE | Noted: 2017-11-17

## 2022-03-19 PROBLEM — E78.2 MIXED HYPERLIPIDEMIA: Status: ACTIVE | Noted: 2020-02-17

## 2022-03-19 PROBLEM — J30.9 ALLERGIC RHINITIS: Status: ACTIVE | Noted: 2020-02-17

## 2022-03-19 PROBLEM — E11.42 DIABETIC PERIPHERAL NEUROPATHY ASSOCIATED WITH TYPE 2 DIABETES MELLITUS (HCC): Status: ACTIVE | Noted: 2020-02-17

## 2022-03-19 PROBLEM — E11.21 TYPE 2 DIABETES WITH NEPHROPATHY (HCC): Status: ACTIVE | Noted: 2018-07-05

## 2022-03-19 PROBLEM — Z95.2 S/P TAVR (TRANSCATHETER AORTIC VALVE REPLACEMENT): Status: ACTIVE | Noted: 2018-04-13

## 2022-03-19 PROBLEM — R42 DIZZINESS AND GIDDINESS: Status: ACTIVE | Noted: 2020-02-17

## 2022-03-19 PROBLEM — J20.9 ACUTE BRONCHITIS: Status: ACTIVE | Noted: 2020-02-17

## 2022-03-20 PROBLEM — E66.811 OBESITY (BMI 30.0-34.9): Status: ACTIVE | Noted: 2018-04-13

## 2022-03-20 PROBLEM — R11.0 NAUSEA: Status: ACTIVE | Noted: 2020-02-17

## 2022-03-20 PROBLEM — I38 VALVULAR HEART DISEASE: Status: ACTIVE | Noted: 2020-02-17

## 2022-03-20 PROBLEM — E66.9 OBESITY (BMI 30.0-34.9): Status: ACTIVE | Noted: 2018-04-13

## 2022-03-20 PROBLEM — R49.0 DYSPHONIA: Status: ACTIVE | Noted: 2020-02-17

## 2022-03-21 ENCOUNTER — PATIENT MESSAGE (OUTPATIENT)
Dept: INTERNAL MEDICINE CLINIC | Age: 87
End: 2022-03-21

## 2022-03-22 RX ORDER — ALBUTEROL SULFATE 90 UG/1
2 AEROSOL, METERED RESPIRATORY (INHALATION)
Qty: 18 G | Refills: 1 | Status: SHIPPED | OUTPATIENT
Start: 2022-03-22 | End: 2022-03-25 | Stop reason: SDUPTHER

## 2022-03-22 NOTE — TELEPHONE ENCOUNTER
Future Appointments:  Future Appointments   Date Time Provider Beau Driscoll   5/9/2022  9:40 AM Sueann Saint, MD [facility] AMB        Last Appointment With Me:  11/9/2021     Requested Prescriptions     Pending Prescriptions Disp Refills    albuterol (PROVENTIL HFA, VENTOLIN HFA, PROAIR HFA) 90 mcg/actuation inhaler 18 g 1     Sig: Take 2 Puffs by inhalation every four (4) hours as needed for Wheezing.

## 2022-03-22 NOTE — TELEPHONE ENCOUNTER
----- Message from Can Frost sent at 3/22/2022  1:02 PM EDT -----  Regarding: Albuterol inhalers  local is faster so is fine--thanks

## 2022-03-23 DIAGNOSIS — I49.3 PVC (PREMATURE VENTRICULAR CONTRACTION): ICD-10-CM

## 2022-03-23 DIAGNOSIS — R00.0 TACHYCARDIA: ICD-10-CM

## 2022-03-23 RX ORDER — ROSUVASTATIN CALCIUM 5 MG/1
TABLET, COATED ORAL
Qty: 90 TABLET | Refills: 3 | Status: SHIPPED | OUTPATIENT
Start: 2022-03-23

## 2022-03-25 RX ORDER — ALBUTEROL SULFATE 90 UG/1
2 AEROSOL, METERED RESPIRATORY (INHALATION)
Qty: 18 G | Refills: 1 | Status: SHIPPED | OUTPATIENT
Start: 2022-03-25

## 2022-03-25 NOTE — TELEPHONE ENCOUNTER
Requested Prescriptions     Pending Prescriptions Disp Refills    albuterol (PROVENTIL HFA, VENTOLIN HFA, PROAIR HFA) 90 mcg/actuation inhaler 18 g 1     Sig: Take 2 Puffs by inhalation every four (4) hours as needed for Wheezing.

## 2022-05-09 ENCOUNTER — OFFICE VISIT (OUTPATIENT)
Dept: INTERNAL MEDICINE CLINIC | Age: 87
End: 2022-05-09
Payer: MEDICARE

## 2022-05-09 VITALS
OXYGEN SATURATION: 100 % | HEIGHT: 66 IN | RESPIRATION RATE: 16 BRPM | BODY MASS INDEX: 30.37 KG/M2 | DIASTOLIC BLOOD PRESSURE: 52 MMHG | TEMPERATURE: 97.3 F | WEIGHT: 189 LBS | SYSTOLIC BLOOD PRESSURE: 133 MMHG | HEART RATE: 77 BPM

## 2022-05-09 DIAGNOSIS — I48.0 PAROXYSMAL ATRIAL FIBRILLATION (HCC): Primary | ICD-10-CM

## 2022-05-09 DIAGNOSIS — J44.9 CHRONIC OBSTRUCTIVE PULMONARY DISEASE, UNSPECIFIED COPD TYPE (HCC): ICD-10-CM

## 2022-05-09 DIAGNOSIS — I50.32 CHRONIC HEART FAILURE WITH PRESERVED EJECTION FRACTION (HCC): ICD-10-CM

## 2022-05-09 DIAGNOSIS — I10 ESSENTIAL HYPERTENSION: ICD-10-CM

## 2022-05-09 DIAGNOSIS — Z00.00 MEDICARE ANNUAL WELLNESS VISIT, SUBSEQUENT: ICD-10-CM

## 2022-05-09 DIAGNOSIS — E11.21 TYPE 2 DIABETES WITH NEPHROPATHY (HCC): ICD-10-CM

## 2022-05-09 DIAGNOSIS — E78.00 PURE HYPERCHOLESTEROLEMIA: ICD-10-CM

## 2022-05-09 PROCEDURE — G0463 HOSPITAL OUTPT CLINIC VISIT: HCPCS | Performed by: INTERNAL MEDICINE

## 2022-05-09 PROCEDURE — G8427 DOCREV CUR MEDS BY ELIG CLIN: HCPCS | Performed by: INTERNAL MEDICINE

## 2022-05-09 PROCEDURE — G0439 PPPS, SUBSEQ VISIT: HCPCS | Performed by: INTERNAL MEDICINE

## 2022-05-09 PROCEDURE — 99214 OFFICE O/P EST MOD 30 MIN: CPT | Performed by: INTERNAL MEDICINE

## 2022-05-09 PROCEDURE — G8536 NO DOC ELDER MAL SCRN: HCPCS | Performed by: INTERNAL MEDICINE

## 2022-05-09 PROCEDURE — G8510 SCR DEP NEG, NO PLAN REQD: HCPCS | Performed by: INTERNAL MEDICINE

## 2022-05-09 PROCEDURE — G8417 CALC BMI ABV UP PARAM F/U: HCPCS | Performed by: INTERNAL MEDICINE

## 2022-05-09 PROCEDURE — 1090F PRES/ABSN URINE INCON ASSESS: CPT | Performed by: INTERNAL MEDICINE

## 2022-05-09 PROCEDURE — 1101F PT FALLS ASSESS-DOCD LE1/YR: CPT | Performed by: INTERNAL MEDICINE

## 2022-05-09 RX ORDER — LORATADINE 10 MG/1
10 TABLET ORAL DAILY
COMMUNITY

## 2022-05-09 RX ORDER — FAMOTIDINE 20 MG/1
20 TABLET, FILM COATED ORAL
COMMUNITY

## 2022-05-09 NOTE — PROGRESS NOTES
HISTORY OF PRESENT ILLNESS  Alexandr Guaman is a 80 y.o. female. HPI  Here for follow up. Diabetes - continues to see Dr. Gabrielle De Souza. She was concerned with possible skin cancer on dorsal aspect right foot. A1C was 6.8. No feeling right leg to knee due to neuropathy. 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 Remains on Eliquis. Has f/u 5/23. No chest pain or tightness, palpitations. Mild baseline swelling left leg. Has approx 6 years of battery life in pacemaker. COPD - stable. Walking around house. Still using walker. + coughing. Using her albuterol HFA 2 puffs every 4 hours if needed. Continues to itch all over. Scratching in sleep. Using different anti-itch lotions inc one with menthol. Has not seen her dermatologist re itching. Using Zyrtec and Famotidine with some improvement. Recovered from [address]. Current Outpatient Medications   Medication Instructions    albuterol (PROVENTIL HFA, VENTOLIN HFA, PROAIR HFA) 90 mcg/actuation inhaler 2 Puffs, Inhalation, EVERY 4 HOURS AS NEEDED    AMOXICILLIN PO Oral, Prior to dental appts, per Dr. Gabrielle De Souza.  apixaban (ELIQUIS) 2.5 mg, Oral, 2 TIMES DAILY    betamethasone, augmented (DIPROLENE) 0.05 % lotion Topical, 2 TIMES DAILY, Use on palms of hands only.  bumetanide (BUMEX) 2 mg, Oral, DAILY    cpap machine kit Does Not Apply, Use nightly    DULoxetine (CYMBALTA) 30 mg, Oral, DAILY, For neuropathy    famotidine (PEPCID) 20 mg, Oral, EVERY BEDTIME    FIBER-CAPS, PSYLLIUM HUSK, PO 5 Capsules, Oral, 2 TIMES DAILY    glucose blood VI test strips (ACCU-CHEK ELBERT PLUS) strip Check bs daily.   Dx 250.00    ketoconazole (NIZORAL) 2 % shampoo APPLY TOPICALLY TWICE  WEEKLY OR AS NEEDED    loratadine (CLARITIN) 10 mg, Oral, DAILY    losartan (COZAAR) 25 mg tablet TAKE 1 TABLET BY MOUTH  DAILY FOR HIGH BLOOD  PRESSURE    metFORMIN (GLUCOPHAGE) 1,000 mg tablet TAKE 1 TABLET BY MOUTH TWO  TIMES DAILY    rosuvastatin (CRESTOR) 5 mg tablet TAKE 1 TABLET BY MOUTH  DAILY    spironolactone (ALDACTONE) 25 mg tablet TAKE 1 TABLET BY MOUTH  DAILY       Visit Vitals  BP (!) 133/52   Pulse 77   Temp 97.3 °F (36.3 °C)   Resp 16   Ht 5' 6\" (1.676 m)   Wt 189 lb (85.7 kg)   SpO2 100%   BMI 30.51 kg/m²       ROS  See above  Physical Exam  Vitals reviewed. Constitutional:       Appearance: Normal appearance. HENT:      Head: Normocephalic and atraumatic. Neck:      Vascular: No carotid bruit. Cardiovascular:      Rate and Rhythm: Normal rate and regular rhythm. Pulmonary:      Effort: Pulmonary effort is normal.      Breath sounds: Normal breath sounds. Abdominal:      General: Bowel sounds are normal. There is no distension. Palpations: Abdomen is soft. There is no mass. Musculoskeletal:      Cervical back: Neck supple. Comments: Trace bilat le edema. No calf tenderness. Negative boy's sign. Lymphadenopathy:      Cervical: No cervical adenopathy. Neurological:      Mental Status: She is alert. ASSESSMENT and PLAN  Diagnoses and all orders for this visit:    1. Paroxysmal atrial fibrillation (HCC) - well controlled, cont same    2. Chronic obstructive pulmonary disease, unspecified COPD type ([address] - controlled with albuterol HFA only. Has tried preventative inhalers but did not like    3. Chronic heart failure with preserved ejection fraction (HCC) - controlled. Continues on diuretics. Upcoming f/u with cardiology    4. Type 2 diabetes with nephropathy ([address]- A1C well controlled, cont same meds    5. Essential hypertension - controlled, cont same    6. Pure hypercholesterolemia - controlled, cont same    7. Medicare annual wellness visit, subsequent      Follow-up and Dispositions    · Return in about 6 months (around 11/9/2022).         This is the Subsequent Medicare Annual Wellness Exam, performed 12 months or more after the Initial AWV or the last Subsequent AWV    I have reviewed the patient's medical history in detail and updated the computerized patient record. Assessment/Plan   Education and counseling provided:  Are appropriate based on today's review and evaluation   Advanced directive discussed with patient. 1. Paroxysmal atrial fibrillation ([facility]  2. Chronic obstructive pulmonary disease, unspecified COPD type ([facility]  3. Chronic heart failure with preserved ejection fraction ([facility]  4. Type 2 diabetes with nephropathy ([facility]  5. Essential hypertension  6.  Pure hypercholesterolemia       Depression Risk Factor Screening     3 most recent PHQ Screens 5/9/2022   Little interest or pleasure in doing things Not at all   Feeling down, depressed, irritable, or hopeless Not at all   Total Score PHQ 2 0   Trouble falling or staying asleep, or sleeping too much Nearly every day   Feeling tired or having little energy Not at all   Poor appetite, weight loss, or overeating Not at all   Feeling bad about yourself - or that you are a failure or have let yourself or your family down Not at all   Trouble concentrating on things such as school, work, reading, or watching TV Not at all   Moving or speaking so slowly that other people could have noticed; or the opposite being so fidgety that others notice Not at all   Thoughts of being better off dead, or hurting yourself in some way Not at all   PHQ 9 Score 3   How difficult have these problems made it for you to do your work, take care of your home and get along with others Not difficult at all       Alcohol & Drug Abuse Risk Screen    Do you average more than 1 drink per night or more than 7 drinks a week:  No    On any one occasion in the past three months have you have had more than 3 drinks containing alcohol:  No          Functional Ability and Level of Safety    Hearing: decreased hearing but does not want hearing eval.     had alisson check approx 1 year ago - told on the cusp of needing hearing aide. Activities of Daily Living: The home contains: handrails and grab bars  Patient needs help with:  housework      Ambulation: with difficulty, uses a walker     Fall Risk:  Fall Risk Assessment, last 12 mths 5/9/2022   Able to walk? No   Fall in past 12 months? -   Do you feel unsteady? -   Are you worried about falling -   Number of falls in past 12 months -   Fall with injury? -      Abuse Screen:  Patient is not abused       Cognitive Screening    Has your family/caregiver stated any concerns about your memory: yes - will forget why she went into another room - comes back to her.  no change in years     Cognitive Screening: Normal -      Health Maintenance Due       Health Maintenance Due   Topic Date Due    Foot Exam Q1  Through Dr. Giancarlo Nichols MICROALBUMIN Q1  Through Dr. Юлия Laird  Through Dr. Giancarlo Nichols Medicare Yearly Exam  05/09/2023         Patient Care Team   Patient Care Team:  Ranjeet Anderson MD as PCP - General (Internal Medicine Physician)  Ranjeet Anderson MD as PCP - [facility]  Mike Staton MD (Endocrinology Physician)  Josse Gilliland MD as Physician (Ophthalmology)  Kenneth Robledo MD as Physician (Dermatology Physician)  Pedro Pablo Cage MD as Physician (Dermatology Physician)  Joseph Blandon MD as Physician (Cardiovascular Disease Physician)  Lawanda Jaimes MD as Physician (Sleep Medicine Physician)  Panfilo Cowan MD (Cardiothoracic Surgery)  Destini Logan MD (Cardiovascular Disease Physician)    History     Patient Active Problem List   Diagnosis Code    Tachycardia R00.0    Hypertension I10    PVC (premature ventricular contraction) I49.3    Aortic stenosis I35.0    Edema R60.9    Foreign body in pharynx T17.208A    Type II or unspecified type diabetes mellitus without mention of complication, uncontrolled [id number]    Pure hypercholesterolemia E78.00    CINDA (obstructive sleep apnea) G47.33    Other dyspnea and respiratory abnormality R06.09, R09.89    Mitral stenosis I05.0    COPD (chronic obstructive pulmonary disease) ([facility]) J44.9    Fibromuscular dysplasia ([facility]) I77.3    Controlled type 2 diabetes mellitus without complication, without long-term current use of insulin (HCC) E11.9    Advance directive on file Z78.9    Chest tightness R07.89    Dyspnea and respiratory abnormalities R06.00, R06.89    S/P TAVR (transcatheter aortic valve replacement) Z95.2    Obesity (BMI 30.0-34. 9) E66.9    (HFpEF) heart failure with preserved ejection fraction ([facility]) I50.30    Type 2 diabetes with nephropathy ([facility]) E11.21    Acute bronchitis J20.9    Allergic rhinitis J30.9    Diabetic peripheral neuropathy associated with type 2 diabetes mellitus ([facility]) E11.42    Diarrhea R19.7    Dizziness and giddiness R42    Dysphonia R49.0    Heartburn R12    Mixed hyperlipidemia E78.2    Mononeuropathy of lower extremity G57.90    Nausea R11.0    Tinea pedis B35.3    Valvular heart disease I38    Vitamin D deficiency E55.9    Paroxysmal atrial fibrillation ([facility]) I48.0     Past Medical History:   Diagnosis Date    Adverse effect of anesthesia     difficulty waking    Aortic stenosis     Arrhythmia     rapid heartrate seen in ER  4/2/18    Cancer ([facility] 01/2018    skin spots, removed periodically    Chronic pain     back - spinal stenosis    COPD     Diabetes ([facility]     Diabetic neuropathy ([facility])     knees down    Diverticulosis of colon     Hypercholesterolemia     Hypertension     CINDA (obstructive sleep apnea) 08-    AHI: 18.6 per hour - CPAP    Other ill-defined conditions(799.89)     heart murmur    Peripheral neuropathy     Renal artery stenosis (HCC)     Right    Unspecified adverse effect of anesthesia     delayed awakening - \"does not need as much\"      Past Surgical History:   Procedure Laterality Date    ENDOSCOPY, COLON, DIAGNOSTIC  1999    hx of polys in the past    HX CATARACT REMOVAL      bilateral    HX GYN  1966    D&C    HX HEENT      Eyelid Cysts x 4    HX HEENT      oral surgeries - gum surgery/floating root  removed x 2    HX HEENT      retinal hemmorage repair    HX ORTHOPAEDIC  11/06    L1-S1 2007    HX ORTHOPAEDIC  1985    L 4th Trigger finger    HX ORTHOPAEDIC      left knee arthroscopy    HX OTHER SURGICAL      skin cancer removal, periodically    HX TONSILLECTOMY      T & A    TX ABDOMEN SURGERY PROC UNLISTED  1977    R Renal Artery Stenosis bypassed    TX ABDOMEN SURGERY PROC UNLISTED  1982 or 1983    hernia repair    TX ABDOMEN SURGERY [facility] - removal of fatty tissue around abdomen    TX BREAST SURGERY PROCEDURE UNLISTED  1968    Left - benign cyst    TX CARDIAC SURG PROCEDURE UNLIST  04/13/2018    TAVR     Current Outpatient Medications   Medication Sig Dispense Refill    loratadine (Claritin) 10 mg tablet Take 10 mg by mouth daily.  famotidine (PEPCID) 20 mg tablet Take 20 mg by mouth nightly.  albuterol (PROVENTIL HFA, VENTOLIN HFA, PROAIR HFA) 90 mcg/actuation inhaler Take 2 Puffs by inhalation every four (4) hours as needed for Wheezing. 18 g 1    rosuvastatin (CRESTOR) 5 mg tablet TAKE 1 TABLET BY MOUTH  DAILY 90 Tablet 3    DULoxetine (CYMBALTA) 30 mg capsule Take 1 Capsule by mouth daily. For neuropathy 90 Capsule 3    apixaban (ELIQUIS) 5 mg tablet Take 0.5 Tablets by mouth two (2) times a day. 90 Tablet 3    bumetanide (BUMEX) 0.5 mg tablet Take 4 Tablets by mouth daily. 360 Tablet 3    losartan (COZAAR) 25 mg tablet TAKE 1 TABLET BY MOUTH  DAILY FOR HIGH BLOOD  PRESSURE 90 Tablet 3    ketoconazole (NIZORAL) 2 % shampoo APPLY TOPICALLY TWICE  WEEKLY OR AS NEEDED 240 mL 0    betamethasone, augmented (DIPROLENE) 0.05 % lotion Apply  to affected area two (2) times a day. Use on palms of hands only.  30 mL 0    spironolactone (ALDACTONE) 25 mg tablet TAKE 1 TABLET BY MOUTH  DAILY 90 Tab 1  metFORMIN (GLUCOPHAGE) 1,000 mg tablet TAKE 1 TABLET BY MOUTH TWO  TIMES DAILY (Patient taking differently: Take 500 mg by mouth two (2) times daily (with meals). ) 180 Tab 3    cpap machine kit by Does Not Apply route. Use nightly       AMOXICILLIN PO Take  by mouth. Prior to dental appts, per Dr. Louise Knight.  FIBER-CAPS, PSYLLIUM HUSK, PO Take 5 Capsules by mouth two (2) times a day.  glucose blood VI test strips (ACCU-CHEK ELBERT PLUS) strip Check bs daily. Dx 250.00 1 Package 11     Allergies   Allergen Reactions    Bees [Hymenoptera Allergenic Extract] Anaphylaxis    Lipitor [Atorvastatin] Myalgia    Betadine [Povidone-Iodine] Other (comments)     Abdominal swelling, one incident in . Has had since without a reaction.  Darvon [Propoxyphene] Nausea and Vomiting    Griseofulvin Nausea and Vomiting    Pollen Extracts Cough    Sting, Bee Other (comments)       Family History   Problem Relation Age of Onset    Stroke Mother         1    Cancer Mother         ovarian    Cancer Father         lung/throat    Alcohol abuse Father     Lung Disease Father     Pacemaker Brother     Heart Attack Brother     Heart Disease Brother     Other Brother         als    Stroke Maternal Grandfather     Heart Disease Paternal Uncle     Heart Attack Paternal Uncle      Social History     Tobacco Use    Smoking status: Former Smoker     Packs/day: 1.50     Years: 13.00     Pack years: 19.50     Types: Cigarettes     Quit date: 1974     Years since quittin.9    Smokeless tobacco: Never Used    Tobacco comment: former cigarette smoker   Substance Use Topics    Alcohol use:  Yes     Alcohol/week: 0.0 - 1.0 standard drinks     Comment: special occasions         Alejandra Mayer MD

## 2022-05-09 NOTE — PROGRESS NOTES
1. Have you been to the ER, urgent care clinic since your last visit? Hospitalized since your last visit? No    2. Have you seen or consulted any other health care providers outside of the [address] [place] [address] since your last visit? Include any pap smears or colon screening.  Yes    Chief Complaint   Patient presents with    Hypertension    Diabetes

## 2022-05-09 NOTE — PATIENT INSTRUCTIONS
Medicare Wellness Visit, Female     The best way to live healthy is to have a lifestyle where you eat a well-balanced diet, exercise regularly, limit alcohol use, and quit all forms of tobacco/nicotine, if applicable. Regular preventive services are another way to keep healthy. Preventive services (vaccines, screening tests, monitoring & exams) can help personalize your care plan, which helps you manage your own care. Screening tests can find health problems at the earliest stages, when they are easiest to treat. [facility] follows the current, evidence-based guidelines published by the [facility] ([facility]) when recommending preventive services for our patients. Because we follow these guidelines, sometimes recommendations change over time as research supports it. (For example, mammograms used to be recommended annually. Even though Medicare will still pay for an annual mammogram, the newer guidelines recommend a mammogram every two years for women of average risk). Of course, you and your doctor may decide to screen more often for some diseases, based on your risk and your co-morbidities (chronic disease you are already diagnosed with). Preventive services for you include:  - Medicare offers their members a free annual wellness visit, which is time for you and your primary care provider to discuss and plan for your preventive service needs. Take advantage of this benefit every year!  -All adults over the age of 72 should receive the recommended pneumonia vaccines. Current USPSTF guidelines recommend a series of two vaccines for the best pneumonia protection.   -All adults should have a flu vaccine yearly and a tetanus vaccine every 10 years.   -All adults age 48 and older should receive the shingles vaccines (series of two vaccines).       -All adults age 38-68 who are overweight should have a diabetes screening test once every three years.   -All adults born between [birth year] and 1965 should be screened once for Hepatitis C.  -Other screening tests and preventive services for persons with diabetes include: an eye exam to screen for diabetic retinopathy, a kidney function test, a foot exam, and stricter control over your cholesterol.   -Cardiovascular screening for adults with routine risk involves an electrocardiogram (ECG) at intervals determined by your doctor.   -Colorectal cancer screenings should be done for adults age 54-65 with no increased risk factors for colorectal cancer. There are a number of acceptable methods of screening for this type of cancer. Each test has its own benefits and drawbacks. Discuss with your doctor what is most appropriate for you during your annual wellness visit. The different tests include: colonoscopy (considered the best screening method), a fecal occult blood test, a fecal DNA test, and sigmoidoscopy.    -A bone mass density test is recommended when a woman turns 65 to screen for osteoporosis. This test is only recommended one time, as a screening. Some providers will use this same test as a disease monitoring tool if you already have osteoporosis. -Breast cancer screenings are recommended every other year for women of normal risk, age 54-69.  -Cervical cancer screenings for women over age 72 are only recommended with certain risk factors.      Here is a list of your current Health Maintenance items (your personalized list of preventive services) with a due date:      Health Maintenance Due   Topic Date Due    Foot Exam Q1  Through Dr. Damon Rivas MICROALBUMIN Q1  Through Dr. Cedric Silverio  Through Dr. Damon Rivas Medicare Yearly Exam  05/09/2023

## 2022-05-18 RX ORDER — KETOCONAZOLE 20 MG/ML
SHAMPOO TOPICAL
Qty: 240 ML | Refills: 0 | Status: SHIPPED | OUTPATIENT
Start: 2022-05-18 | End: 2022-09-28

## 2022-05-20 ENCOUNTER — TELEPHONE (OUTPATIENT)
Dept: INTERNAL MEDICINE CLINIC | Age: 87
End: 2022-05-20

## 2022-05-20 NOTE — TELEPHONE ENCOUNTER
Patient says she needs an order for doppler faxed to [facility]. [phone].  Appointment is Monday

## 2022-05-23 ENCOUNTER — TELEPHONE (OUTPATIENT)
Dept: INTERNAL MEDICINE CLINIC | Age: 87
End: 2022-05-23

## 2022-05-23 DIAGNOSIS — R60.0 LEG EDEMA: Primary | ICD-10-CM

## 2022-05-23 NOTE — TELEPHONE ENCOUNTER
----- Message from Radha Louie sent at 5/23/2022  6:00 PM EDT -----  Regarding: FW: request for Doppler  to be sent ASAP May 23    ----- Message -----  From: Bertha Brito  Sent: 5/23/2022   6:23 AM EDT  To: Nina Fernández Oneida  Subject: request for Doppler  to be sent ASAP May 23      my legs

## 2022-06-30 NOTE — PROGRESS NOTES
Cardiac Cath Lab Recovery Arrival Note:      Greg Shirley arrived to Cardiac Cath Lab, Recovery Area. Staff introduced to patient. Patient identifiers verified with NAME and DATE OF BIRTH. Procedure verified with patient. Consent forms reviewed and signed by patient or authorized representative and verified. Allergies verified. Patient informed of procedure and plan of care. Questions answered with review. Patient prepped for procedure, per orders from physician, prior to arrival.    Patient on cardiac monitor, non-invasive blood pressure, SPO2 monitor. Patient is A&Ox 4. Patient reports no complaints. Patient in stretcher, in low position, with side rails up, call bell within reach, patient instructed to call of assistance as needed. Patient prep in: [facility] Recovery Area, Bed# 2. Family in: waiting room. Prep by: CAROLE Dent Post-Care Instructions: I reviewed with the patient in detail post-care instructions. Patient is to wear sunprotection, and avoid picking at any of the treated lesions. Pt may apply Vaseline to crusted or scabbing areas. Render Note In Bullet Format When Appropriate: No Show Applicator Variable?: Yes Duration Of Freeze Thaw-Cycle (Seconds): 0 Consent: The patient's consent was obtained including but not limited to risks of crusting, scabbing, blistering, scarring, darker or lighter pigmentary change, recurrence, incomplete removal and infection. Detail Level: Detailed

## 2022-08-04 RX ORDER — DULOXETIN HYDROCHLORIDE 30 MG/1
30 CAPSULE, DELAYED RELEASE ORAL DAILY
Qty: 90 CAPSULE | Refills: 3 | Status: SHIPPED | OUTPATIENT
Start: 2022-08-04

## 2022-09-28 RX ORDER — KETOCONAZOLE 20 MG/ML
SHAMPOO TOPICAL
Qty: 240 ML | Refills: 0 | Status: SHIPPED | OUTPATIENT
Start: 2022-09-28
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
## 2022-11-07 ENCOUNTER — OFFICE VISIT (OUTPATIENT)
Dept: INTERNAL MEDICINE CLINIC | Age: 87
End: 2022-11-07
Payer: MEDICARE

## 2022-11-07 VITALS
OXYGEN SATURATION: 98 % | HEIGHT: 66 IN | HEART RATE: 79 BPM | RESPIRATION RATE: 22 BRPM | BODY MASS INDEX: 31.05 KG/M2 | SYSTOLIC BLOOD PRESSURE: 136 MMHG | DIASTOLIC BLOOD PRESSURE: 63 MMHG | TEMPERATURE: 98.2 F | WEIGHT: 193.2 LBS

## 2022-11-07 DIAGNOSIS — I77.3 FIBROMUSCULAR DYSPLASIA (HCC): ICD-10-CM

## 2022-11-07 DIAGNOSIS — E78.00 PURE HYPERCHOLESTEROLEMIA: ICD-10-CM

## 2022-11-07 DIAGNOSIS — J44.9 CHRONIC OBSTRUCTIVE PULMONARY DISEASE, UNSPECIFIED COPD TYPE (HCC): ICD-10-CM

## 2022-11-07 DIAGNOSIS — I50.32 CHRONIC HEART FAILURE WITH PRESERVED EJECTION FRACTION (HCC): ICD-10-CM

## 2022-11-07 DIAGNOSIS — I10 ESSENTIAL HYPERTENSION: ICD-10-CM

## 2022-11-07 DIAGNOSIS — E11.21 TYPE 2 DIABETES WITH NEPHROPATHY (HCC): Primary | ICD-10-CM

## 2022-11-07 DIAGNOSIS — I48.0 PAROXYSMAL ATRIAL FIBRILLATION (HCC): ICD-10-CM

## 2022-11-07 PROCEDURE — 99214 OFFICE O/P EST MOD 30 MIN: CPT | Performed by: INTERNAL MEDICINE

## 2022-11-07 PROCEDURE — 1101F PT FALLS ASSESS-DOCD LE1/YR: CPT | Performed by: INTERNAL MEDICINE

## 2022-11-07 PROCEDURE — 1090F PRES/ABSN URINE INCON ASSESS: CPT | Performed by: INTERNAL MEDICINE

## 2022-11-07 PROCEDURE — G8417 CALC BMI ABV UP PARAM F/U: HCPCS | Performed by: INTERNAL MEDICINE

## 2022-11-07 PROCEDURE — G8427 DOCREV CUR MEDS BY ELIG CLIN: HCPCS | Performed by: INTERNAL MEDICINE

## 2022-11-07 PROCEDURE — G8510 SCR DEP NEG, NO PLAN REQD: HCPCS | Performed by: INTERNAL MEDICINE

## 2022-11-07 PROCEDURE — G8536 NO DOC ELDER MAL SCRN: HCPCS | Performed by: INTERNAL MEDICINE

## 2022-11-07 PROCEDURE — G0463 HOSPITAL OUTPT CLINIC VISIT: HCPCS | Performed by: INTERNAL MEDICINE

## 2022-11-07 RX ORDER — LANOLIN ALCOHOL/MO/W.PET/CERES
1000 CREAM (GRAM) TOPICAL DAILY
COMMUNITY

## 2022-11-07 RX ORDER — BUMETANIDE 2 MG/1
2 TABLET ORAL DAILY
COMMUNITY

## 2022-11-07 RX ORDER — AZELASTINE 1 MG/ML
2 SPRAY, METERED NASAL 2 TIMES DAILY
COMMUNITY

## 2022-11-07 RX ORDER — PYRIDOXINE HCL (VITAMIN B6) 100 MG
100 TABLET ORAL DAILY
COMMUNITY

## 2022-11-07 RX ORDER — FAMOTIDINE 10 MG/1
10 TABLET ORAL 2 TIMES DAILY
COMMUNITY

## 2022-11-07 NOTE — PROGRESS NOTES
Chief Complaint   Patient presents with    Follow-up     Htn      Diabetes          Vitals:    11/07/22 0949   BP: 136/63   Pulse: 79   Resp: 22   Temp: 98.2 °F (36.8 °C)   TempSrc: Temporal   SpO2: 98%   Weight: 193 lb 3.2 oz (87.6 kg)   Height: 5' 6\" (1.676 m)   PainSc:   5   PainLoc: Back       Current Outpatient Medications on File Prior to Visit   Medication Sig Dispense Refill    ketoconazole (NIZORAL) 2 % shampoo APPLY TOPICALLY TWICE  WEEKLY OR AS NEEDED 240 mL 0    DULoxetine (CYMBALTA) 30 mg capsule Take 1 Capsule by mouth in the morning. For neuropathy 90 Capsule 3    loratadine (CLARITIN) 10 mg tablet Take 10 mg by mouth daily. famotidine (PEPCID) 20 mg tablet Take 20 mg by mouth nightly. albuterol (PROVENTIL HFA, VENTOLIN HFA, PROAIR HFA) 90 mcg/actuation inhaler Take 2 Puffs by inhalation every four (4) hours as needed for Wheezing. 18 g 1    rosuvastatin (CRESTOR) 5 mg tablet TAKE 1 TABLET BY MOUTH  DAILY 90 Tablet 3    apixaban (ELIQUIS) 5 mg tablet Take 0.5 Tablets by mouth two (2) times a day. 90 Tablet 3    bumetanide (BUMEX) 0.5 mg tablet Take 4 Tablets by mouth daily. 360 Tablet 3    losartan (COZAAR) 25 mg tablet TAKE 1 TABLET BY MOUTH  DAILY FOR HIGH BLOOD  PRESSURE 90 Tablet 3    spironolactone (ALDACTONE) 25 mg tablet TAKE 1 TABLET BY MOUTH  DAILY 90 Tab 1    metFORMIN (GLUCOPHAGE) 1,000 mg tablet TAKE 1 TABLET BY MOUTH TWO  TIMES DAILY (Patient taking differently: Take 500 mg by mouth two (2) times daily (with meals). ) 180 Tab 3    cpap machine kit by Does Not Apply route. Use nightly       AMOXICILLIN PO Take  by mouth. Prior to dental appts, per Dr. Ghazal Espinoza. FIBER-CAPS, PSYLLIUM HUSK, PO Take 5 Capsules by mouth two (2) times a day. betamethasone, augmented (DIPROLENE) 0.05 % lotion Apply  to affected area two (2) times a day. Use on palms of hands only.  (Patient not taking: Reported on 11/7/2022) 30 mL 0    glucose blood VI test strips (ACCU-CHEK ELBERT PLUS) strip Check bs daily. Dx 250.00 1 Package 11     No current facility-administered medications on file prior to visit. Health Maintenance Due   Topic    Foot Exam Q1     MICROALBUMIN Q1     Lipid Screen     COVID-19 Vaccine (4 - Booster for Moderna series)    Flu Vaccine (1)       1. \"Have you been to the ER, urgent care clinic since your last visit? Hospitalized since your last visit? \" No    2. \"Have you seen or consulted any other health care providers outside of the [address] [place] [address] since your last visit? \" No     3. For patients aged 39-70: Has the patient had a colonoscopy / FIT/ Cologuard? NA - based on age      If the patient is female:    4. For patients aged 41-77: Has the patient had a mammogram within the past 2 years? NA - based on age or sex      11. For patients aged 21-65: Has the patient had a pap smear?  NA - based on age or sex

## 2022-11-09 ENCOUNTER — PATIENT OUTREACH (OUTPATIENT)
Dept: CASE MANAGEMENT | Age: 87
End: 2022-11-09

## 2022-11-09 NOTE — PROGRESS NOTES
Ambulatory Care Management Note    Date/Time:  11/9/2022 11:16 AM    This patient was received as a referral from [facility] [employer]. Ambulatory Care Manager outreached to patient today to offer care management services. Introduction to self and role of care manager provided. Patient accepted care management services at this time. Follow up call scheduled at this time. Patient has Ambulatory Care Manager's contact number for any questions or concerns.

## 2022-11-10 RX ORDER — KETOCONAZOLE 20 MG/ML
SHAMPOO, SUSPENSION TOPICAL
Qty: 240 ML | Refills: 0 | Status: SHIPPED | OUTPATIENT
Start: 2022-11-10

## 2022-11-17 ENCOUNTER — PATIENT OUTREACH (OUTPATIENT)
Dept: CASE MANAGEMENT | Age: 87
End: 2022-11-17

## 2022-11-17 NOTE — PROGRESS NOTES
Ambulatory Care Management Note    Date/Time:  11/17/2022 3:14 PM    This Ambulatory Care Manager (ACM) reviewed and updated the following screenings during this call; general assessment, SDOH assessments, and medication reconciliation     Patient's challenges to self-management identified:    no challenges identified at this time      Medication Management:  good adherence and good understanding    Advance Care Planning:   Does patient have an Advance Directive:  yes; reviewed and current     [employer], Referrals, and Durable Medical Equipment: wheelchair, cane, CPAP      Health Maintenance Due   Topic Date Due    Foot Exam Q1  03/22/2019    MICROALBUMIN Q1  08/03/2020    Lipid Screen  02/06/2021    Flu Vaccine (1) 08/01/2022     Health Maintenance Reviewed    Patient was asked to consider health care goals that they would like to focus on with this ACM. ACM will follow up with patient to discuss goals and establish care plan in the next 7-14 days.        PCP/Specialist follow up:   Future Appointments   Date Time Provider Beau Driscoll   5/8/2023 10:00 AM MD NEEL Tang BS AMB

## 2022-12-02 ENCOUNTER — PATIENT OUTREACH (OUTPATIENT)
Dept: CASE MANAGEMENT | Age: 87
End: 2022-12-02

## 2022-12-02 NOTE — PROGRESS NOTES
Ambulatory Care Management Note      Date/Time:  12/2/2022 12:28 PM    This patient was received as a referral from [facility] [employer]. Top Challenges reviewed with the provider   No challenges reviewed with provider at this time             Ambulatory  contacted patient for discussion and case management of CHF, COPD, diabetes. Summary of patients top problems:   CHF - controlled on current regimen. No recent exacerbations. On Bumex 2mg daily, losartan and spironolactone. Hx TAVR in 2018. EF 55-60%. Managed by Dr. Sven Watters. COPD - controlled. Managed with albuterol PRN and CPAP at night. Diabetes - A1c 7. Metformin BID. Managed by endocrinology Dr. Jose J Colbert. Patient's challenges to self management identified:    no challenges identified at this time. Medication Management:  good adherence and good understanding    Advance Care Planning:   Does patient have an Advance Directive:  yes; reviewed and current     [employer], Referrals, and Durable Medical Equipment: wheelchair, cane, CPAP. PCP/Specialist follow up:   Future Appointments   Date Time Provider Beau Driscoll   5/8/2023 10:00 AM MD ENEL Zelaya BS AMB           Goals        CHF management      Heart Failure Education outreach Date/Time: 12/2/2022 12:23 PM  ACM reviewed CHF zones, daily weights, the importance of low sodium diet with the patient. Instructed patient to call their Cardiologist if they have a weight gain of 3 lbs in 2 days or 5 lbs in a week. Patient reminded that there is a physician on call 24 hours a day / 7 days a week should the patient have questions or concerns. The patient verbalized understanding. COPD management      12/2/22 - Reviewed COPD exacerbation plan. COPD Exacerbation Plan: Care Instructions  Overview     If you have chronic obstructive pulmonary disease (COPD), your usual shortness of breath could suddenly get worse.  You may start coughing more and have more mucus. This flare-up is called a COPD exacerbation (say \"[id number]\"). A lung infection or air pollution could set off an exacerbation. Sometimes it can happen after being around chemicals. Work with your doctor to make a plan for dealing with an exacerbation. You can better manage it if you plan ahead. Follow-up care is a key part of your treatment and safety. Be sure to make and go to all appointments, and call your doctor if you are having problems. It's also a good idea to know your test results and keep a list of the medicines you take. How can you care for yourself at home? During an exacerbation  Do not panic if you start to have one. Quick treatment may help you prevent serious breathing problems. If you have a COPD exacerbation plan that you developed with your doctor, follow it. Take your medicines exactly as your doctor tells you. Use your inhaler as directed by your doctor. If your symptoms do not get better after you use your medicine, have someone take you to the emergency room. Call an ambulance if necessary. With inhaled medicines, a spacer or a nebulizer may help you get more medicine to your lungs. Ask your doctor or pharmacist how to use them properly. Practice using the spacer in front of a mirror before you have an exacerbation. This may help you get the medicine into your lungs quickly. If your doctor has given you steroid pills, take them as directed. Your doctor may have given you a prescription for antibiotics, which you can fill if you need it. Talk to your doctor if you have any problems with your medicine. And call your doctor if you have to use your antibiotic or steroid pills. Preventing an exacerbation  Do not smoke. This is the most important step you can take to prevent more damage to your lungs and prevent problems. If you already smoke, it is never too late to stop. If you need help quitting, talk to your doctor about stop-smoking programs and medicines.  These may increase your chances of quitting for good. Take your daily medicines as prescribed. Avoid colds and other infections. Get a flu vaccine each year, as soon as it is available. Ask those you live or work with to do the same, so they will not get the flu and infect you. Get the pneumococcal and whooping cough (pertussis) vaccines. Try to stay away from people with colds or the flu. Wash your hands often. Avoid secondhand smoke and air pollution. Try to stay inside with your windows closed when air pollution is bad. Learn breathing techniques for COPD, such as pursed-lip breathing. These techniques may help you breathe easier during an exacerbation. When should you call for help? Call 911 anytime you think you may need emergency care. For example, call if:    You have severe trouble breathing. You have severe chest pain. Call your doctor now or seek immediate medical care if:    You have new or worse shortness of breath. You have new or worse chest pain. You cough up blood. You have a fever. You have used your quick-relief medicine or followed your plan for what to do if your symptoms get worse, but you are still short of breath. Watch closely for changes in your health, and be sure to contact your doctor if:    You are coughing more deeply or more often, especially if you notice more mucus or a change in the color of your mucus. You have new or increased swelling in your legs or belly. You have feelings of anxiety or depression. You need to use your antibiotic or steroid pills. You are not getting better as expected. Patient verbalized understanding of all information discussed. Patient has this Ambulatory Care Manager's contact information for any further questions, concerns, or needs.

## 2023-01-03 ENCOUNTER — PATIENT OUTREACH (OUTPATIENT)
Dept: CASE MANAGEMENT | Age: 88
End: 2023-01-03

## 2023-01-03 NOTE — PROGRESS NOTES
Goals        CHF management      Heart Failure Education outreach Date/Time: 12/2/2022 12:23 PM  ACM reviewed CHF zones, daily weights, the importance of low sodium diet with the patient. Instructed patient to call their Cardiologist if they have a weight gain of 3 lbs in 2 days or 5 lbs in a week. Patient reminded that there is a physician on call 24 hours a day / 7 days a week should the patient have questions or concerns. The patient verbalized understanding. 1/3/22 - Patient able to recall information from previous discussion. No concerns at this time. Patient will continue plan of care. [facility]         COPD management      12/2/22 - Reviewed COPD exacerbation plan. COPD Exacerbation Plan: Care Instructions  Overview     If you have chronic obstructive pulmonary disease (COPD), your usual shortness of breath could suddenly get worse. You may start coughing more and have more mucus. This flare-up is called a COPD exacerbation (say \"eg-QYA-xx-BAY-shun\"). A lung infection or air pollution could set off an exacerbation. Sometimes it can happen after being around chemicals. Work with your doctor to make a plan for dealing with an exacerbation. You can better manage it if you plan ahead. Follow-up care is a key part of your treatment and safety. Be sure to make and go to all appointments, and call your doctor if you are having problems. It's also a good idea to know your test results and keep a list of the medicines you take. How can you care for yourself at home? During an exacerbation  Do not panic if you start to have one. Quick treatment may help you prevent serious breathing problems. If you have a COPD exacerbation plan that you developed with your doctor, follow it. Take your medicines exactly as your doctor tells you. Use your inhaler as directed by your doctor. If your symptoms do not get better after you use your medicine, have someone take you to the emergency room.  Call an ambulance if necessary. With inhaled medicines, a spacer or a nebulizer may help you get more medicine to your lungs. Ask your doctor or pharmacist how to use them properly. Practice using the spacer in front of a mirror before you have an exacerbation. This may help you get the medicine into your lungs quickly. If your doctor has given you steroid pills, take them as directed. Your doctor may have given you a prescription for antibiotics, which you can fill if you need it. Talk to your doctor if you have any problems with your medicine. And call your doctor if you have to use your antibiotic or steroid pills. Preventing an exacerbation  Do not smoke. This is the most important step you can take to prevent more damage to your lungs and prevent problems. If you already smoke, it is never too late to stop. If you need help quitting, talk to your doctor about stop-smoking programs and medicines. These may increase your chances of quitting for good. Take your daily medicines as prescribed. Avoid colds and other infections. Get a flu vaccine each year, as soon as it is available. Ask those you live or work with to do the same, so they will not get the flu and infect you. Get the pneumococcal and whooping cough (pertussis) vaccines. Try to stay away from people with colds or the flu. Wash your hands often. Avoid secondhand smoke and air pollution. Try to stay inside with your windows closed when air pollution is bad. Learn breathing techniques for COPD, such as pursed-lip breathing. These techniques may help you breathe easier during an exacerbation. When should you call for help? Call 911 anytime you think you may need emergency care. For example, call if:    You have severe trouble breathing. You have severe chest pain. Call your doctor now or seek immediate medical care if:    You have new or worse shortness of breath. You have new or worse chest pain. You cough up blood. You have a fever.      You have used your quick-relief medicine or followed your plan for what to do if your symptoms get worse, but you are still short of breath. Watch closely for changes in your health, and be sure to contact your doctor if:    You are coughing more deeply or more often, especially if you notice more mucus or a change in the color of your mucus. You have new or increased swelling in your legs or belly. You have feelings of anxiety or depression. You need to use your antibiotic or steroid pills. You are not getting better as expected. 1/3/22 - Patient able to recall information from previous discussion. No concerns at this time. Patient will continue plan of care.  MARCIA

## 2023-01-13 RX ORDER — LOSARTAN POTASSIUM 25 MG/1
TABLET ORAL
Qty: 90 TABLET | Refills: 3 | Status: SHIPPED | OUTPATIENT
Start: 2023-01-13

## 2023-01-26 NOTE — PROGRESS NOTES
HISTORY OF PRESENTING ILLNESS      Ciera Proctor is a 80 y.o. female with acute diastolic heart failure noted to have bradycardia. Telemetry exhibits findings suggestive of junctional rhythm versus atrial fibrillation with heart rates in the 50s. Patient has reported exertional fatigue since undergoing transaortic aortic valve replacement 3 months ago. She underwent single chamber pacemaker and now presents for follow up. Device interrogation shows normal functioning and her incision has healed well. Her heart rates are pinned in the 60s. She does report some exertional fatigue.        ACTIVE PROBLEM LIST     Patient Active Problem List    Diagnosis Date Noted    Type 2 diabetes with nephropathy ([address] 07/05/2018    (HFpEF) heart failure with preserved ejection fraction ([address] 06/12/2018    S/P TAVR (transcatheter aortic valve replacement) 04/13/2018    Obesity (BMI 30.0-34.9) 04/13/2018    Chest tightness 11/17/2017    Dyspnea and respiratory abnormalities 11/17/2017    Controlled type 2 diabetes mellitus without complication, without long-term current use of insulin ([facility] 03/23/2017    Advance directive on file 03/23/2017    Fibromuscular dysplasia ([facility] 02/17/2017    COPD (chronic obstructive pulmonary disease) ([facility] 10/27/2014    Mitral stenosis 07/03/2014    Other dyspnea and respiratory abnormality 10/02/2013    CINDA (obstructive sleep apnea) 10/18/2012    Type II or unspecified type diabetes mellitus without mention of complication, uncontrolled 05/26/2012    Pure hypercholesterolemia 05/26/2012    Foreign body in pharynx 12/18/2011    Tachycardia 12/01/2011    Hypertension 12/01/2011    PVC (premature ventricular contraction) 12/01/2011    Aortic stenosis 12/01/2011    Edema 12/01/2011           PAST MEDICAL HISTORY     Past Medical History:   Diagnosis Date    Adverse effect of anesthesia     difficulty waking    Aortic stenosis     Arrhythmia     rapid heartrate seen in ER Patient with history of Crohn's disease s/p ostomy     Plan:  · Ostomy care 4/2/18    Cancer ([facility] 01/2018    skin spots, removed periodically    Chronic pain     back - spinal stenosis    COPD     Diabetes ([facility]     Diabetic neuropathy ([facility]     knees down    Diverticulosis of colon     Hypercholesterolemia     Hypertension     CINDA (obstructive sleep apnea) 08-    AHI: 18.6 per hour - CPAP    Other ill-defined conditions(799.89)     heart murmur    Peripheral neuropathy     Renal artery stenosis (HCC)     Right    Unspecified adverse effect of anesthesia     delayed awakening - \"does not need as much\"           PAST SURGICAL HISTORY     Past Surgical History:   Procedure Laterality Date    ABDOMEN SURGERY PROC UNLISTED  1977    R Renal Artery Stenosis bypassed    ABDOMEN SURGERY [address] or 1983    hernia repair    ABDOMEN SURGERY [address] [place] [address] - removal of fatty tissue around abdomen    BREAST SURGERY PROCEDURE UNLISTED  1968    Left - benign cyst    CARDIAC SURG PROCEDURE UNLIST  04/13/2018    TAVR    ENDOSCOPY, COLON, DIAGNOSTIC  1999    hx of polys in the past    HX CATARACT REMOVAL      bilateral    HX GYN  1966    D&C    HX HEENT      Eyelid Cysts x 4    HX HEENT      oral surgeries - gum surgery/floating root  removed x 2    HX HEENT      retinal hemmorage repair    HX ORTHOPAEDIC  11/06    L1-S1 2007    HX ORTHOPAEDIC  1985    L 4th Trigger finger    HX ORTHOPAEDIC      left knee arthroscopy    HX OTHER SURGICAL      skin cancer removal, periodically    HX TONSILLECTOMY      T & A          ALLERGIES     Allergies   Allergen Reactions    Bees [Hymenoptera Allergenic Extract] Anaphylaxis    Lipitor [Atorvastatin] Myalgia    Betadine [Povidone-Iodine] Other (comments)     Abdominal swelling, one incident in 1977. Has had since without a reaction.     Darvon [Propoxyphene] Nausea and Vomiting    Griseofulvin Nausea and Vomiting    Pollen Extracts Cough          FAMILY HISTORY     Family History   Problem Relation Age of Onset    Stroke Mother      1    Cancer Mother      ovarian    Cancer Father      lung/throat    Alcohol abuse Father     Lung Disease Father     Pacemaker Brother     Heart Attack Brother     Heart Disease Brother     Other Brother      als    Stroke Maternal Grandfather     Heart Disease Paternal Uncle     Heart Attack Paternal Uncle     negative for cardiac disease       SOCIAL HISTORY     Social History     Social History    Marital status:      Spouse name: N/A    Number of children: N/A    Years of education: N/A     Social History Main Topics    Smoking status: Former Smoker     Packs/day: 1.50     Years: 13.00     Types: Cigarettes     Quit date: 6/6/1977    Smokeless tobacco: Never Used      Comment: former cigarette smoker    Alcohol use No    Drug use: No    Sexual activity: No     Other Topics Concern    None     Social History Narrative         MEDICATIONS     Current Outpatient Prescriptions   Medication Sig    magnesium oxide (MAG-OX) 400 mg tablet Take 1 Tab by mouth two (2) times a day.  azelastine (ASTELIN) 137 mcg (0.1 %) nasal spray 2 Sprays by Both Nostrils route two (2) times a day. Use in each nostril as directed    aspirin 81 mg chewable tablet Take 1 Tab by mouth daily.  bumetanide (BUMEX) 1 mg tablet Take 1 Tab by mouth two (2) times a day.  potassium chloride SR (K-TAB) 20 mEq tablet Take 1 Tab by mouth two (2) times a day.  apixaban (ELIQUIS) 5 mg tablet Take 1 Tab by mouth two (2) times a day.  spironolactone (ALDACTONE) 25 mg tablet Take 1 Tab by mouth daily.  rosuvastatin (CRESTOR) 5 mg tablet TAKE 1 TABLET BY MOUTH  DAILY    metFORMIN (GLUCOPHAGE) 1,000 mg tablet TAKE 1 TABLET BY MOUTH TWO  TIMES DAILY    losartan (COZAAR) 50 mg tablet Take 1 Tab by mouth daily. Indications: hypertension    cholecalciferol, vitamin D3, (VITAMIN D3) 2,000 unit tab Take 1 Tab by mouth daily (after lunch).     CYANOCOBALAMIN, VITAMIN B-12, (VITAMIN B-12 PO) Take 1,500 mg by mouth daily.  COD LIVER OIL PO Take 1,000 mg by mouth daily.  glucose blood VI test strips (ACCU-CHEK ELBERT PLUS) strip Check bs daily. Dx 250.00     No current facility-administered medications for this visit. I have reviewed the nurses notes, vitals, problem list, allergy list, medical history, family, social history and medications. REVIEW OF SYMPTOMS      General: Pt denies excessive weight gain or loss. Pt is able to conduct ADL's  HEENT: Denies blurred vision, headaches, hearing loss, epistaxis and difficulty swallowing. Respiratory: Denies cough, congestion, shortness of breath, ROBB, wheezing or stridor. Cardiovascular: Denies precordial pain, palpitations, edema or PND  Gastrointestinal: Denies poor appetite, indigestion, abdominal pain or blood in stool  Genitourinary: Denies hematuria, dysuria, increased urinary frequency  Musculoskeletal: Denies joint pain or swelling from muscles or joints  Neurologic: Denies tremor, paresthesias, headache, or sensory motor disturbance  Psychiatric: Denies confusion, insomnia, depression  Integumentray: Denies rash, itching or ulcers. Hematologic: Denies easy bruising, bleeding       PHYSICAL EXAMINATION      Vitals:    07/18/18 1105   BP: 140/62   Pulse: 68   Resp: 20   SpO2: 95%   Weight: 194 lb (88 kg)   Height: 5' 6\" (1.676 m)     General: Well developed, in no acute distress. HEENT: No jaundice, oral mucosa moist, no oral ulcers  Neck: Supple, no stiffness, no lymphadenopathy, supple  Heart:  Normal S1/S2 negative S3 or S4. Regular, no murmur, gallop or rub, no jugular venous distention  Respiratory: Clear bilaterally x 4, no wheezing or rales  Abdomen:   Soft, non-tender, bowel sounds are active.   Extremities:  No edema, normal cap refill, no cyanosis.   Musculoskeletal: No clubbing, no deformities  Neuro: A&Ox3, speech clear, gait stable, cooperative, no focal neurologic deficits  Skin: Skin color is normal. No rashes or lesions. Non diaphoretic, moist.  Vascular: 2+ pulses symmetric in all extremities       DIAGNOSTIC DATA      EKG:        LABORATORY DATA      Lab Results   Component Value Date/Time    WBC 8.0 06/12/2018 02:32 PM    HGB 11.2 (L) 06/12/2018 02:32 PM    HCT 36.5 06/12/2018 02:32 PM    PLATELET 240 (L) 04/00/9140 02:32 PM    MCV 84.5 06/12/2018 02:32 PM      Lab Results   Component Value Date/Time    Sodium 143 06/15/2018 04:13 AM    Potassium 3.5 06/15/2018 04:13 AM    Chloride 103 06/15/2018 04:13 AM    CO2 32 06/15/2018 04:13 AM    Anion gap 8 06/15/2018 04:13 AM    Glucose 152 (H) 06/15/2018 04:13 AM    BUN 25 (H) 06/15/2018 04:13 AM    Creatinine 0.97 06/15/2018 04:13 AM    BUN/Creatinine ratio 26 (H) 06/15/2018 04:13 AM    GFR est AA >60 06/15/2018 04:13 AM    GFR est non-AA 54 (L) 06/15/2018 04:13 AM    Calcium 8.8 06/15/2018 04:13 AM    Bilirubin, total 1.2 (H) 06/12/2018 02:32 PM    AST (SGOT) 33 06/12/2018 02:32 PM    Alk. phosphatase 82 06/12/2018 02:32 PM    Protein, total 6.4 06/12/2018 02:32 PM    Albumin 3.1 (L) 06/12/2018 02:32 PM    Globulin 3.3 06/12/2018 02:32 PM    A-G Ratio 0.9 (L) 06/12/2018 02:32 PM    ALT (SGPT) 59 06/12/2018 02:32 PM           ASSESSMENT      1. Bradycardia (junctional vs AF)  2. Exertional fatigue  3. Aortic stenosis status post TAVR  4. Hypertension  5. Diabetes mellitus  6. CINDA on CPAP  7. Renal artery stenosis   8. Peripheral neuropathy  9. Diastolic dysfunction  10. Pacemaker   A. Single chamber   B. Left sided   C. [employer]         PLAN     We will just rate response in attempt to help improve her exertional fatigue. Continue follow-up in device clinic. Thank you, Halima Chambers MD and Dr. Alize Harper for allowing me to participate in the care of this extraordinarily pleasant female. Please do not hesitate to contact me for further questions/concerns.          Caleen Meigs, MD  Cardiac Electrophysiology / Cardiology    [facility] [place], [address] [place] [address]    [facility]onPer[facility]  [phone] / [phone] Fax   [phone] / [phone] Fax

## 2023-01-31 ENCOUNTER — PATIENT OUTREACH (OUTPATIENT)
Dept: CASE MANAGEMENT | Age: 88
End: 2023-01-31

## 2023-01-31 NOTE — PROGRESS NOTES
Goals        CHF management      Heart Failure Education outreach Date/Time: 12/2/2022 12:23 PM  ACM reviewed CHF zones, daily weights, the importance of low sodium diet with the patient. Instructed patient to call their Cardiologist if they have a weight gain of 3 lbs in 2 days or 5 lbs in a week. Patient reminded that there is a physician on call 24 hours a day / 7 days a week should the patient have questions or concerns. The patient verbalized understanding. 1/3/22 - Patient able to recall information from previous discussion. No concerns at this time. Patient will continue plan of care. KE    1/31/23 - Patient reports doing well. No concerns at this time. Weight is stable. Sending info on CHF zone to [facility] for patient to refer to if needed. Patient will continue plan of care. [facility]         COPD management      12/2/22 - Reviewed COPD exacerbation plan. COPD Exacerbation Plan: Care Instructions  Overview     If you have chronic obstructive pulmonary disease (COPD), your usual shortness of breath could suddenly get worse. You may start coughing more and have more mucus. This flare-up is called a COPD exacerbation (say \"gz-HJG-zx-BAY-[facility]\"). A lung infection or air pollution could set off an exacerbation. Sometimes it can happen after being around chemicals. Work with your doctor to make a plan for dealing with an exacerbation. You can better manage it if you plan ahead. Follow-up care is a key part of your treatment and safety. Be sure to make and go to all appointments, and call your doctor if you are having problems. It's also a good idea to know your test results and keep a list of the medicines you take. How can you care for yourself at home? During an exacerbation  Do not panic if you start to have one. Quick treatment may help you prevent serious breathing problems. If you have a COPD exacerbation plan that you developed with your doctor, follow it.   Take your medicines exactly as your doctor tells you. Use your inhaler as directed by your doctor. If your symptoms do not get better after you use your medicine, have someone take you to the emergency room. Call an ambulance if necessary. With inhaled medicines, a spacer or a nebulizer may help you get more medicine to your lungs. Ask your doctor or pharmacist how to use them properly. Practice using the spacer in front of a mirror before you have an exacerbation. This may help you get the medicine into your lungs quickly. If your doctor has given you steroid pills, take them as directed. Your doctor may have given you a prescription for antibiotics, which you can fill if you need it. Talk to your doctor if you have any problems with your medicine. And call your doctor if you have to use your antibiotic or steroid pills. Preventing an exacerbation  Do not smoke. This is the most important step you can take to prevent more damage to your lungs and prevent problems. If you already smoke, it is never too late to stop. If you need help quitting, talk to your doctor about stop-smoking programs and medicines. These may increase your chances of quitting for good. Take your daily medicines as prescribed. Avoid colds and other infections. Get a flu vaccine each year, as soon as it is available. Ask those you live or work with to do the same, so they will not get the flu and infect you. Get the pneumococcal and whooping cough (pertussis) vaccines. Try to stay away from people with colds or the flu. Wash your hands often. Avoid secondhand smoke and air pollution. Try to stay inside with your windows closed when air pollution is bad. Learn breathing techniques for COPD, such as pursed-lip breathing. These techniques may help you breathe easier during an exacerbation. When should you call for help? Call 911 anytime you think you may need emergency care. For example, call if:    You have severe trouble breathing. You have severe chest pain. Call your doctor now or seek immediate medical care if:    You have new or worse shortness of breath. You have new or worse chest pain. You cough up blood. You have a fever. You have used your quick-relief medicine or followed your plan for what to do if your symptoms get worse, but you are still short of breath. Watch closely for changes in your health, and be sure to contact your doctor if:    You are coughing more deeply or more often, especially if you notice more mucus or a change in the color of your mucus. You have new or increased swelling in your legs or belly. You have feelings of anxiety or depression. You need to use your antibiotic or steroid pills. You are not getting better as expected. 1/3/23 - Patient able to recall information from previous discussion. No concerns at this time. Patient will continue plan of care. KEB    1/31/23 - Patient feels she has a good understanding of COPD management. [facility] for [facility] to refer to as needed. MARCIA             Learning About Heart Failure Zones  What are heart failure zones? Heart failure zones give you an easy way to see changes in your heart failure symptoms. They also tell you when you need to get help. Check every day to see which zone you are in. Green zone. You are doing well. This is where you want to be. Your weight is stable. It's not going up or down. You breathe easily. You are sleeping well. You are able to lie flat without shortness of breath. You can do your usual activities. Yellow zone. Be careful. Your symptoms are changing. Call your doctor. You have new or increased shortness of breath. You are dizzy or lightheaded, or you feel like you may faint. You have sudden weight gain, such as more than 2 to 3 pounds in a day or 5 pounds in a week. (Your doctor may suggest a different range of weight gain.)  You have increased swelling in your legs, ankles, or feet.   You are so tired or weak that you can't do your usual activities. You are not sleeping well. Shortness of breath wakes you up at night. You need extra pillows. Red zone. This is an emergency. Call 911. You have symptoms of sudden heart failure. For example: You have severe trouble breathing. You cough up pink, foamy mucus. You have a new irregular or fast heartbeat. You have symptoms of a heart attack. These may include:  Chest pain or pressure, or a strange feeling in the chest.  Sweating. Shortness of breath. Nausea or vomiting. Pain, pressure, or a strange feeling in the back, neck, jaw, or upper belly or in one or both shoulders or arms. Lightheadedness or sudden weakness. A fast or irregular heartbeat. If you have symptoms of a heart attack: After you call 911, the  may tell you to chew 1 adult-strength or 2 to 4 low-dose aspirin. Wait for an ambulance. Do not try to drive yourself. Follow-up care is a key part of your treatment and safety. Be sure to make and go to all appointments, and call your doctor if you are having problems. It's also a good idea to know your test results and keep a list of the medicines you take. Where can you learn more? Go to http://www.gray.com/  Enter T174 in the search box to learn more about \"Learning About Heart Failure Zones. \"  Current as of: January 10, 2022               Content Version: 13.4  © 2006-2022 Healthwise, [facility]. Care instructions adapted under license by [employer] (which disclaims liability or warranty for this information). If you have questions about a medical condition or this instruction, always ask your healthcare professional. [place] [address] any warranty or liability for your use of this information.

## 2023-02-28 ENCOUNTER — PATIENT OUTREACH (OUTPATIENT)
Dept: CASE MANAGEMENT | Age: 88
End: 2023-02-28

## 2023-02-28 NOTE — PROGRESS NOTES
Ambulatory Care Management Note    Date/Time:  2/28/2023 11:00 AM    Outreach made to patient for care management follow-up. Unable to reach patient by phone.     Message left requesting return call: Yes  Mychart message sent: No    PCP/Specialist follow up:   Future Appointments   Date Time Provider Beau Driscoll   5/8/2023 10:00 AM MD NEEL Fraser BS AMB

## 2023-03-07 ENCOUNTER — PATIENT OUTREACH (OUTPATIENT)
Dept: CASE MANAGEMENT | Age: 88
End: 2023-03-07

## 2023-03-07 NOTE — PROGRESS NOTES
Ambulatory Care Management Note    Date/Time:  3/7/2023 1:24 PM    Patient Current Location: [place]    Patient has graduated from the Complex Case Management  program on 3/7/23. Patient/family has the ability to self-manage at this time. Care management goals have been completed. No further Ambulatory Care Manager follow up scheduled. Goals Addressed                   This Visit's Progress     COMPLETED: CHF management        Heart Failure Education outreach Date/Time: 12/2/2022 12:23 PM  ACM reviewed CHF zones, daily weights, the importance of low sodium diet with the patient. Instructed patient to call their Cardiologist if they have a weight gain of 3 lbs in 2 days or 5 lbs in a week. Patient reminded that there is a physician on call 24 hours a day / 7 days a week should the patient have questions or concerns. The patient verbalized understanding. 1/3/22 - Patient able to recall information from previous discussion. No concerns at this time. Patient will continue plan of care. KEB    1/31/23 - Patient reports doing well. No concerns at this time. Weight is stable. Sending info on CHF zone to [facility] for patient to refer to if needed. Patient will continue plan of care. KEB         COMPLETED: COPD management        12/2/22 - Reviewed COPD exacerbation plan. COPD Exacerbation Plan: Care Instructions  Overview     If you have chronic obstructive pulmonary disease (COPD), your usual shortness of breath could suddenly get worse. You may start coughing more and have more mucus. This flare-up is called a COPD exacerbation (say \"wb-[id number]-[id number]-BAY-shun\"). A lung infection or air pollution could set off an exacerbation. Sometimes it can happen after being around chemicals. Work with your doctor to make a plan for dealing with an exacerbation. You can better manage it if you plan ahead. Follow-up care is a key part of your treatment and safety.  Be sure to make and go to all appointments, and call your doctor if you are having problems. It's also a good idea to know your test results and keep a list of the medicines you take. How can you care for yourself at home? During an exacerbation  Do not panic if you start to have one. Quick treatment may help you prevent serious breathing problems. If you have a COPD exacerbation plan that you developed with your doctor, follow it. Take your medicines exactly as your doctor tells you. Use your inhaler as directed by your doctor. If your symptoms do not get better after you use your medicine, have someone take you to the emergency room. Call an ambulance if necessary. With inhaled medicines, a spacer or a nebulizer may help you get more medicine to your lungs. Ask your doctor or pharmacist how to use them properly. Practice using the spacer in front of a mirror before you have an exacerbation. This may help you get the medicine into your lungs quickly. If your doctor has given you steroid pills, take them as directed. Your doctor may have given you a prescription for antibiotics, which you can fill if you need it. Talk to your doctor if you have any problems with your medicine. And call your doctor if you have to use your antibiotic or steroid pills. Preventing an exacerbation  Do not smoke. This is the most important step you can take to prevent more damage to your lungs and prevent problems. If you already smoke, it is never too late to stop. If you need help quitting, talk to your doctor about stop-smoking programs and medicines. These may increase your chances of quitting for good. Take your daily medicines as prescribed. Avoid colds and other infections. Get a flu vaccine each year, as soon as it is available. Ask those you live or work with to do the same, so they will not get the flu and infect you. Get the pneumococcal and whooping cough (pertussis) vaccines. Try to stay away from people with colds or the flu. Wash your hands often.   Avoid secondhand smoke and air pollution. Try to stay inside with your windows closed when air pollution is bad. Learn breathing techniques for COPD, such as pursed-lip breathing. These techniques may help you breathe easier during an exacerbation. When should you call for help? Call 911 anytime you think you may need emergency care. For example, call if:    You have severe trouble breathing. You have severe chest pain. Call your doctor now or seek immediate medical care if:    You have new or worse shortness of breath. You have new or worse chest pain. You cough up blood. You have a fever. You have used your quick-relief medicine or followed your plan for what to do if your symptoms get worse, but you are still short of breath. Watch closely for changes in your health, and be sure to contact your doctor if:    You are coughing more deeply or more often, especially if you notice more mucus or a change in the color of your mucus. You have new or increased swelling in your legs or belly. You have feelings of anxiety or depression. You need to use your antibiotic or steroid pills. You are not getting better as expected. 1/3/23 - Patient able to recall information from previous discussion. No concerns at this time. Patient will continue plan of care. MARCIA    1/31/23 - Patient feels she has a good understanding of COPD management. [facility] for patient to refer to as needed. KEABIGAIL               Patient has Ambulatory Care Manager's contact information for any further questions, concerns, or needs.   Patients upcoming visits:    Future Appointments   Date Time Provider Beau Driscoll   5/8/2023 10:00 AM MD NEEL Echeverria AMB

## 2023-03-15 DIAGNOSIS — R00.0 TACHYCARDIA: ICD-10-CM

## 2023-03-15 DIAGNOSIS — I49.3 PVC (PREMATURE VENTRICULAR CONTRACTION): ICD-10-CM

## 2023-03-16 RX ORDER — ROSUVASTATIN CALCIUM 5 MG/1
TABLET, COATED ORAL
Qty: 90 TABLET | Refills: 3 | Status: SHIPPED | OUTPATIENT
Start: 2023-03-16

## 2023-03-17 ENCOUNTER — TELEPHONE (OUTPATIENT)
Dept: INTERNAL MEDICINE CLINIC | Age: 88
End: 2023-03-17

## 2023-03-17 NOTE — TELEPHONE ENCOUNTER
3/17 LVM to r/s appt - r/s appt so pt doesn't get pushed out too far    R/s appt from 10a to 1p same day

## 2023-05-06 LAB
CREATININE, EXTERNAL: 1.6
HBA1C MFR BLD HPLC: 7.2 %
LDL CHOLESTEROL, EXTERNAL: 70
MICROALBUMIN URINE, EXTERNAL: 9.8

## 2023-05-08 ENCOUNTER — OFFICE VISIT (OUTPATIENT)
Age: 88
End: 2023-05-08
Payer: MEDICARE

## 2023-05-08 VITALS
DIASTOLIC BLOOD PRESSURE: 57 MMHG | TEMPERATURE: 95.3 F | RESPIRATION RATE: 20 BRPM | OXYGEN SATURATION: 95 % | HEART RATE: 70 BPM | WEIGHT: 190 LBS | HEIGHT: 66 IN | SYSTOLIC BLOOD PRESSURE: 134 MMHG | BODY MASS INDEX: 30.53 KG/M2

## 2023-05-08 DIAGNOSIS — E11.21 TYPE 2 DIABETES WITH NEPHROPATHY (HCC): Primary | ICD-10-CM

## 2023-05-08 DIAGNOSIS — I77.3 ARTERIAL FIBROMUSCULAR DYSPLASIA (HCC): ICD-10-CM

## 2023-05-08 DIAGNOSIS — I48.0 PAROXYSMAL ATRIAL FIBRILLATION (HCC): ICD-10-CM

## 2023-05-08 DIAGNOSIS — J44.9 CHRONIC OBSTRUCTIVE PULMONARY DISEASE, UNSPECIFIED COPD TYPE (HCC): ICD-10-CM

## 2023-05-08 DIAGNOSIS — I50.32 CHRONIC HEART FAILURE WITH PRESERVED EJECTION FRACTION (HCC): ICD-10-CM

## 2023-05-08 DIAGNOSIS — E78.00 PURE HYPERCHOLESTEROLEMIA, UNSPECIFIED: ICD-10-CM

## 2023-05-08 DIAGNOSIS — I10 ESSENTIAL (PRIMARY) HYPERTENSION: ICD-10-CM

## 2023-05-08 PROCEDURE — 99214 OFFICE O/P EST MOD 30 MIN: CPT | Performed by: INTERNAL MEDICINE

## 2023-05-08 PROCEDURE — 3023F SPIROM DOC REV: CPT | Performed by: INTERNAL MEDICINE

## 2023-05-08 PROCEDURE — 1090F PRES/ABSN URINE INCON ASSESS: CPT | Performed by: INTERNAL MEDICINE

## 2023-05-08 PROCEDURE — 1036F TOBACCO NON-USER: CPT | Performed by: INTERNAL MEDICINE

## 2023-05-08 PROCEDURE — G8417 CALC BMI ABV UP PARAM F/U: HCPCS | Performed by: INTERNAL MEDICINE

## 2023-05-08 PROCEDURE — 1123F ACP DISCUSS/DSCN MKR DOCD: CPT | Performed by: INTERNAL MEDICINE

## 2023-05-08 PROCEDURE — G8427 DOCREV CUR MEDS BY ELIG CLIN: HCPCS | Performed by: INTERNAL MEDICINE

## 2023-05-08 ASSESSMENT — PATIENT HEALTH QUESTIONNAIRE - PHQ9
SUM OF ALL RESPONSES TO PHQ QUESTIONS 1-9: 0
2. FEELING DOWN, DEPRESSED OR HOPELESS: 0
SUM OF ALL RESPONSES TO PHQ9 QUESTIONS 1 & 2: 0
SUM OF ALL RESPONSES TO PHQ QUESTIONS 1-9: 0
SUM OF ALL RESPONSES TO PHQ QUESTIONS 1-9: 0
1. LITTLE INTEREST OR PLEASURE IN DOING THINGS: 0
SUM OF ALL RESPONSES TO PHQ QUESTIONS 1-9: 0

## 2023-05-08 NOTE — PROGRESS NOTES
Subjective:      Patient ID: Yamini Vigil is a 80 y.o. female. HPI  Here for follow up. DM - followed by Dr. Sylvester Landeros - follow up end of this week. Not eating as well. A1C has increased to 7. 2.  had darryl 6.7 previously. Neuropathy continues to worsen. Wearing compression hose. 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 Remains on Eliquis. Occ sob, no palpitations. Intermittent CP she relates to indigestion. Upcoming appointment with Dr. Catalino Quinones. COPD - continued chronic cough, worse recently with allergies. Nonproductive. Using albuterol hfa approx 0-1 times per day. Falls - no fallen since last visit in November. Previous fall hit left elbow. Now with bursitis - nontender, no erythema. Still using wheelchair (moving herself with her feet) at home and walker when out.         Current Outpatient Medications:     albuterol sulfate HFA (PROVENTIL;VENTOLIN;PROAIR) 108 (90 Base) MCG/ACT inhaler, Inhale 2 puffs into the lungs every 4 hours as needed, Disp: , Rfl:     apixaban (ELIQUIS) 2.5 MG TABS tablet, Take 1 tablet by mouth 2 times daily, Disp: , Rfl:     bumetanide (BUMEX) 2 MG tablet, Take 1 tablet by mouth daily, Disp: , Rfl:     cyanocobalamin 1000 MCG tablet, Take 1 tablet by mouth daily, Disp: , Rfl:     DULoxetine (CYMBALTA) 30 MG extended release capsule, Take 1 capsule by mouth daily, Disp: , Rfl:     famotidine (PEPCID) 10 MG tablet, Take 1 tablet by mouth 2 times daily, Disp: , Rfl:     ketoconazole (NIZORAL) 2 % shampoo, , Disp: , Rfl:     losartan (COZAAR) 25 MG tablet, TAKE 1 TABLET BY MOUTH  DAILY FOR HIGH BLOOD  PRESSURE, Disp: , Rfl:     pyridoxine (B-6) 100 MG tablet, Take 1 tablet by mouth daily, Disp: , Rfl:     rosuvastatin (CRESTOR) 5 MG tablet, Take 1 tablet by mouth daily, Disp: , Rfl:     SITagliptin (JANUVIA) 50 MG tablet, Take 0.5 tablets by mouth

## 2023-05-08 NOTE — PROGRESS NOTES
Patient is here for six month follow up. No chief complaint on file. [unfilled]    Current Outpatient Medications on File Prior to Visit   Medication Sig Dispense Refill    albuterol sulfate HFA (PROVENTIL;VENTOLIN;PROAIR) 108 (90 Base) MCG/ACT inhaler Inhale 2 puffs into the lungs every 4 hours as needed      apixaban (ELIQUIS) 2.5 MG TABS tablet Take 1 tablet by mouth 2 times daily      azelastine (ASTELIN) 0.1 % nasal spray 2 sprays by Nasal route 2 times daily      bumetanide (BUMEX) 2 MG tablet Take 1 tablet by mouth daily      DULoxetine (CYMBALTA) 30 MG extended release capsule Take 1 capsule by mouth daily      famotidine (PEPCID) 10 MG tablet Take 1 tablet by mouth 2 times daily      losartan (COZAAR) 25 MG tablet TAKE 1 TABLET BY MOUTH  DAILY FOR HIGH BLOOD  PRESSURE      rosuvastatin (CRESTOR) 5 MG tablet Take 1 tablet by mouth daily      SITagliptin (JANUVIA) 50 MG tablet Take 1 tablet by mouth daily      spironolactone (ALDACTONE) 25 MG tablet Take 1 tablet by mouth daily      AMOXICILLIN PO Take by mouth (Patient not taking: Reported on 5/8/2023)      cyanocobalamin 1000 MCG tablet Take 1,000 mcg by mouth daily (Patient not taking: Reported on 5/8/2023)      ketoconazole (NIZORAL) 2 % shampoo APPLY TOPICALLY TWICE  WEEKLY OR AS NEEDED (Patient not taking: Reported on 5/8/2023)      loratadine (CLARITIN) 10 MG tablet Take 10 mg by mouth 2 times daily (Patient not taking: Reported on 5/8/2023)      metFORMIN (GLUCOPHAGE) 1000 MG tablet Take 1 tablet by mouth 2 times daily (Patient not taking: Reported on 5/8/2023)      pyridoxine (B-6) 100 MG tablet Take 100 mg by mouth daily (Patient not taking: Reported on 5/8/2023)       No current facility-administered medications on file prior to visit. Health Maintenance Due   Topic    Lipids     Annual Wellness Visit (AWV)        1. \"Have you been to the ER, urgent care clinic since your last visit? Hospitalized since your last visit? No    2.  \"Have

## 2023-05-25 RX ORDER — KETOCONAZOLE 20 MG/ML
SHAMPOO TOPICAL
Qty: 240 ML | Refills: 1 | Status: SHIPPED | OUTPATIENT
Start: 2023-05-25

## 2023-06-20 RX ORDER — ALBUTEROL SULFATE 90 UG/1
2 AEROSOL, METERED RESPIRATORY (INHALATION) EVERY 4 HOURS PRN
Qty: 18 G | Refills: 3 | Status: SHIPPED | OUTPATIENT
Start: 2023-06-20

## 2023-06-24 NOTE — TELEPHONE ENCOUNTER
6/7/2021 at 11:46 spoke with patients son in office appointment scheduled for patient to see Jennifer Alan NP as follows:     Future Appointments   Date Time Provider Beau Sevillai   6/14/2021 12:40 PM MD NEEL Munoz BS AMB   6/17/2021  1:40 PM MD NEEL Munoz BS AMB   7/1/2021 11:00 AM Mirtha Casper NP CAVREY BS AMB   8/12/2021  9:00 AM PACEMAKER, STFRANCES CAVSF BS AMB   8/12/2021  9:20 AM YVETTE Metzger BS AMB   11/16/2021  9:30 AM [place] [id number] [facility] MELISSAF BS AMB   11/24/2021  8:00 AM MARISA ARDON BS AMB   11/24/2021  9:00 AM MD LIZBETH Barber BS AMB Pharmacist Renal Dose Adjustment Note    Anastasiia Badillo is a 72 y.o. female being treated with the medication Cefepime    Patient Data:    Vital Signs (Most Recent):  Temp: 96.5 °F (35.8 °C) (06/24/23 0246)  Pulse: 76 (06/24/23 0425)  Resp: 20 (06/24/23 0424)  BP: (!) 104/59 (06/24/23 0425)  SpO2: 97 % (06/24/23 0424) Vital Signs (72h Range):  Temp:  [96.5 °F (35.8 °C)-97.8 °F (36.6 °C)]   Pulse:  [76-87]   Resp:  [15-21]   BP: ()/(38-59)   SpO2:  [97 %-100 %]      Recent Labs   Lab 06/24/23  0121   CREATININE 3.0*     Serum creatinine: 3 mg/dL (H) 06/24/23 0121  Estimated creatinine clearance: 16.2 mL/min (A)    Per [facility] renal dosing protocol:     Previous Order: Cefepime 1 g Q12H    Will be changed to:     New Order:Cefepime 1 g Q24H,    Due to: CrCl < 30 mL/min    Renal dose adjustments performed as noted above.    We will continue monitoring and adjusting as necessary.    Pharmacist: Christian Bradford, PharmD  Ext: [phone]

## 2023-07-06 RX ORDER — ALBUTEROL SULFATE 90 UG/1
2 AEROSOL, METERED RESPIRATORY (INHALATION) EVERY 4 HOURS PRN
Qty: 18 G | Refills: 3 | Status: SHIPPED | OUTPATIENT
Start: 2023-07-06

## 2023-07-26 RX ORDER — DULOXETIN HYDROCHLORIDE 30 MG/1
CAPSULE, DELAYED RELEASE ORAL
Qty: 90 CAPSULE | Refills: 3 | Status: SHIPPED | OUTPATIENT
Start: 2023-07-26

## 2023-11-25 SDOH — HEALTH STABILITY: PHYSICAL HEALTH: ON AVERAGE, HOW MANY MINUTES DO YOU ENGAGE IN EXERCISE AT THIS LEVEL?: 0 MIN

## 2023-11-25 SDOH — HEALTH STABILITY: PHYSICAL HEALTH: ON AVERAGE, HOW MANY DAYS PER WEEK DO YOU ENGAGE IN MODERATE TO STRENUOUS EXERCISE (LIKE A BRISK WALK)?: 0 DAYS

## 2023-11-25 SDOH — ECONOMIC STABILITY: TRANSPORTATION INSECURITY
IN THE PAST 12 MONTHS, HAS LACK OF TRANSPORTATION KEPT YOU FROM MEETINGS, WORK, OR FROM GETTING THINGS NEEDED FOR DAILY LIVING?: NO

## 2023-11-25 SDOH — ECONOMIC STABILITY: FOOD INSECURITY: WITHIN THE PAST 12 MONTHS, YOU WORRIED THAT YOUR FOOD WOULD RUN OUT BEFORE YOU GOT MONEY TO BUY MORE.: NEVER TRUE

## 2023-11-25 SDOH — ECONOMIC STABILITY: INCOME INSECURITY: HOW HARD IS IT FOR YOU TO PAY FOR THE VERY BASICS LIKE FOOD, HOUSING, MEDICAL CARE, AND HEATING?: NOT HARD AT ALL

## 2023-11-25 SDOH — ECONOMIC STABILITY: HOUSING INSECURITY
IN THE LAST 12 MONTHS, WAS THERE A TIME WHEN YOU DID NOT HAVE A STEADY PLACE TO SLEEP OR SLEPT IN A SHELTER (INCLUDING NOW)?: NO

## 2023-11-25 SDOH — ECONOMIC STABILITY: FOOD INSECURITY: WITHIN THE PAST 12 MONTHS, THE FOOD YOU BOUGHT JUST DIDN'T LAST AND YOU DIDN'T HAVE MONEY TO GET MORE.: NEVER TRUE

## 2023-11-25 ASSESSMENT — PATIENT HEALTH QUESTIONNAIRE - PHQ9
SUM OF ALL RESPONSES TO PHQ QUESTIONS 1-9: 0
SUM OF ALL RESPONSES TO PHQ9 QUESTIONS 1 & 2: 0
2. FEELING DOWN, DEPRESSED OR HOPELESS: 0
SUM OF ALL RESPONSES TO PHQ QUESTIONS 1-9: 0
1. LITTLE INTEREST OR PLEASURE IN DOING THINGS: 0

## 2023-11-25 ASSESSMENT — LIFESTYLE VARIABLES
HOW OFTEN DO YOU HAVE A DRINK CONTAINING ALCOHOL: MONTHLY OR LESS
HOW MANY STANDARD DRINKS CONTAINING ALCOHOL DO YOU HAVE ON A TYPICAL DAY: 1 OR 2
HOW OFTEN DO YOU HAVE SIX OR MORE DRINKS ON ONE OCCASION: 1
HOW OFTEN DO YOU HAVE A DRINK CONTAINING ALCOHOL: 2
HOW MANY STANDARD DRINKS CONTAINING ALCOHOL DO YOU HAVE ON A TYPICAL DAY: 1

## 2023-11-28 ENCOUNTER — OFFICE VISIT (OUTPATIENT)
Age: 88
End: 2023-11-28
Payer: MEDICARE

## 2023-11-28 VITALS
WEIGHT: 199 LBS | OXYGEN SATURATION: 93 % | RESPIRATION RATE: 22 BRPM | HEART RATE: 66 BPM | TEMPERATURE: 97 F | SYSTOLIC BLOOD PRESSURE: 129 MMHG | BODY MASS INDEX: 31.98 KG/M2 | DIASTOLIC BLOOD PRESSURE: 59 MMHG | HEIGHT: 66 IN

## 2023-11-28 DIAGNOSIS — R60.0 LOCALIZED EDEMA: ICD-10-CM

## 2023-11-28 DIAGNOSIS — Z00.00 MEDICARE ANNUAL WELLNESS VISIT, SUBSEQUENT: Primary | ICD-10-CM

## 2023-11-28 DIAGNOSIS — L98.9 FINGER LESION: ICD-10-CM

## 2023-11-28 PROCEDURE — 1123F ACP DISCUSS/DSCN MKR DOCD: CPT | Performed by: NURSE PRACTITIONER

## 2023-11-28 PROCEDURE — G0439 PPPS, SUBSEQ VISIT: HCPCS | Performed by: NURSE PRACTITIONER

## 2023-11-28 PROCEDURE — G8484 FLU IMMUNIZE NO ADMIN: HCPCS | Performed by: NURSE PRACTITIONER

## 2023-11-28 RX ORDER — LORATADINE 10 MG/1
10 TABLET ORAL 2 TIMES DAILY
COMMUNITY

## 2023-11-28 RX ORDER — CHLORHEXIDINE GLUCONATE ORAL RINSE 1.2 MG/ML
SOLUTION DENTAL
COMMUNITY
Start: 2023-11-10 | End: 2023-11-28

## 2023-11-28 RX ORDER — DEXAMETHASONE 0.5 MG/5ML
ELIXIR ORAL
COMMUNITY
Start: 2023-11-14

## 2023-12-11 RX ORDER — LOSARTAN POTASSIUM 25 MG/1
25 TABLET ORAL DAILY
Qty: 90 TABLET | Refills: 2 | Status: SHIPPED | OUTPATIENT
Start: 2023-12-11

## 2023-12-11 NOTE — TELEPHONE ENCOUNTER
Refill request received from [facility]   Requested Prescriptions     Pending Prescriptions Disp Refills    losartan (COZAAR) 25 MG tablet 30 tablet      Sig: Take 1 tablet by mouth daily for high blood pressure     Last Visit Date:11/28/2023   Next Visit Date:Visit date not found     Routed to Kera Pat, [address] for review.      Bebe Art, [address] [facility]

## 2024-01-19 RX ORDER — KETOCONAZOLE 20 MG/ML
SHAMPOO TOPICAL
Qty: 240 ML | Refills: 1 | Status: SHIPPED | OUTPATIENT
Start: 2024-01-19

## 2024-01-19 NOTE — TELEPHONE ENCOUNTER
Refill request received from [facility] for   Requested Prescriptions     Pending Prescriptions Disp Refills    ketoconazole (NIZORAL) 2 % shampoo 240 mL 1     Sig: Apply topically daily as needed.     Last office visit: 11/28/2023   Next office visit: 5/6/2024     Routed to MEENU Hernandez for review.

## 2024-05-02 ENCOUNTER — TELEPHONE (OUTPATIENT)
Age: 89
End: 2024-05-02

## 2024-05-02 NOTE — TELEPHONE ENCOUNTER
Called and spoke with patient about appointment for 5/6/2024 having to be canceled due to her not reestablishing with a provider here in the office. She confirmed understanding of having to contact the find a provider number to get established with another office.

## 2024-08-30 PROBLEM — F33.42 RECURRENT MAJOR DEPRESSIVE DISORDER, IN FULL REMISSION (HCC): Status: ACTIVE | Noted: 2024-08-30

## 2024-08-30 PROBLEM — R12 HEARTBURN: Status: RESOLVED | Noted: 2020-02-17 | Resolved: 2024-08-30

## 2024-08-30 PROBLEM — R49.0 DYSPHONIA: Status: RESOLVED | Noted: 2020-02-17 | Resolved: 2024-08-30

## 2024-08-30 PROBLEM — I70.0 AORTIC ATHEROSCLEROSIS (HCC): Status: ACTIVE | Noted: 2024-08-30

## 2024-08-30 PROBLEM — D68.69 SECONDARY HYPERCOAGULABLE STATE (HCC): Status: ACTIVE | Noted: 2024-08-30

## 2024-08-30 PROBLEM — N18.30 STAGE 3 CHRONIC KIDNEY DISEASE (HCC): Status: ACTIVE | Noted: 2024-08-30

## 2024-08-30 PROBLEM — R06.00 DYSPNEA AND RESPIRATORY ABNORMALITIES: Status: RESOLVED | Noted: 2017-11-17 | Resolved: 2024-08-30

## 2024-08-30 PROBLEM — Z78.9 ADVANCE DIRECTIVE ON FILE: Status: RESOLVED | Noted: 2017-03-23 | Resolved: 2024-08-30

## 2024-08-30 PROBLEM — B35.3 TINEA PEDIS: Status: RESOLVED | Noted: 2020-02-17 | Resolved: 2024-08-30

## 2024-08-30 PROBLEM — R07.89 CHEST TIGHTNESS: Status: RESOLVED | Noted: 2017-11-17 | Resolved: 2024-08-30

## 2024-08-30 PROBLEM — E78.2 MIXED HYPERLIPIDEMIA: Status: RESOLVED | Noted: 2020-02-17 | Resolved: 2024-08-30

## 2024-08-30 PROBLEM — F33.42 RECURRENT MAJOR DEPRESSIVE DISORDER, IN FULL REMISSION (HCC): Status: RESOLVED | Noted: 2024-08-30 | Resolved: 2024-08-30

## 2024-08-30 PROBLEM — R06.89 DYSPNEA AND RESPIRATORY ABNORMALITIES: Status: RESOLVED | Noted: 2017-11-17 | Resolved: 2024-08-30

## 2024-08-30 PROBLEM — R19.7 DIARRHEA: Status: RESOLVED | Noted: 2020-02-17 | Resolved: 2024-08-30

## 2024-08-30 PROBLEM — D69.2 SENILE PURPURA (HCC): Status: ACTIVE | Noted: 2024-08-30

## 2024-08-30 PROBLEM — R42 DIZZINESS AND GIDDINESS: Status: RESOLVED | Noted: 2020-02-17 | Resolved: 2024-08-30

## 2024-12-04 RX ORDER — KETOCONAZOLE 20 MG/ML
SHAMPOO, SUSPENSION TOPICAL
Qty: 360 ML | Refills: 5 | Status: SHIPPED | OUTPATIENT
Start: 2024-12-04

## (undated) DEVICE — CATH DIAG IMPLS PIG 6FRX100CM -- IMPULSE 16599-40

## (undated) DEVICE — TRAY CATH 16F URIN MTR LTX -- CONVERT TO ITEM 363111

## (undated) DEVICE — 3M™ TEGADERM™ TRANSPARENT FILM DRESSING FRAME STYLE, 1626W, 4 IN X 4-3/4 IN (10 CM X 12 CM), 50/CT 4CT/CASE: Brand: 3M™ TEGADERM™

## (undated) DEVICE — AMPLATZ EXTRA STIFF WIRE GUIDE: Brand: AMPLATZ

## (undated) DEVICE — INTENDED FOR TISSUE SEPARATION, AND OTHER PROCEDURES THAT REQUIRE A SHARP SURGICAL BLADE TO PUNCTURE OR CUT.: Brand: BARD-PARKER ® CARBON RIB-BACK BLADES

## (undated) DEVICE — GRADUATED BOWL: Brand: DEVON

## (undated) DEVICE — MICROPUNCTURE INTRODUCER SET SILHOUETTE TRANSITIONLESS WITH STAINLESS STEEL WIRE GUIDE: Brand: MICROPUNCTURE

## (undated) DEVICE — CATH DIAG FRC4 IMPLS 6FRX100CM -- IMPULSE 16599-02

## (undated) DEVICE — PINNACLE INTRODUCER SHEATH: Brand: PINNACLE

## (undated) DEVICE — 6 FOOT DISPOSABLE EXTENSION CABLE WITH SAFE CONNECT / SCREW-DOWN

## (undated) DEVICE — GDWIRE ANGIO SUP STF 0.035IN --

## (undated) DEVICE — STERILE POLYISOPRENE POWDER-FREE SURGICAL GLOVES: Brand: PROTEXIS

## (undated) DEVICE — Device

## (undated) DEVICE — 40418 TRENDELENBURG ONE-STEP ARM PROTECTORS LARGE (1 PAIR): Brand: 40418 TRENDELENBURG ONE-STEP ARM PROTECTORS LARGE (1 PAIR)

## (undated) DEVICE — GUIDEWIRE VASC L260CM DIA0.035IN COIL L15CM FLX TIP L4CM

## (undated) DEVICE — GOWN,SIRUS,FABRNF,XL,20/CS: Brand: MEDLINE

## (undated) DEVICE — MEDI-TRACE CADENCE ADULT, DEFIBRILLATION ELECTRODE -RTS  (10 PR/PK) - PHILIPS: Brand: MEDI-TRACE CADENCE

## (undated) DEVICE — PERCLOSE PROGLIDE™ SUTURE-MEDIATED CLOSURE SYSTEM: Brand: PERCLOSE PROGLIDE™

## (undated) DEVICE — GDWIRE COR 0.035INX260CM -- CONFIDA

## (undated) DEVICE — GUIDEWIRE VASC L260CM DIA0.035IN RAD 3MM J TIP L7CM PTFE

## (undated) DEVICE — GUIDEWIRE VASC L150CM DIA0.035IN FLX TIP L7CM PTFE STR FIX

## (undated) DEVICE — PRESSURE MONITORING SET: Brand: TRUWAVE

## (undated) DEVICE — COVER,TABLE,60X90,STERILE: Brand: MEDLINE

## (undated) DEVICE — DRAPE XR C ARM 41X74IN LF --

## (undated) DEVICE — (D)SHTH DRYSEAL 18FR 28CM -- DISC BY MFR USE ITEM 341000

## (undated) DEVICE — STERILE POLYISOPRENE POWDER-FREE SURGICAL GLOVES WITH EMOLLIENT COATING: Brand: PROTEXIS

## (undated) DEVICE — GUIDEWIRE VASC L260CM DIA0.035IN STR TIP L7CM PTFE FIX COR

## (undated) DEVICE — WRAP SURG W1.31XL1.34M CARD FOR PT 165-172CM THERMOWRP

## (undated) DEVICE — BAG PRESSURE INFUSION 1000ML -- CONVERT TO ITEM 360122

## (undated) DEVICE — INTENDED TO STANDARDIZE OR CAMERAS TO ALLOW FOR THE USE OF THE OR CAMERA COVER: Brand: ASPEN® O.R. CAMERA COVER

## (undated) DEVICE — (D)PREP SKN CHLRAPRP APPL 26ML -- CONVERT TO ITEM 371833

## (undated) DEVICE — PACK PROCEDURE SURG HRT CATH

## (undated) DEVICE — LUB SURG MEDC STRL 2OZ TUBE MC -- MEDICHOICE

## (undated) DEVICE — INFECTION CONTROL KIT SYS

## (undated) DEVICE — REM POLYHESIVE ADULT PATIENT RETURN ELECTRODE: Brand: VALLEYLAB